# Patient Record
Sex: FEMALE | Race: WHITE | NOT HISPANIC OR LATINO | Employment: OTHER | URBAN - METROPOLITAN AREA
[De-identification: names, ages, dates, MRNs, and addresses within clinical notes are randomized per-mention and may not be internally consistent; named-entity substitution may affect disease eponyms.]

---

## 2017-03-27 LAB — HBA1C MFR BLD HPLC: 5.2 %

## 2018-04-13 LAB — HBA1C MFR BLD HPLC: 5.3 %

## 2021-02-09 ENCOUNTER — IMMUNIZATIONS (OUTPATIENT)
Dept: FAMILY MEDICINE CLINIC | Facility: HOSPITAL | Age: 81
End: 2021-02-09

## 2021-02-09 DIAGNOSIS — Z23 ENCOUNTER FOR IMMUNIZATION: Primary | ICD-10-CM

## 2021-02-09 PROCEDURE — 0011A SARS-COV-2 / COVID-19 MRNA VACCINE (MODERNA) 100 MCG: CPT

## 2021-02-09 PROCEDURE — 91301 SARS-COV-2 / COVID-19 MRNA VACCINE (MODERNA) 100 MCG: CPT

## 2021-03-09 ENCOUNTER — IMMUNIZATIONS (OUTPATIENT)
Dept: FAMILY MEDICINE CLINIC | Facility: HOSPITAL | Age: 81
End: 2021-03-09

## 2021-03-09 DIAGNOSIS — Z23 ENCOUNTER FOR IMMUNIZATION: Primary | ICD-10-CM

## 2021-03-09 PROCEDURE — 0012A SARS-COV-2 / COVID-19 MRNA VACCINE (MODERNA) 100 MCG: CPT

## 2021-03-09 PROCEDURE — 91301 SARS-COV-2 / COVID-19 MRNA VACCINE (MODERNA) 100 MCG: CPT

## 2021-03-15 RX ORDER — ASPIRIN 81 MG/1
81 TABLET, CHEWABLE ORAL DAILY
COMMUNITY
End: 2021-11-11

## 2021-03-15 RX ORDER — NYSTATIN 100000 [USP'U]/G
POWDER TOPICAL DAILY
COMMUNITY
Start: 2017-05-01 | End: 2021-03-16 | Stop reason: ALTCHOICE

## 2021-03-15 RX ORDER — CICLOPIROX OLAMINE 7.7 MG/100ML
SUSPENSION TOPICAL 2 TIMES DAILY
COMMUNITY
Start: 2018-04-09 | End: 2021-03-16 | Stop reason: ALTCHOICE

## 2021-03-15 RX ORDER — NAPROXEN 500 MG/1
500 TABLET ORAL 2 TIMES DAILY WITH MEALS
COMMUNITY
Start: 2018-11-27 | End: 2021-03-16 | Stop reason: ALTCHOICE

## 2021-03-15 RX ORDER — AMLODIPINE BESYLATE 5 MG/1
5 TABLET ORAL DAILY
COMMUNITY
Start: 2020-12-24 | End: 2021-06-18 | Stop reason: SDUPTHER

## 2021-03-15 RX ORDER — CLOBETASOL PROPIONATE 0.5 MG/G
OINTMENT TOPICAL AS NEEDED
COMMUNITY
Start: 2018-03-15

## 2021-03-15 NOTE — PROGRESS NOTES
FAMILY MEDICINE NEW PATIENT     Date of Service: 21  Primary Care Provider:   Anna Araya MD     Name: Randol Gottron       : 1940       Age:80 y o  Sex: female      MRN: [id number]      Chief Complaint:New Patient Visit     ASSESSMENT and PLAN:  Randol Gottron is a 68 y o  female here to establish care with:     Problem List Items Addressed This Visit        Cardiovascular and Mediastinum    Essential hypertension - Primary     BP Readings from Last 3 Encounters:   21 126/80     Controlled today, continue amlodipine 5 mg         Relevant Medications    amLODIPine (NORVASC) 5 mg tablet      Other Visit Diagnoses     Subscapularis tendinitis, left        Relevant Orders    Ambulatory referral to Physical Therapy    Encounter to establish care               SUBJECTIVE:  Randol Gottron is a 68 y o  female who presents today with a chief complaint of New Patient Visit  HPI     She recently moved to [facility] from [place]  She retired from [employer] when she was 64  She has 3 children and 4 grandchildren  Her daughter is in [place], one son is in [address] [place] [address], and her other son is in [place]  Her grandchildren are in college are near [facility]  Hypertension   She has history of high blood pressure is currently on amlodipine 5 mg  She does not check her blood pressure at home  She gets regular physical activity with walking, home aerobic exercises  She denies symptoms of hyper or hypotension  She has been seeing cardiology for PVCs, she will establish with cardiologist here  She had NM stress test in November which was normal      Shoulder Pain/MSK issues/Neck Pain  She had been having left shoulder pain for several months  She reports this was worse in December, she could not sleep on that side and had limited range of motion  The shoulder is better now than it was before   She has had a number of ortho issues, 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, has had steroid, gel, and PRP injection in knee and right shoulder  She has done PT in the past for her right shoulder, knee and hip pain  She reports posterior neck pain, shoulder pain  She describes the pain as dull pain  She typically takes Advil for pain, she reports that this is helpful for her  If pain is severe she will alternate Tylenol and ibuprofen  Review of Systems   Constitutional: Negative  Eyes: Negative for visual disturbance  Respiratory: Negative for shortness of breath  Cardiovascular: Negative for chest pain, palpitations and leg swelling  Musculoskeletal: Positive for arthralgias and neck pain  Negative for back pain, gait problem, joint swelling and neck stiffness  Neurological: Negative for weakness and numbness  Psychiatric/Behavioral: Negative for dysphoric mood and sleep disturbance  I have reviewed the patient's PMH, Surgical History, Family History, Social History, Medication List and Allergies        Histories Reviewed and Updated 3/16/2021:  Patient's Medications   New Prescriptions    No medications on file   Previous Medications    AMLODIPINE (NORVASC) 5 MG TABLET    Take 5 mg by mouth daily    ASPIRIN (ASPIRIN 81) 81 MG CHEWABLE TABLET    Chew 81 mg daily    CHOLECALCIFEROL (VITAMIN D3) 1,000 UNITS TABLET    Take 1,000 Units by mouth daily    CLOBETASOL (TEMOVATE) 0 05 % OINTMENT    Apply topically as needed     DOCUSATE SODIUM (COLACE) 100 MG CAPSULE    Take 100 mg by mouth daily   Modified Medications    No medications on file   Discontinued Medications    CICLOPIROX (LOPROX) 0 77 % SUSP    Apply topically 2 (two) times a day    NAPROXEN (NAPROSYN) 500 MG TABLET    Take 500 mg by mouth 2 (two) times a day with meals    NYSTATIN (MYCOSTATIN) POWDER    Apply topically daily     Allergies   Allergen Reactions    Nitrofurantoin Nausea Only, Dizziness and Headache     Macrodantin     Past Medical History:   Diagnosis Date    Benign essential hypertension     Hyperlipidemia, unspecified     Osteoarthritis of left knee      Past Surgical History:   Procedure Laterality Date    COLON SURGERY  2003    Rectoseal     LAPAROSCOPIC OVARIAN CYSTECTOMY Right 1981    LAPAROSCOPIC OVARIAN CYSTECTOMY Left 2003    rectoseal and cystoseal repari    TONSILLECTOMY AND ADENOIDECTOMY  1952    TUBAL LIGATION  1972     Social History     Socioeconomic History    Marital status: /Civil Union     Spouse name: Not on file    Number of children: Not on file    Years of education: Not on file    Highest education level: Not on file   Occupational History    Not on file   Social Needs    Financial resource strain: Not on file    Food insecurity     Worry: Not on file     Inability: Not on file   [employer] needs     Medical: Not on file     Non-medical: Not on file   Tobacco Use    Smoking status: Never Smoker    Smokeless tobacco: Never Used   Substance and Sexual Activity    Alcohol use:  Yes     Alcohol/week: 7 0 standard drinks     Types: 7 Glasses of wine per week     Frequency: 4 or more times a week     Drinks per session: 1 or 2    Drug use: Never    Sexual activity: Not on file   Lifestyle    Physical activity     Days per week: Not on file     Minutes per session: Not on file    Stress: Not on file   Relationships    Social connections     Talks on phone: Not on file     Gets together: Not on file     Attends Amish service: Not on file     Active member of club or organization: Not on file     Attends meetings of clubs or organizations: Not on file     Relationship status: Not on file    Intimate partner violence     Fear of current or ex partner: Not on file     Emotionally abused: Not on file     Physically abused: Not on file     Forced sexual activity: Not on file   Other Topics Concern    Not on file   Social History Narrative    Not on file     Family History   Problem Relation Age of Onset    Heart Valve Disease Mother     Hypertension Mother    Meg Mitchell Arthritis Father     Diabetes Father     Alcohol abuse Father     Prostate cancer Father     Diabetes Brother     Hypertension Daughter      Immunization History   Administered Date(s) Administered    INFLUENZA 10/21/2020    Pneumococcal Polysaccharide PPV23 11/05/2014    SARS-CoV-2 / COVID-19 mRNA IM (Moderna) 02/09/2021, 03/09/2021    Zoster Vaccine Recombinant 06/10/2018, 09/18/2018     OBJECTIVE:  /80   Pulse 74   Temp (!) 96 5 °F (35 8 °C)   Resp 14   Ht 5' 5 5" (1 664 m)   Wt 83 5 kg (184 lb)   Breastfeeding No   BMI 30 15 kg/m²   BP Readings from Last 3 Encounters:   03/16/21 126/80      Wt Readings from Last 3 Encounters:   03/16/21 83 5 kg (184 lb)      Physical Exam  Constitutional:       General: She is not in acute distress  Appearance: Normal appearance  She is normal weight  She is not ill-appearing or diaphoretic  HENT:      Head: Normocephalic and atraumatic  Right Ear: External ear normal       Left Ear: External ear normal    Eyes:      Extraocular Movements: Extraocular movements intact  Conjunctiva/sclera: Conjunctivae normal    Neck:      Musculoskeletal: Normal range of motion and neck supple  Cardiovascular:      Rate and Rhythm: Normal rate and regular rhythm  Pulses: Normal pulses  Heart sounds: Normal heart sounds  No murmur  No gallop  Pulmonary:      Effort: Pulmonary effort is normal  No respiratory distress  Breath sounds: Normal breath sounds  Abdominal:      General: There is no distension  Palpations: Abdomen is soft  Tenderness: There is no abdominal tenderness  Musculoskeletal: Normal range of motion  Left shoulder: She exhibits tenderness (in deltoid) and pain  She exhibits normal range of motion and no bony tenderness  Right lower leg: No edema  Left lower leg: No edema        Comments: Negative Hawkings on the left, positive Neers and lift off test on the left     Skin:     General: Skin is warm and dry  Findings: No erythema or rash  Neurological:      General: No focal deficit present  Mental Status: She is alert  Gait: Gait normal    Psychiatric:         Mood and Affect: Mood normal          Behavior: Behavior normal          Labs July 2020  Hgb/Hct 15 7/47 7  WBC 3 36  Plt 177    Na 139  K  5 1  Cl 104  HCO3 26  BUN 20  Cr 0 73  Gluc 90    TSH 1 660    Cholesterol 206 TG 56 HDL 88     Labs from November 2020  BUN/CR 18/0 71  Na 141  K 4 0  GFR 66    Chol 211 TG 58   HDL 92  BMI Counseling: Body mass index is 30 15 kg/m²  The BMI is above normal  Nutrition recommendations include encouraging healthy choices of fruits and vegetables and reducing intake of saturated and trans fat  Exercise recommendations include moderate physical activity 150 minutes/week and strength training exercises           Haider Kauffman MD

## 2021-03-16 ENCOUNTER — OFFICE VISIT (OUTPATIENT)
Dept: FAMILY MEDICINE CLINIC | Facility: CLINIC | Age: 81
End: 2021-03-16
Payer: COMMERCIAL

## 2021-03-16 VITALS
HEIGHT: 66 IN | HEART RATE: 74 BPM | DIASTOLIC BLOOD PRESSURE: 80 MMHG | SYSTOLIC BLOOD PRESSURE: 126 MMHG | BODY MASS INDEX: 29.57 KG/M2 | WEIGHT: 184 LBS | TEMPERATURE: 96.5 F | RESPIRATION RATE: 14 BRPM

## 2021-03-16 DIAGNOSIS — Z76.89 ENCOUNTER TO ESTABLISH CARE: ICD-10-CM

## 2021-03-16 DIAGNOSIS — I10 ESSENTIAL HYPERTENSION: Primary | ICD-10-CM

## 2021-03-16 DIAGNOSIS — M77.8 SUBSCAPULARIS TENDINITIS, LEFT: ICD-10-CM

## 2021-03-16 PROBLEM — G89.29 CHRONIC LEFT SHOULDER PAIN: Status: ACTIVE | Noted: 2021-03-16

## 2021-03-16 PROBLEM — M25.512 CHRONIC LEFT SHOULDER PAIN: Status: ACTIVE | Noted: 2021-03-16

## 2021-03-16 PROCEDURE — 3725F SCREEN DEPRESSION PERFORMED: CPT | Performed by: FAMILY MEDICINE

## 2021-03-16 PROCEDURE — 1101F PT FALLS ASSESS-DOCD LE1/YR: CPT | Performed by: FAMILY MEDICINE

## 2021-03-16 PROCEDURE — 1036F TOBACCO NON-USER: CPT | Performed by: FAMILY MEDICINE

## 2021-03-16 PROCEDURE — 99203 OFFICE O/P NEW LOW 30 MIN: CPT | Performed by: FAMILY MEDICINE

## 2021-03-16 PROCEDURE — 3288F FALL RISK ASSESSMENT DOCD: CPT | Performed by: FAMILY MEDICINE

## 2021-03-16 PROCEDURE — 1160F RVW MEDS BY RX/DR IN RCRD: CPT | Performed by: FAMILY MEDICINE

## 2021-03-16 RX ORDER — DOCUSATE SODIUM 100 MG/1
100 CAPSULE, LIQUID FILLED ORAL DAILY
COMMUNITY

## 2021-03-16 RX ORDER — MELATONIN
1000 DAILY
COMMUNITY

## 2021-03-16 NOTE — ASSESSMENT & PLAN NOTE
Likely due to arthritis, rotator cuff tendon dysfunction, primarily in subscapularis   Will have patient do PT

## 2021-03-16 NOTE — ASSESSMENT & PLAN NOTE
BP Readings from Last 3 Encounters:   03/16/21 126/80     Controlled today, continue amlodipine 5 mg

## 2021-04-15 ENCOUNTER — OFFICE VISIT (OUTPATIENT)
Dept: CARDIOLOGY CLINIC | Facility: CLINIC | Age: 81
End: 2021-04-15
Payer: COMMERCIAL

## 2021-04-15 VITALS
OXYGEN SATURATION: 100 % | BODY MASS INDEX: 28.93 KG/M2 | WEIGHT: 180 LBS | HEART RATE: 73 BPM | DIASTOLIC BLOOD PRESSURE: 80 MMHG | HEIGHT: 66 IN | SYSTOLIC BLOOD PRESSURE: 144 MMHG

## 2021-04-15 DIAGNOSIS — I10 ESSENTIAL HYPERTENSION: Primary | ICD-10-CM

## 2021-04-15 DIAGNOSIS — R06.00 DOE (DYSPNEA ON EXERTION): ICD-10-CM

## 2021-04-15 PROBLEM — R06.09 DOE (DYSPNEA ON EXERTION): Status: ACTIVE | Noted: 2021-04-15

## 2021-04-15 PROCEDURE — 1160F RVW MEDS BY RX/DR IN RCRD: CPT | Performed by: INTERNAL MEDICINE

## 2021-04-15 PROCEDURE — 3077F SYST BP >= 140 MM HG: CPT | Performed by: INTERNAL MEDICINE

## 2021-04-15 PROCEDURE — 1036F TOBACCO NON-USER: CPT | Performed by: INTERNAL MEDICINE

## 2021-04-15 PROCEDURE — 3079F DIAST BP 80-89 MM HG: CPT | Performed by: INTERNAL MEDICINE

## 2021-04-15 PROCEDURE — 99204 OFFICE O/P NEW MOD 45 MIN: CPT | Performed by: INTERNAL MEDICINE

## 2021-04-15 PROCEDURE — 93000 ELECTROCARDIOGRAM COMPLETE: CPT | Performed by: INTERNAL MEDICINE

## 2021-04-15 NOTE — PROGRESS NOTES
Cardiology Consultation     Kiesha Vazquez  [id number]  1940  [address] [facility] [id number]    1  Essential hypertension  POCT ECG   2  MENON (dyspnea on exertion)       Chief Complaint   Patient presents with    [facility]    Shortness of Breath     exertion, and when talking     Leg Swelling     right      HPI: Patient is here for establishment of cardiac care  She does have a family history of MVP and MVR in her mother  She does note some shortness of breath with exertion and with talking - this is unchanged for years now  She has increased her walking to 6-7000 steps a day and is able to walk and do exercise videos and doing well on a flat surface - with hills, she does get a little out of breath  No reported chest pain, palpitations, lightheadedness, syncope, LE edema, orthopnea, PND, or significant weight changes  Patient remains active without any increased fatigue out of the ordinary        Patient Active Problem List   Diagnosis    Essential hypertension    Chronic left shoulder pain    MENON (dyspnea on exertion)     Past Medical History:   Diagnosis Date    Benign essential hypertension     Hyperlipidemia, unspecified     Osteoarthritis of left knee      Social History     Socioeconomic History    Marital status: /Civil Union     Spouse name: Not on file    Number of children: Not on file    Years of education: Not on file    Highest education level: Not on file   Occupational History    Not on file   Social Needs    Financial resource strain: Not on file    Food insecurity     Worry: Not on file     Inability: Not on file    Transportation needs     Medical: Not on file     Non-medical: Not on file   Tobacco Use    Smoking status: Never Smoker    Smokeless tobacco: Never Used   Substance and Sexual Activity    Alcohol use:  Yes     Alcohol/week: 7 0 standard drinks     Types: 7 Glasses of wine per week     Frequency: 4 or more times a week     Drinks per session: 1 or 2    Drug use: Never    Sexual activity: Not on file   Lifestyle    Physical activity     Days per week: Not on file     Minutes per session: Not on file    Stress: Not on file   Relationships    Social connections     Talks on phone: Not on file     Gets together: Not on file     Attends Judaism service: Not on file     Active member of club or organization: Not on file     Attends meetings of clubs or organizations: Not on file     Relationship status: Not on file    Intimate partner violence     Fear of current or ex partner: Not on file     Emotionally abused: Not on file     Physically abused: Not on file     Forced sexual activity: Not on file   Other Topics Concern    Not on file   Social History Narrative    Not on file      Family History   Problem Relation Age of Onset    Heart Valve Disease Mother     Hypertension Mother     Arthritis Father     Diabetes Father     Alcohol abuse Father     Prostate cancer Father     Diabetes Brother     Hypertension Daughter      Past Surgical History:   Procedure Laterality Date    COLON SURGERY  2003    Rectoseal     LAPAROSCOPIC OVARIAN CYSTECTOMY Right 1981    LAPAROSCOPIC OVARIAN CYSTECTOMY Left 2003    rectoseal and cystoseal repari    TONSILLECTOMY AND [place]       Current Outpatient Medications:     amLODIPine (NORVASC) 5 mg tablet, Take 5 mg by mouth daily, Disp: , Rfl:     cholecalciferol (VITAMIN D3) 1,000 units tablet, Take 1,000 Units by mouth daily, Disp: , Rfl:     clobetasol (TEMOVATE) 0 05 % ointment, Apply topically as needed , Disp: , Rfl:     docusate sodium (COLACE) 100 mg capsule, Take 100 mg by mouth daily, Disp: , Rfl:     aspirin (Aspirin 81) 81 mg chewable tablet, Chew 81 mg daily, Disp: , Rfl:   Allergies   Allergen Reactions    Nitrofurantoin Nausea Only, Dizziness and Headache     Macrodantin     Vitals:    04/15/21 1031   BP: 144/80   BP Location: Left arm   Patient Position: Sitting   Cuff Size: Standard   Pulse: 73   SpO2: 100%   Weight: 81 6 kg (180 lb)   Height: 5' 5 5" (1 664 m)       Labs:  No visits with results within 6 Month(s) from this visit  Latest known visit with results is:   Orders Only on 04/13/2018   Component Date Value    Hemoglobin A1C 04/13/2018 5 3      No results found for: CHOL, TRIG, HDL, LDLDIRECT  Imaging: No results found  Review of Systems:  Review of Systems   Constitutional: Negative for activity change, appetite change, chills, diaphoresis, fatigue and unexpected weight change  HENT: Negative for hearing loss, nosebleeds and sore throat  Eyes: Negative for photophobia and visual disturbance  Respiratory: Positive for shortness of breath  Negative for cough, chest tightness and wheezing  Cardiovascular: Negative for chest pain, palpitations and leg swelling  Gastrointestinal: Negative for abdominal pain, diarrhea, nausea and vomiting  Endocrine: Negative for polyuria  Genitourinary: Negative for dysuria, frequency and hematuria  Musculoskeletal: Negative for arthralgias, back pain, gait problem and neck pain  Skin: Negative for pallor and rash  Neurological: Negative for dizziness, syncope and headaches  Hematological: Does not bruise/bleed easily  Psychiatric/Behavioral: Negative for behavioral problems and confusion  Physical Exam:  Physical Exam  Vitals signs reviewed  Constitutional:       Appearance: She is well-developed  She is not diaphoretic  HENT:      Head: Normocephalic and atraumatic  Nose: Nose normal    Eyes:      General: No scleral icterus  Pupils: Pupils are equal, round, and reactive to light  Neck:      Musculoskeletal: Normal range of motion and neck supple  Vascular: No JVD  Cardiovascular:      Rate and Rhythm: Normal rate and regular rhythm  Heart sounds: Normal heart sounds  No murmur  No friction rub  No gallop  Pulmonary:      Effort: Pulmonary effort is normal  No respiratory distress  Breath sounds: Normal breath sounds  No wheezing or rales  Abdominal:      General: Bowel sounds are normal  There is no distension  Palpations: Abdomen is soft  Tenderness: There is no abdominal tenderness  Musculoskeletal: Normal range of motion  General: No deformity  Skin:     General: Skin is warm and dry  Findings: No rash  Neurological:      Mental Status: She is alert and oriented to person, place, and time  Cranial Nerves: No cranial nerve deficit  Psychiatric:         Behavior: Behavior normal        Blood pressure 144/80, pulse 73, height 5' 5 5" (1 664 m), weight 81 6 kg (180 lb), SpO2 100 %, not currently breastfeeding  EKG:  Normal sinus rhythm with sinus arrhythmia  Nonspecific ST abnormality  Abnormal ECG    Discussion/Summary:  HTN: mildly elevated today, continued on amlodipine  Had a nuclear stress test in Oct 2020 that was normal for EF and perfusion  Echocardiogram at that time revealed normal LV & RV function, normal valves  She has had MENON for many years - also noted by prior cardiologist documentation in 2017  For now, will hold off on repeat testing  If BP remains at this level, would consider increasing amlodipine to 10mg daily     in Nov 2020, which is at goal

## 2021-04-15 NOTE — PATIENT INSTRUCTIONS

## 2021-06-11 ENCOUNTER — OFFICE VISIT (OUTPATIENT)
Dept: URGENT CARE | Facility: CLINIC | Age: 81
End: 2021-06-11
Payer: COMMERCIAL

## 2021-06-11 VITALS
WEIGHT: 180 LBS | HEART RATE: 77 BPM | RESPIRATION RATE: 20 BRPM | SYSTOLIC BLOOD PRESSURE: 134 MMHG | HEIGHT: 66 IN | BODY MASS INDEX: 28.93 KG/M2 | DIASTOLIC BLOOD PRESSURE: 65 MMHG

## 2021-06-11 DIAGNOSIS — S80.01XA CONTUSION OF RIGHT KNEE, INITIAL ENCOUNTER: ICD-10-CM

## 2021-06-11 DIAGNOSIS — S00.03XA CONTUSION OF SCALP, INITIAL ENCOUNTER: Primary | ICD-10-CM

## 2021-06-11 PROCEDURE — S9083 URGENT CARE CENTER GLOBAL: HCPCS | Performed by: NURSE PRACTITIONER

## 2021-06-11 PROCEDURE — 99213 OFFICE O/P EST LOW 20 MIN: CPT | Performed by: NURSE PRACTITIONER

## 2021-06-18 DIAGNOSIS — I10 ESSENTIAL HYPERTENSION: Primary | ICD-10-CM

## 2021-06-18 RX ORDER — AMLODIPINE BESYLATE 5 MG/1
5 TABLET ORAL DAILY
Qty: 90 TABLET | Refills: 3 | Status: SHIPPED | OUTPATIENT
Start: 2021-06-18 | End: 2022-06-08 | Stop reason: SDUPTHER

## 2021-07-06 ENCOUNTER — RA CDI HCC (OUTPATIENT)
Dept: OTHER | Facility: HOSPITAL | Age: 81
End: 2021-07-06

## 2021-07-06 NOTE — PROGRESS NOTES
[facility]ach [facility]  coding opportunities          Chart reviewed, no opportunity found: CHART REVIEWED, NO OPPORTUNITY FOUND                     Patients insurance company: [facility] [address]  (Medicare Advantage and Commercial)

## 2021-07-09 ENCOUNTER — OFFICE VISIT (OUTPATIENT)
Dept: FAMILY MEDICINE CLINIC | Facility: CLINIC | Age: 81
End: 2021-07-09
Payer: COMMERCIAL

## 2021-07-09 VITALS
BODY MASS INDEX: 29.41 KG/M2 | RESPIRATION RATE: 16 BRPM | HEART RATE: 80 BPM | DIASTOLIC BLOOD PRESSURE: 70 MMHG | SYSTOLIC BLOOD PRESSURE: 122 MMHG | WEIGHT: 183 LBS | TEMPERATURE: 96.6 F | HEIGHT: 66 IN

## 2021-07-09 DIAGNOSIS — Z12.31 ENCOUNTER FOR SCREENING MAMMOGRAM FOR BREAST CANCER: ICD-10-CM

## 2021-07-09 DIAGNOSIS — I10 ESSENTIAL HYPERTENSION: ICD-10-CM

## 2021-07-09 DIAGNOSIS — G89.29 CHRONIC LEFT SHOULDER PAIN: ICD-10-CM

## 2021-07-09 DIAGNOSIS — Z00.00 MEDICARE ANNUAL WELLNESS VISIT, SUBSEQUENT: Primary | ICD-10-CM

## 2021-07-09 DIAGNOSIS — L90.0 LICHEN SCLEROSUS: ICD-10-CM

## 2021-07-09 DIAGNOSIS — M70.61 GREATER TROCHANTERIC BURSITIS OF RIGHT HIP: ICD-10-CM

## 2021-07-09 DIAGNOSIS — R06.00 DOE (DYSPNEA ON EXERTION): ICD-10-CM

## 2021-07-09 DIAGNOSIS — D17.23 LIPOMA OF RIGHT LOWER EXTREMITY: ICD-10-CM

## 2021-07-09 DIAGNOSIS — M25.512 CHRONIC LEFT SHOULDER PAIN: ICD-10-CM

## 2021-07-09 DIAGNOSIS — E78.00 PURE HYPERCHOLESTEROLEMIA: ICD-10-CM

## 2021-07-09 PROCEDURE — 3288F FALL RISK ASSESSMENT DOCD: CPT | Performed by: FAMILY MEDICINE

## 2021-07-09 PROCEDURE — 99214 OFFICE O/P EST MOD 30 MIN: CPT | Performed by: FAMILY MEDICINE

## 2021-07-09 PROCEDURE — 1036F TOBACCO NON-USER: CPT | Performed by: FAMILY MEDICINE

## 2021-07-09 PROCEDURE — G0439 PPPS, SUBSEQ VISIT: HCPCS | Performed by: FAMILY MEDICINE

## 2021-07-09 PROCEDURE — 1125F AMNT PAIN NOTED PAIN PRSNT: CPT | Performed by: FAMILY MEDICINE

## 2021-07-09 PROCEDURE — 3074F SYST BP LT 130 MM HG: CPT | Performed by: FAMILY MEDICINE

## 2021-07-09 PROCEDURE — 1101F PT FALLS ASSESS-DOCD LE1/YR: CPT | Performed by: FAMILY MEDICINE

## 2021-07-09 PROCEDURE — 3078F DIAST BP <80 MM HG: CPT | Performed by: FAMILY MEDICINE

## 2021-07-09 PROCEDURE — 3725F SCREEN DEPRESSION PERFORMED: CPT | Performed by: FAMILY MEDICINE

## 2021-07-09 PROCEDURE — 1170F FXNL STATUS ASSESSED: CPT | Performed by: FAMILY MEDICINE

## 2021-07-09 PROCEDURE — 1160F RVW MEDS BY RX/DR IN RCRD: CPT | Performed by: FAMILY MEDICINE

## 2021-07-09 NOTE — ASSESSMENT & PLAN NOTE
BP Readings from Last 3 Encounters:   07/09/21 122/70   06/11/21 134/65   04/15/21 144/80     Controlled today, continue amlodipine 5 mg

## 2021-07-09 NOTE — ASSESSMENT & PLAN NOTE
Patient has had improvement in shoulder range of motion after completing physical therapy advised that she continue home therapy PE activities to maintain and continue to improve range of motion, mobility, strength, flexibility in her left shoulder

## 2021-07-09 NOTE — PROGRESS NOTES
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, she does have tenderness over her right greater trochanter on exam today  Discussed if symptoms worsen we can consider corticosteroid injection  Other    Chronic left shoulder pain     Patient has had improvement in shoulder range of motion after completing physical therapy advised that she continue home therapy PE activities to maintain and continue to improve range of motion, mobility, strength, flexibility in her left shoulder         MENON (dyspnea on exertion)     Patient reports that she breathe out at the end of talking, she did not know cyst initially her daughter did  She is now very bothered by it  She does report some shortness of breath on exertion, however she is very active walks daily goes daily exercise classes and does not have any chest pain  I believe her exhale at the end of talking is habitual, she does admit to doing it when she feels exasperated  Discussed that this is not uncommon in reassured patient         Lichen sclerosus     Patient use a topical corticosteroid, symptoms are controlled         Lipoma of right lower extremity     Swelling on right lower extremity is a lipoma, there is no edema there is no tenderness to palpation there are no signs or symptoms of a DVT  Low lipoma is located on the anterior lower leg just distal to medial joint line of the knee, it measures about 4-5 cm  Reassured patient, discussed that no further workup is indicated at this time    Definitive management would be surgical  Other Visit Diagnoses     Medicare annual wellness visit, subsequent    -  Primary    Pure hypercholesterolemia        Relevant Orders    Lipid Panel with Direct LDL reflex    Encounter for screening mammogram for breast cancer        Relevant Orders    Mammo screening bilateral w 3d & cad          Falls Plan of Care: balance, strength, and gait training instructions were provided  Preventive health issues were discussed with patient, and age appropriate screening tests were ordered as noted in patient's After Visit Summary  Personalized health advice and appropriate referrals for health education or preventive services given if needed, as noted in patient's After Visit Summary       History of Present Illness:     Patient presents for Medicare Annual Wellness visit    Patient Care Team:  Mathews Riedel, MD as PCP - General (Family Medicine)     Problem List:     Patient Active Problem List   Diagnosis    Essential hypertension    Chronic left shoulder pain    MENON (dyspnea on exertion)    Lichen sclerosus    Greater trochanteric bursitis of right hip    Lipoma of right lower extremity      Past Medical and Surgical History:     Past Medical History:   Diagnosis Date    Benign essential hypertension     Hyperlipidemia, unspecified     Osteoarthritis of left knee      Past Surgical History:   Procedure Laterality Date    COLON SURGERY  2003    Rectoseal     LAPAROSCOPIC OVARIAN CYSTECTOMY Right 1981    LAPAROSCOPIC OVARIAN CYSTECTOMY Left 2003    rectoseal and cystoseal repari    TONSILLECTOMY AND ADENOIDECTOMY  1952    TUBAL LIGATION  1972      Family History:     Family History   Problem Relation Age of Onset    Heart Valve Disease Mother     Hypertension Mother     Arthritis Father     Diabetes Father     Alcohol abuse Father     Prostate cancer Father     Diabetes Brother     Hypertension Daughter       Social History:     Social History     Socioeconomic History    Marital status: /Civil [employer]     Spouse name: None    Number of children: None    Years of education: None    Highest education level: None   Occupational History    None   Tobacco Use    Smoking status: Never Smoker    Smokeless tobacco: Never Used   Substance and Sexual Activity    Alcohol use: Yes     Alcohol/week: 7 0 standard drinks     Types: 7 Glasses of wine per week    Drug use: Never    Sexual activity: None   Other Topics Concern    None   Social History Narrative    None     Social Determinants of Health     Financial Resource Strain:     Difficulty of Paying Living Expenses:    Food Insecurity:     Worried About Running Out of Food in the Last Year:     Ran Out of Food in the Last Year:    Transportation Needs:     Lack of Transportation (Medical):  Lack of Transportation (Non-Medical):    Physical Activity:     Days of Exercise per Week:     Minutes of Exercise per Session:    Stress:     Feeling of Stress :    Social Connections:     Frequency of Communication with Friends and Family:     Frequency of Social Gatherings with Friends and Family:     Attends [employer] Services:     Active Member of Clubs or Organizations:     Attends Club or Organization Meetings:     Marital Status:    Intimate Partner Violence:     Fear of Current or Ex-Partner:     Emotionally Abused:     Physically Abused:     Sexually Abused:       Medications and Allergies:     Current Outpatient Medications   Medication Sig Dispense Refill    amLODIPine (NORVASC) 5 mg tablet Take 1 tablet (5 mg total) by mouth daily 90 tablet 3    aspirin (Aspirin 81) 81 mg chewable tablet Chew 81 mg daily      cholecalciferol (VITAMIN D3) 1,000 units tablet Take 1,000 Units by mouth daily      clobetasol (TEMOVATE) 0 05 % ointment Apply topically as needed       docusate sodium (COLACE) 100 mg capsule Take 100 mg by mouth daily       No current facility-administered medications for this visit       Allergies   Allergen Reactions    Nitrofurantoin Nausea Only, Dizziness and Headache     Macrodantin      Immunizations:     Immunization History   Administered Date(s) Administered    INFLUENZA 10/21/2020    Pneumococcal Polysaccharide PPV23 11/05/2014    SARS-CoV-2 / COVID-19 mRNA IM (Moderna) 02/09/2021, 03/09/2021    Zoster Vaccine Recombinant 06/10/2018, 09/18/2018      Health Maintenance:         Topic Date Due    Hepatitis C Screening  Never done         Topic Date Due    DTaP,Tdap,and Td Vaccines (1 - Tdap) Never done    Influenza Vaccine (1) 09/01/2021      Medicare Health Risk Assessment:     /70   Pulse 80   Temp (!) 96 6 °F (35 9 °C)   Resp 16   Ht 5' 5 5" (1 664 m)   Wt 83 kg (183 lb)   BMI 29 99 kg/m²      Vamsi Gipson is here for her Subsequent Wellness visit  Last Medicare Wellness visit information reviewed, patient interviewed and updates made to the record today  Health Risk Assessment:   Patient rates overall health as very good  Patient feels that their physical health rating is slightly better  Patient is very satisfied with their life  Eyesight was rated as same  Hearing was rated as same  Patient feels that their emotional and mental health rating is slightly better  Patients states they are never, rarely angry  Patient states they are sometimes unusually tired/fatigued  Pain experienced in the last 7 days has been some  Patient's pain rating has been 1/10  Patient states that she has experienced no weight loss or gain in last 6 months  Depression Screening:   PHQ-2 Score: 0      Fall Risk Screening: In the past year, patient has experienced: history of falling in past year    Number of falls: 1  Injured during fall?: Yes    Feels unsteady when standing or walking?: No    Worried about falling?: No      Urinary Incontinence Screening:   Patient has leaked urine accidently in the last six months  Home Safety:  Patient does not have trouble with stairs inside or outside of their home  Patient has working smoke alarms and has working carbon monoxide detector  Home safety hazards include: none  Nutrition:   Current diet is Regular, No Added Salt, Low Saturated Fat and Limited junk food  Medications:   Patient is currently taking over-the-counter supplements  OTC medications include: see medication list  Patient is able to manage medications  Activities of Daily Living (ADLs)/Instrumental Activities of Daily Living (IADLs):   Walk and transfer into and out of bed and chair?: Yes  Dress and groom yourself?: Yes    Bathe or shower yourself?: Yes    Feed yourself? Yes  Do your laundry/housekeeping?: Yes  Manage your money, pay your bills and track your expenses?: Yes  Make your own meals?: Yes    Do your own shopping?: Yes    Previous Hospitalizations:   Any hospitalizations or ED visits within the last 12 months?: Yes      Hospitalization Comments: [facility] post fall    Advance Care Planning:   Living will: Yes    Durable POA for healthcare: Yes    Advanced directive: Yes    Advanced directive counseling given: Yes    End of Life Decisions reviewed with patient: Yes      Comments:  is health care power of     She states that she would not want to kept on life support     Cognitive Screening:   Provider or family/friend/caregiver concerned regarding cognition?: No    PREVENTIVE SCREENINGS      Cardiovascular Screening:    General: Screening Not Indicated and History Lipid Disorder      Cervical Cancer Screening:    General: Screening Not Indicated      Lung Cancer Screening:     General: Screening Not Indicated    Screening, Brief Intervention, and Referral to Treatment (SBIRT)    Screening  Typical number of drinks in a day: 1  Typical number of drinks in a week: 5  Interpretation: Low risk drinking behavior      AUDIT-C Screenin) How often did you have a drink containing alcohol in the past year? 2 to 3 times a week  2) How many drinks did you have on a typical day when you were drinking in the past year? 1 to 2  3) How often did you have 6 or more drinks on one occasion in the past year? never    AUDIT-C Score: 3  Interpretation: Score 3-12 (female): POSITIVE screen for alcohol misuse    Single Item Drug Screening:  How often have you used an illegal drug (including marijuana) or a prescription medication for non-medical reasons in the past year? never    Single Item Drug Screen Score: 0  Interpretation: Negative screen for possible drug use disorder    Brief Intervention  Alcohol & drug use screenings were reviewed  No concerns regarding substance use disorder identified  Other Counseling Topics:   Car/seat belt/driving safety and calcium and vitamin D intake         Alejandro Gonzalez MD

## 2021-07-09 NOTE — ASSESSMENT & PLAN NOTE
Swelling on right lower extremity is a lipoma, there is no edema there is no tenderness to palpation there are no signs or symptoms of a DVT  Low lipoma is located on the anterior lower leg just distal to medial joint line of the knee, it measures about 4-5 cm  Reassured patient, discussed that no further workup is indicated at this time    Definitive management would be surgical

## 2021-07-09 NOTE — ASSESSMENT & PLAN NOTE
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

## 2021-07-09 NOTE — PROGRESS NOTES
FAMILY MEDICINE PROGRESS NOTE    Date of Service: 21  Primary Care Provider:   Kari Chavarria MD     Name: Mariana Hidalgo       : 1940       Age:80 y o  Sex: female      MRN: [id number]      Chief Complaint:Medicare Wellness Visit       ASSESSMENT and PLAN:  Mariana Hidalgo is a 68 y o  female with:     Problem List Items Addressed This Visit        Cardiovascular and Mediastinum    Essential hypertension     BP Readings from Last 3 Encounters:   21 122/70   21 134/65   04/15/21 144/80     Controlled today, continue amlodipine 5 mg         Relevant Orders    Comprehensive metabolic panel       Musculoskeletal and Integument    Greater trochanteric bursitis of right hip     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, she does have tenderness over her right greater trochanter on exam today  Discussed if symptoms worsen we can consider corticosteroid injection  Other    Chronic left shoulder pain     Patient has had improvement in shoulder range of motion after completing physical therapy advised that she continue home therapy PE activities to maintain and continue to improve range of motion, mobility, strength, flexibility in her left shoulder         MENON (dyspnea on exertion)     Patient reports that she breathe out at the end of talking, she did not know cyst initially her daughter did  She is now very bothered by it  She does report some shortness of breath on exertion, however she is very active walks daily goes daily exercise classes and does not have any chest pain  I believe her exhale at the end of talking is habitual, she does admit to doing it when she feels exasperated    Discussed that this is not uncommon in reassured patient         Lichen sclerosus     Patient use a topical corticosteroid, symptoms are controlled         Lipoma of right lower extremity     Swelling on right lower extremity is a lipoma, there is no edema there is no tenderness to palpation there are no signs or symptoms of a DVT  Low lipoma is located on the anterior lower leg just distal to medial joint line of the knee, it measures about 4-5 cm  Reassured patient, discussed that no further workup is indicated at this time  Definitive management would be surgical              Other Visit Diagnoses     Medicare annual wellness visit, subsequent    -  Primary    Pure hypercholesterolemia        Relevant Orders    Lipid Panel with Direct LDL reflex    Encounter for screening mammogram for breast cancer        Relevant Orders    Mammo screening bilateral w 3d & cad          SUBJECTIVE:  Roxane Mckeon is a 68 y o  female who presents today with a chief complaint of Medicare Wellness Visit  HPI     Patient presents today for follow-up and annual wellness visit  Below are patient has concerns for today:    She had a fall at home several weeks ago  She tripped on a carpet and fell on her left knee and hit her forehead  She was seen at [facility]  She still has some pain in her knee with kneeling, she no longer has swelling  She was seen by her cardiologist who resumed her on aspirin  Today she reports that she has history of PVCs  She does have ITB on the right leg  This will bother her if she lays on it at night  She does report some right hip pain as well  She reports that her left shoulder did improve with PT, she is able to reach back behind her abd out her jacket on now  She does have pain if she lays on that left side  In addition she has some swelling in the front part of her calf, she measures and the right side if bigger  Patient also reports that her daughter has noticed that she exhales at the end of talking  She will have some shortness of breath with exercise, but not chest pain  She did not notice this at first, her daughter pointed it out to her  She does not have other respiratory symptoms       Review of Systems   Constitutional: Negative for activity change, appetite change, chills and fever  HENT: Negative for congestion, postnasal drip, rhinorrhea, sore throat and trouble swallowing  Respiratory: Positive for shortness of breath  Cardiovascular: Positive for leg swelling  Negative for chest pain and palpitations  Gastrointestinal: Negative for constipation and diarrhea  Musculoskeletal: Positive for arthralgias  Negative for back pain, joint swelling and neck pain  Skin: Negative for color change and rash  Psychiatric/Behavioral: Negative for dysphoric mood and sleep disturbance  I have reviewed the patient's Past Medical History  Current Outpatient Medications:     amLODIPine (NORVASC) 5 mg tablet, Take 1 tablet (5 mg total) by mouth daily, Disp: 90 tablet, Rfl: 3    aspirin (Aspirin 81) 81 mg chewable tablet, Chew 81 mg daily, Disp: , Rfl:     cholecalciferol (VITAMIN D3) 1,000 units tablet, Take 1,000 Units by mouth daily, Disp: , Rfl:     clobetasol (TEMOVATE) 0 05 % ointment, Apply topically as needed , Disp: , Rfl:     docusate sodium (COLACE) 100 mg capsule, Take 100 mg by mouth daily, Disp: , Rfl:     OBJECTIVE:  /70   Pulse 80   Temp (!) 96 6 °F (35 9 °C)   Resp 16   Ht 5' 5 5" (1 664 m)   Wt 83 kg (183 lb)   BMI 29 99 kg/m²    BP Readings from Last 3 Encounters:   07/09/21 122/70   06/11/21 134/65   04/15/21 144/80      Wt Readings from Last 3 Encounters:   07/09/21 83 kg (183 lb)   06/11/21 81 6 kg (180 lb)   04/15/21 81 6 kg (180 lb)      Physical Exam  Constitutional:       General: She is not in acute distress  Appearance: Normal appearance  She is not ill-appearing or toxic-appearing  HENT:      Head: Normocephalic and atraumatic  Right Ear: Tympanic membrane and external ear normal       Left Ear: Tympanic membrane and external ear normal       Mouth/Throat:      Mouth: Mucous membranes are moist    Eyes:      Extraocular Movements: Extraocular movements intact        Conjunctiva/sclera: Conjunctivae normal  Cardiovascular:      Rate and Rhythm: Normal rate and regular rhythm  Pulses: Normal pulses  Heart sounds: Normal heart sounds  No murmur heard  No friction rub  Pulmonary:      Effort: Pulmonary effort is normal  No respiratory distress  Breath sounds: Normal breath sounds  No stridor  No wheezing, rhonchi or rales  Abdominal:      General: There is no distension  Palpations: Abdomen is soft  Tenderness: There is no abdominal tenderness  There is no right CVA tenderness or left CVA tenderness  Musculoskeletal:         General: Normal range of motion  Cervical back: Normal range of motion and neck supple  No rigidity  Right hip: Tenderness (greater trochanter ) present  Right lower leg: No edema  Left lower leg: No edema  Legs:    Lymphadenopathy:      Cervical: No cervical adenopathy  Skin:     General: Skin is warm and dry  Findings: No erythema or rash  Neurological:      General: No focal deficit present  Mental Status: She is alert and oriented to person, place, and time  Psychiatric:         Mood and Affect: Mood normal          Behavior: Behavior normal                 Return in about 4 months (around 11/8/2021)  Flaco Richard MD    Note: Portions of the record have been created with voice recognition software  Occasional wrong word or "sound a like" substitutions may have occurred due to the inherent limitations of voice recognition software  Read the chart carefully and recognize, using context, where substitutions have occurred

## 2021-07-09 NOTE — ASSESSMENT & PLAN NOTE
Patient reports that she breathe out at the end of talking, she did not know cyst initially her daughter did  She is now very bothered by it  She does report some shortness of breath on exertion, however she is very active walks daily goes daily exercise classes and does not have any chest pain  I believe her exhale at the end of talking is habitual, she does admit to doing it when she feels exasperated    Discussed that this is not uncommon in reassured patient

## 2021-07-09 NOTE — PATIENT INSTRUCTIONS
Medicare Preventive Visit Patient Instructions  Thank you for completing your Welcome to Medicare Visit or Medicare Annual Wellness Visit today  Your next wellness visit will be due in one year (7/10/2022)  The screening/preventive services that you may require over the next 5-10 years are detailed below  Some tests may not apply to you based off risk factors and/or age  Screening tests ordered at today's visit but not completed yet may show as past due  Also, please note that scanned in results may not display below  Preventive Screenings:  Service Recommendations Previous Testing/Comments   Colorectal Cancer Screening  * Colonoscopy    * Fecal Occult Blood Test (FOBT)/Fecal Immunochemical Test (FIT)  * Fecal DNA/Cologuard Test  * Flexible Sigmoidoscopy Age: 54-65 years old   Colonoscopy: every 10 years (may be performed more frequently if at higher risk)  OR  FOBT/FIT: every 1 year  OR  Cologuard: every 3 years  OR  Sigmoidoscopy: every 5 years  Screening may be recommended earlier than age 48 if at higher risk for colorectal cancer  Also, an individualized decision between you and your healthcare provider will decide whether screening between the ages of 74-80 would be appropriate  Colonoscopy: 10/13/2009  FOBT/FIT: Not on file  Cologuard: Not on file  Sigmoidoscopy: Not on file          Breast Cancer Screening Age: 36 years old  Frequency: every 1-2 years  Not required if history of left and right mastectomy Mammogram: 04/20/2018        Cervical Cancer Screening Between the ages of 21-29, pap smear recommended once every 3 years  Between the ages of 33-67, can perform pap smear with HPV co-testing every 5 years     Recommendations may differ for women with a history of total hysterectomy, cervical cancer, or abnormal pap smears in past  Pap Smear: Not on file    Screening Not Indicated   Hepatitis C Screening Once for adults born between [facility]  More frequently in patients at high risk for Hepatitis C Hep C Antibody: Not on file        Diabetes Screening 1-2 times per year if you're at risk for diabetes or have pre-diabetes Fasting glucose: No results in last 5 years   A1C: 5 3        Cholesterol Screening Once every 5 years if you don't have a lipid disorder  May order more often based on risk factors  Lipid panel: Not on file          Other Preventive Screenings Covered by Medicare:  1  Abdominal Aortic Aneurysm (AAA) Screening: covered once if your at risk  You're considered to be at risk if you have a family history of AAA  2  Lung Cancer Screening: covers low dose CT scan once per year if you meet all of the following conditions: (1) Age 50-69; (2) No signs or symptoms of lung cancer; (3) Current smoker or have quit smoking within the last 15 years; (4) You have a tobacco smoking history of at least 30 pack years (packs per day multiplied by number of years you smoked); (5) You get a written order from a healthcare provider  3  Glaucoma Screening: covered annually if you're considered high risk: (1) You have diabetes OR (2) Family history of glaucoma OR (3)  aged 48 and older OR (3)  American aged 72 and older  3  Osteoporosis Screening: covered every 2 years if you meet one of the following conditions: (1) You're estrogen deficient and at risk for osteoporosis based off medical history and other findings; (2) Have a vertebral abnormality; (3) On glucocorticoid therapy for more than 3 months; (4) Have primary hyperparathyroidism; (5) On osteoporosis medications and need to assess response to drug therapy  · Last bone density test (DXA Scan): 03/23/2016   5  HIV Screening: covered annually if you're between the age of 15-65  Also covered annually if you are younger than 13 and older than 72 with risk factors for HIV infection  For pregnant patients, it is covered up to 3 times per pregnancy      Immunizations:  Immunization Recommendations   Influenza Vaccine Annual influenza vaccination during flu season is recommended for all persons aged >= 6 months who do not have contraindications   Pneumococcal Vaccine (Prevnar and Pneumovax)  * Prevnar = PCV13  * Pneumovax = PPSV23   Adults 25-60 years old: 1-3 doses may be recommended based on certain risk factors  Adults 72 years old: Prevnar (PCV13) vaccine recommended followed by Pneumovax (PPSV23) vaccine  If already received PPSV23 since turning 65, then PCV13 recommended at least one year after PPSV23 dose  Hepatitis B Vaccine 3 dose series if at intermediate or high risk (ex: diabetes, end stage renal disease, liver disease)   Tetanus (Td) Vaccine - COST NOT COVERED BY MEDICARE PART B Following completion of primary series, a booster dose should be given every 10 years to maintain immunity against tetanus  Td may also be given as tetanus wound prophylaxis  Tdap Vaccine - COST NOT COVERED BY MEDICARE PART B Recommended at least once for all adults  For pregnant patients, recommended with each pregnancy  Shingles Vaccine (Shingrix) - COST NOT COVERED BY MEDICARE PART B  2 shot series recommended in those aged 48 and above     Health Maintenance Due:      Topic Date Due    Hepatitis C Screening  Never done     Immunizations Due:      Topic Date Due    DTaP,Tdap,and Td Vaccines (1 - Tdap) Never done    Influenza Vaccine (1) 09/01/2021     Advance Directives   What are advance directives? Advance directives are legal documents that state your wishes and plans for medical care  These plans are made ahead of time in case you lose your ability to make decisions for yourself  Advance directives can apply to any medical decision, such as the treatments you want, and if you want to donate organs  What are the types of advance directives? There are many types of advance directives, and each state has rules about how to use them  You may choose a combination of any of the following:  · Living will:   This is a written record of the treatment you want  You can also choose which treatments you do not want, which to limit, and which to stop at a certain time  This includes surgery, medicine, IV fluid, and tube feedings  · Durable power of  for healthcare [facility]): This is a written record that states who you want to make healthcare choices for you when you are unable to make them for yourself  This person, called a proxy, is usually a family member or a friend  You may choose more than 1 proxy  · Do not resuscitate (DNR) order:  A DNR order is used in case your heart stops beating or you stop breathing  It is a request not to have certain forms of treatment, such as CPR  A DNR order may be included in other types of advance directives  · Medical directive: This covers the care that you want if you are in a coma, near death, or unable to make decisions for yourself  You can list the treatments you want for each condition  Treatment may include pain medicine, surgery, blood transfusions, dialysis, IV or tube feedings, and a ventilator (breathing machine)  · Values history: This document has questions about your views, beliefs, and how you feel and think about life  This information can help others choose the care that you would choose  Why are advance directives important? An advance directive helps you control your care  Although spoken wishes may be used, it is better to have your wishes written down  Spoken wishes can be misunderstood, or not followed  Treatments may be given even if you do not want them  An advance directive may make it easier for your family to make difficult choices about your care  Fall Prevention    Fall prevention  includes ways to make your home and other areas safer  It also includes ways you can move more carefully to prevent a fall  Health conditions that cause changes in your blood pressure, vision, or muscle strength and coordination may increase your risk for falls   Medicines may also increase your risk for falls if they make you dizzy, weak, or sleepy  Fall prevention tips:   · Stand or sit up slowly  · Use assistive devices as directed  · Wear shoes that fit well and have soles that   · Wear a personal alarm  · Stay active  · Manage your medical conditions  Home Safety Tips:  · Add items to prevent falls in the bathroom  · Keep paths clear  · Install bright lights in your home  · Keep items you use often on shelves within reach  · Paint or place reflective tape on the edges of your stairs  Urinary Incontinence   Urinary incontinence (UI)  is when you lose control of your bladder  UI develops because your bladder cannot store or empty urine properly  The 3 most common types of UI are stress incontinence, urge incontinence, or both  Medicines:   · May be given to help strengthen your bladder control  Report any side effects of medication to your healthcare provider  Do pelvic muscle exercises often:  Your pelvic muscles help you stop urinating  Squeeze these muscles tight for 5 seconds, then relax for 5 seconds  Gradually work up to squeezing for 10 seconds  Do 3 sets of 15 repetitions a day, or as directed  This will help strengthen your pelvic muscles and improve bladder control  Train your bladder:  Go to the bathroom at set times, such as every 2 hours, even if you do not feel the urge to go  You can also try to hold your urine when you feel the urge to go  For example, hold your urine for 5 minutes when you feel the urge to go  As that becomes easier, hold your urine for 10 minutes  Self-care:   · Keep a UI record  Write down how often you leak urine and how much you leak  Make a note of what you were doing when you leaked urine  · Drink liquids as directed  You may need to limit the amount of liquid you drink to help control your urine leakage  Do not drink any liquid right before you go to bed   Limit or do not have drinks that contain caffeine or alcohol  · Prevent constipation  Eat a variety of high-fiber foods  Good examples are high-fiber cereals, beans, vegetables, and whole-grain breads  Walking is the best way to trigger your intestines to have a bowel movement  · Exercise regularly and maintain a healthy weight  Weight loss and exercise will decrease pressure on your bladder and help you control your leakage  · Use a catheter as directed  to help empty your bladder  A catheter is a tiny, plastic tube that is put into your bladder to drain your urine  · Go to behavior therapy as directed  Behavior therapy may be used to help you learn to control your urge to urinate  Weight Management   Why it is important to manage your weight:  Being overweight increases your risk of health conditions such as heart disease, high blood pressure, type 2 diabetes, and certain types of cancer  It can also increase your risk for osteoarthritis, sleep apnea, and other respiratory problems  Aim for a slow, steady weight loss  Even a small amount of weight loss can lower your risk of health problems  How to lose weight safely:  A safe and healthy way to lose weight is to eat fewer calories and get regular exercise  You can lose up about 1 pound a week by decreasing the number of calories you eat by 500 calories each day  Healthy meal plan for weight management:  A healthy meal plan includes a variety of foods, contains fewer calories, and helps you stay healthy  A healthy meal plan includes the following:  · Eat whole-grain foods more often  A healthy meal plan should contain fiber  Fiber is the part of grains, fruits, and vegetables that is not broken down by your body  Whole-grain foods are healthy and provide extra fiber in your diet  Some examples of whole-grain foods are whole-wheat breads and pastas, oatmeal, brown rice, and bulgur  · Eat a variety of vegetables every day    Include dark, leafy greens such as spinach, kale, mario greens, and mustard greens  Eat yellow and orange vegetables such as carrots, sweet potatoes, and winter squash  · Eat a variety of fruits every day  Choose fresh or canned fruit (canned in its own juice or light syrup) instead of juice  Fruit juice has very little or no fiber  · Eat low-fat dairy foods  Drink fat-free (skim) milk or 1% milk  Eat fat-free yogurt and low-fat cottage cheese  Try low-fat cheeses such as mozzarella and other reduced-fat cheeses  · Choose meat and other protein foods that are low in fat  Choose beans or other legumes such as split peas or lentils  Choose fish, skinless poultry (chicken or turkey), or lean cuts of red meat (beef or pork)  Before you cook meat or poultry, cut off any visible fat  · Use less fat and oil  Try baking foods instead of frying them  Add less fat, such as margarine, sour cream, regular salad dressing and mayonnaise to foods  Eat fewer high-fat foods  Some examples of high-fat foods include french fries, doughnuts, ice cream, and cakes  · Eat fewer sweets  Limit foods and drinks that are high in sugar  This includes candy, cookies, regular soda, and sweetened drinks  Exercise:  Exercise at least 30 minutes per day on most days of the week  Some examples of exercise include walking, biking, dancing, and swimming  You can also fit in more physical activity by taking the stairs instead of the elevator or parking farther away from stores  Ask your healthcare provider about the best exercise plan for you  © Copyright [address] 2018 Information is for End User's use only and may not be sold, redistributed or otherwise used for commercial purposes   All illustrations and images included in CareNotes® are the copyrighted property of [employer]  or [address] [place] [address] [facility]

## 2021-09-01 ENCOUNTER — HOSPITAL ENCOUNTER (OUTPATIENT)
Dept: MAMMOGRAPHY | Facility: HOSPITAL | Age: 81
Discharge: HOME | End: 2021-09-01
Payer: COMMERCIAL

## 2021-09-01 VITALS — WEIGHT: 183 LBS | BODY MASS INDEX: 29.41 KG/M2 | HEIGHT: 66 IN

## 2021-09-01 DIAGNOSIS — Z12.31 ENCOUNTER FOR SCREENING MAMMOGRAM FOR BREAST CANCER: ICD-10-CM

## 2021-09-01 PROCEDURE — 77067 SCR MAMMO BI INCL CAD: CPT

## 2021-09-01 PROCEDURE — 77063 BREAST TOMOSYNTHESIS BI: CPT

## 2021-09-25 ENCOUNTER — RA CDI HCC (OUTPATIENT)
Dept: OTHER | Facility: HOSPITAL | Age: 81
End: 2021-09-25

## 2021-09-25 NOTE — PROGRESS NOTES
Zach RUST 77  coding opportunities       Chart reviewed, no opportunity found: CHART REVIEWED, NO OPPORTUNITY FOUND      no podiatry note or imaging for PVD                  Patients insurance company: [address]  (Medicare Advantage and Commercial)

## 2021-09-28 NOTE — PROGRESS NOTES
FAMILY MEDICINE PROGRESS NOTE    Date of Service: 21  Primary Care Provider:   Marianna Meckel, MD       Name: Mervat Arora       : 1940       Age:80 y o  Sex: female      MRN: [id number]      Chief Complaint:Hip Pain (right)       ASSESSMENT and PLAN:  Mervat Arora is a 68 y o  female with:     Problem List Items Addressed This Visit        Musculoskeletal and Integument    Greater trochanteric bursitis of right hip - Primary       Other    Chronic left shoulder pain        Bursa injection given today, continue supportive care  Handout of shoulder exercises given to patient today  Large joint arthrocentesis: R greater trochanteric bursa  Universal Protocol:  Consent: Verbal consent obtained  Written consent not obtained  Risks and benefits: risks, benefits and alternatives were discussed  Consent given by: patient  Time out: Immediately prior to procedure a "time out" was called to verify the correct patient, procedure, equipment, support staff and site/side marked as required  Patient understanding: patient states understanding of the procedure being performed  Patient consent: the patient's understanding of the procedure matches consent given  Procedure consent: procedure consent matches procedure scheduled  Relevant documents: relevant documents not present or verified  Test results: test results not available  Site marked: the operative site was marked  Radiology Images displayed and confirmed   If images not available, report reviewed: imaging studies not available  Required items: required blood products, implants, devices, and special equipment available  Patient identity confirmed: verbally with patient    Supporting Documentation  Indications: pain   Procedure Details  Location: hip - R greater trochanteric bursa  Preparation: Patient was prepped and draped in the usual sterile fashion  Needle size: 22 G  Ultrasound guidance: no  Approach: lateral    Patient tolerance: patient tolerated the procedure well with no immediate complications          SUBJECTIVE:  Raphael Sneed is a 68 y o  female who presents today with a chief complaint of Hip Pain (right)  HPI     She has history of ITB Syndrome, greater trochanter bursitis in right hip  She reports that her hip was problematic last week, she was having a hard time sleeping on her right side  She also had trouble getting up from a seated position  She reports that she did some home exercises and stretches  She has been taken ibuprofen  Shoulder pain did improve with PT, though she still has some restricted range of motion  Review of Systems   Musculoskeletal: Positive for arthralgias  I have reviewed the patient's Past Medical History  Current Outpatient Medications:     amLODIPine (NORVASC) 5 mg tablet, Take 1 tablet (5 mg total) by mouth daily, Disp: 90 tablet, Rfl: 3    aspirin (Aspirin 81) 81 mg chewable tablet, Chew 81 mg daily, Disp: , Rfl:     cholecalciferol (VITAMIN D3) 1,000 units tablet, Take 1,000 Units by mouth daily, Disp: , Rfl:     clobetasol (TEMOVATE) 0 05 % ointment, Apply topically as needed , Disp: , Rfl:     docusate sodium (COLACE) 100 mg capsule, Take 100 mg by mouth daily, Disp: , Rfl:     OBJECTIVE:  /72   Pulse 80   Temp (!) 96 1 °F (35 6 °C)   Resp 16   Ht 5' 5 5" (1 664 m)   Wt 84 8 kg (187 lb)   BMI 30 65 kg/m²    BP Readings from Last 3 Encounters:   09/29/21 124/72   07/09/21 122/70   06/11/21 134/65      Wt Readings from Last 3 Encounters:   09/29/21 84 8 kg (187 lb)   09/01/21 83 kg (183 lb)   07/09/21 83 kg (183 lb)      Physical Exam  Constitutional:       General: She is not in acute distress  Appearance: Normal appearance  She is not toxic-appearing  HENT:      Head: Normocephalic and atraumatic  Eyes:      Extraocular Movements: Extraocular movements intact        Conjunctiva/sclera: Conjunctivae normal    Pulmonary:      Effort: Pulmonary effort is normal  No respiratory distress  Musculoskeletal:      Right hip: Tenderness (over greater trochanter) present  No bony tenderness  Normal range of motion  Normal strength  Left hip: Normal  No tenderness  Normal range of motion  Normal strength  Neurological:      General: No focal deficit present  Mental Status: She is alert  Mental status is at baseline  Gait: Gait normal                 Return if symptoms worsen or fail to improve  Dee Dee Bucio MD    Note: Portions of the record have been created with voice recognition software  Occasional wrong word or "sound a like" substitutions may have occurred due to the inherent limitations of voice recognition software  Read the chart carefully and recognize, using context, where substitutions have occurred

## 2021-09-29 ENCOUNTER — OFFICE VISIT (OUTPATIENT)
Dept: FAMILY MEDICINE CLINIC | Facility: CLINIC | Age: 81
End: 2021-09-29
Payer: COMMERCIAL

## 2021-09-29 VITALS
HEART RATE: 80 BPM | BODY MASS INDEX: 30.05 KG/M2 | SYSTOLIC BLOOD PRESSURE: 124 MMHG | WEIGHT: 187 LBS | DIASTOLIC BLOOD PRESSURE: 72 MMHG | HEIGHT: 66 IN | RESPIRATION RATE: 16 BRPM | TEMPERATURE: 96.1 F

## 2021-09-29 DIAGNOSIS — M70.61 GREATER TROCHANTERIC BURSITIS OF RIGHT HIP: Primary | ICD-10-CM

## 2021-09-29 DIAGNOSIS — I73.9 PAD (PERIPHERAL ARTERY DISEASE) (HCC): ICD-10-CM

## 2021-09-29 DIAGNOSIS — M25.512 CHRONIC LEFT SHOULDER PAIN: ICD-10-CM

## 2021-09-29 DIAGNOSIS — G89.29 CHRONIC LEFT SHOULDER PAIN: ICD-10-CM

## 2021-09-29 PROCEDURE — 1160F RVW MEDS BY RX/DR IN RCRD: CPT | Performed by: FAMILY MEDICINE

## 2021-09-29 PROCEDURE — 3074F SYST BP LT 130 MM HG: CPT | Performed by: FAMILY MEDICINE

## 2021-09-29 PROCEDURE — 3078F DIAST BP <80 MM HG: CPT | Performed by: FAMILY MEDICINE

## 2021-09-29 PROCEDURE — 1036F TOBACCO NON-USER: CPT | Performed by: FAMILY MEDICINE

## 2021-09-29 PROCEDURE — 20610 DRAIN/INJ JOINT/BURSA W/O US: CPT | Performed by: FAMILY MEDICINE

## 2021-09-29 PROCEDURE — 99213 OFFICE O/P EST LOW 20 MIN: CPT | Performed by: FAMILY MEDICINE

## 2021-09-29 RX ORDER — TRIAMCINOLONE ACETONIDE 40 MG/ML
40 INJECTION, SUSPENSION INTRA-ARTICULAR; INTRAMUSCULAR ONCE
Status: COMPLETED | OUTPATIENT
Start: 2021-09-29 | End: 2021-09-29

## 2021-09-29 RX ADMIN — TRIAMCINOLONE ACETONIDE 40 MG: 40 INJECTION, SUSPENSION INTRA-ARTICULAR; INTRAMUSCULAR at 11:24

## 2021-10-08 ENCOUNTER — IMMUNIZATIONS (OUTPATIENT)
Dept: GERIATRICS | Facility: OTHER | Age: 81
End: 2021-10-08
Payer: COMMERCIAL

## 2021-10-08 DIAGNOSIS — Z23 ENCOUNTER FOR IMMUNIZATION: Primary | ICD-10-CM

## 2021-10-08 PROCEDURE — G0008 ADMIN INFLUENZA VIRUS VAC: HCPCS | Performed by: NURSE PRACTITIONER

## 2021-10-08 PROCEDURE — 90662 IIV NO PRSV INCREASED AG IM: CPT | Performed by: NURSE PRACTITIONER

## 2021-11-11 ENCOUNTER — OFFICE VISIT (OUTPATIENT)
Dept: FAMILY MEDICINE CLINIC | Facility: CLINIC | Age: 81
End: 2021-11-11
Payer: COMMERCIAL

## 2021-11-11 VITALS
HEART RATE: 104 BPM | RESPIRATION RATE: 16 BRPM | DIASTOLIC BLOOD PRESSURE: 70 MMHG | BODY MASS INDEX: 29.89 KG/M2 | WEIGHT: 186 LBS | SYSTOLIC BLOOD PRESSURE: 116 MMHG | HEIGHT: 66 IN | TEMPERATURE: 96.4 F

## 2021-11-11 DIAGNOSIS — I73.9 PAD (PERIPHERAL ARTERY DISEASE) (HCC): ICD-10-CM

## 2021-11-11 DIAGNOSIS — I10 ESSENTIAL HYPERTENSION: Primary | ICD-10-CM

## 2021-11-11 DIAGNOSIS — E78.00 PURE HYPERCHOLESTEROLEMIA: ICD-10-CM

## 2021-11-11 DIAGNOSIS — R20.0 NUMBNESS OF TOES: ICD-10-CM

## 2021-11-11 DIAGNOSIS — D17.23 LIPOMA OF RIGHT LOWER EXTREMITY: ICD-10-CM

## 2021-11-11 PROCEDURE — 3074F SYST BP LT 130 MM HG: CPT | Performed by: FAMILY MEDICINE

## 2021-11-11 PROCEDURE — 3078F DIAST BP <80 MM HG: CPT | Performed by: FAMILY MEDICINE

## 2021-11-11 PROCEDURE — 99214 OFFICE O/P EST MOD 30 MIN: CPT | Performed by: FAMILY MEDICINE

## 2021-11-11 PROCEDURE — 1036F TOBACCO NON-USER: CPT | Performed by: FAMILY MEDICINE

## 2021-11-11 PROCEDURE — 1160F RVW MEDS BY RX/DR IN RCRD: CPT | Performed by: FAMILY MEDICINE

## 2021-12-20 ENCOUNTER — OFFICE VISIT (OUTPATIENT)
Dept: URGENT CARE | Facility: CLINIC | Age: 81
End: 2021-12-20
Payer: COMMERCIAL

## 2021-12-20 VITALS
RESPIRATION RATE: 18 BRPM | DIASTOLIC BLOOD PRESSURE: 74 MMHG | TEMPERATURE: 98.7 F | HEART RATE: 69 BPM | BODY MASS INDEX: 31.16 KG/M2 | WEIGHT: 187 LBS | HEIGHT: 65 IN | SYSTOLIC BLOOD PRESSURE: 161 MMHG | OXYGEN SATURATION: 99 %

## 2021-12-20 DIAGNOSIS — H60.11 CELLULITIS OF AURICLE OF RIGHT EAR: Primary | ICD-10-CM

## 2021-12-20 PROCEDURE — 99213 OFFICE O/P EST LOW 20 MIN: CPT | Performed by: PHYSICIAN ASSISTANT

## 2021-12-20 PROCEDURE — S9083 URGENT CARE CENTER GLOBAL: HCPCS | Performed by: PHYSICIAN ASSISTANT

## 2021-12-20 RX ORDER — CEPHALEXIN 500 MG/1
500 CAPSULE ORAL EVERY 6 HOURS SCHEDULED
Qty: 28 CAPSULE | Refills: 0 | Status: SHIPPED | OUTPATIENT
Start: 2021-12-20 | End: 2021-12-27

## 2021-12-23 ENCOUNTER — TELEMEDICINE (OUTPATIENT)
Dept: FAMILY MEDICINE CLINIC | Facility: CLINIC | Age: 81
End: 2021-12-23
Payer: COMMERCIAL

## 2021-12-23 DIAGNOSIS — H60.11 CELLULITIS OF RIGHT EXTERNAL EAR: Primary | ICD-10-CM

## 2021-12-23 DIAGNOSIS — Z20.822 EXPOSURE TO COVID-19 VIRUS: ICD-10-CM

## 2021-12-23 PROCEDURE — 1036F TOBACCO NON-USER: CPT | Performed by: FAMILY MEDICINE

## 2021-12-23 PROCEDURE — 99213 OFFICE O/P EST LOW 20 MIN: CPT | Performed by: FAMILY MEDICINE

## 2021-12-23 PROCEDURE — 1160F RVW MEDS BY RX/DR IN RCRD: CPT | Performed by: FAMILY MEDICINE

## 2022-01-19 ENCOUNTER — OFFICE VISIT (OUTPATIENT)
Dept: FAMILY MEDICINE CLINIC | Facility: CLINIC | Age: 82
End: 2022-01-19
Payer: COMMERCIAL

## 2022-01-19 VITALS
SYSTOLIC BLOOD PRESSURE: 118 MMHG | OXYGEN SATURATION: 97 % | HEIGHT: 65 IN | BODY MASS INDEX: 31.32 KG/M2 | RESPIRATION RATE: 18 BRPM | DIASTOLIC BLOOD PRESSURE: 80 MMHG | WEIGHT: 188 LBS | HEART RATE: 71 BPM | TEMPERATURE: 96.7 F

## 2022-01-19 DIAGNOSIS — H60.11 CELLULITIS OF RIGHT EXTERNAL EAR: Primary | ICD-10-CM

## 2022-01-19 DIAGNOSIS — L98.9 SCALP LESION: ICD-10-CM

## 2022-01-19 PROCEDURE — 99213 OFFICE O/P EST LOW 20 MIN: CPT | Performed by: FAMILY MEDICINE

## 2022-01-19 PROCEDURE — 1036F TOBACCO NON-USER: CPT | Performed by: FAMILY MEDICINE

## 2022-01-19 PROCEDURE — 3074F SYST BP LT 130 MM HG: CPT | Performed by: FAMILY MEDICINE

## 2022-01-19 PROCEDURE — 1160F RVW MEDS BY RX/DR IN RCRD: CPT | Performed by: FAMILY MEDICINE

## 2022-01-19 PROCEDURE — 3079F DIAST BP 80-89 MM HG: CPT | Performed by: FAMILY MEDICINE

## 2022-01-19 PROCEDURE — 3725F SCREEN DEPRESSION PERFORMED: CPT | Performed by: FAMILY MEDICINE

## 2022-01-19 RX ORDER — SULFAMETHOXAZOLE AND TRIMETHOPRIM 800; 160 MG/1; MG/1
1 TABLET ORAL EVERY 12 HOURS SCHEDULED
Qty: 14 TABLET | Refills: 0 | Status: SHIPPED | OUTPATIENT
Start: 2022-01-19 | End: 2022-01-26

## 2022-01-19 NOTE — PROGRESS NOTES
FAMILY MEDICINE PROGRESS NOTE    Date of Service: 22  Primary Care Provider:   Edwin Morris MD       Name: Moni Lang       : 1940       Age:81 y o  Sex: female      MRN: [id number]      Chief Complaint:Recurrent Skin Infections (Of right ear)       ASSESSMENT and PLAN:  Moni Lang is a 80 y o  female with:     Problem List Items Addressed This Visit     None      Visit Diagnoses     Cellulitis of right external ear    -  Primary    Relevant Medications    sulfamethoxazole-trimethoprim (BACTRIM DS) 800-160 mg per tablet    Scalp lesion            Will treat cellulitis with Bactrim for MRSA coverage  If does not fully resolve within two weeks, consider additional course of antibiotics with flouroquinolone, but she does not have risk factors for pseudomonas, and this does not appear to be perichondritis  Recommended warm compresses for lesion on scalp    SUBJECTIVE:  Moni Lang is a 80 y o  female who presents today with a chief complaint of Recurrent Skin Infections (Of right ear)  HPI     She reports that three days ago she noticed ear pain  She had recently been treated for right ear cellulitis in December with keflex and topical mupirocin  She reports then her symptoms improved, but never fully resolved  She recently bumped her head on car trunk  She feels a spot on her scalp  Review of Systems   Skin: Positive for color change (ear redness)  I have reviewed the patient's Past Medical History      Current Outpatient Medications:     amLODIPine (NORVASC) 5 mg tablet, Take 1 tablet (5 mg total) by mouth daily, Disp: 90 tablet, Rfl: 3    cholecalciferol (VITAMIN D3) 1,000 units tablet, Take 1,000 Units by mouth daily, Disp: , Rfl:     clobetasol (TEMOVATE) 0 05 % ointment, Apply topically as needed , Disp: , Rfl:     docusate sodium (COLACE) 100 mg capsule, Take 100 mg by mouth daily, Disp: , Rfl:     mupirocin (BACTROBAN) 2 % ointment, Apply topically 3 (three) times a day for 5 days, Disp: 22 g, Rfl: 0    sulfamethoxazole-trimethoprim (BACTRIM DS) 800-160 mg per tablet, Take 1 tablet by mouth every 12 (twelve) hours for 7 days, Disp: 14 tablet, Rfl: 0    OBJECTIVE:  /80 (BP Location: Left arm, Patient Position: Sitting, Cuff Size: Standard)   Pulse 71   Temp (!) 96 7 °F (35 9 °C)   Resp 18   Ht 5' 5" (1 651 m)   Wt 85 3 kg (188 lb)   SpO2 97%   Breastfeeding No   BMI 31 28 kg/m²    BP Readings from Last 3 Encounters:   01/19/22 118/80   12/20/21 161/74   11/11/21 116/70      Wt Readings from Last 3 Encounters:   01/19/22 85 3 kg (188 lb)   12/20/21 84 8 kg (187 lb)   11/11/21 84 4 kg (186 lb)      Physical Exam  Constitutional:       General: She is not in acute distress  Appearance: Normal appearance  She is not ill-appearing or toxic-appearing  HENT:      Head: Normocephalic and atraumatic  Right Ear: Tympanic membrane and ear canal normal  Tenderness present  No laceration, drainage or swelling  No middle ear effusion  There is no impacted cerumen  Left Ear: Tympanic membrane and ear canal normal   No middle ear effusion  There is no impacted cerumen  Ears:     Pulmonary:      Effort: Pulmonary effort is normal    Musculoskeletal:      Cervical back: Normal range of motion  Neurological:      General: No focal deficit present  Mental Status: She is alert and oriented to person, place, and time  Return if symptoms worsen or fail to improve  Winifred Quintero MD    Note: Portions of the record have been created with voice recognition software  Occasional wrong word or "sound a like" substitutions may have occurred due to the inherent limitations of voice recognition software  Read the chart carefully and recognize, using context, where substitutions have occurred

## 2022-04-19 ENCOUNTER — OFFICE VISIT (OUTPATIENT)
Dept: CARDIOLOGY CLINIC | Facility: CLINIC | Age: 82
End: 2022-04-19
Payer: COMMERCIAL

## 2022-04-19 VITALS
HEIGHT: 65 IN | OXYGEN SATURATION: 99 % | DIASTOLIC BLOOD PRESSURE: 72 MMHG | SYSTOLIC BLOOD PRESSURE: 122 MMHG | WEIGHT: 185.3 LBS | HEART RATE: 76 BPM | BODY MASS INDEX: 30.87 KG/M2

## 2022-04-19 DIAGNOSIS — E78.00 PURE HYPERCHOLESTEROLEMIA: ICD-10-CM

## 2022-04-19 DIAGNOSIS — I10 ESSENTIAL HYPERTENSION: Primary | ICD-10-CM

## 2022-04-19 DIAGNOSIS — R06.00 DOE (DYSPNEA ON EXERTION): ICD-10-CM

## 2022-04-19 PROCEDURE — 3078F DIAST BP <80 MM HG: CPT | Performed by: INTERNAL MEDICINE

## 2022-04-19 PROCEDURE — 93000 ELECTROCARDIOGRAM COMPLETE: CPT | Performed by: INTERNAL MEDICINE

## 2022-04-19 PROCEDURE — 1160F RVW MEDS BY RX/DR IN RCRD: CPT | Performed by: INTERNAL MEDICINE

## 2022-04-19 PROCEDURE — 99214 OFFICE O/P EST MOD 30 MIN: CPT | Performed by: INTERNAL MEDICINE

## 2022-04-19 PROCEDURE — 3074F SYST BP LT 130 MM HG: CPT | Performed by: INTERNAL MEDICINE

## 2022-04-19 PROCEDURE — 1036F TOBACCO NON-USER: CPT | Performed by: INTERNAL MEDICINE

## 2022-04-19 NOTE — PATIENT INSTRUCTIONS
Chronic Hypertension   AMBULATORY CARE:   Hypertension  is high blood pressure  Your blood pressure is the force of your blood moving against the walls of your arteries  Hypertension causes your blood pressure to get so high that your heart has to work much harder than normal  This can damage your heart  Even if you have hypertension for years, lifestyle changes, medicines, or both can help bring your blood pressure to normal   Call your local emergency number (911 in the [address] [facility]) or have someone call if:   · You have chest pain  · You have any of the following signs of a heart attack:      ? Squeezing, pressure, or pain in your chest    ? You may  also have any of the following:     § Discomfort or pain in your back, neck, jaw, stomach, or arm    § Shortness of breath    § Nausea or vomiting    § Lightheadedness or a sudden cold sweat    · You become confused or have difficulty speaking  · You suddenly feel lightheaded or have trouble breathing  Seek care immediately if:   · You have a severe headache or vision loss  · You have weakness in an arm or leg  Call your doctor or cardiologist if:   · You feel faint, dizzy, confused, or drowsy  · You have been taking your blood pressure medicine but your pressure is higher than your provider says it should be  · You have questions or concerns about your condition or care  Treatment for chronic hypertension  may include medicine to lower your blood pressure and cholesterol levels  A low cholesterol level helps prevent heart disease and makes it easier to control your blood pressure  Heart disease can make your blood pressure harder to control  You may also need to make lifestyle changes  What you need to know about the stages of hypertension:       · Normal blood pressure is 119/79 or lower   Your healthcare provider may only check your blood pressure each year if it stays at a normal level  · Elevated blood pressure is 120/79 to 129/79    This is sometimes called prehypertension  Your healthcare provider may suggest lifestyle changes to help lower your blood pressure to a normal level  He or she may then check it again in 3 to 6 months  · Stage 1 hypertension is 130/80  to 139/89   Your provider may recommend lifestyle changes, medication, and checks every 3 to 6 months until your blood pressure is controlled  · Stage 2 hypertension is 140/90 or higher   Your provider will recommend lifestyle changes and have you take 2 kinds of hypertension medicines  You will also need to have your blood pressure checked monthly until it is controlled  Manage chronic hypertension:   · Check your blood pressure at home  Avoid smoking, caffeine, and exercise at least 30 minutes before checking your blood pressure  Sit and rest for 5 minutes before you take your blood pressure  Extend your arm and support it on a flat surface  Your arm should be at the same level as your heart  Follow the directions that came with your blood pressure monitor  Check your blood pressure 2 times, 1 minute apart, before you take your medicine in the morning  Also check your blood pressure before your evening meal  Keep a record of your readings and bring it to your follow-up visits  Ask your healthcare provider what your blood pressure should be  · Manage any other health conditions you have  Health conditions such as diabetes can increase your risk for hypertension  Follow your healthcare provider's instructions and take all your medicines as directed  Talk to your healthcare provider about any new health conditions you have recently developed  · Ask about all medicines  Certain medicines can increase your blood pressure  Examples include oral birth control pills, decongestants, herbal supplements, and NSAIDs, such as ibuprofen  Your healthcare provider can tell you which medicines are safe for you to take   This includes prescription and over-the-counter medicines  Lifestyle changes you can make to lower your blood pressure: Your provider may want you to make more lifestyle changes if you are having trouble controlling your blood pressure  This may feel difficult over time, especially if you think you are making good changes but your pressure is still high  It might help to focus on one new change at a time  For example, try to add 1 more day of exercise, or exercise for an extra 10 minutes on 2 days  Small changes can make a big difference  Your healthcare provider can also refer you to specialists such as a dietitian who can help you make small changes  Your family members may be included in helping you learn to create lifestyle changes, such as the following:     · Limit sodium (salt) as directed  Too much sodium can affect your fluid balance  Check labels to find low-sodium or no-salt-added foods  Some low-sodium foods use potassium salts for flavor  Too much potassium can also cause health problems  Your healthcare provider will tell you how much sodium and potassium are safe for you to have in a day  He or she may recommend that you limit sodium to 2,300 mg a day  · Follow the meal plan recommended by your healthcare provider  A dietitian or your provider can give you more information on low-sodium plans or the DASH (Dietary Approaches to Stop Hypertension) eating plan  The DASH plan is low in sodium, processed sugar, unhealthy fats, and total fat  It is high in potassium, calcium, and fiber  These can be found in vegetables, fruit, and whole-grain foods  · Be physically active throughout the day  Physical activity, such as exercise, can help control your blood pressure and your weight  Be physically active for at least 30 minutes per day, on most days of the week  Include aerobic activity, such as walking or riding a bicycle  Also include strength training at least 2 times each week   Your healthcare providers can help you create a physical activity plan  · Decrease stress  This may help lower your blood pressure  Learn ways to relax, such as deep breathing or listening to music  · Limit alcohol as directed  Alcohol can increase your blood pressure  A drink of alcohol is 12 ounces of beer, 5 ounces of wine, or 1½ ounces of liquor  · Do not smoke  Nicotine and other chemicals in cigarettes and cigars can increase your blood pressure and also cause lung damage  Ask your healthcare provider for information if you currently smoke and need help to quit  E-cigarettes or smokeless tobacco still contain nicotine  Talk to your healthcare provider before you use these products  Follow up with your doctor or cardiologist as directed: You will need to return to have your blood pressure checked and to have other lab tests done  Write down your questions so you remember to ask them during your visits  © Copyright [employer] 2022 Information is for End User's use only and may not be sold, redistributed or otherwise used for commercial purposes  All illustrations and images included in CareNotes® are the copyrighted property of [employer]  or [facility] Benita Leslie   The above information is an  only  It is not intended as medical advice for individual conditions or treatments  Talk to your doctor, nurse or pharmacist before following any medical regimen to see if it is safe and effective for you

## 2022-04-19 NOTE — PROGRESS NOTES
Cardiology Consultation     Nyla Salazar  [id number]  1940  [facility]  [address]  [id number]    1  Essential hypertension  POCT ECG   2  MENON (dyspnea on exertion)     3  Pure hypercholesterolemia       Chief Complaint   Patient presents with    Follow-up     yearly     Shortness of Breath     exertion, takes a deep breath after talking     Leg Swelling     right      HPI: Patient is here for continued cardiac care  She does have a family history of MVP and MVR in her mother  She does note some shortness of breath with exertion and with talking - this is unchanged for years now  She has increased her walking to 6-7000 steps a day and is able to walk and do exercise videos and doing well on a flat surface - with hills, she does get a little out of breath  Symptoms have increased since last visit with more SOB with talking  No reported chest pain, palpitations, lightheadedness, syncope, LE edema, orthopnea, PND, or significant weight changes  Patient remains active without any increased fatigue out of the ordinary          Patient Active Problem List   Diagnosis    Essential hypertension    Chronic left shoulder pain    MENON (dyspnea on exertion)    Lichen sclerosus    Greater trochanteric bursitis of right hip    Lipoma of right lower extremity    PAD (peripheral artery disease) (HCC)    Pure hypercholesterolemia    Numbness of toes     Past Medical History:   Diagnosis Date    Benign essential hypertension     Hyperlipidemia, unspecified     Osteoarthritis of left knee      Social History     Socioeconomic History    Marital status: /Civil Union     Spouse name: Not on file    Number of children: Not on file    Years of education: Not on file    Highest education level: Not on file   Occupational History    Not on file   Tobacco Use    Smoking status: Never Smoker    Smokeless tobacco: Never Used   Substance and Sexual Activity    Alcohol use:  Yes     Alcohol/week: 1 0 standard drink     Types: 1 Glasses of wine per week     Comment: 1 glass nightly    Drug use: Never    Sexual activity: Not on file   Other Topics Concern    Not on file   Social History Narrative    Not on file     Social Determinants of Health     Financial Resource Strain: Not on file   Food Insecurity: Not on file   Transportation Needs: Not on file   Physical Activity: Not on file   Stress: Not on file   Social Connections: Not on file   Intimate Partner Violence: Not on file   Housing Stability: Not on file      Family History   Problem Relation Age of Onset    Heart Valve Disease Mother     Hypertension Mother     Arthritis Father     Diabetes Father     Alcohol abuse Father     Prostate cancer Father [de-identified]    Diabetes Brother     No Known Problems Son     Hypertension Daughter     No Known Problems Son     No Known Problems Paternal Uncle     No Known Problems Paternal Uncle     No Known Problems Paternal Uncle     No Known Problems Paternal Uncle     Pancreatic cancer Maternal Aunt 72     Past Surgical History:   Procedure Laterality Date    COLON SURGERY  2003    Rectoseal     LAPAROSCOPIC OVARIAN CYSTECTOMY Right 1981    LAPAROSCOPIC OVARIAN CYSTECTOMY Left 2003    rectoseal and cystoseal repari    TONSILLECTOMY AND ADENOIDECTOMY  1952    TUBAL LIGATION  1972       Current Outpatient Medications:     amLODIPine (NORVASC) 5 mg tablet, Take 1 tablet (5 mg total) by mouth daily, Disp: 90 tablet, Rfl: 3    cholecalciferol (VITAMIN D3) 1,000 units tablet, Take 1,000 Units by mouth daily, Disp: , Rfl:     clobetasol (TEMOVATE) 0 05 % ointment, Apply topically as needed , Disp: , Rfl:     docusate sodium (COLACE) 100 mg capsule, Take 100 mg by mouth daily, Disp: , Rfl:     mupirocin (BACTROBAN) 2 % ointment, Apply topically 3 (three) times a day for 5 days (Patient not taking: Reported on 4/19/2022 ), Disp: 22 g, Rfl: 0  Allergies   Allergen Reactions    Nitrofurantoin Nausea Only, Dizziness and Headache     Macrodantin     Vitals:    04/19/22 1257   BP: 122/72   BP Location: Left arm   Patient Position: Sitting   Cuff Size: Large   Pulse: 76   SpO2: 99%   Weight: 84 1 kg (185 lb 4 8 oz)   Height: 5' 5" (1 651 m)       Labs:  No visits with results within 6 Month(s) from this visit  Latest known visit with results is:   Orders Only on 04/13/2018   Component Date Value    Hemoglobin A1C 04/13/2018 5 3      No results found for: CHOL, TRIG, HDL, LDLDIRECT  Imaging: No results found  Review of Systems:  Review of Systems   Constitutional: Negative for activity change, appetite change, chills, diaphoresis, fatigue and unexpected weight change  HENT: Negative for hearing loss, nosebleeds and sore throat  Eyes: Negative for photophobia and visual disturbance  Respiratory: Positive for shortness of breath  Negative for cough, chest tightness and wheezing  Cardiovascular: Negative for chest pain, palpitations and leg swelling  Gastrointestinal: Negative for abdominal pain, diarrhea, nausea and vomiting  Endocrine: Negative for polyuria  Genitourinary: Negative for dysuria, frequency and hematuria  Musculoskeletal: Negative for arthralgias, back pain, gait problem and neck pain  Skin: Negative for pallor and rash  Neurological: Negative for dizziness, syncope and headaches  Hematological: Does not bruise/bleed easily  Psychiatric/Behavioral: Negative for behavioral problems and confusion  Physical Exam:  Physical Exam  Vitals reviewed  Constitutional:       Appearance: She is well-developed  She is not diaphoretic  HENT:      Head: Normocephalic and atraumatic  Nose: Nose normal    Eyes:      General: No scleral icterus  Pupils: Pupils are equal, round, and reactive to light  Neck:      Vascular: No JVD     Cardiovascular:      Rate and Rhythm: Normal rate and regular rhythm  Heart sounds: Normal heart sounds  No murmur heard  No friction rub  No gallop  Pulmonary:      Effort: Pulmonary effort is normal  No respiratory distress  Breath sounds: Normal breath sounds  No wheezing or rales  Abdominal:      General: Bowel sounds are normal  There is no distension  Palpations: Abdomen is soft  Tenderness: There is no abdominal tenderness  Musculoskeletal:         General: No deformity  Normal range of motion  Cervical back: Normal range of motion and neck supple  Skin:     General: Skin is warm and dry  Findings: No rash  Neurological:      Mental Status: She is alert and oriented to person, place, and time  Cranial Nerves: No cranial nerve deficit  Psychiatric:         Behavior: Behavior normal        Blood pressure 122/72, pulse 76, height 5' 5" (1 651 m), weight 84 1 kg (185 lb 4 8 oz), SpO2 99 %, not currently breastfeeding  EKG:  Normal sinus rhythm   Nonspecific T abnormality  Abnormal ECG    Discussion/Summary:  HTN: much improved today, continued on amlodipine  Had a nuclear stress test in Oct 2020 that was normal for EF and perfusion  Echocardiogram at that time revealed normal LV & RV function, normal valves  She has had MENON for many years - also noted by prior cardiologist documentation in 2017  For now, will hold off on repeat testing, dyspnea has increased since last time, so will refer for repeat echo and stress test    in Nov 2020, which is at goal - repeat levels in Oct 2021 revealed LDL of 113

## 2022-05-25 ENCOUNTER — HOSPITAL ENCOUNTER (OUTPATIENT)
Dept: NON INVASIVE DIAGNOSTICS | Facility: CLINIC | Age: 82
Discharge: HOME | End: 2022-05-25
Payer: COMMERCIAL

## 2022-05-25 VITALS — HEIGHT: 65 IN | WEIGHT: 185 LBS | BODY MASS INDEX: 30.82 KG/M2

## 2022-05-25 VITALS
SYSTOLIC BLOOD PRESSURE: 122 MMHG | WEIGHT: 185 LBS | HEIGHT: 65 IN | BODY MASS INDEX: 30.82 KG/M2 | DIASTOLIC BLOOD PRESSURE: 72 MMHG | HEART RATE: 82 BPM

## 2022-05-25 DIAGNOSIS — I10 ESSENTIAL HYPERTENSION: ICD-10-CM

## 2022-05-25 DIAGNOSIS — R06.00 DOE (DYSPNEA ON EXERTION): ICD-10-CM

## 2022-05-25 DIAGNOSIS — E78.00 PURE HYPERCHOLESTEROLEMIA: ICD-10-CM

## 2022-05-25 DIAGNOSIS — I48.0 PAROXYSMAL ATRIAL FIBRILLATION (HCC): Primary | ICD-10-CM

## 2022-05-25 LAB
AORTIC ROOT: 2.5 CM
APICAL FOUR CHAMBER EJECTION FRACTION: 52 %
ASCENDING AORTA: 3.1 CM
BASELINE ST DEPRESSION: 0 MM
CHEST PAIN STATEMENT: NORMAL
E WAVE DECELERATION TIME: 175 MS
E/A RATIO: 3.36
FRACTIONAL SHORTENING: 39 (ref 28–44)
INTERVENTRICULAR SEPTUM IN DIASTOLE (PARASTERNAL SHORT AXIS VIEW): 0.7 CM
INTERVENTRICULAR SEPTUM: 0.7 CM (ref 0.6–1.1)
LAAS-AP2: 18.8 CM2
LAAS-AP4: 20.1 CM2
LEFT ATRIUM SIZE: 3.9 CM
LEFT ATRIUM VOLUME INDEX (MOD BIPLANE): 20.4
LEFT INTERNAL DIMENSION IN SYSTOLE: 3.1 CM (ref 2.1–4)
LEFT VENTRICULAR INTERNAL DIMENSION IN DIASTOLE: 5.1 CM (ref 3.5–6)
LEFT VENTRICULAR POSTERIOR WALL IN END DIASTOLE: 0.8 CM
LEFT VENTRICULAR STROKE VOLUME: 89 ML
LVSV (TEICH): 89 ML
MAX DIASTOLIC BP: 84 MMHG
MAX HEART RATE: 148 BPM
MAX PREDICTED HEART RATE: 141 BPM
MAX. SYSTOLIC BP: 142 MMHG
MV E'TISSUE VEL-SEP: 11 CM/S
MV PEAK A VEL: 0.22 M/S
MV PEAK E VEL: 74 CM/S
MV STENOSIS PRESSURE HALF TIME: 51 MS
MV VALVE AREA P 1/2 METHOD: 4.3
NUC STRESS EJECTION FRACTION: 62 %
PA SYSTOLIC PRESSURE: 27 MMHG
PROTOCOL NAME: NORMAL
RATE PRESSURE PRODUCT: NORMAL
REASON FOR TERMINATION: NORMAL
RIGHT ATRIUM AREA SYSTOLE A4C: 18.6 CM2
RIGHT VENTRICLE ID DIMENSION: 3.5 CM
SL CV LEFT ATRIUM LENGTH A2C: 5.5 CM
SL CV LV EF: 60
SL CV PED ECHO LEFT VENTRICLE DIASTOLIC VOLUME (MOD BIPLANE) 2D: 127 ML
SL CV PED ECHO LEFT VENTRICLE SYSTOLIC VOLUME (MOD BIPLANE) 2D: 38 ML
SL CV REST NUCLEAR ISOTOPE DOSE: 9 MCI
SL CV STRESS NUCLEAR ISOTOPE DOSE: 33 MCI
SL CV STRESS RECOVERY BP: NORMAL MMHG
SL CV STRESS RECOVERY HR: 106 BPM
SL CV STRESS RECOVERY O2 SAT: 98 %
STRESS ANGINA INDEX: 0
STRESS BASELINE BP: NORMAL MMHG
STRESS BASELINE HR: 108 BPM
STRESS O2 SAT REST: 98 %
STRESS PEAK HR: 148 BPM
STRESS POST O2 SAT PEAK: 98 %
STRESS POST PEAK BP: 128 MMHG
STRESS ST DEPRESSION: 0 MM
STRESS/REST PERFUSION RATIO: 1.4
TARGET HR FORMULA: NORMAL
TEST INDICATION: NORMAL
TIME IN EXERCISE PHASE: NORMAL
TR MAX PG: 23 MMHG
TR PEAK VELOCITY: 2.4 M/S
TRICUSPID VALVE PEAK REGURGITATION VELOCITY: 2.38 M/S

## 2022-05-25 PROCEDURE — 78452 HT MUSCLE IMAGE SPECT MULT: CPT

## 2022-05-25 PROCEDURE — 93306 TTE W/DOPPLER COMPLETE: CPT

## 2022-05-25 PROCEDURE — A9502 TC99M TETROFOSMIN: HCPCS

## 2022-05-25 PROCEDURE — 93016 CV STRESS TEST SUPVJ ONLY: CPT | Performed by: INTERNAL MEDICINE

## 2022-05-25 PROCEDURE — 93306 TTE W/DOPPLER COMPLETE: CPT | Performed by: INTERNAL MEDICINE

## 2022-05-25 PROCEDURE — 78452 HT MUSCLE IMAGE SPECT MULT: CPT | Performed by: INTERNAL MEDICINE

## 2022-05-25 PROCEDURE — 93017 CV STRESS TEST TRACING ONLY: CPT

## 2022-05-25 PROCEDURE — 93018 CV STRESS TEST I&R ONLY: CPT | Performed by: INTERNAL MEDICINE

## 2022-05-25 RX ORDER — METOPROLOL SUCCINATE 25 MG/1
25 TABLET, EXTENDED RELEASE ORAL DAILY
Qty: 30 TABLET | Refills: 5 | Status: SHIPPED | OUTPATIENT
Start: 2022-05-25 | End: 2022-08-17 | Stop reason: SDUPTHER

## 2022-05-25 RX ADMIN — REGADENOSON 0.4 MG: 0.08 INJECTION, SOLUTION INTRAVENOUS at 13:14

## 2022-05-26 ENCOUNTER — TELEPHONE (OUTPATIENT)
Dept: CARDIOLOGY CLINIC | Facility: CLINIC | Age: 82
End: 2022-05-26

## 2022-05-26 NOTE — TELEPHONE ENCOUNTER
Currently, she is on appropriate management for the new diagnosis of afib, so there isn't anything dangerous  I am more than happy to see her to discuss the diagnosis though

## 2022-05-26 NOTE — TELEPHONE ENCOUNTER
P/C is concerned about her abnormal stress test  and echo results  Should pt make an appt to come in with these results?   Please advise    P/C [phone]

## 2022-05-26 NOTE — TELEPHONE ENCOUNTER
Called and advised pt of results, wants an appointment to discuss  Advised can call as soon as the schedule comes out

## 2022-06-07 NOTE — H&P (VIEW-ONLY)
Cardiology  Follow Up   Office Visit Note  Megan Villareal   80 y o    female   MRN: [id number]  [address]  [address] [facility] [address] [facility] [address]  [phone]    PCP: Alberta Morris MD  Cardiologist: Dr Bandar Wright            Summary of recommendations  Cardiac catheterization is recommended, given new onset atrial fibrillation and a stress test that shows transient ischemic dilatation, in the setting of MENON  Due for routine lab work by her PCP in the near future  Add TSH  She was provided a handout on atrial fibrillation  No exertional activity  Follow up will be scheduled with Dr Bandar Wright        Assessment/plan  Abnormal stress test, transient ischemic dilatation was noted  She has new onset atrial fibrillation  Cardiac catheterization recommended as I discussed today with her cardiologist Dr Bandar Wright  Will start aspirin 81 mg daily  No exertional activity  Atrial fibrillation, paroxysmal, new onset  Found during a stress test   -EKG today normal sinus rhythm 69 beats per minute  Nonspecific ST T-wave changes  Started on Toprol 25 mg daily, anticoagulation with Xarelto 20 mg daily  MENON  Mitral regurgitation, mild-to-moderate by echo 5/25/22  Hypertension, essential   /80 on amlodipine 5 mg daily, metoprolol succinate 25 mg daily  Lipids:  11/20/20 LDL 92, non   Cardiac testing  · TTE 5/25/22  EF 60%  No RWMA  RV cavity size mildly dilated  Mild biatrial enlargement  Mild-to-moderate MR  Moderate TR  Mild pulmonic valve regurgitation  · NM myocardial SPECT 5/25/22  No perfusion defects  EF 62%  The ECG and SPECT imaging portions of the stress study are concordant with no evidence of stress induced myocardial ischemia  The ECG shows atrial fibrillation  There is evidence of transient ischemic dilation (TID)  TID was appreciated quantitatively but not visually            HPI  Dakotah Gandara is a 81 yo female with essential hypertension and chronic MENON  She had a nuclear stress test in October 2020 that was normal   She had an echocardiogram that demonstrated normal RV and LV function, normal valvular function  She has been dyspneic on exertion for years  This has been documented by her cardiologist back in 2017  She was last seen in the office 4/19/22 by Dr Heraclio Capone  Testing was repeated 5/25/22 given persistent, and worsening MENON  She has been trying to increase her exercise tolerance  She has been walking 6-7000 steps a day  She does well on a flat surface, has increased symptoms with hills  She denied palpitations    6/8/22  Follow-up, after testing, performed given worsening MENON  She was found to be in AFib, during the stress test   She was started on metoprolol succinate 25 mg daily, anticoagulation with Xarelto 20 mg daily, by a covering cardiologist   The stress test showed no evidence of perfusion defects  There was, however evidence of transient ischemic dilatation  EKG:  Normal sinus rhythm 69 beats per minute  /80  HPI:  The patient relates to me that she has been asymptomatic  Her cardiologist simply wanted to establish a new baseline given that she was established a relationship with a new cardiologist   That was the impetus for her recent echo and stress test   She exercises regularly, either walking outside or walking on a treadmill, for 30 minutes, whether dependent  She also participates in Silver sneakers, and does gentle yoga, and some light weight lifting  With these activities she denies chest pain pressure heaviness back jaw or arm pain, palpitations or shortness of breath  She relates that her daughter believe she is slightly dyspneic with conversation at peak exercise, and has pursed lip breathing at times  Today, she wanted to review her test results, and have a further explanation about atrial fibrillation  We reviewed her stress test, and echocardiogram findings      She is aware of the transient ischemic dilatation/abnormal stress findings  I recommend no exertional activity  Will proceed with cardiac catheterization  The patient is in agreement  She is aware of the mitral regurgitation  This certainly could be an etiology of her dyspnea  She is tolerating Toprol and anticoagulation without problems   She tells me she will be getting her annual blood work, for her annual physical per her PCP, at the beginning of July  I added a TSH to ensure that would be covered given her new onset AFib  I recommend she avoid NSAIDs given her anticoagulation  I have spent 40 minutes with Patient  today in which greater than 50% of this time was spent in counseling/coordination of care regarding Patient and family education, Importance of tx compliance, Risk factor reductions and Impressions  Assessment  Diagnoses and all orders for this visit:    Abnormal stress test    MENON (dyspnea on exertion)    Paroxysmal atrial fibrillation (HCC)  -     POCT ECG  -     TSH, 3rd generation with Free T4 reflex; Future    Essential hypertension  -     amLODIPine (NORVASC) 5 mg tablet; Take 1 tablet (5 mg total) by mouth daily    Pure hypercholesterolemia    Nonrheumatic mitral valve regurgitation          Past Medical History:   Diagnosis Date    Benign essential hypertension     Hyperlipidemia, unspecified     Osteoarthritis of left knee        Review of Systems   Constitutional: Negative for chills  Cardiovascular: Positive for dyspnea on exertion  Negative for chest pain, claudication, cyanosis, irregular heartbeat, leg swelling, near-syncope, orthopnea, palpitations, paroxysmal nocturnal dyspnea and syncope  Respiratory: Negative for cough and shortness of breath  Gastrointestinal: Negative for heartburn and nausea  Neurological: Negative for dizziness, focal weakness, headaches, light-headedness and weakness  All other systems reviewed and are negative        Allergies   Allergen Reactions    Nitrofurantoin Nausea Only, Dizziness and Headache     Macrodantin       Current Outpatient Medications:     amLODIPine (NORVASC) 5 mg tablet, Take 1 tablet (5 mg total) by mouth daily, Disp: 90 tablet, Rfl: 3    cholecalciferol (VITAMIN D3) 1,000 units tablet, Take 1,000 Units by mouth daily, Disp: , Rfl:     clobetasol (TEMOVATE) 0 05 % ointment, Apply topically as needed , Disp: , Rfl:     docusate sodium (COLACE) 100 mg capsule, Take 100 mg by mouth daily, Disp: , Rfl:     metoprolol succinate (TOPROL-XL) 25 mg 24 hr tablet, Take 1 tablet (25 mg total) by mouth daily, Disp: 30 tablet, Rfl: 5    rivaroxaban (Xarelto) 20 mg tablet, Take 1 tablet (20 mg total) by mouth daily with breakfast, Disp: 30 tablet, Rfl: 5        Social History     Socioeconomic History    Marital status: /Civil Union     Spouse name: Not on file    Number of children: Not on file    Years of education: Not on file    Highest education level: Not on file   Occupational History    Not on file   Tobacco Use    Smoking status: Never Smoker    Smokeless tobacco: Never Used   Substance and Sexual Activity    Alcohol use:  Yes     Alcohol/week: 1 0 standard drink     Types: 1 Glasses of wine per week     Comment: 1 glass nightly    Drug use: Never    Sexual activity: Not on file   Other Topics Concern    Not on file   Social History Narrative    Not on file     Social Determinants of Health     Financial Resource Strain: Not on file   Food Insecurity: Not on file   Transportation Needs: Not on file   Physical Activity: Not on file   Stress: Not on file   Social Connections: Not on file   Intimate Partner Violence: Not on file   Housing Stability: Not on file       Family History   Problem Relation Age of Onset    Hyperlipidemia Mother     Heart Valve Disease Mother     Hypertension Mother     Arthritis Father     Diabetes Father     Alcohol abuse Father     Prostate cancer Father [de-identified]    Diabetes Brother     Atrial fibrillation Brother     Pancreatic cancer Maternal Aunt 72    No Known Problems Paternal Uncle     No Known Problems Paternal Uncle     No Known Problems Paternal Uncle     No Known Problems Paternal Uncle     Hypertension Daughter     Bradycardia Son     No Known Problems Son        Physical Exam  Vitals and nursing note reviewed  Constitutional:       General: She is not in acute distress  Appearance: She is not diaphoretic  HENT:      Head: Normocephalic and atraumatic  Eyes:      Conjunctiva/sclera: Conjunctivae normal    Cardiovascular:      Rate and Rhythm: Normal rate and regular rhythm  Pulses: Intact distal pulses  Heart sounds: Normal heart sounds  Pulmonary:      Effort: Pulmonary effort is normal       Breath sounds: Normal breath sounds  Abdominal:      General: Bowel sounds are normal       Palpations: Abdomen is soft  Musculoskeletal:         General: Normal range of motion  Cervical back: Normal range of motion and neck supple  Skin:     General: Skin is warm and dry  Neurological:      Mental Status: She is alert and oriented to person, place, and time  Vitals: Blood pressure 122/80, pulse 71, resp  rate 18, height 5' 5" (1 651 m), weight 84 kg (185 lb 4 oz), SpO2 98 %, not currently breastfeeding  Wt Readings from Last 3 Encounters:   06/08/22 84 kg (185 lb 4 oz)   05/25/22 83 9 kg (185 lb)   05/25/22 83 9 kg (185 lb)         Labs & Results:  No results found for: WBC, HGB, HCT, MCV, PLT  No results found for: BNP  No components found for: CHEM  No results found for: CKTOTAL, TROPONINI, TROPONINT, CKMBINDEX  No results found for this or any previous visit  No results found for this or any previous visit  This note was completed in part utilizing [employer] direct voice recognition software     Grammatical errors, random word insertion, spelling mistakes, and incomplete sentences may be an occasional consequence of the system secondary to software limitations, ambient noise and hardware issues  At the time of dictation, efforts were made to edit, clarify and /or correct errors  Please read the chart carefully and recognize, using context, where substitutions have occurred    If you have any questions or concerns about the context, text or information contained within the body of this dictation, please contact myself, the provider, for further clarification

## 2022-06-07 NOTE — PROGRESS NOTES
Cardiology  Follow Up   Office Visit Note  Leighton Gar   80 y o    female   MRN: [id number]  [address]  [address] [facility] [address] [facility] [place] [address]  [phone]    PCP: Haider Kauffman MD  Cardiologist: Dr Mary Washington            Summary of recommendations  Cardiac catheterization is recommended, given new onset atrial fibrillation and a stress test that shows transient ischemic dilatation, in the setting of MENON  Due for routine lab work by her PCP in the near future  Add TSH  She was provided a handout on atrial fibrillation  No exertional activity  Follow up will be scheduled with Dr Mary Washington        Assessment/plan  Abnormal stress test, transient ischemic dilatation was noted  She has new onset atrial fibrillation  Cardiac catheterization recommended as I discussed today with her cardiologist Dr Mary Washington  Will start aspirin 81 mg daily  No exertional activity  Atrial fibrillation, paroxysmal, new onset  Found during a stress test   -EKG today normal sinus rhythm 69 beats per minute  Nonspecific ST T-wave changes  Started on Toprol 25 mg daily, anticoagulation with Xarelto 20 mg daily  MENON  Mitral regurgitation, mild-to-moderate by echo 5/25/22  Hypertension, essential   /80 on amlodipine 5 mg daily, metoprolol succinate 25 mg daily  Lipids:  11/20/20 LDL 92, non   Cardiac testing  · TTE 5/25/22  EF 60%  No RWMA  RV cavity size mildly dilated  Mild biatrial enlargement  Mild-to-moderate MR  Moderate TR  Mild pulmonic valve regurgitation  · NM myocardial SPECT 5/25/22  No perfusion defects  EF 62%  The ECG and SPECT imaging portions of the stress study are concordant with no evidence of stress induced myocardial ischemia  The ECG shows atrial fibrillation  There is evidence of transient ischemic dilation (TID)  TID was appreciated quantitatively but not visually            HPI  Ino Barahona is a 81 yo female with essential hypertension and chronic MENON  She had a nuclear stress test in October 2020 that was normal   She had an echocardiogram that demonstrated normal RV and LV function, normal valvular function  She has been dyspneic on exertion for years  This has been documented by her cardiologist back in 2017  She was last seen in the office 4/19/22 by Dr Shameka Alfonso  Testing was repeated 5/25/22 given persistent, and worsening MENON  She has been trying to increase her exercise tolerance  She has been walking 6-7000 steps a day  She does well on a flat surface, has increased symptoms with hills  She denied palpitations    6/8/22  Follow-up, after testing, performed given worsening MENON  She was found to be in AFib, during the stress test   She was started on metoprolol succinate 25 mg daily, anticoagulation with Xarelto 20 mg daily, by a covering cardiologist   The stress test showed no evidence of perfusion defects  There was, however evidence of transient ischemic dilatation  EKG:  Normal sinus rhythm 69 beats per minute  /80  HPI:  The patient relates to me that she has been asymptomatic  Her cardiologist simply wanted to establish a new baseline given that she was established a relationship with a new cardiologist   That was the impetus for her recent echo and stress test   She exercises regularly, either walking outside or walking on a treadmill, for 30 minutes, whether dependent  She also participates in Silver [employer], and does gentle yoga, and some light weight lifting  With these activities she denies chest pain pressure heaviness back jaw or arm pain, palpitations or shortness of breath  She relates that her daughter believe she is slightly dyspneic with conversation at peak exercise, and has pursed lip breathing at times  Today, she wanted to review her test results, and have a further explanation about atrial fibrillation  We reviewed her stress test, and echocardiogram findings      She is aware of the transient ischemic dilatation/abnormal stress findings  I recommend no exertional activity  Will proceed with cardiac catheterization  The patient is in agreement  She is aware of the mitral regurgitation  This certainly could be an etiology of her dyspnea  She is tolerating Toprol and anticoagulation without problems   She tells me she will be getting her annual blood work, for her annual physical per her PCP, at the beginning of July  I added a TSH to ensure that would be covered given her new onset AFib  I recommend she avoid NSAIDs given her anticoagulation  I have spent 40 minutes with Patient  today in which greater than 50% of this time was spent in counseling/coordination of care regarding Patient and family education, Importance of tx compliance, Risk factor reductions and Impressions  Assessment  Diagnoses and all orders for this visit:    Abnormal stress test    MENON (dyspnea on exertion)    Paroxysmal atrial fibrillation (HCC)  -     POCT ECG  -     TSH, 3rd generation with Free T4 reflex; Future    Essential hypertension  -     amLODIPine (NORVASC) 5 mg tablet; Take 1 tablet (5 mg total) by mouth daily    Pure hypercholesterolemia    Nonrheumatic mitral valve regurgitation          Past Medical History:   Diagnosis Date    Benign essential hypertension     Hyperlipidemia, unspecified     Osteoarthritis of left knee        Review of Systems   Constitutional: Negative for chills  Cardiovascular: Positive for dyspnea on exertion  Negative for chest pain, claudication, cyanosis, irregular heartbeat, leg swelling, near-syncope, orthopnea, palpitations, paroxysmal nocturnal dyspnea and syncope  Respiratory: Negative for cough and shortness of breath  Gastrointestinal: Negative for heartburn and nausea  Neurological: Negative for dizziness, focal weakness, headaches, light-headedness and weakness  All other systems reviewed and are negative        Allergies   Allergen Reactions    Nitrofurantoin Nausea Only, Dizziness and Headache     Macrodantin       Current Outpatient Medications:     amLODIPine (NORVASC) 5 mg tablet, Take 1 tablet (5 mg total) by mouth daily, Disp: 90 tablet, Rfl: 3    cholecalciferol (VITAMIN D3) 1,000 units tablet, Take 1,000 Units by mouth daily, Disp: , Rfl:     clobetasol (TEMOVATE) 0 05 % ointment, Apply topically as needed , Disp: , Rfl:     docusate sodium (COLACE) 100 mg capsule, Take 100 mg by mouth daily, Disp: , Rfl:     metoprolol succinate (TOPROL-XL) 25 mg 24 hr tablet, Take 1 tablet (25 mg total) by mouth daily, Disp: 30 tablet, Rfl: 5    rivaroxaban (Xarelto) 20 mg tablet, Take 1 tablet (20 mg total) by mouth daily with breakfast, Disp: 30 tablet, Rfl: 5        Social History     Socioeconomic History    Marital status: /Civil Union     Spouse name: Not on file    Number of children: Not on file    Years of education: Not on file    Highest education level: Not on file   Occupational History    Not on file   Tobacco Use    Smoking status: Never Smoker    Smokeless tobacco: Never Used   Substance and Sexual Activity    Alcohol use:  Yes     Alcohol/week: 1 0 standard drink     Types: 1 Glasses of wine per week     Comment: 1 glass nightly    Drug use: Never    Sexual activity: Not on file   Other Topics Concern    Not on file   Social History Narrative    Not on file     Social Determinants of Health     Financial Resource Strain: Not on file   Food Insecurity: Not on file   Transportation Needs: Not on file   Physical Activity: Not on file   Stress: Not on file   Social Connections: Not on file   Intimate Partner Violence: Not on file   Housing Stability: Not on file       Family History   Problem Relation Age of Onset    Hyperlipidemia Mother     Heart Valve Disease Mother     Hypertension Mother     Arthritis Father     Diabetes Father     Alcohol abuse Father     Prostate cancer Father [de-identified]    Diabetes Brother     Atrial fibrillation Brother     Pancreatic cancer Maternal Aunt 72    No Known Problems Paternal Uncle     No Known Problems Paternal Uncle     No Known Problems Paternal Uncle     No Known Problems Paternal Uncle     Hypertension Daughter     Bradycardia Son     No Known Problems Son        Physical Exam  Vitals and nursing note reviewed  Constitutional:       General: She is not in acute distress  Appearance: She is not diaphoretic  HENT:      Head: Normocephalic and atraumatic  Eyes:      Conjunctiva/sclera: Conjunctivae normal    Cardiovascular:      Rate and Rhythm: Normal rate and regular rhythm  Pulses: Intact distal pulses  Heart sounds: Normal heart sounds  Pulmonary:      Effort: Pulmonary effort is normal       Breath sounds: Normal breath sounds  Abdominal:      General: Bowel sounds are normal       Palpations: Abdomen is soft  Musculoskeletal:         General: Normal range of motion  Cervical back: Normal range of motion and neck supple  Skin:     General: Skin is warm and dry  Neurological:      Mental Status: She is alert and oriented to person, place, and time  Vitals: Blood pressure 122/80, pulse 71, resp  rate 18, height 5' 5" (1 651 m), weight 84 kg (185 lb 4 oz), SpO2 98 %, not currently breastfeeding  Wt Readings from Last 3 Encounters:   06/08/22 84 kg (185 lb 4 oz)   05/25/22 83 9 kg (185 lb)   05/25/22 83 9 kg (185 lb)         Labs & Results:  No results found for: WBC, HGB, HCT, MCV, PLT  No results found for: BNP  No components found for: CHEM  No results found for: CKTOTAL, TROPONINI, TROPONINT, CKMBINDEX  No results found for this or any previous visit  No results found for this or any previous visit  This note was completed in part utilizing [employer] direct voice recognition software     Grammatical errors, random word insertion, spelling mistakes, and incomplete sentences may be an occasional consequence of the system secondary to software limitations, ambient noise and hardware issues  At the time of dictation, efforts were made to edit, clarify and /or correct errors  Please read the chart carefully and recognize, using context, where substitutions have occurred    If you have any questions or concerns about the context, text or information contained within the body of this dictation, please contact myself, the provider, for further clarification

## 2022-06-08 ENCOUNTER — OFFICE VISIT (OUTPATIENT)
Dept: CARDIOLOGY CLINIC | Facility: CLINIC | Age: 82
End: 2022-06-08
Payer: COMMERCIAL

## 2022-06-08 ENCOUNTER — PREP FOR PROCEDURE (OUTPATIENT)
Dept: CARDIOLOGY CLINIC | Facility: CLINIC | Age: 82
End: 2022-06-08

## 2022-06-08 VITALS
OXYGEN SATURATION: 98 % | DIASTOLIC BLOOD PRESSURE: 80 MMHG | HEART RATE: 71 BPM | WEIGHT: 185.25 LBS | HEIGHT: 65 IN | SYSTOLIC BLOOD PRESSURE: 122 MMHG | BODY MASS INDEX: 30.87 KG/M2 | RESPIRATION RATE: 18 BRPM

## 2022-06-08 DIAGNOSIS — R06.00 DOE (DYSPNEA ON EXERTION): ICD-10-CM

## 2022-06-08 DIAGNOSIS — R94.39 ABNORMAL STRESS TEST: Primary | ICD-10-CM

## 2022-06-08 DIAGNOSIS — I48.0 PAROXYSMAL ATRIAL FIBRILLATION (HCC): ICD-10-CM

## 2022-06-08 DIAGNOSIS — I34.0 NONRHEUMATIC MITRAL VALVE REGURGITATION: ICD-10-CM

## 2022-06-08 DIAGNOSIS — E78.00 PURE HYPERCHOLESTEROLEMIA: ICD-10-CM

## 2022-06-08 DIAGNOSIS — I10 ESSENTIAL HYPERTENSION: ICD-10-CM

## 2022-06-08 DIAGNOSIS — I48.91 ATRIAL FIBRILLATION, NEW ONSET (HCC): ICD-10-CM

## 2022-06-08 PROCEDURE — 3079F DIAST BP 80-89 MM HG: CPT | Performed by: NURSE PRACTITIONER

## 2022-06-08 PROCEDURE — 99215 OFFICE O/P EST HI 40 MIN: CPT | Performed by: NURSE PRACTITIONER

## 2022-06-08 PROCEDURE — 1160F RVW MEDS BY RX/DR IN RCRD: CPT | Performed by: NURSE PRACTITIONER

## 2022-06-08 PROCEDURE — 93000 ELECTROCARDIOGRAM COMPLETE: CPT | Performed by: NURSE PRACTITIONER

## 2022-06-08 PROCEDURE — 1036F TOBACCO NON-USER: CPT | Performed by: NURSE PRACTITIONER

## 2022-06-08 PROCEDURE — 3074F SYST BP LT 130 MM HG: CPT | Performed by: NURSE PRACTITIONER

## 2022-06-08 RX ORDER — AMLODIPINE BESYLATE 5 MG/1
5 TABLET ORAL DAILY
Qty: 90 TABLET | Refills: 3 | Status: SHIPPED | OUTPATIENT
Start: 2022-06-08

## 2022-06-08 RX ORDER — ASPIRIN 81 MG/1
81 TABLET ORAL DAILY
Start: 2022-06-08 | End: 2022-08-03 | Stop reason: ALTCHOICE

## 2022-06-08 NOTE — LETTER
June 8, 2022     Matthew Shen MD  [address] [place] [address]    Patient: Logan Ball   YOB: 1940   Date of Visit: 6/8/2022       Dear Dr Catalina Aleman: Thank you for referring Logan Ball to me for evaluation  Below are my notes for this consultation  If you have questions, please do not hesitate to call me  I look forward to following your patient along with you  Sincerely,        ANN-MARIE Haynes        CC: MD Juan Alberto Arthur, [facility]  6/8/2022 10:13 AM  Sign when Signing Visit  Cardiology  Follow Up   Office Visit Note  Logan Ball   80 y o    female   MRN: [id number]  [address]  [address]  SUNDAY 110[address] [facility] [address] Patel [address]  [phone]    PCP: Matthew Shen MD  Cardiologist: Dr Beto Allan            Summary of recommendations  Cardiac catheterization is recommended, given new onset atrial fibrillation and a stress test that shows transient ischemic dilatation, in the setting of MENON  Due for routine lab work by her PCP in the near future  Add TSH  She was provided a handout on atrial fibrillation  No exertional activity  Follow up will be scheduled with Dr Beto Allan        Assessment/plan  Abnormal stress test, transient ischemic dilatation was noted  She has new onset atrial fibrillation  Cardiac catheterization recommended as I discussed today with her cardiologist Dr Beto Allan  Will start aspirin 81 mg daily  No exertional activity  Atrial fibrillation, paroxysmal, new onset  Found during a stress test   -EKG today normal sinus rhythm 69 beats per minute  Nonspecific ST T-wave changes  Started on Toprol 25 mg daily, anticoagulation with Xarelto 20 mg daily  MENON  Mitral regurgitation, mild-to-moderate by echo 5/25/22  Hypertension, essential   /80 on amlodipine 5 mg daily, metoprolol succinate 25 mg daily  Lipids:  11/20/20 LDL 92, non   Cardiac testing  · TTE 5/25/22  EF 60%  No RWMA  RV cavity size mildly dilated  Mild biatrial enlargement  Mild-to-moderate MR  Moderate TR  Mild pulmonic valve regurgitation  · NM myocardial SPECT 5/25/22  No perfusion defects  EF 62%  The ECG and SPECT imaging portions of the stress study are concordant with no evidence of stress induced myocardial ischemia  The ECG shows atrial fibrillation  There is evidence of transient ischemic dilation (TID)  TID was appreciated quantitatively but not visually  HPI  Tomeka Martinez is a 79 yo female with essential hypertension and chronic MENON  She had a nuclear stress test in October 2020 that was normal   She had an echocardiogram that demonstrated normal RV and LV function, normal valvular function  She has been dyspneic on exertion for years  This has been documented by her cardiologist back in 2017  She was last seen in the office 4/19/22 by Dr Marie Shape  Testing was repeated 5/25/22 given persistent, and worsening MENON  She has been trying to increase her exercise tolerance  She has been walking 6-7000 steps a day  She does well on a flat surface, has increased symptoms with hills  She denied palpitations    6/8/22  Follow-up, after testing, performed given worsening MENON  She was found to be in AFib, during the stress test   She was started on metoprolol succinate 25 mg daily, anticoagulation with Xarelto 20 mg daily, by a covering cardiologist   The stress test showed no evidence of perfusion defects  There was, however evidence of transient ischemic dilatation  EKG:  Normal sinus rhythm 69 beats per minute  /80  HPI:  The patient relates to me that she has been asymptomatic  Her cardiologist simply wanted to establish a new baseline given that she was established a relationship with a new cardiologist   That was the impetus for her recent echo and stress test   She exercises regularly, either walking outside or walking on a treadmill, for 30 minutes, whether dependent    She also participates in Silver sneakers, and does gentle yoga, and some light weight lifting  With these activities she denies chest pain pressure heaviness back jaw or arm pain, palpitations or shortness of breath  She relates that her daughter believe she is slightly dyspneic with conversation at peak exercise, and has pursed lip breathing at times  Today, she wanted to review her test results, and have a further explanation about atrial fibrillation  We reviewed her stress test, and echocardiogram findings  She is aware of the transient ischemic dilatation/abnormal stress findings  I recommend no exertional activity  Will proceed with cardiac catheterization  The patient is in agreement  She is aware of the mitral regurgitation  This certainly could be an etiology of her dyspnea  She is tolerating Toprol and anticoagulation without problems   She tells me she will be getting her annual blood work, for her annual physical per her PCP, at the beginning of July  I added a TSH to ensure that would be covered given her new onset AFib  I recommend she avoid NSAIDs given her anticoagulation  I have spent 40 minutes with Patient  today in which greater than 50% of this time was spent in counseling/coordination of care regarding Patient and family education, Importance of tx compliance, Risk factor reductions and Impressions  Assessment  Diagnoses and all orders for this visit:    Abnormal stress test    MENON (dyspnea on exertion)    Paroxysmal atrial fibrillation (HCC)  -     POCT ECG  -     TSH, 3rd generation with Free T4 reflex; Future    Essential hypertension  -     amLODIPine (NORVASC) 5 mg tablet;  Take 1 tablet (5 mg total) by mouth daily    Pure hypercholesterolemia    Nonrheumatic mitral valve regurgitation          Past Medical History:   Diagnosis Date    Benign essential hypertension     Hyperlipidemia, unspecified     Osteoarthritis of left knee        Review of Systems   Constitutional: Negative for chills  Cardiovascular: Positive for dyspnea on exertion  Negative for chest pain, claudication, cyanosis, irregular heartbeat, leg swelling, near-syncope, orthopnea, palpitations, paroxysmal nocturnal dyspnea and syncope  Respiratory: Negative for cough and shortness of breath  Gastrointestinal: Negative for heartburn and nausea  Neurological: Negative for dizziness, focal weakness, headaches, light-headedness and weakness  All other systems reviewed and are negative  Allergies   Allergen Reactions    Nitrofurantoin Nausea Only, Dizziness and Headache     Macrodantin       Current Outpatient Medications:     amLODIPine (NORVASC) 5 mg tablet, Take 1 tablet (5 mg total) by mouth daily, Disp: 90 tablet, Rfl: 3    cholecalciferol (VITAMIN D3) 1,000 units tablet, Take 1,000 Units by mouth daily, Disp: , Rfl:     clobetasol (TEMOVATE) 0 05 % ointment, Apply topically as needed , Disp: , Rfl:     docusate sodium (COLACE) 100 mg capsule, Take 100 mg by mouth daily, Disp: , Rfl:     metoprolol succinate (TOPROL-XL) 25 mg 24 hr tablet, Take 1 tablet (25 mg total) by mouth daily, Disp: 30 tablet, Rfl: 5    rivaroxaban (Xarelto) 20 mg tablet, Take 1 tablet (20 mg total) by mouth daily with breakfast, Disp: 30 tablet, Rfl: 5        Social History     Socioeconomic History    Marital status: /Civil Union     Spouse name: Not on file    Number of children: Not on file    Years of education: Not on file    Highest education level: Not on file   Occupational History    Not on file   Tobacco Use    Smoking status: Never Smoker    Smokeless tobacco: Never Used   Substance and Sexual Activity    Alcohol use:  Yes     Alcohol/week: 1 0 standard drink     Types: 1 Glasses of wine per week     Comment: 1 glass nightly    Drug use: Never    Sexual activity: Not on file   Other Topics Concern    Not on file   Social History Narrative    Not on file     Social Determinants of Health Financial Resource Strain: Not on file   Food Insecurity: Not on file   Transportation Needs: Not on file   Physical Activity: Not on file   Stress: Not on file   Social Connections: Not on file   Intimate Partner Violence: Not on file   Housing Stability: Not on file       Family History   Problem Relation Age of Onset    Hyperlipidemia Mother     Heart Valve Disease Mother     Hypertension Mother     Arthritis Father     Diabetes Father     Alcohol abuse Father     Prostate cancer Father [de-identified]    Diabetes Brother     Atrial fibrillation Brother     Pancreatic cancer Maternal Aunt 72    No Known Problems Paternal Uncle     No Known Problems Paternal Uncle     No Known Problems Paternal Uncle     No Known Problems Paternal Uncle     Hypertension Daughter     Bradycardia Son     No Known Problems Son        Physical Exam  Vitals and nursing note reviewed  Constitutional:       General: She is not in acute distress  Appearance: She is not diaphoretic  HENT:      Head: Normocephalic and atraumatic  Eyes:      Conjunctiva/sclera: Conjunctivae normal    Cardiovascular:      Rate and Rhythm: Normal rate and regular rhythm  Pulses: Intact distal pulses  Heart sounds: Normal heart sounds  Pulmonary:      Effort: Pulmonary effort is normal       Breath sounds: Normal breath sounds  Abdominal:      General: Bowel sounds are normal       Palpations: Abdomen is soft  Musculoskeletal:         General: Normal range of motion  Cervical back: Normal range of motion and neck supple  Skin:     General: Skin is warm and dry  Neurological:      Mental Status: She is alert and oriented to person, place, and time  Vitals: Blood pressure 122/80, pulse 71, resp  rate 18, height 5' 5" (1 651 m), weight 84 kg (185 lb 4 oz), SpO2 98 %, not currently breastfeeding     Wt Readings from Last 3 Encounters:   06/08/22 84 kg (185 lb 4 oz)   05/25/22 83 9 kg (185 lb)   05/25/22 83 9 kg (185 lb)         Labs & Results:  No results found for: WBC, HGB, HCT, MCV, PLT  No results found for: BNP  No components found for: CHEM  No results found for: Reginia Code, TROPONINT, CKMBINDEX  No results found for this or any previous visit  No results found for this or any previous visit  This note was completed in part utilizing m-modal fluency direct voice recognition software  Grammatical errors, random word insertion, spelling mistakes, and incomplete sentences may be an occasional consequence of the system secondary to software limitations, ambient noise and hardware issues  At the time of dictation, efforts were made to edit, clarify and /or correct errors  Please read the chart carefully and recognize, using context, where substitutions have occurred    If you have any questions or concerns about the context, text or information contained within the body of this dictation, please contact myself, the provider, for further clarification

## 2022-06-16 ENCOUNTER — TELEPHONE (OUTPATIENT)
Dept: CARDIOLOGY CLINIC | Facility: CLINIC | Age: 82
End: 2022-06-16

## 2022-06-16 ENCOUNTER — PREP FOR PROCEDURE (OUTPATIENT)
Dept: CARDIOLOGY CLINIC | Facility: CLINIC | Age: 82
End: 2022-06-16

## 2022-06-16 DIAGNOSIS — I48.0 PAROXYSMAL ATRIAL FIBRILLATION (HCC): Primary | ICD-10-CM

## 2022-06-16 NOTE — TELEPHONE ENCOUNTER
Pt is schedule for a LHC on 7/6/2022 at [facility] with Dr Sommer    PT is aware of the instructions (mychart)    No allergies for the cath    Lab ordered:  · CMP     Med hold:  Xarelto - 2 days prior to procedure     Can I have an auth?

## 2022-06-27 ENCOUNTER — TELEPHONE (OUTPATIENT)
Dept: CARDIOLOGY CLINIC | Facility: CLINIC | Age: 82
End: 2022-06-27

## 2022-06-27 NOTE — TELEPHONE ENCOUNTER
P/c'd , she has been on Toprol-xl 25 mg qd for about one mth  Starting Saturday her Bp was low ( 89/59) and she was lightheaded  She then only took half the dose and Bp had improved to 119/74  Asymptomatic  Last night she took the 25 mg again and today Bp 99/66  Hr 67-73 and 50 at rest   Lightheaded again      Please advise

## 2022-07-06 ENCOUNTER — HOSPITAL ENCOUNTER (OUTPATIENT)
Facility: HOSPITAL | Age: 82
Setting detail: OUTPATIENT SURGERY
Discharge: HOME | End: 2022-07-06
Attending: INTERNAL MEDICINE | Admitting: INTERNAL MEDICINE
Payer: COMMERCIAL

## 2022-07-06 VITALS
HEIGHT: 65 IN | BODY MASS INDEX: 29.99 KG/M2 | WEIGHT: 180 LBS | HEART RATE: 70 BPM | DIASTOLIC BLOOD PRESSURE: 61 MMHG | TEMPERATURE: 97.7 F | SYSTOLIC BLOOD PRESSURE: 101 MMHG | OXYGEN SATURATION: 94 % | RESPIRATION RATE: 18 BRPM

## 2022-07-06 DIAGNOSIS — R06.09 DOE (DYSPNEA ON EXERTION): ICD-10-CM

## 2022-07-06 DIAGNOSIS — I48.91 ATRIAL FIBRILLATION, NEW ONSET (HCC): ICD-10-CM

## 2022-07-06 DIAGNOSIS — R94.39 ABNORMAL STRESS TEST: ICD-10-CM

## 2022-07-06 LAB
ERYTHROCYTE [DISTWIDTH] IN BLOOD BY AUTOMATED COUNT: 13.5 % (ref 11.6–15.1)
HCT VFR BLD AUTO: 46.8 % (ref 34.8–46.1)
HGB BLD-MCNC: 15.3 G/DL (ref 11.5–15.4)
MCH RBC QN AUTO: 29.8 PG (ref 26.8–34.3)
MCHC RBC AUTO-ENTMCNC: 32.7 G/DL (ref 31.4–37.4)
MCV RBC AUTO: 91 FL (ref 82–98)
PLATELET # BLD AUTO: 178 THOUSANDS/UL (ref 149–390)
PMV BLD AUTO: 10.1 FL (ref 8.9–12.7)
RBC # BLD AUTO: 5.14 MILLION/UL (ref 3.81–5.12)
WBC # BLD AUTO: 3.81 THOUSAND/UL (ref 4.31–10.16)

## 2022-07-06 PROCEDURE — C1769 GUIDE WIRE: HCPCS | Performed by: INTERNAL MEDICINE

## 2022-07-06 PROCEDURE — 85027 COMPLETE CBC AUTOMATED: CPT

## 2022-07-06 PROCEDURE — 93454 CORONARY ARTERY ANGIO S&I: CPT | Performed by: INTERNAL MEDICINE

## 2022-07-06 PROCEDURE — 99152 MOD SED SAME PHYS/QHP 5/>YRS: CPT | Performed by: INTERNAL MEDICINE

## 2022-07-06 PROCEDURE — C1894 INTRO/SHEATH, NON-LASER: HCPCS | Performed by: INTERNAL MEDICINE

## 2022-07-06 RX ORDER — ACETAMINOPHEN 325 MG/1
650 TABLET ORAL EVERY 4 HOURS PRN
Status: DISCONTINUED | OUTPATIENT
Start: 2022-07-06 | End: 2022-07-06 | Stop reason: HOSPADM

## 2022-07-06 RX ORDER — HEPARIN SODIUM 1000 [USP'U]/ML
INJECTION, SOLUTION INTRAVENOUS; SUBCUTANEOUS AS NEEDED
Status: DISCONTINUED | OUTPATIENT
Start: 2022-07-06 | End: 2022-07-06 | Stop reason: HOSPADM

## 2022-07-06 RX ORDER — VERAPAMIL HCL 2.5 MG/ML
AMPUL (ML) INTRAVENOUS AS NEEDED
Status: DISCONTINUED | OUTPATIENT
Start: 2022-07-06 | End: 2022-07-06 | Stop reason: HOSPADM

## 2022-07-06 RX ORDER — FENTANYL CITRATE 50 UG/ML
INJECTION, SOLUTION INTRAMUSCULAR; INTRAVENOUS AS NEEDED
Status: DISCONTINUED | OUTPATIENT
Start: 2022-07-06 | End: 2022-07-06 | Stop reason: HOSPADM

## 2022-07-06 RX ORDER — SODIUM CHLORIDE 9 MG/ML
125 INJECTION, SOLUTION INTRAVENOUS CONTINUOUS
Status: DISCONTINUED | OUTPATIENT
Start: 2022-07-06 | End: 2022-07-06 | Stop reason: HOSPADM

## 2022-07-06 RX ORDER — ASPIRIN 81 MG/1
324 TABLET, CHEWABLE ORAL ONCE
Status: COMPLETED | OUTPATIENT
Start: 2022-07-06 | End: 2022-07-06

## 2022-07-06 RX ORDER — SODIUM CHLORIDE 9 MG/ML
125 INJECTION, SOLUTION INTRAVENOUS CONTINUOUS
Status: DISCONTINUED | OUTPATIENT
Start: 2022-07-06 | End: 2022-07-06

## 2022-07-06 RX ORDER — MIDAZOLAM HYDROCHLORIDE 2 MG/2ML
INJECTION, SOLUTION INTRAMUSCULAR; INTRAVENOUS AS NEEDED
Status: DISCONTINUED | OUTPATIENT
Start: 2022-07-06 | End: 2022-07-06 | Stop reason: HOSPADM

## 2022-07-06 RX ORDER — NITROGLYCERIN 20 MG/100ML
INJECTION INTRAVENOUS AS NEEDED
Status: DISCONTINUED | OUTPATIENT
Start: 2022-07-06 | End: 2022-07-06 | Stop reason: HOSPADM

## 2022-07-06 RX ORDER — LIDOCAINE HYDROCHLORIDE 10 MG/ML
INJECTION, SOLUTION EPIDURAL; INFILTRATION; INTRACAUDAL; PERINEURAL AS NEEDED
Status: DISCONTINUED | OUTPATIENT
Start: 2022-07-06 | End: 2022-07-06 | Stop reason: HOSPADM

## 2022-07-06 RX ORDER — ONDANSETRON 2 MG/ML
4 INJECTION INTRAMUSCULAR; INTRAVENOUS EVERY 6 HOURS PRN
Status: DISCONTINUED | OUTPATIENT
Start: 2022-07-06 | End: 2022-07-06 | Stop reason: HOSPADM

## 2022-07-06 RX ADMIN — ASPIRIN 81 MG 324 MG: 81 TABLET ORAL at 07:19

## 2022-07-06 RX ADMIN — SODIUM CHLORIDE 125 ML/HR: 0.9 INJECTION, SOLUTION INTRAVENOUS at 07:19

## 2022-07-06 NOTE — DISCHARGE INSTRUCTIONS
1  Please see the post cardiac catheterization dishcarge instructions  No heavy lifting, greater than 10 lbs  or strenuous  activity for 48 hrs  2 Remove band aid tomorrow  Shower and wash area- wrist gently with soap and water- beginning tomorrow  Rinse and pat dry  Apply new water seal band aid  Repeat this process for 5 days  No powders, creams lotions or antibiotic ointments  for 5 days  No tub baths, hot tubs or swimming for 5 days  3  Please call our office ([phone]) if you have any fever, redness, swelling, discharge from your wrist access site      4 No driving for 2 days

## 2022-07-06 NOTE — INTERVAL H&P NOTE
H&P reviewed  After examining the patient I find no changes in the patients condition since the H&P had been written      For transient ischemic dilation with otherwise no perfusion defect on nuclear stress test     Vitals:    07/06/22 0722   BP: 145/75   Pulse: 69   Resp: 17   Temp: 98 3 °F (36 8 °C)   SpO2: 95%     Rae Novak MD / 07/06/22 / 8:10 AM

## 2022-07-08 ENCOUNTER — RA CDI HCC (OUTPATIENT)
Dept: OTHER | Facility: HOSPITAL | Age: 82
End: 2022-07-08

## 2022-07-08 NOTE — PROGRESS NOTES
[facility]  coding opportunities       Chart reviewed, no opportunity found:   [address]        Patients Insurance     Medicare Insurance: [employer] Advantage

## 2022-07-11 PROBLEM — I48.0 PAROXYSMAL ATRIAL FIBRILLATION (HCC): Status: ACTIVE | Noted: 2022-07-11

## 2022-07-11 PROBLEM — I25.10 CORONARY ARTERY DISEASE INVOLVING NATIVE CORONARY ARTERY OF NATIVE HEART WITHOUT ANGINA PECTORIS: Status: ACTIVE | Noted: 2022-07-11

## 2022-07-11 NOTE — PROGRESS NOTES
FAMILY MEDICINE PROGRESS NOTE    Date of Service: 22  Primary Care Provider:   Amado Altamirano MD       Name: Kirsten Berman       : 1940       Age:81 y o  Sex: female      MRN: [id number]      Chief Complaint:Medicare Wellness Visit       ASSESSMENT and PLAN:  Kirsten Berman is a 80 y o  female with:     Problem List Items Addressed This Visit        Cardiovascular and Mediastinum    Essential hypertension - Primary     BP Readings from Last 3 Encounters:   22 142/94   22 101/61   22 122/80     Elevated today, continue current management with amlodipine 5 mg daily, metoprolol succinate 25 mg daily  Recommended ongoing home monitoring, though if persistently elevated may need to increase amlodipine   Will do home study for sleep apnea as well given increase in hypertension, new diagnosis of atrial fibrillation            Relevant Orders    Home Study    Paroxysmal atrial fibrillation [facility])     Recent diagnosis during cardiac work-up for exertional dyspnea  She is now on metoprolol succinate 25 mg daily for rate control and Xarelto for anticoagulation            Relevant Orders    Home Study    Coronary artery disease involving native coronary artery of native heart without angina pectoris     Mild atherosclerosis on recent cath  Continue medical management with aspirin, though continue addition of statin  Reviewed importance of health/active lifestyle               Other    Pure hypercholesterolemia    Class 1 obesity due to excess calories without serious comorbidity with body mass index (BMI) of 30 0 to 30 9 in adult    Relevant Orders    Home Study    Acute nonintractable headache     Not migrainous, does not sound like paroxysmal hemicrania  Possible related to elevated blood pressure  No red flag symptoms  Continue Tylenol, recommended keeping headache diary to better classify                Other Visit Diagnoses     Medicare annual wellness visit, subsequent Encounter for screening for other disorder        At risk for sleep apnea        Relevant Orders    Home Study          SUBJECTIVE:  Mojgan Love is a 80 y o  female who presents today with a chief complaint of Medicare Wellness Visit  HPI     Patient presents for AWV and follow-up  Since her last visit she saw her cardiologist for yearly follow-up  Given ongoing dyspnea on exertion that had worsened since her last visit she had further testing with ECHO and NM stress test  Normal LV function noted on ECHO, both right ad left atrium were mild dilated, there was mild to moderate mitral regurgitation, and patient was noted to be in atrial fibrillation  NM stress test did not show any evidence of ischemia, there was however atrial fibrillation  She had cardiac catheterization that showed 10% stenosis in proximal RCA, otherwise only minimal disease  She was started on metoprolol succinate 12 5 mg daily, and anticoagulation with Xarelto 20 mg daily  She was also started on baby aspirin  She reports that she has more elevated blood pressure at home  She is on amlodipine 5 mg daily and metoprolol succinate 25 mg daily  She denies chest pain, no change in her shortness of breath  She has been having unilateral headaches, starting about 2 to 3 weeks ago, that comes and goes will last anywhere from a few seconds to a a few minutes  She will take Tylenol which does help the pain  The pain is dull, does not radiate  No associated symptoms  She has not been exercising for the last month due to recommendations from cardiology  She was previously exercising 5 days a week with silver sneakers, treadmill, ect  Review of Systems   HENT: Negative for hearing loss  Eyes: Negative for visual disturbance  Respiratory: Positive for shortness of breath  Cardiovascular: Negative for chest pain and palpitations  Neurological: Positive for headaches  Negative for dizziness and light-headedness       I have reviewed the patient's Past Medical History  Current Outpatient Medications:     amLODIPine (NORVASC) 5 mg tablet, Take 1 tablet (5 mg total) by mouth daily, Disp: 90 tablet, Rfl: 3    aspirin (ECOTRIN LOW STRENGTH) 81 mg EC tablet, Take 1 tablet (81 mg total) by mouth daily, Disp: , Rfl:     cholecalciferol (VITAMIN D3) 1,000 units tablet, Take 1,000 Units by mouth daily, Disp: , Rfl:     clobetasol (TEMOVATE) 0 05 % ointment, Apply topically as needed , Disp: , Rfl:     docusate sodium (COLACE) 100 mg capsule, Take 100 mg by mouth daily, Disp: , Rfl:     metoprolol succinate (TOPROL-XL) 25 mg 24 hr tablet, Take 1 tablet (25 mg total) by mouth daily, Disp: 30 tablet, Rfl: 5    rivaroxaban (Xarelto) 20 mg tablet, Take 1 tablet (20 mg total) by mouth daily with breakfast, Disp: 30 tablet, Rfl: 5    OBJECTIVE:  /94   Pulse 74   Temp (!) 95 6 °F (35 3 °C)   Resp 17   Ht 5' 5" (1 651 m)   Wt 83 5 kg (184 lb)   SpO2 97%   BMI 30 62 kg/m²    BP Readings from Last 3 Encounters:   07/12/22 142/94   07/06/22 101/61   06/08/22 122/80      Wt Readings from Last 3 Encounters:   07/12/22 83 5 kg (184 lb)   07/06/22 81 6 kg (180 lb)   06/08/22 84 kg (185 lb 4 oz)      Physical Exam  Constitutional:       General: She is not in acute distress  Appearance: Normal appearance  She is obese  She is not ill-appearing or toxic-appearing  HENT:      Head: Normocephalic and atraumatic  Right Ear: Tympanic membrane, ear canal and external ear normal       Left Ear: Tympanic membrane, ear canal and external ear normal       Nose: Nose normal       Mouth/Throat:      Mouth: Mucous membranes are moist    Eyes:      Extraocular Movements: Extraocular movements intact  Conjunctiva/sclera: Conjunctivae normal       Pupils: Pupils are equal, round, and reactive to light  Cardiovascular:      Rate and Rhythm: Normal rate and regular rhythm  Pulses: Normal pulses  Heart sounds: Normal heart sounds   No murmur heard  No friction rub  No gallop  Pulmonary:      Effort: Pulmonary effort is normal  No respiratory distress  Breath sounds: Normal breath sounds  No stridor  No wheezing, rhonchi or rales  Abdominal:      General: There is no distension  Palpations: Abdomen is soft  Tenderness: There is no abdominal tenderness  Musculoskeletal:         General: Normal range of motion  Cervical back: Normal range of motion and neck supple  No rigidity  Right lower leg: No edema  Left lower leg: No edema  Lymphadenopathy:      Cervical: No cervical adenopathy  Skin:     General: Skin is warm and dry  Findings: No erythema or rash  Neurological:      General: No focal deficit present  Mental Status: She is alert and oriented to person, place, and time  Mental status is at baseline  Cranial Nerves: No cranial nerve deficit  Sensory: No sensory deficit  Motor: No weakness  Gait: Gait normal    Psychiatric:         Mood and Affect: Mood normal          Behavior: Behavior normal            BMI Counseling: Body mass index is 30 62 kg/m²  The BMI is above normal  Nutrition recommendations include encouraging healthy choices of fruits and vegetables, consuming healthier snacks and reducing intake of saturated and trans fat  Exercise recommendations include moderate physical activity 150 minutes/week and strength training exercises  Rationale for BMI follow-up plan is due to patient being overweight or obese  Depression Screening and Follow-up Plan: Patient was screened for depression during today's encounter  They screened negative with a PHQ-2 score of 0  Return in about 6 months (around 1/12/2023), or if symptoms worsen or fail to improve, for follow-up   Charmayne Perches, MD    Note: Portions of the record have been created with voice recognition software    Occasional wrong word or "sound a like" substitutions may have occurred due to the inherent limitations of voice recognition software  Read the chart carefully and recognize, using context, where substitutions have occurred

## 2022-07-12 ENCOUNTER — OFFICE VISIT (OUTPATIENT)
Dept: FAMILY MEDICINE CLINIC | Facility: CLINIC | Age: 82
End: 2022-07-12
Payer: COMMERCIAL

## 2022-07-12 VITALS
RESPIRATION RATE: 17 BRPM | OXYGEN SATURATION: 97 % | HEART RATE: 74 BPM | TEMPERATURE: 95.6 F | BODY MASS INDEX: 30.66 KG/M2 | HEIGHT: 65 IN | WEIGHT: 184 LBS | SYSTOLIC BLOOD PRESSURE: 142 MMHG | DIASTOLIC BLOOD PRESSURE: 94 MMHG

## 2022-07-12 DIAGNOSIS — I48.0 PAROXYSMAL ATRIAL FIBRILLATION (HCC): ICD-10-CM

## 2022-07-12 DIAGNOSIS — Z00.00 MEDICARE ANNUAL WELLNESS VISIT, SUBSEQUENT: ICD-10-CM

## 2022-07-12 DIAGNOSIS — I25.10 CORONARY ARTERY DISEASE INVOLVING NATIVE CORONARY ARTERY OF NATIVE HEART WITHOUT ANGINA PECTORIS: ICD-10-CM

## 2022-07-12 DIAGNOSIS — Z13.89 ENCOUNTER FOR SCREENING FOR OTHER DISORDER: ICD-10-CM

## 2022-07-12 DIAGNOSIS — I10 ESSENTIAL HYPERTENSION: Primary | ICD-10-CM

## 2022-07-12 DIAGNOSIS — Z91.89 AT RISK FOR SLEEP APNEA: ICD-10-CM

## 2022-07-12 DIAGNOSIS — R51.9 ACUTE NONINTRACTABLE HEADACHE, UNSPECIFIED HEADACHE TYPE: ICD-10-CM

## 2022-07-12 DIAGNOSIS — E66.09 CLASS 1 OBESITY DUE TO EXCESS CALORIES WITHOUT SERIOUS COMORBIDITY WITH BODY MASS INDEX (BMI) OF 30.0 TO 30.9 IN ADULT: ICD-10-CM

## 2022-07-12 DIAGNOSIS — E78.00 PURE HYPERCHOLESTEROLEMIA: ICD-10-CM

## 2022-07-12 PROBLEM — E66.811 CLASS 1 OBESITY DUE TO EXCESS CALORIES WITHOUT SERIOUS COMORBIDITY WITH BODY MASS INDEX (BMI) OF 30.0 TO 30.9 IN ADULT: Status: ACTIVE | Noted: 2022-07-12

## 2022-07-12 PROCEDURE — 3288F FALL RISK ASSESSMENT DOCD: CPT | Performed by: FAMILY MEDICINE

## 2022-07-12 PROCEDURE — 99214 OFFICE O/P EST MOD 30 MIN: CPT | Performed by: FAMILY MEDICINE

## 2022-07-12 PROCEDURE — 3080F DIAST BP >= 90 MM HG: CPT | Performed by: FAMILY MEDICINE

## 2022-07-12 PROCEDURE — G0444 DEPRESSION SCREEN ANNUAL: HCPCS | Performed by: FAMILY MEDICINE

## 2022-07-12 PROCEDURE — 1160F RVW MEDS BY RX/DR IN RCRD: CPT | Performed by: FAMILY MEDICINE

## 2022-07-12 PROCEDURE — 3725F SCREEN DEPRESSION PERFORMED: CPT | Performed by: FAMILY MEDICINE

## 2022-07-12 PROCEDURE — G0439 PPPS, SUBSEQ VISIT: HCPCS | Performed by: FAMILY MEDICINE

## 2022-07-12 PROCEDURE — 1003F LEVEL OF ACTIVITY ASSESS: CPT | Performed by: FAMILY MEDICINE

## 2022-07-12 PROCEDURE — 3077F SYST BP >= 140 MM HG: CPT | Performed by: FAMILY MEDICINE

## 2022-07-12 PROCEDURE — 1125F AMNT PAIN NOTED PAIN PRSNT: CPT | Performed by: FAMILY MEDICINE

## 2022-07-12 PROCEDURE — 1170F FXNL STATUS ASSESSED: CPT | Performed by: FAMILY MEDICINE

## 2022-07-12 PROCEDURE — 1090F PRES/ABSN URINE INCON ASSESS: CPT | Performed by: FAMILY MEDICINE

## 2022-07-12 PROCEDURE — 1101F PT FALLS ASSESS-DOCD LE1/YR: CPT | Performed by: FAMILY MEDICINE

## 2022-07-12 NOTE — ASSESSMENT & PLAN NOTE
Not migrainous, does not sound like paroxysmal hemicrania  Possible related to elevated blood pressure  No red flag symptoms  Continue Tylenol, recommended keeping headache diary to better classify

## 2022-07-12 NOTE — ASSESSMENT & PLAN NOTE
Recent diagnosis during cardiac work-up for exertional dyspnea   She is now on metoprolol succinate 25 mg daily for rate control and Xarelto for anticoagulation

## 2022-07-12 NOTE — ASSESSMENT & PLAN NOTE
Mild atherosclerosis on recent cath  Continue medical management with aspirin, though continue addition of statin  Reviewed importance of health/active lifestyle

## 2022-07-12 NOTE — PATIENT INSTRUCTIONS
Medicare Preventive Visit Patient Instructions  Thank you for completing your Welcome to Medicare Visit or Medicare Annual Wellness Visit today  Your next wellness visit will be due in one year (7/13/2023)  The screening/preventive services that you may require over the next 5-10 years are detailed below  Some tests may not apply to you based off risk factors and/or age  Screening tests ordered at today's visit but not completed yet may show as past due  Also, please note that scanned in results may not display below  Preventive Screenings:  Service Recommendations Previous Testing/Comments   Colorectal Cancer Screening  * Colonoscopy    * Fecal Occult Blood Test (FOBT)/Fecal Immunochemical Test (FIT)  * Fecal DNA/Cologuard Test  * Flexible Sigmoidoscopy Age: 54-65 years old   Colonoscopy: every 10 years (may be performed more frequently if at higher risk)  OR  FOBT/FIT: every 1 year  OR  Cologuard: every 3 years  OR  Sigmoidoscopy: every 5 years  Screening may be recommended earlier than age 48 if at higher risk for colorectal cancer  Also, an individualized decision between you and your healthcare provider will decide whether screening between the ages of 74-80 would be appropriate  Colonoscopy: 10/13/2009  FOBT/FIT: Not on file  Cologuard: Not on file  Sigmoidoscopy: Not on file          Breast Cancer Screening Age: 36 years old  Frequency: every 1-2 years  Not required if history of left and right mastectomy Mammogram: 09/01/2021    Screening Current   Cervical Cancer Screening Between the ages of 21-29, pap smear recommended once every 3 years  Between the ages of 33-67, can perform pap smear with HPV co-testing every 5 years     Recommendations may differ for women with a history of total hysterectomy, cervical cancer, or abnormal pap smears in past  Pap Smear: Not on file    Screening Not Indicated   Hepatitis C Screening Once for adults born between 1945 and 1965  More frequently in patients at high risk for Hepatitis C Hep C Antibody: Not on file        Diabetes Screening 1-2 times per year if you're at risk for diabetes or have pre-diabetes Fasting glucose: No results in last 5 years   A1C: 5 3        Cholesterol Screening Once every 5 years if you don't have a lipid disorder  May order more often based on risk factors  Lipid panel: Not on file    Screening Not Indicated  History Lipid Disorder     Other Preventive Screenings Covered by Medicare:  1  Abdominal Aortic Aneurysm (AAA) Screening: covered once if your at risk  You're considered to be at risk if you have a family history of AAA  2  Lung Cancer Screening: covers low dose CT scan once per year if you meet all of the following conditions: (1) Age 50-69; (2) No signs or symptoms of lung cancer; (3) Current smoker or have quit smoking within the last 15 years; (4) You have a tobacco smoking history of at least 30 pack years (packs per day multiplied by number of years you smoked); (5) You get a written order from a healthcare provider  3  Glaucoma Screening: covered annually if you're considered high risk: (1) You have diabetes OR (2) Family history of glaucoma OR (3)  aged 48 and older OR (3)  American aged 72 and older  3  Osteoporosis Screening: covered every 2 years if you meet one of the following conditions: (1) You're estrogen deficient and at risk for osteoporosis based off medical history and other findings; (2) Have a vertebral abnormality; (3) On glucocorticoid therapy for more than 3 months; (4) Have primary hyperparathyroidism; (5) On osteoporosis medications and need to assess response to drug therapy  · Last bone density test (DXA Scan): 03/23/2016   5  HIV Screening: covered annually if you're between the age of 15-65  Also covered annually if you are younger than 13 and older than 72 with risk factors for HIV infection   For pregnant patients, it is covered up to 3 times per pregnancy  Immunizations:  Immunization Recommendations   Influenza Vaccine Annual influenza vaccination during flu season is recommended for all persons aged >= 6 months who do not have contraindications   Pneumococcal Vaccine (Prevnar and Pneumovax)  * Prevnar = PCV13  * Pneumovax = PPSV23   Adults 25-60 years old: 1-3 doses may be recommended based on certain risk factors  Adults 72 years old: Prevnar (PCV13) vaccine recommended followed by Pneumovax (PPSV23) vaccine  If already received PPSV23 since turning 65, then PCV13 recommended at least one year after PPSV23 dose  Hepatitis B Vaccine 3 dose series if at intermediate or high risk (ex: diabetes, end stage renal disease, liver disease)   Tetanus (Td) Vaccine - COST NOT COVERED BY MEDICARE PART B Following completion of primary series, a booster dose should be given every 10 years to maintain immunity against tetanus  Td may also be given as tetanus wound prophylaxis  Tdap Vaccine - COST NOT COVERED BY MEDICARE PART B Recommended at least once for all adults  For pregnant patients, recommended with each pregnancy  Shingles Vaccine (Shingrix) - COST NOT COVERED BY MEDICARE PART B  2 shot series recommended in those aged 48 and above     Health Maintenance Due:      Topic Date Due    Hepatitis C Screening  Never done     Immunizations Due:      Topic Date Due    Pneumococcal Vaccine: 65+ Years (2 - PCV) 11/05/2015    COVID-19 Vaccine (4 - Booster for Moderna series) 03/01/2022    Influenza Vaccine (1) 09/01/2022     Advance Directives   What are advance directives? Advance directives are legal documents that state your wishes and plans for medical care  These plans are made ahead of time in case you lose your ability to make decisions for yourself  Advance directives can apply to any medical decision, such as the treatments you want, and if you want to donate organs  What are the types of advance directives?   There are many types of advance directives, and each state has rules about how to use them  You may choose a combination of any of the following:  · Living will: This is a written record of the treatment you want  You can also choose which treatments you do not want, which to limit, and which to stop at a certain time  This includes surgery, medicine, IV fluid, and tube feedings  · Durable power of  for healthcare [facility]): This is a written record that states who you want to make healthcare choices for you when you are unable to make them for yourself  This person, called a proxy, is usually a family member or a friend  You may choose more than 1 proxy  · Do not resuscitate (DNR) order:  A DNR order is used in case your heart stops beating or you stop breathing  It is a request not to have certain forms of treatment, such as CPR  A DNR order may be included in other types of advance directives  · Medical directive: This covers the care that you want if you are in a coma, near death, or unable to make decisions for yourself  You can list the treatments you want for each condition  Treatment may include pain medicine, surgery, blood transfusions, dialysis, IV or tube feedings, and a ventilator (breathing machine)  · Values history: This document has questions about your views, beliefs, and how you feel and think about life  This information can help others choose the care that you would choose  Why are advance directives important? An advance directive helps you control your care  Although spoken wishes may be used, it is better to have your wishes written down  Spoken wishes can be misunderstood, or not followed  Treatments may be given even if you do not want them  An advance directive may make it easier for your family to make difficult choices about your care     Weight Management   Why it is important to manage your weight:  Being overweight increases your risk of health conditions such as heart disease, high blood pressure, type 2 diabetes, and certain types of cancer  It can also increase your risk for osteoarthritis, sleep apnea, and other respiratory problems  Aim for a slow, steady weight loss  Even a small amount of weight loss can lower your risk of health problems  How to lose weight safely:  A safe and healthy way to lose weight is to eat fewer calories and get regular exercise  You can lose up about 1 pound a week by decreasing the number of calories you eat by 500 calories each day  Healthy meal plan for weight management:  A healthy meal plan includes a variety of foods, contains fewer calories, and helps you stay healthy  A healthy meal plan includes the following:  · Eat whole-grain foods more often  A healthy meal plan should contain fiber  Fiber is the part of grains, fruits, and vegetables that is not broken down by your body  Whole-grain foods are healthy and provide extra fiber in your diet  Some examples of whole-grain foods are whole-wheat breads and pastas, oatmeal, brown rice, and bulgur  · Eat a variety of vegetables every day  Include dark, leafy greens such as spinach, kale, mario greens, and mustard greens  Eat yellow and orange vegetables such as carrots, sweet potatoes, and winter squash  · Eat a variety of fruits every day  Choose fresh or canned fruit (canned in its own juice or light syrup) instead of juice  Fruit juice has very little or no fiber  · Eat low-fat dairy foods  Drink fat-free (skim) milk or 1% milk  Eat fat-free yogurt and low-fat cottage cheese  Try low-fat cheeses such as mozzarella and other reduced-fat cheeses  · Choose meat and other protein foods that are low in fat  Choose beans or other legumes such as split peas or lentils  Choose fish, skinless poultry (chicken or turkey), or lean cuts of red meat (beef or pork)  Before you cook meat or poultry, cut off any visible fat  · Use less fat and oil  Try baking foods instead of frying them   Add less fat, such as margarine, sour cream, regular salad dressing and mayonnaise to foods  Eat fewer high-fat foods  Some examples of high-fat foods include french fries, doughnuts, ice cream, and cakes  · Eat fewer sweets  Limit foods and drinks that are high in sugar  This includes candy, cookies, regular soda, and sweetened drinks  Exercise:  Exercise at least 30 minutes per day on most days of the week  Some examples of exercise include walking, biking, dancing, and swimming  You can also fit in more physical activity by taking the stairs instead of the elevator or parking farther away from stores  Ask your healthcare provider about the best exercise plan for you  © Copyright [employer] 2018 Information is for End User's use only and may not be sold, redistributed or otherwise used for commercial purposes   All illustrations and images included in CareNotes® are the copyrighted property of A D A M , Inc  or [address] [place] [address]

## 2022-07-12 NOTE — PROGRESS NOTES
Assessment and Plan:     Problem List Items Addressed This Visit        Cardiovascular and Mediastinum    Essential hypertension - Primary     BP Readings from Last 3 Encounters:   07/12/22 142/94   07/06/22 101/61   06/08/22 122/80     Elevated today, continue current management with amlodipine 5 mg daily, metoprolol succinate 25 mg daily  Recommended ongoing home monitoring, though if persistently elevated may need to increase amlodipine   Will do home study for sleep apnea as well given increase in hypertension, new diagnosis of atrial fibrillation            Relevant Orders    Home Study    Paroxysmal atrial fibrillation [facility])     Recent diagnosis during cardiac work-up for exertional dyspnea  She is now on metoprolol succinate 25 mg daily for rate control and Xarelto for anticoagulation            Relevant Orders    Home Study    Coronary artery disease involving native coronary artery of native heart without angina pectoris     Mild atherosclerosis on recent cath  Continue medical management with aspirin, though continue addition of statin  Reviewed importance of health/active lifestyle               Other    Pure hypercholesterolemia    Class 1 obesity due to excess calories without serious comorbidity with body mass index (BMI) of 30 0 to 30 9 in adult    Relevant Orders    Home Study    Acute nonintractable headache     Not migrainous, does not sound like paroxysmal hemicrania  Possible related to elevated blood pressure  No red flag symptoms  Continue Tylenol, recommended keeping headache diary to better classify  Other Visit Diagnoses     Medicare annual wellness visit, subsequent        Encounter for screening for other disorder        At risk for sleep apnea        Relevant Orders    Home Study           Preventive health issues were discussed with patient, and age appropriate screening tests were ordered as noted in patient's After Visit Summary    Personalized health advice and appropriate referrals for health education or preventive services given if needed, as noted in patient's After Visit Summary  History of Present Illness:     Patient presents for a Medicare Wellness Visit    HPI   Patient Care Team:  Omayra Enrique MD as PCP - General (Family Medicine)     Review of Systems:     Review of Systems     Problem List:     Patient Active Problem List   Diagnosis    Essential hypertension    Chronic left shoulder pain    MENON (dyspnea on exertion)    Lichen sclerosus    Lipoma of right lower extremity    [facility] [address] [facility]    Pure hypercholesterolemia    Abnormal stress test    Paroxysmal atrial fibrillation ([facility] [address] [facility]    Coronary artery disease involving native coronary artery of native heart without angina pectoris    Class 1 obesity due to excess calories without serious comorbidity with body mass index (BMI) of 30 0 to 30 9 in adult    Acute nonintractable headache      Past Medical and Surgical History:     Past Medical History:   Diagnosis Date    Benign essential hypertension     Hyperlipidemia, unspecified     Osteoarthritis of left knee      Past Surgical History:   Procedure Laterality Date    CARDIAC CATHETERIZATION Left 7/6/2022    Procedure: Cardiac Left Heart Cath;  Surgeon: Reynaldo Schuster MD;  [facility];   Service: Cardiology    COLON SURGERY  2003    Rectoseal     LAPAROSCOPIC OVARIAN CYSTECTOMY Right 1981    LAPAROSCOPIC OVARIAN CYSTECTOMY Left 2003    rectoseal and cystoseal repari    TONSILLECTOMY AND ADENOIDECTOMY  1952    TUBAL LIGATION  1972      Family History:     Family History   Problem Relation Age of Onset    Hyperlipidemia Mother     Heart Valve Disease Mother     Hypertension Mother     Arthritis Father     Diabetes Father     Alcohol abuse Father     Prostate cancer Father [de-identified]    Diabetes Brother     Atrial fibrillation Brother     Pancreatic cancer Maternal Aunt 72    No Known Problems Paternal Uncle     No Known Problems Paternal Uncle     No Known Problems Paternal Uncle     No Known Problems Paternal Uncle     Hypertension Daughter     Bradycardia Son     No Known Problems Son       Social History:     Social History     Socioeconomic History    Marital status: /Civil Union     Spouse name: None    Number of children: None    Years of education: None    Highest education level: None   Occupational History    None   Tobacco Use    Smoking status: Never Smoker    Smokeless tobacco: Never Used   Substance and Sexual Activity    Alcohol use: Yes     Alcohol/week: 1 0 standard drink     Types: 1 Glasses of wine per week     Comment: 1 glass nightly    Drug use: Never    Sexual activity: None   Other Topics Concern    None   Social History Narrative    None     Social Determinants of Health     Financial Resource Strain: Not on file   Food Insecurity: Not on file   Transportation Needs: Not on file   Physical Activity: Not on file   Stress: Not on file   Social Connections: Not on file   Intimate Partner Violence: Not on file   Housing Stability: Not on file      Medications and Allergies:     Current Outpatient Medications   Medication Sig Dispense Refill    amLODIPine (NORVASC) 5 mg tablet Take 1 tablet (5 mg total) by mouth daily 90 tablet 3    aspirin (ECOTRIN LOW STRENGTH) 81 mg EC tablet Take 1 tablet (81 mg total) by mouth daily      cholecalciferol (VITAMIN D3) 1,000 units tablet Take 1,000 Units by mouth daily      clobetasol (TEMOVATE) 0 05 % ointment Apply topically as needed       docusate sodium (COLACE) 100 mg capsule Take 100 mg by mouth daily      metoprolol succinate (TOPROL-XL) 25 mg 24 hr tablet Take 1 tablet (25 mg total) by mouth daily 30 tablet 5    rivaroxaban (Xarelto) 20 mg tablet Take 1 tablet (20 mg total) by mouth daily with breakfast 30 tablet 5     No current facility-administered medications for this visit       Allergies   Allergen Reactions    Nitrofurantoin Nausea Only, Dizziness and Headache     Macrodantin      Immunizations:     Immunization History   Administered Date(s) Administered    COVID-19 MODERNA VACC 0 5 ML IM 02/09/2021, 03/09/2021, 11/01/2021    INFLUENZA 10/21/2020    Influenza, high dose seasonal 0 7 mL 10/08/2021    Pneumococcal Polysaccharide PPV23 11/05/2014    Zoster Vaccine Recombinant 06/10/2018, 09/18/2018      Health Maintenance:         Topic Date Due    Hepatitis C Screening  Never done         Topic Date Due    Pneumococcal Vaccine: 65+ Years (2 - PCV) 11/05/2015    COVID-19 Vaccine (4 - Booster for Moderna series) 03/01/2022    Influenza Vaccine (1) 09/01/2022      Medicare Screening Tests and Risk Assessments:     Michelle Vale is here for her Subsequent Wellness visit  Last Medicare Wellness visit information reviewed, patient interviewed and updates made to the record today  Health Risk Assessment:   Patient rates overall health as very good  Patient feels that their physical health rating is slightly worse  Patient is very satisfied with their life  Eyesight was rated as same  Hearing was rated as same  Patient feels that their emotional and mental health rating is same  Patients states they are never, rarely angry  Patient states they are sometimes unusually tired/fatigued  Pain experienced in the last 7 days has been some  Patient's pain rating has been 3/10  Patient states that she has experienced no weight loss or gain in last 6 months  Depression Screening:   PHQ-2 Score: 0      Fall Risk Screening: In the past year, patient has experienced: no history of falling in past year      Urinary Incontinence Screening:   Patient has not leaked urine accidently in the last six months  Home Safety:  Patient does not have trouble with stairs inside or outside of their home  Patient has working smoke alarms and has working carbon monoxide detector  Home safety hazards include: none       Nutrition: Current diet is Regular  Medications:   Patient is currently taking over-the-counter supplements  OTC medications include: see medication list  Patient is able to manage medications  Activities of Daily Living (ADLs)/Instrumental Activities of Daily Living (IADLs):   Walk and transfer into and out of bed and chair?: Yes  Dress and groom yourself?: Yes    Bathe or shower yourself?: Yes    Feed yourself? Yes  Do your laundry/housekeeping?: Yes  Manage your money, pay your bills and track your expenses?: Yes  Make your own meals?: Yes    Do your own shopping?: Yes    Previous Hospitalizations:   Any hospitalizations or ED visits within the last 12 months?: No      Advance Care Planning:   Living will: Yes    Durable POA for healthcare: Yes    Advanced directive: Yes    Advanced directive counseling given: Yes      Cognitive Screening:   Provider or family/friend/caregiver concerned regarding cognition?: No    PREVENTIVE SCREENINGS      Cardiovascular Screening:    General: Screening Not Indicated and History Lipid Disorder      Breast Cancer Screening:     General: Screening Current      Cervical Cancer Screening:    General: Screening Not Indicated      Osteoporosis Screening:    General: Screening Not Indicated and Screening Current      Lung Cancer Screening:     General: Screening Not Indicated    Screening, Brief Intervention, and Referral to Treatment (SBIRT)    Screening  Typical number of drinks in a day: 1  Typical number of drinks in a week: 4  Interpretation: Low risk drinking behavior  AUDIT-C Screenin) How often did you have a drink containing alcohol in the past year? 2 to 3 times a week  2) How many drinks did you have on a typical day when you were drinking in the past year?  1 to 2  3) How often did you have 6 or more drinks on one occasion in the past year? never    AUDIT-C Score: 3  Interpretation: Score 3-12 (female): POSITIVE screen for alcohol misuse    AUDIT Screenin) How often during the last year have you found that you were not able to stop drinking once you had started? 0 - never  5) How often during the last year have you failed to do what was normally expected from you because of drinking? 0 - never  6) How often during the last year have you needed a first drink in the morning to get yourself going after a heavy drinking session? 0 - never  7) How often during the last year have you had a feeling of guilt or remorse after drinking? 0 - never  8) How often during the last year have you been unable to remember what happened the night before because you had been drinking? 0 - never  9) Have you or someone else been injured as a result of your drinking? 0 - no  10) Has a relative or friend or a doctor or another health worker been concerned about your drinking or suggested you cut down? 0 - no    AUDIT Score: 3  Interpretation: Low risk alcohol consumption    Single Item Drug Screening:  How often have you used an illegal drug (including marijuana) or a prescription medication for non-medical reasons in the past year? never    Single Item Drug Screen Score: 0  Interpretation: Negative screen for possible drug use disorder    Brief Intervention  Alcohol & drug use screenings were reviewed  No concerns regarding substance use disorder identified  Healthy alcohol use/limits discussed  Alcohol cessation counseling given  Other Counseling Topics:   Regular weightbearing exercise and calcium and vitamin D intake       No exam data present     Physical Exam:     /94   Pulse 74   Temp (!) 95 6 °F (35 3 °C)   Resp 17   Ht 5' 5" (1 651 m)   Wt 83 5 kg (184 lb)   SpO2 97%   BMI 30 62 kg/m²     Physical Exam     Betty Albarado MD

## 2022-07-12 NOTE — ASSESSMENT & PLAN NOTE
BP Readings from Last 3 Encounters:   07/12/22 142/94   07/06/22 101/61   06/08/22 122/80     Elevated today, continue current management with amlodipine 5 mg daily, metoprolol succinate 25 mg daily   Recommended ongoing home monitoring, though if persistently elevated may need to increase amlodipine   Will do home study for sleep apnea as well given increase in hypertension, new diagnosis of atrial fibrillation

## 2022-07-14 ENCOUNTER — TELEPHONE (OUTPATIENT)
Dept: CARDIOLOGY CLINIC | Facility: CLINIC | Age: 82
End: 2022-07-14

## 2022-07-14 NOTE — TELEPHONE ENCOUNTER
She had cardiac catheterization 7/6  Some CAD, chronic occlusion no intervention    Yes, she can return to her normal activities  She should follow-up with Dr Gilberto Yeager    If she has persistent symptoms of dyspnea, I can see her prior to that, otherwise three-month follow-up with Dr Gilberto Yeager is satisfactory

## 2022-07-14 NOTE — TELEPHONE ENCOUNTER
P/C had a cath last week, needs to know when can return back to normal activities  Also does she need a f/up at this time?     Last ov 6/8/2022   Next request is nov 2022    Please advise

## 2022-08-01 NOTE — PROGRESS NOTES
Cardiology  Follow Up   Office Visit Note  Poonam Roth   80 y o    female   MRN: [id number]  [address]  [address] [facility] [address] [facility] [place] [address]  [phone]    PCP: Corey Calzada MD  Cardiologist: Dr Chacho Woodward            Summary of recommendations  -Stop aspirin, given her minimal CAD in the setting of chronic anticoagulation with with Xarelto, to minimize the bleeding risk in this 28-year-old   -1 week Zio patch given persistent MENON- assess % of AF burden  Follow up will be scheduled with Dr Chacho Woodward next month, already sched        Assessment/plan  MENON  Due to frequent PAF vs contriubtion of mitral valve disease  - 1 week zio patch- assess AF burden  Atrial fibrillation, paroxysmal, new onset  Found during a stress test  Started on Toprol 25 mg daily, anticoagulation with Xarelto 20 mg daily  -EKG normal sinus rhythm 63 beats per minute  -assess burden of AFib  If high, consider AARD versus ablation  Abnormal stress test 5/25/22: Evidence of t i d   In the   setting of new AFib, the abnormal stress test, a left heart catheterization was pursued as recommended by her cardiologist   This demonstrated minimal ASCVD  Mitral regurgitation, mild-to-moderate by echo 5/25/22  Hypertension, essential   /82 on amlodipine 5 mg daily, metoprolol succinate 25 mg daily  Lipids:  11/20/20 LDL 92, non   She would benefit from statin therapy, given her LHC  Not addressed today  Cardiac testing  · TTE 5/25/22  EF 60%  No RWMA  RV cavity size mildly dilated  Mild biatrial enlargement  Mild-to-moderate MR  Moderate TR  Mild pulmonic valve regurgitation  · NM myocardial SPECT 5/25/22  No perfusion defects  EF 62%  The ECG and SPECT imaging portions of the stress study are concordant with no evidence of stress induced myocardial ischemia  The ECG shows atrial fibrillation  There is evidence of transient ischemic dilation (TID)   TID was appreciated quantitatively but not visually  · Cardiac catheterization 7/6/22:  · Prox RCA lesion is 10% stenosed  Minimal atherosclerotic disease  HPI  Garlon Essex is a 79 yo female with essential hypertension and chronic MENON  She had a nuclear stress test in October 2020 that was normal   She had an echocardiogram that demonstrated normal RV and LV function, normal valvular function  She has been dyspneic on exertion for years  This has been documented by her cardiologist back in 2017  She was last seen in the office 4/19/22 by Dr Arthur Leak  Testing was repeated 5/25/22 given persistent, and worsening MENON  She has been trying to increase her exercise tolerance  She has been walking 6-7000 steps a day  She does well on a flat surface, has increased symptoms with hills  She denied palpitations    6/8/22  Follow-up, after testing, performed given worsening MENON  She was found to be in AFib, during the stress test   She was started on metoprolol succinate 25 mg daily, anticoagulation with Xarelto 20 mg daily, by a covering cardiologist   The stress test showed no evidence of perfusion defects  There was, however evidence of transient ischemic dilatation  EKG:  Normal sinus rhythm 69 beats per minute  /80  HPI:  The patient relates to me that she has been asymptomatic  Her cardiologist simply wanted to establish a new baseline given that she was established a relationship with a new cardiologist   That was the impetus for her recent echo and stress test   She exercises regularly, either walking outside or walking on a treadmill, for 30 minutes, whether dependent  She also participates in Silver sneakers, and does gentle yoga, and some light weight lifting  With these activities she denies chest pain pressure heaviness back jaw or arm pain, palpitations or shortness of breath    She relates that her daughter believe she is slightly dyspneic with conversation at peak exercise, and has pursed lip breathing at times  Today, she wanted to review her test results, and have a further explanation about atrial fibrillation  We reviewed her stress test, and echocardiogram findings  She is aware of the transient ischemic dilatation/abnormal stress findings  I recommend no exertional activity  Will proceed with cardiac catheterization  The patient is in agreement  She is aware of the mitral regurgitation  This certainly could be an etiology of her dyspnea  She is tolerating Toprol and anticoagulation without problems   She tells me she will be getting her annual blood work, for her annual physical per her PCP, at the beginning of July  I added a TSH to ensure that would be covered given her new onset AFib  I recommend she avoid NSAIDs given her anticoagulation  Interval history  7/6/22  LHC: Minimal CAD    7/12/22  OV PCP  BP elevated  Home Sleep study recommended    8/3/22  Close follow-up  MENON  Underwent stress test   Found have new onset AFib  No started on metoprolol, and Xarelto given the AF  Stress test showed t i d  Started on baby aspirin  Left heart catheterization pursued, this demonstrated minimal CAD  ROS: + MENON  No CP  Given the minimal degree of CAD, will stop aspirin as she is on Xarelto, to minimize bleeding risk in this 80year-old  EKG today normal sinus rhythm 63 beats per minute   /82  Together we reviewed her coronary angiogram diagram   She is aware of the findings  Today, I recommend a 1 week ZIO patch to assess the burden of atrial fibrillation, which may be contributing to her MENON  If she has a low burden, we may consider re-evaluating the degree of MR she has  If she has a high burden, consider antiarrhythmic therapy versus ablation  Given her minimal CAD, she may benefit from statin therapy    This however was not addressed today        I have spent 40 minutes with Patient  today in which greater than 50% of this time was spent in counseling/coordination of care regarding Patient and family education, Importance of tx compliance, Risk factor reductions and Impressions  Assessment  Diagnoses and all orders for this visit:    Coronary artery disease involving native coronary artery of native heart without angina pectoris    Essential hypertension    PAD (peripheral artery disease) (HCC)    Paroxysmal atrial fibrillation (HCC)    Class 1 obesity due to excess calories without serious comorbidity with body mass index (BMI) of 30 0 to 30 9 in adult    Pure hypercholesterolemia          Past Medical History:   Diagnosis Date    Benign essential hypertension     Hyperlipidemia, unspecified     Osteoarthritis of left knee        Review of Systems   Constitutional: Negative for chills  Cardiovascular: Positive for dyspnea on exertion  Negative for chest pain, claudication, cyanosis, irregular heartbeat, leg swelling, near-syncope, orthopnea, palpitations, paroxysmal nocturnal dyspnea and syncope  Respiratory: Negative for cough and shortness of breath  Gastrointestinal: Negative for heartburn and nausea  Neurological: Negative for dizziness, focal weakness, headaches, light-headedness and weakness  All other systems reviewed and are negative  Allergies   Allergen Reactions    Nitrofurantoin Nausea Only, Dizziness and Headache     Macrodantin           Current Outpatient Medications:     amLODIPine (NORVASC) 5 mg tablet, Take 1 tablet (5 mg total) by mouth daily, Disp: 90 tablet, Rfl: 3    aspirin (ECOTRIN LOW STRENGTH) 81 mg EC tablet, Take 1 tablet (81 mg total) by mouth daily, Disp: , Rfl:     cholecalciferol (VITAMIN D3) 1,000 units tablet, Take 1,000 Units by mouth daily, Disp: , Rfl:     clobetasol (TEMOVATE) 0 05 % ointment, Apply topically as needed , Disp: , Rfl:     docusate sodium (COLACE) 100 mg capsule, Take 100 mg by mouth daily, Disp: , Rfl:     metoprolol succinate (TOPROL-XL) 25 mg 24 hr tablet, Take 1 tablet (25 mg total) by mouth daily, Disp: 30 tablet, Rfl: 5    rivaroxaban (Xarelto) 20 mg tablet, Take 1 tablet (20 mg total) by mouth daily with breakfast, Disp: 30 tablet, Rfl: 5        Social History     Socioeconomic History    Marital status: /Civil Union     Spouse name: Not on file    Number of children: Not on file    Years of education: Not on file    Highest education level: Not on file   Occupational History    Not on file   Tobacco Use    Smoking status: Never Smoker    Smokeless tobacco: Never Used   Substance and Sexual Activity    Alcohol use: Yes     Alcohol/week: 1 0 standard drink     Types: 1 Glasses of wine per week     Comment: 1 glass nightly    Drug use: Never    Sexual activity: Not on file   Other Topics Concern    Not on file   Social History Narrative    Not on file     Social Determinants of Health     Financial Resource Strain: Not on file   Food Insecurity: Not on file   Transportation Needs: Not on file   Physical Activity: Not on file   Stress: Not on file   Social Connections: Not on file   Intimate Partner Violence: Not on file   Housing Stability: Not on file       Family History   Problem Relation Age of Onset    Hyperlipidemia Mother     Heart Valve Disease Mother     Hypertension Mother     Arthritis Father     Diabetes Father     Alcohol abuse Father     Prostate cancer Father [de-identified]    Diabetes Brother     Atrial fibrillation Brother     Pancreatic cancer Maternal Aunt 72    No Known Problems Paternal Uncle     No Known Problems Paternal Uncle     No Known Problems Paternal Uncle     No Known Problems Paternal Uncle     Hypertension Daughter     Bradycardia Son     No Known Problems Son        Physical Exam  Vitals and nursing note reviewed  Constitutional:       General: She is not in acute distress  Appearance: She is not diaphoretic  HENT:      Head: Normocephalic and atraumatic     Eyes:      Conjunctiva/sclera: Conjunctivae normal    Cardiovascular:      Rate and Rhythm: Normal rate and regular rhythm  Pulses: Intact distal pulses  Heart sounds: Normal heart sounds  Pulmonary:      Effort: Pulmonary effort is normal       Breath sounds: Normal breath sounds  Abdominal:      General: Bowel sounds are normal       Palpations: Abdomen is soft  Musculoskeletal:         General: Normal range of motion  Cervical back: Normal range of motion and neck supple  Skin:     General: Skin is warm and dry  Neurological:      Mental Status: She is alert and oriented to person, place, and time  Vitals: not currently breastfeeding  Wt Readings from Last 3 Encounters:   07/12/22 83 5 kg (184 lb)   07/06/22 81 6 kg (180 lb)   06/08/22 84 kg (185 lb 4 oz)         Labs & Results:  Lab Results   Component Value Date    WBC 3 81 (L) 07/06/2022    HGB 15 3 07/06/2022    HCT 46 8 (H) 07/06/2022    MCV 91 07/06/2022     07/06/2022     No results found for: BNP  No components found for: CHEM  No results found for: CKTOTAL, TROPONINI, TROPONINT, CKMBINDEX  No results found for this or any previous visit  No results found for this or any previous visit  This note was completed in part utilizing [employer] direct voice recognition software  Grammatical errors, random word insertion, spelling mistakes, and incomplete sentences may be an occasional consequence of the system secondary to software limitations, ambient noise and hardware issues  At the time of dictation, efforts were made to edit, clarify and /or correct errors  Please read the chart carefully and recognize, using context, where substitutions have occurred    If you have any questions or concerns about the context, text or information contained within the body of this dictation, please contact myself, the provider, for further clarification

## 2022-08-03 ENCOUNTER — OFFICE VISIT (OUTPATIENT)
Dept: CARDIOLOGY CLINIC | Facility: CLINIC | Age: 82
End: 2022-08-03
Payer: COMMERCIAL

## 2022-08-03 VITALS
BODY MASS INDEX: 30.86 KG/M2 | HEART RATE: 64 BPM | OXYGEN SATURATION: 99 % | WEIGHT: 185.2 LBS | HEIGHT: 65 IN | SYSTOLIC BLOOD PRESSURE: 130 MMHG | DIASTOLIC BLOOD PRESSURE: 82 MMHG

## 2022-08-03 DIAGNOSIS — I10 ESSENTIAL HYPERTENSION: ICD-10-CM

## 2022-08-03 DIAGNOSIS — R06.09 DOE (DYSPNEA ON EXERTION): ICD-10-CM

## 2022-08-03 DIAGNOSIS — I73.9 PAD (PERIPHERAL ARTERY DISEASE) (HCC): ICD-10-CM

## 2022-08-03 DIAGNOSIS — E78.00 PURE HYPERCHOLESTEROLEMIA: ICD-10-CM

## 2022-08-03 DIAGNOSIS — I48.0 PAROXYSMAL ATRIAL FIBRILLATION (HCC): ICD-10-CM

## 2022-08-03 DIAGNOSIS — I25.10 CORONARY ARTERY DISEASE INVOLVING NATIVE CORONARY ARTERY OF NATIVE HEART WITHOUT ANGINA PECTORIS: Primary | ICD-10-CM

## 2022-08-03 DIAGNOSIS — E66.09 CLASS 1 OBESITY DUE TO EXCESS CALORIES WITHOUT SERIOUS COMORBIDITY WITH BODY MASS INDEX (BMI) OF 30.0 TO 30.9 IN ADULT: ICD-10-CM

## 2022-08-03 PROCEDURE — 99215 OFFICE O/P EST HI 40 MIN: CPT | Performed by: NURSE PRACTITIONER

## 2022-08-03 PROCEDURE — 93000 ELECTROCARDIOGRAM COMPLETE: CPT | Performed by: NURSE PRACTITIONER

## 2022-08-03 NOTE — LETTER
August 3, 2022     Dipika Davis MD  [address] [place] [address]    Patient: Avila Lawrence   YOB: 1940   Date of Visit: 8/3/2022       Dear Dr Андрей Joyner: Thank you for referring Avila Lawrence to me for evaluation  Below are my notes for this consultation  If you have questions, please do not hesitate to call me  I look forward to following your patient along with you  Sincerely,        ANN-MARIE Torre        CC: MD Sierra Moreno, [facility]  8/3/2022 11:32 AM  Incomplete  Cardiology  Follow Up   Office Visit Note  Avila Lawrence   80 y o    female   MRN: [id number]  [address]  [address]  SUNDAY [address] [place] [address]  [phone]  [phone]    PCP: Dipika Davis MD  Cardiologist: Dr Tami Justin            Summary of recommendations  -Stop aspirin, given her minimal CAD in the setting of chronic anticoagulation with with Xarelto, to minimize the bleeding risk in this 68-year-old   -1 week Zio patch given persistent MENON- assess % of AF burden  Follow up will be scheduled with Dr Tami Justin next month, already sched        Assessment/plan  MENON  Due to frequent PAF vs contriubtion of mitral valve disease  - 1 week zio patch- assess AF burden  Atrial fibrillation, paroxysmal, new onset  Found during a stress test  Started on Toprol 25 mg daily, anticoagulation with Xarelto 20 mg daily  -EKG normal sinus rhythm 63 beats per minute  -assess burden of AFib  If high, consider AARD versus ablation  Abnormal stress test 5/25/22: Evidence of t i d   In the   setting of new AFib, the abnormal stress test, a left heart catheterization was pursued as recommended by her cardiologist   This demonstrated minimal ASCVD  Mitral regurgitation, mild-to-moderate by echo 5/25/22  Hypertension, essential   /82 on amlodipine 5 mg daily, metoprolol succinate 25 mg daily  Lipids:  11/20/20 LDL 92, non    She would benefit from statin therapy, given her [facility]  Not addressed today  Cardiac testing  · TTE 5/25/22  EF 60%  No RWMA  RV cavity size mildly dilated  Mild biatrial enlargement  Mild-to-moderate MR  Moderate TR  Mild pulmonic valve regurgitation  · NM myocardial SPECT 5/25/22  No perfusion defects  EF 62%  The ECG and SPECT imaging portions of the stress study are concordant with no evidence of stress induced myocardial ischemia  The ECG shows atrial fibrillation  There is evidence of transient ischemic dilation (TID)  TID was appreciated quantitatively but not visually  · Cardiac catheterization 7/6/22:  · Prox RCA lesion is 10% stenosed  Minimal atherosclerotic disease  CHEMA De La Cruz is a 81 yo female with essential hypertension and chronic MENON  She had a nuclear stress test in October 2020 that was normal   She had an echocardiogram that demonstrated normal RV and LV function, normal valvular function  She has been dyspneic on exertion for years  This has been documented by her cardiologist back in 2017  She was last seen in the office 4/19/22 by Dr Jesus Watts  Testing was repeated 5/25/22 given persistent, and worsening MENON  She has been trying to increase her exercise tolerance  She has been walking 6-7000 steps a day  She does well on a flat surface, has increased symptoms with hills  She denied palpitations    6/8/22  Follow-up, after testing, performed given worsening MENON  She was found to be in AFib, during the stress test   She was started on metoprolol succinate 25 mg daily, anticoagulation with Xarelto 20 mg daily, by a covering cardiologist   The stress test showed no evidence of perfusion defects  There was, however evidence of transient ischemic dilatation  EKG:  Normal sinus rhythm 69 beats per minute  /80  HPI:  The patient relates to me that she has been asymptomatic    Her cardiologist simply wanted to establish a new baseline given that she was established a relationship with a new cardiologist   That was the impetus for her recent echo and stress test   She exercises regularly, either walking outside or walking on a treadmill, for 30 minutes, whether dependent  She also participates in Silver sneakers, and does gentle yoga, and some light weight lifting  With these activities she denies chest pain pressure heaviness back jaw or arm pain, palpitations or shortness of breath  She relates that her daughter believe she is slightly dyspneic with conversation at peak exercise, and has pursed lip breathing at times  Today, she wanted to review her test results, and have a further explanation about atrial fibrillation  We reviewed her stress test, and echocardiogram findings  She is aware of the transient ischemic dilatation/abnormal stress findings  I recommend no exertional activity  Will proceed with cardiac catheterization  The patient is in agreement  She is aware of the mitral regurgitation  This certainly could be an etiology of her dyspnea  She is tolerating Toprol and anticoagulation without problems   She tells me she will be getting her annual blood work, for her annual physical per her PCP, at the beginning of July  I added a TSH to ensure that would be covered given her new onset AFib  I recommend she avoid NSAIDs given her anticoagulation  Interval history  7/6/22  LHC: Minimal CAD    7/12/22  OV PCP  BP elevated  Home Sleep study recommended    8/3/22  Close follow-up  MENON  Underwent stress test   Found have new onset AFib  No started on metoprolol, and Xarelto given the AF  Stress test showed t i d  Started on baby aspirin  Left heart catheterization pursued, this demonstrated minimal CAD  ROS: + MENON   No CP  Given the minimal degree of CAD, will stop aspirin as she is on Xarelto, to minimize bleeding risk in this 80year-old  EKG today normal sinus rhythm 63 beats per minute   /82  Together we reviewed her coronary angiogram diagram   She is aware of the findings  Today, I recommend a 1 week ZIO patch to assess the burden of atrial fibrillation, which may be contributing to her MENON  If she has a low burden, we may consider re-evaluating the degree of MR she has  If she has a high burden, consider antiarrhythmic therapy versus ablation  Given her minimal CAD, she may benefit from statin therapy  This however was not addressed today        I have spent 40 minutes with Patient  today in which greater than 50% of this time was spent in counseling/coordination of care regarding Patient and family education, Importance of tx compliance, Risk factor reductions and Impressions  Assessment  Diagnoses and all orders for this visit:    Coronary artery disease involving native coronary artery of native heart without angina pectoris    Essential hypertension    PAD (peripheral artery disease) (HCC)    Paroxysmal atrial fibrillation (HCC)    Class 1 obesity due to excess calories without serious comorbidity with body mass index (BMI) of 30 0 to 30 9 in adult    Pure hypercholesterolemia          Past Medical History:   Diagnosis Date    Benign essential hypertension     Hyperlipidemia, unspecified     Osteoarthritis of left knee        Review of Systems   Constitutional: Negative for chills  Cardiovascular: Positive for dyspnea on exertion  Negative for chest pain, claudication, cyanosis, irregular heartbeat, leg swelling, near-syncope, orthopnea, palpitations, paroxysmal nocturnal dyspnea and syncope  Respiratory: Negative for cough and shortness of breath  Gastrointestinal: Negative for heartburn and nausea  Neurological: Negative for dizziness, focal weakness, headaches, light-headedness and weakness  All other systems reviewed and are negative  Allergies   Allergen Reactions    Nitrofurantoin Nausea Only, Dizziness and Headache     Macrodantin           Current Outpatient Medications:     amLODIPine (NORVASC) 5 mg tablet, Take 1 tablet (5 mg total) by mouth daily, Disp: 90 tablet, Rfl: 3    aspirin (ECOTRIN LOW STRENGTH) 81 mg EC tablet, Take 1 tablet (81 mg total) by mouth daily, Disp: , Rfl:     cholecalciferol (VITAMIN D3) 1,000 units tablet, Take 1,000 Units by mouth daily, Disp: , Rfl:     clobetasol (TEMOVATE) 0 05 % ointment, Apply topically as needed , Disp: , Rfl:     docusate sodium (COLACE) 100 mg capsule, Take 100 mg by mouth daily, Disp: , Rfl:     metoprolol succinate (TOPROL-XL) 25 mg 24 hr tablet, Take 1 tablet (25 mg total) by mouth daily, Disp: 30 tablet, Rfl: 5    rivaroxaban (Xarelto) 20 mg tablet, Take 1 tablet (20 mg total) by mouth daily with breakfast, Disp: 30 tablet, Rfl: 5        Social History     Socioeconomic History    Marital status: /Civil Union     Spouse name: Not on file    Number of children: Not on file    Years of education: Not on file    Highest education level: Not on file   Occupational History    Not on file   Tobacco Use    Smoking status: Never Smoker    Smokeless tobacco: Never Used   Substance and Sexual Activity    Alcohol use:  Yes     Alcohol/week: 1 0 standard drink     Types: 1 Glasses of wine per week     Comment: 1 glass nightly    Drug use: Never    Sexual activity: Not on file   Other Topics Concern    Not on file   Social History Narrative    Not on file     Social Determinants of Health     Financial Resource Strain: Not on file   Food Insecurity: Not on file   Transportation Needs: Not on file   Physical Activity: Not on file   Stress: Not on file   Social Connections: Not on file   Intimate Partner Violence: Not on file   Housing Stability: Not on file       Family History   Problem Relation Age of Onset    Hyperlipidemia Mother     Heart Valve Disease Mother     Hypertension Mother     Arthritis Father     Diabetes Father     Alcohol abuse Father     Prostate cancer Father [de-identified]    Diabetes Brother     Atrial fibrillation Brother     Pancreatic cancer Maternal Aunt 72    No Known Problems Paternal Uncle     No Known Problems Paternal Uncle     No Known Problems Paternal Uncle     No Known Problems Paternal Uncle     Hypertension Daughter     Bradycardia Son     No Known Problems Son        Physical Exam  Vitals and nursing note reviewed  Constitutional:       General: She is not in acute distress  Appearance: She is not diaphoretic  HENT:      Head: Normocephalic and atraumatic  Eyes:      Conjunctiva/sclera: Conjunctivae normal    Cardiovascular:      Rate and Rhythm: Normal rate and regular rhythm  Pulses: Intact distal pulses  Heart sounds: Normal heart sounds  Pulmonary:      Effort: Pulmonary effort is normal       Breath sounds: Normal breath sounds  Abdominal:      General: Bowel sounds are normal       Palpations: Abdomen is soft  Musculoskeletal:         General: Normal range of motion  Cervical back: Normal range of motion and neck supple  Skin:     General: Skin is warm and dry  Neurological:      Mental Status: She is alert and oriented to person, place, and time  Vitals: not currently breastfeeding  Wt Readings from Last 3 Encounters:   07/12/22 83 5 kg (184 lb)   07/06/22 81 6 kg (180 lb)   06/08/22 84 kg (185 lb 4 oz)         Labs & Results:  Lab Results   Component Value Date    WBC 3 81 (L) 07/06/2022    HGB 15 3 07/06/2022    HCT 46 8 (H) 07/06/2022    MCV 91 07/06/2022     07/06/2022     No results found for: BNP  No components found for: CHEM  No results found for: CKTOTAL, TROPONINI, TROPONINT, CKMBINDEX  No results found for this or any previous visit  No results found for this or any previous visit  This note was completed in part utilizing [employer] direct voice recognition software     Grammatical errors, random word insertion, spelling mistakes, and incomplete sentences may be an occasional consequence of the system secondary to software limitations, ambient noise and hardware issues  At the time of dictation, efforts were made to edit, clarify and /or correct errors  Please read the chart carefully and recognize, using context, where substitutions have occurred  If you have any questions or concerns about the context, text or information contained within the body of this dictation, please contact myself, the provider, for further clarification        ANN-MARIE Carmen  8/3/2022 11:27 AM  Incomplete  Cardiology  Follow Up   Office Visit Note  Leandra Horton   80 y o    female   MRN: [id number]  [address]  [address] [place] [address] [phone]    PCP: Tony Dubois MD  Cardiologist: Dr Kay Cody            Summary of recommendations  Stop aspirin, given her minimal CAD in the setting of chronic anticoagulation with with Xarelto, to minimize the bleeding risk in this 82-year-old   Start pravastatin  Follow up will be scheduled with Dr Kay Cody 3 months        Assessment/plan  MENON  Due to frequent PAF vs contriubtin of mitral valve disease  Atrial fibrillation, paroxysmal, new onset  Found during a stress test  Started on Toprol 25 mg daily, anticoagulation with Xarelto 20 mg daily  -EKG normal sinus rhythm 63 beats per minute  Abnormal stress test 5/25/22: Evidence of t i d   In the   setting of new AFib, the abnormal stress test, a left heart catheterization was pursued as recommended by her cardiologist   This demonstrated minimal ASCVD  Mitral regurgitation, mild-to-moderate by echo 5/25/22  Hypertension, essential   /82 on amlodipine 5 mg daily, metoprolol succinate 25 mg daily  Lipids:  11/20/20 LDL 92, non   Cardiac testing  · TTE 5/25/22  EF 60%  No RWMA  RV cavity size mildly dilated  Mild biatrial enlargement  Mild-to-moderate MR  Moderate TR  Mild pulmonic valve regurgitation  · NM myocardial SPECT 5/25/22  No perfusion defects  EF 62%   The ECG and SPECT imaging portions of the stress study are concordant with no evidence of stress induced myocardial ischemia  The ECG shows atrial fibrillation  There is evidence of transient ischemic dilation (TID)  TID was appreciated quantitatively but not visually  · Cardiac catheterization 7/6/22:  · Prox RCA lesion is 10% stenosed  Minimal atherosclerotic disease  HPI  Sanya García is a 79 yo female with essential hypertension and chronic MENON  She had a nuclear stress test in October 2020 that was normal   She had an echocardiogram that demonstrated normal RV and LV function, normal valvular function  She has been dyspneic on exertion for years  This has been documented by her cardiologist back in 2017  She was last seen in the office 4/19/22 by Dr Coronel Brine  Testing was repeated 5/25/22 given persistent, and worsening MENON  She has been trying to increase her exercise tolerance  She has been walking 6-7000 steps a day  She does well on a flat surface, has increased symptoms with hills  She denied palpitations    6/8/22  Follow-up, after testing, performed given worsening MENON  She was found to be in AFib, during the stress test   She was started on metoprolol succinate 25 mg daily, anticoagulation with Xarelto 20 mg daily, by a covering cardiologist   The stress test showed no evidence of perfusion defects  There was, however evidence of transient ischemic dilatation  EKG:  Normal sinus rhythm 69 beats per minute  /80  HPI:  The patient relates to me that she has been asymptomatic  Her cardiologist simply wanted to establish a new baseline given that she was established a relationship with a new cardiologist   That was the impetus for her recent echo and stress test   She exercises regularly, either walking outside or walking on a treadmill, for 30 minutes, whether dependent  She also participates in Silver sneakers, and does gentle yoga, and some light weight lifting    With these activities she denies chest pain pressure heaviness back jaw or arm pain, palpitations or shortness of breath  She relates that her daughter believe she is slightly dyspneic with conversation at peak exercise, and has pursed lip breathing at times  Today, she wanted to review her test results, and have a further explanation about atrial fibrillation  We reviewed her stress test, and echocardiogram findings  She is aware of the transient ischemic dilatation/abnormal stress findings  I recommend no exertional activity  Will proceed with cardiac catheterization  The patient is in agreement  She is aware of the mitral regurgitation  This certainly could be an etiology of her dyspnea  She is tolerating Toprol and anticoagulation without problems   She tells me she will be getting her annual blood work, for her annual physical per her PCP, at the beginning of July  I added a TSH to ensure that would be covered given her new onset AFib  I recommend she avoid NSAIDs given her anticoagulation  Interval history  7/6/22  LHC: Minimal CAD    7/12/22  OV PCP  BP elevated  Home Sleep study recommended    8/3/22  Close follow-up  MENON  Underwent stress test   Found have new onset AFib  No started on metoprolol, and Xarelto given the AF  Stress test showed t i d  Started on baby aspirin  Left heart catheterization pursued, this demonstrated minimal CAD  ROS  Recommend starting a statin  Given the minimal degree of CAD, will stop aspirin as she is on Xarelto, to minimize bleeding risk in this 80year-old  EKG today normal sinus rhythm 63 beats per minute   /82                    I have spent 40 minutes with Patient  today in which greater than 50% of this time was spent in counseling/coordination of care regarding Patient and family education, Importance of tx compliance, Risk factor reductions and Impressions    Assessment  Diagnoses and all orders for this visit:    Coronary artery disease involving native coronary artery of native heart without angina pectoris    Essential hypertension    PAD (peripheral artery disease) (HCC)    Paroxysmal atrial fibrillation (HCC)    Class 1 obesity due to excess calories without serious comorbidity with body mass index (BMI) of 30 0 to 30 9 in adult    Pure hypercholesterolemia          Past Medical History:   Diagnosis Date    Benign essential hypertension     Hyperlipidemia, unspecified     Osteoarthritis of left knee        Review of Systems   Constitutional: Negative for chills  Cardiovascular: Positive for dyspnea on exertion  Negative for chest pain, claudication, cyanosis, irregular heartbeat, leg swelling, near-syncope, orthopnea, palpitations, paroxysmal nocturnal dyspnea and syncope  Respiratory: Negative for cough and shortness of breath  Gastrointestinal: Negative for heartburn and nausea  Neurological: Negative for dizziness, focal weakness, headaches, light-headedness and weakness  All other systems reviewed and are negative  Allergies   Allergen Reactions    Nitrofurantoin Nausea Only, Dizziness and Headache     Macrodantin           Current Outpatient Medications:     amLODIPine (NORVASC) 5 mg tablet, Take 1 tablet (5 mg total) by mouth daily, Disp: 90 tablet, Rfl: 3    aspirin (ECOTRIN LOW STRENGTH) 81 mg EC tablet, Take 1 tablet (81 mg total) by mouth daily, Disp: , Rfl:     cholecalciferol (VITAMIN D3) 1,000 units tablet, Take 1,000 Units by mouth daily, Disp: , Rfl:     clobetasol (TEMOVATE) 0 05 % ointment, Apply topically as needed , Disp: , Rfl:     docusate sodium (COLACE) 100 mg capsule, Take 100 mg by mouth daily, Disp: , Rfl:     metoprolol succinate (TOPROL-XL) 25 mg 24 hr tablet, Take 1 tablet (25 mg total) by mouth daily, Disp: 30 tablet, Rfl: 5    rivaroxaban (Xarelto) 20 mg tablet, Take 1 tablet (20 mg total) by mouth daily with breakfast, Disp: 30 tablet, Rfl: 5        Social History     Socioeconomic History    Marital status: /Civil Union     Spouse name: Not on file    Number of children: Not on file    Years of education: Not on file    Highest education level: Not on file   Occupational History    Not on file   Tobacco Use    Smoking status: Never Smoker    Smokeless tobacco: Never Used   Substance and Sexual Activity    Alcohol use: Yes     Alcohol/week: 1 0 standard drink     Types: 1 Glasses of wine per week     Comment: 1 glass nightly    Drug use: Never    Sexual activity: Not on file   Other Topics Concern    Not on file   Social History Narrative    Not on file     Social Determinants of Health     Financial Resource Strain: Not on file   Food Insecurity: Not on file   Transportation Needs: Not on file   Physical Activity: Not on file   Stress: Not on file   Social Connections: Not on file   Intimate Partner Violence: Not on file   Housing Stability: Not on file       Family History   Problem Relation Age of Onset    Hyperlipidemia Mother     Heart Valve Disease Mother     Hypertension Mother     Arthritis Father     Diabetes Father     Alcohol abuse Father     Prostate cancer Father [de-identified]    Diabetes Brother     Atrial fibrillation Brother     Pancreatic cancer Maternal Aunt 72    No Known Problems Paternal Uncle     No Known Problems Paternal Uncle     No Known Problems Paternal Uncle     No Known Problems Paternal Uncle     Hypertension Daughter     Bradycardia Son     No Known Problems Son        Physical Exam  Vitals and nursing note reviewed  Constitutional:       General: She is not in acute distress  Appearance: She is not diaphoretic  HENT:      Head: Normocephalic and atraumatic  Eyes:      Conjunctiva/sclera: Conjunctivae normal    Cardiovascular:      Rate and Rhythm: Normal rate and regular rhythm  Pulses: Intact distal pulses  Heart sounds: Normal heart sounds  Pulmonary:      Effort: Pulmonary effort is normal       Breath sounds: Normal breath sounds     Abdominal: General: Bowel sounds are normal       Palpations: Abdomen is soft  Musculoskeletal:         General: Normal range of motion  Cervical back: Normal range of motion and neck supple  Skin:     General: Skin is warm and dry  Neurological:      Mental Status: She is alert and oriented to person, place, and time  Vitals: not currently breastfeeding  Wt Readings from Last 3 Encounters:   07/12/22 83 5 kg (184 lb)   07/06/22 81 6 kg (180 lb)   06/08/22 84 kg (185 lb 4 oz)         Labs & Results:  Lab Results   Component Value Date    WBC 3 81 (L) 07/06/2022    HGB 15 3 07/06/2022    HCT 46 8 (H) 07/06/2022    MCV 91 07/06/2022     07/06/2022     No results found for: BNP  No components found for: CHEM  No results found for: CKTOTAL, TROPONINI, TROPONINT, CKMBINDEX  No results found for this or any previous visit  No results found for this or any previous visit  This note was completed in part utilizing [employer] direct voice recognition software  Grammatical errors, random word insertion, spelling mistakes, and incomplete sentences may be an occasional consequence of the system secondary to software limitations, ambient noise and hardware issues  At the time of dictation, efforts were made to edit, clarify and /or correct errors  Please read the chart carefully and recognize, using context, where substitutions have occurred    If you have any questions or concerns about the context, text or information contained within the body of this dictation, please contact myself, the provider, for further clarification

## 2022-08-17 ENCOUNTER — CLINICAL SUPPORT (OUTPATIENT)
Dept: CARDIOLOGY CLINIC | Facility: CLINIC | Age: 82
End: 2022-08-17
Payer: COMMERCIAL

## 2022-08-17 DIAGNOSIS — R06.09 DOE (DYSPNEA ON EXERTION): ICD-10-CM

## 2022-08-17 DIAGNOSIS — I48.0 PAROXYSMAL ATRIAL FIBRILLATION (HCC): ICD-10-CM

## 2022-08-17 PROCEDURE — 93244 EXT ECG>48HR<7D REV&INTERPJ: CPT | Performed by: INTERNAL MEDICINE

## 2022-08-17 RX ORDER — METOPROLOL SUCCINATE 25 MG/1
37.5 TABLET, EXTENDED RELEASE ORAL DAILY
Qty: 30 TABLET | Refills: 5
Start: 2022-08-17 | End: 2022-09-12 | Stop reason: SDUPTHER

## 2022-08-28 ENCOUNTER — OFFICE VISIT (OUTPATIENT)
Dept: URGENT CARE | Facility: CLINIC | Age: 82
End: 2022-08-28
Payer: COMMERCIAL

## 2022-08-28 VITALS
BODY MASS INDEX: 31.16 KG/M2 | HEIGHT: 65 IN | RESPIRATION RATE: 16 BRPM | WEIGHT: 187 LBS | TEMPERATURE: 97.7 F | DIASTOLIC BLOOD PRESSURE: 69 MMHG | OXYGEN SATURATION: 98 % | HEART RATE: 58 BPM | SYSTOLIC BLOOD PRESSURE: 139 MMHG

## 2022-08-28 DIAGNOSIS — M94.8X9 PERICHONDRITIS: Primary | ICD-10-CM

## 2022-08-28 PROCEDURE — S9083 URGENT CARE CENTER GLOBAL: HCPCS | Performed by: NURSE PRACTITIONER

## 2022-08-28 PROCEDURE — 99213 OFFICE O/P EST LOW 20 MIN: CPT | Performed by: NURSE PRACTITIONER

## 2022-08-28 RX ORDER — LEVOFLOXACIN 500 MG/1
500 TABLET, FILM COATED ORAL EVERY 24 HOURS
Qty: 5 TABLET | Refills: 0 | Status: SHIPPED | OUTPATIENT
Start: 2022-08-28 | End: 2022-09-02

## 2022-08-28 NOTE — PROGRESS NOTES
[facility]        NAME: Kirsten Berman is a 80 y o  female  : 1940    MRN: [id number]  DATE: 2022  TIME: 10:17 AM    Assessment and Plan   Perichondritis [M94 8X9]  1  Perichondritis  levofloxacin (LEVAQUIN) 500 mg tablet         Patient Instructions     --Warm compresses to ear 2-3 times a day  --Take antibiotic as prescribed  Take with food  --OTC hydrocortisone as needed for itching  --Seek re-evaluation by PCP if no improvement over the next 3-5 days  Go to ER for any worsening (redness, swelling, pain, fever)  Chief Complaint     Chief Complaint   Patient presents with    Cellulitis     Pt  C/o right ear feeling painful, red, swollen, and itchy x 2 days  Pt  States that she did a toning class Thursday and feels pain from her right buttock down into her leg  Pt  Using ice and taking tylenol  History of Present Illness       Here with complaints of right ear infection  Outside of ear is red, swollen, painful, itchy  Started 2 days ago  No known injuries, skin breaks, contact precipitants  No recent swimming  No otorrhea  No fever/chills  Applying topical antibiotic  Had something similar 8 months ago  Resolved after two rounds of antibiotic (cephalexin-->Bactrim)  Denies history of MRSA  Review of Systems   Review of Systems   Constitutional: Negative for fever  HENT: Positive for ear pain  Negative for ear discharge            Current Medications       Current Outpatient Medications:     cholecalciferol (VITAMIN D3) 1,000 units tablet, Take 1,000 Units by mouth daily, Disp: , Rfl:     levofloxacin (LEVAQUIN) 500 mg tablet, Take 1 tablet (500 mg total) by mouth every 24 hours for 5 days, Disp: 5 tablet, Rfl: 0    metoprolol succinate (TOPROL-XL) 25 mg 24 hr tablet, Take 1 5 tablets (37 5 mg total) by mouth daily, Disp: 30 tablet, Rfl: 5    rivaroxaban (Xarelto) 20 mg tablet, Take 1 tablet (20 mg total) by mouth daily with breakfast, Disp: 30 tablet, Rfl: 5    clobetasol (TEMOVATE) 0 05 % ointment, Apply topically as needed , Disp: , Rfl:     docusate sodium (COLACE) 100 mg capsule, Take 100 mg by mouth daily, Disp: , Rfl:     Current Allergies     Allergies as of 08/28/2022 - Reviewed 08/28/2022   Allergen Reaction Noted    Nitrofurantoin Nausea Only, Dizziness, and Headache 02/28/2018            The following portions of the patient's history were reviewed and updated as appropriate: allergies, current medications, past family history, past medical history, past social history, past surgical history and problem list      Past Medical History:   Diagnosis Date    Benign essential hypertension     Hyperlipidemia, unspecified     Osteoarthritis of left knee        Past Surgical History:   Procedure Laterality Date    CARDIAC CATHETERIZATION Left 7/6/2022    Procedure: Cardiac Left Heart Cath;  Surgeon: Tyler Coulter MD;  Location:  CARDIAC CATH LAB; Service: Cardiology    COLON SURGERY  2003    Rectoseal     LAPAROSCOPIC OVARIAN CYSTECTOMY Right 1981    LAPAROSCOPIC OVARIAN CYSTECTOMY Left 2003    rectoseal and cystoseal repari    TONSILLECTOMY AND ADENOIDECTOMY  1952    TUBAL LIGATION  1972       Family History   Problem Relation Age of Onset    Hyperlipidemia Mother     Heart Valve Disease Mother     Hypertension Mother     Arthritis Father     Diabetes Father     Alcohol abuse Father     Prostate cancer Father [de-identified]    Diabetes Brother     Atrial fibrillation Brother     Pancreatic cancer Maternal Aunt 72    No Known Problems Paternal Uncle     No Known Problems Paternal Uncle     No Known Problems Paternal Uncle     No Known Problems Paternal Uncle     Hypertension Daughter     Bradycardia Son     No Known Problems Son          Medications have been verified          Objective   /69   Pulse 58   Temp 97 7 °F (36 5 °C)   Resp 16   Ht 5' 5" (1 651 m)   Wt 84 8 kg (187 lb)   SpO2 98%   BMI 31 12 kg/m²   No LMP recorded  Patient is postmenopausal        Physical Exam     Physical Exam  Constitutional:       General: She is not in acute distress  HENT:      Right Ear: Tympanic membrane and ear canal normal  There is no impacted cerumen  Ears:      Comments: Superior and middle aspect of pinna right ear, erythematous, swollen, warm, tender  Extends partially to tragus, but not to canal or elsewhere  No mastoid tenderness  No obvious skin breaks  No drainage noted  Pulmonary:      Effort: Pulmonary effort is normal    Neurological:      Mental Status: She is alert     Psychiatric:         Mood and Affect: Mood normal

## 2022-08-28 NOTE — PATIENT INSTRUCTIONS
--Warm compresses to ear 2-3 times a day  --Take antibiotic as prescribed  Take with food  --OTC hydrocortisone as needed for itching  --Seek re-evaluation by PCP if no improvement over the next 3-5 days  Go to ER for any worsening (redness, swelling, pain, fever)

## 2022-08-29 ENCOUNTER — OFFICE VISIT (OUTPATIENT)
Dept: FAMILY MEDICINE CLINIC | Facility: CLINIC | Age: 82
End: 2022-08-29
Payer: COMMERCIAL

## 2022-08-29 ENCOUNTER — TELEPHONE (OUTPATIENT)
Dept: CARDIOLOGY CLINIC | Facility: CLINIC | Age: 82
End: 2022-08-29

## 2022-08-29 VITALS
RESPIRATION RATE: 17 BRPM | BODY MASS INDEX: 31.4 KG/M2 | HEIGHT: 65 IN | WEIGHT: 188.5 LBS | OXYGEN SATURATION: 97 % | TEMPERATURE: 96.1 F | SYSTOLIC BLOOD PRESSURE: 132 MMHG | HEART RATE: 82 BPM | DIASTOLIC BLOOD PRESSURE: 84 MMHG

## 2022-08-29 DIAGNOSIS — M25.551 RIGHT HIP PAIN: Primary | ICD-10-CM

## 2022-08-29 DIAGNOSIS — S76.011A MUSCLE STRAIN OF RIGHT GLUTEAL REGION, INITIAL ENCOUNTER: ICD-10-CM

## 2022-08-29 DIAGNOSIS — M70.61 GREATER TROCHANTERIC BURSITIS OF RIGHT HIP: ICD-10-CM

## 2022-08-29 PROCEDURE — 3075F SYST BP GE 130 - 139MM HG: CPT | Performed by: FAMILY MEDICINE

## 2022-08-29 PROCEDURE — 20610 DRAIN/INJ JOINT/BURSA W/O US: CPT | Performed by: FAMILY MEDICINE

## 2022-08-29 PROCEDURE — 1160F RVW MEDS BY RX/DR IN RCRD: CPT | Performed by: FAMILY MEDICINE

## 2022-08-29 PROCEDURE — 3079F DIAST BP 80-89 MM HG: CPT | Performed by: FAMILY MEDICINE

## 2022-08-29 PROCEDURE — 99213 OFFICE O/P EST LOW 20 MIN: CPT | Performed by: FAMILY MEDICINE

## 2022-08-29 RX ORDER — TRIAMCINOLONE ACETONIDE 40 MG/ML
40 INJECTION, SUSPENSION INTRA-ARTICULAR; INTRAMUSCULAR ONCE
Status: COMPLETED | OUTPATIENT
Start: 2022-08-29 | End: 2022-08-29

## 2022-08-29 RX ADMIN — TRIAMCINOLONE ACETONIDE 40 MG: 40 INJECTION, SUSPENSION INTRA-ARTICULAR; INTRAMUSCULAR at 10:37

## 2022-08-29 NOTE — PROGRESS NOTES
FAMILY MEDICINE PROGRESS NOTE    Date of Service: 22  Primary Care Provider:   Andrea Lang MD       Name: Irina Barnes       : 1940       Age:81 y o  Sex: female      MRN: [id number]      Chief Complaint:Hip Pain (Right hip pain, difficulty walking x2 days, patient has chronic right hip bursitis  Pain is in glute area )       ASSESSMENT and PLAN:  Irina Barnes is a 80 y o  female with:     Problem List Items Addressed This Visit    None     Visit Diagnoses     Right hip pain    -  Primary    Greater trochanteric bursitis of right hip        Relevant Medications    triamcinolone acetonide (KENALOG-40) 40 mg/mL injection 40 mg (Start on 2022 10:45 AM)    Other Relevant Orders    Large joint arthrocentesis: R greater trochanteric bursa    Muscle strain of right gluteal region, initial encounter            Patient with gluteus muscle strain as well as flare of greater trochanter bursitis  Injection given today, recommended supportive care with ice, stretches, Tylenol, Voltaren gel  Large joint arthrocentesis: R greater trochanteric bursa  Universal Protocol:  Consent: Verbal consent obtained  Written consent not obtained  Risks and benefits: risks, benefits and alternatives were discussed  Consent given by: patient  Time out: Immediately prior to procedure a "time out" was called to verify the correct patient, procedure, equipment, support staff and site/side marked as required  Patient understanding: patient states understanding of the procedure being performed  Patient consent: the patient's understanding of the procedure matches consent given  Procedure consent: procedure consent matches procedure scheduled  Relevant documents: relevant documents not present or verified  Test results: test results not available  Site marked: the operative site was marked  Radiology Images displayed and confirmed   If images not available, report reviewed: imaging studies not available  Required items: required blood products, implants, devices, and special equipment available  Patient identity confirmed: verbally with patient    Supporting Documentation  Indications: pain   Procedure Details  Location: hip - R greater trochanteric bursa  Preparation: Patient was prepped and draped in the usual sterile fashion  Needle size: 22 G  Ultrasound guidance: no  Approach: lateral    Patient tolerance: patient tolerated the procedure well with no immediate complications            SUBJECTIVE:  Nadia Godoy is a 80 y o  female who presents today with a chief complaint of Hip Pain (Right hip pain, difficulty walking x2 days, patient has chronic right hip bursitis  Pain is in glute area )  HPI     She reports that she has had right hip pain since Friday  She did a harder exercise class on Thursday  She still has some pain with walking  She reports this morning he pain was so severe she could not raise her right leg off the bed  She reprots it did feel better once she got up and moved around she felt better but still has some pain  She has been icing and taking Tylenol  She had right greater trochanteric bursa injection in September of last year  She has similar tenderness  Review of Systems   Musculoskeletal: Positive for arthralgias  I have reviewed the patient's Past Medical History      Current Outpatient Medications:     cholecalciferol (VITAMIN D3) 1,000 units tablet, Take 1,000 Units by mouth daily, Disp: , Rfl:     clobetasol (TEMOVATE) 0 05 % ointment, Apply topically as needed , Disp: , Rfl:     docusate sodium (COLACE) 100 mg capsule, Take 100 mg by mouth daily, Disp: , Rfl:     levofloxacin (LEVAQUIN) 500 mg tablet, Take 1 tablet (500 mg total) by mouth every 24 hours for 5 days, Disp: 5 tablet, Rfl: 0    metoprolol succinate (TOPROL-XL) 25 mg 24 hr tablet, Take 1 5 tablets (37 5 mg total) by mouth daily, Disp: 30 tablet, Rfl: 5    rivaroxaban (Xarelto) 20 mg tablet, Take 1 tablet (20 mg total) by mouth daily with breakfast, Disp: 30 tablet, Rfl: 5    Current Facility-Administered Medications:     triamcinolone acetonide (KENALOG-40) 40 mg/mL injection 40 mg, 40 mg, Intra-articular, Once, Betty Albarado MD    OBJECTIVE:  /84   Pulse 82   Temp (!) 96 1 °F (35 6 °C)   Resp 17   Ht 5' 5" (1 651 m)   Wt 85 5 kg (188 lb 8 oz)   SpO2 97%   BMI 31 37 kg/m²    BP Readings from Last 3 Encounters:   08/29/22 132/84   08/28/22 139/69   08/03/22 130/82      Wt Readings from Last 3 Encounters:   08/29/22 85 5 kg (188 lb 8 oz)   08/28/22 84 8 kg (187 lb)   08/03/22 84 kg (185 lb 3 2 oz)      Physical Exam  Constitutional:       General: She is not in acute distress  Appearance: Normal appearance  She is normal weight  She is not ill-appearing or toxic-appearing  HENT:      Head: Normocephalic and atraumatic  Right Ear: External ear normal       Left Ear: External ear normal       Nose: Nose normal       Mouth/Throat:      Mouth: Mucous membranes are moist    Eyes:      Extraocular Movements: Extraocular movements intact  Conjunctiva/sclera: Conjunctivae normal    Pulmonary:      Effort: Pulmonary effort is normal  No respiratory distress  Musculoskeletal:         General: Normal range of motion  Cervical back: Normal range of motion and neck supple  Right hip: Tenderness (over greater trochanter) present  No bony tenderness or crepitus  Normal range of motion (negative Log Roll and CONRAD test)  Skin:     Findings: No erythema or rash  Neurological:      General: No focal deficit present  Mental Status: She is alert and oriented to person, place, and time  Psychiatric:         Mood and Affect: Mood normal          Behavior: Behavior normal                 Return if symptoms worsen or fail to improve  Betty Albarado MD    Note: Portions of the record have been created with voice recognition software    Occasional wrong word or "sound a like" substitutions may have occurred due to the inherent limitations of voice recognition software  Read the chart carefully and recognize, using context, where substitutions have occurred

## 2022-08-29 NOTE — TELEPHONE ENCOUNTER
Patient has high bp readings in the morning up to 155/103 for the past week  She was instructed to stop amlodipine and increase metoprolol based on her zio result which showed SVT  By the afternoon bp does go down to 106/74  She is taking metoprolol in the pm  She monitors her heart rate on her fitbit and averages in the 60's  This morning bp was 151/95 then after sitting for 10 minutes it went down to 132/85  She was seen by urgent care and her pcp the past 2 days for other issues and bp was within normal range at both visits  She would like to know if she should continue the current regimen  Please advise

## 2022-08-30 ENCOUNTER — TELEPHONE (OUTPATIENT)
Dept: FAMILY MEDICINE CLINIC | Facility: CLINIC | Age: 82
End: 2022-08-30

## 2022-08-30 NOTE — TELEPHONE ENCOUNTER
Spoke with patient, kenalog was the injection given at visit, not sent to pharmacy  After speaking with patient she stated she was suppose to get "a cream"  Chart reviewed, she was instructed to get OTC voltaren gel  I informed patient we have samples here she can    will  today

## 2022-08-30 NOTE — TELEPHONE ENCOUNTER
Patient was in yesterday and  Was given Kenalog but [facility] do not have it yet          Can it be called in to pharmacy please

## 2022-09-12 DIAGNOSIS — I48.0 PAROXYSMAL ATRIAL FIBRILLATION (HCC): ICD-10-CM

## 2022-09-12 RX ORDER — METOPROLOL SUCCINATE 25 MG/1
37.5 TABLET, EXTENDED RELEASE ORAL DAILY
Qty: 45 TABLET | Refills: 5 | Status: SHIPPED | OUTPATIENT
Start: 2022-09-12

## 2022-09-20 ENCOUNTER — OFFICE VISIT (OUTPATIENT)
Dept: CARDIOLOGY CLINIC | Facility: CLINIC | Age: 82
End: 2022-09-20
Payer: COMMERCIAL

## 2022-09-20 VITALS
SYSTOLIC BLOOD PRESSURE: 128 MMHG | HEIGHT: 65 IN | DIASTOLIC BLOOD PRESSURE: 72 MMHG | HEART RATE: 70 BPM | OXYGEN SATURATION: 95 % | WEIGHT: 186.2 LBS | BODY MASS INDEX: 31.02 KG/M2

## 2022-09-20 DIAGNOSIS — E78.00 PURE HYPERCHOLESTEROLEMIA: ICD-10-CM

## 2022-09-20 DIAGNOSIS — I48.0 PAROXYSMAL ATRIAL FIBRILLATION (HCC): Primary | ICD-10-CM

## 2022-09-20 DIAGNOSIS — I10 ESSENTIAL HYPERTENSION: ICD-10-CM

## 2022-09-20 DIAGNOSIS — I25.10 CORONARY ARTERY DISEASE INVOLVING NATIVE CORONARY ARTERY OF NATIVE HEART WITHOUT ANGINA PECTORIS: ICD-10-CM

## 2022-09-20 PROCEDURE — 3074F SYST BP LT 130 MM HG: CPT | Performed by: INTERNAL MEDICINE

## 2022-09-20 PROCEDURE — 1160F RVW MEDS BY RX/DR IN RCRD: CPT | Performed by: INTERNAL MEDICINE

## 2022-09-20 PROCEDURE — 99214 OFFICE O/P EST MOD 30 MIN: CPT | Performed by: INTERNAL MEDICINE

## 2022-09-20 PROCEDURE — 3078F DIAST BP <80 MM HG: CPT | Performed by: INTERNAL MEDICINE

## 2022-09-20 NOTE — PROGRESS NOTES
Cardiology Consultation     David Martinez  [id number]  1940  [address] [facility]   [address]  [id number]    1  Paroxysmal atrial fibrillation (HCC)     2  Essential hypertension     3  Coronary artery disease involving native coronary artery of native heart without angina pectoris     4  Pure hypercholesterolemia       Chief Complaint   Patient presents with    Follow-up    Shortness of Breath     On exertion     Numbness      R Sciatica     Fatigue     HPI: Patient has continued shortness of breath with exertion, unchanged for years  No reported chest pain, palpitations, lightheadedness, syncope, LE edema, orthopnea, PND, or significant weight changes  Patient remains active without any increased fatigue out of the ordinary        Patient Active Problem List   Diagnosis    Essential hypertension    Chronic left shoulder pain    MENON (dyspnea on exertion)    Lichen sclerosus    Lipoma of right lower extremity    [facility]    Pure hypercholesterolemia    Abnormal stress test    Paroxysmal atrial fibrillation ([address] )    Coronary artery disease involving native coronary artery of native heart without angina pectoris    Class 1 obesity due to excess calories without serious comorbidity with body mass index (BMI) of 30 0 to 30 9 in adult    Acute nonintractable headache     Past Medical History:   Diagnosis Date    Benign essential hypertension     Hyperlipidemia, unspecified     Osteoarthritis of left knee     Perichondritis of ear, right      Social History     Socioeconomic History    Marital status: /Civil Union     Spouse name: Not on file    Number of children: Not on file    Years of education: Not on file    Highest education level: Not on file   Occupational History    Not on file   Tobacco Use    Smoking status: Never Smoker    Smokeless tobacco: Never Used   Vaping Use    Vaping Use: Never used   Substance and Sexual Activity    Alcohol use: Yes     Alcohol/week: 1 0 standard drink     Types: 1 Glasses of wine per week     Comment: 1 glass nightly    Drug use: Never    Sexual activity: Not on file   Other Topics Concern    Not on file   Social History Narrative    Not on file     Social Determinants of Health     Financial Resource Strain: Not on file   Food Insecurity: Not on file   Transportation Needs: Not on file   Physical Activity: Not on file   Stress: Not on file   Social Connections: Not on file   Intimate Partner Violence: Not on file   Housing Stability: Not on file      Family History   Problem Relation Age of Onset    Hyperlipidemia Mother     Heart Valve Disease Mother     Hypertension Mother     Arthritis Father     Diabetes Father     Alcohol abuse Father     Prostate cancer Father [de-identified]    Diabetes Brother     Atrial fibrillation Brother     Pancreatic cancer Maternal Aunt 72    No Known Problems Paternal Uncle     No Known Problems Paternal Uncle     No Known Problems Paternal Uncle     No Known Problems Paternal Uncle     Hypertension Daughter     Bradycardia Son     No Known Problems Son      Past Surgical History:   Procedure Laterality Date    CARDIAC CATHETERIZATION Left 7/6/2022    Procedure: Cardiac Left Heart Cath;  Surgeon: Jamel Estrada MD;  [facility];   Service: Cardiology    COLON SURGERY  2003    Rectoseal     LAPAROSCOPIC OVARIAN CYSTECTOMY Right 1981    LAPAROSCOPIC OVARIAN CYSTECTOMY Left 2003    rectoseal and cystoseal repari    TONSILLECTOMY AND ADENOIDECTOMY  1952    TUBAL LIGATION  1972       Current Outpatient Medications:     cholecalciferol (VITAMIN D3) 1,000 units tablet, Take 1,000 Units by mouth daily, Disp: , Rfl:     clobetasol (TEMOVATE) 0 05 % ointment, Apply topically as needed , Disp: , Rfl:     docusate sodium (COLACE) 100 mg capsule, Take 100 mg by mouth daily, Disp: , Rfl:     metoprolol succinate (TOPROL-XL) 25 mg 24 hr tablet, Take 1 5 tablets (37 5 mg total) by mouth daily, Disp: 45 tablet, Rfl: 5    rivaroxaban (Xarelto) 20 mg tablet, Take 1 tablet (20 mg total) by mouth daily with breakfast, Disp: 30 tablet, Rfl: 5  Allergies   Allergen Reactions    Nitrofurantoin Nausea Only, Dizziness and Headache     Macrodantin     Vitals:    09/20/22 1047   BP: 128/72   BP Location: Left arm   Patient Position: Sitting   Cuff Size: Large   Pulse: 70   SpO2: 95%   Weight: 84 5 kg (186 lb 3 2 oz)   Height: 5' 5" (1 651 m)       Labs:  Admission on 07/06/2022, Discharged on 07/06/2022   Component Date Value    WBC 07/06/2022 3 81 (A)    RBC 07/06/2022 5 14 (A)    Hemoglobin 07/06/2022 15 3     Hematocrit 07/06/2022 46 8 (A)    MCV 07/06/2022 91     MCH 07/06/2022 29 8     MCHC 07/06/2022 32 7     RDW 07/06/2022 13 5     Platelets 34/22/4756 178     MPV 07/06/2022 10 1    Hospital Outpatient Visit on 05/25/2022   Component Date Value    Protocol Name 05/25/2022 DANNIELLE SIT     Time In Exercise Phase 05/25/2022 00:03:00     MAX   SYSTOLIC BP 47/32/9441 708     Max Diastolic Bp 88/92/6565 84     Max Heart Rate 05/25/2022 148     Max Predicted Heart Rate 05/25/2022 141     Reason for Termination 05/25/2022 Protocol Complete     Test Indication 05/25/2022 MENON     Target Hr Formular 05/25/2022 (220 - Age)*100%     Chest Pain Statement 05/25/2022 none    Hospital Outpatient Visit on 05/25/2022   Component Date Value    Baseline HR 05/25/2022 108     Baseline BP 05/25/2022 142/84     O2 sat rest 05/25/2022 98     Stress peak HR 05/25/2022 148     Post peak BP 05/25/2022 128     Rate Pressure Product 05/25/2022 18,944 0     O2 sat peak 05/25/2022 98     Recovery HR 05/25/2022 106     Recovery BP 05/25/2022 142/70     O2 sat recovery 05/25/2022 98     Angina Index 05/25/2022 0     Rest Nuclear Isotope Dose 05/25/2022 9 00     Stress Nuclear Isotope D* 05/25/2022 33 00     ST Depression (mm) 05/25/2022 0     Stress/rest perfusion ra* 05/25/2022 1 40     EF (%) 05/25/2022 62     Base ST Depresion (mm) 05/25/2022 0    Hospital Outpatient Visit on 05/25/2022   Component Date Value    A4C EF 05/25/2022 52     LVIDd 05/25/2022 5 10     LVIDS 05/25/2022 3 10     IVSd 05/25/2022 0 70     LVPWd 05/25/2022 0 80     FS 05/25/2022 39     MV E' Tissue Velocity Se* 05/25/2022 11     LA Volume Index (BP) 05/25/2022 20 4     E/A ratio 05/25/2022 3 36     E wave deceleration time 05/25/2022 175     MV Peak E Jeff 05/25/2022 74     MV Peak A Jeff 05/25/2022 0 22     RVID d 05/25/2022 3 5     LA size 05/25/2022 3 9     LA length (A2C) 05/25/2022 5 50     RAA A4C 05/25/2022 18 6     MV stenosis pressure 1/2* 05/25/2022 51     MV valve area p 1/2 meth* 05/25/2022 4 30     TR Peak Jeff 05/25/2022 2 4     Triscuspid Valve Regurgi* 05/25/2022 23 0     Ao root 05/25/2022 2 50     Asc Ao 05/25/2022 3 1     Tricuspid valve peak reg* 05/25/2022 2 38     Left ventricular stroke * 05/25/2022 89 00     IVS 05/25/2022 0 7     LEFT VENTRICLE SYSTOLIC * 46/32/1838 38     LV DIASTOLIC VOLUME (MOD* 65/98/8488 127     Left Atrium Area-systoli* 05/25/2022 20 1     Left Atrium Area-systoli* 05/25/2022 18 8     LVSV, 2D 05/25/2022 89     LV EF 05/25/2022 60     PASP 05/25/2022 27 0      No results found for: CHOL, TRIG, HDL, LDLDIRECT  Imaging: No results found  Review of Systems:  Review of Systems   Constitutional: Negative for activity change, appetite change, chills, diaphoresis, fatigue and unexpected weight change  HENT: Negative for hearing loss, nosebleeds and sore throat  Eyes: Negative for photophobia and visual disturbance  Respiratory: Positive for shortness of breath  Negative for cough, chest tightness and wheezing  Cardiovascular: Negative for chest pain, palpitations and leg swelling     Gastrointestinal: Negative for abdominal pain, diarrhea, nausea and vomiting  Endocrine: Negative for polyuria  Genitourinary: Negative for dysuria, frequency and hematuria  Musculoskeletal: Negative for arthralgias, back pain, gait problem and neck pain  Skin: Negative for pallor and rash  Neurological: Negative for dizziness, syncope and headaches  Hematological: Does not bruise/bleed easily  Psychiatric/Behavioral: Negative for behavioral problems and confusion  Physical Exam:  Physical Exam  Vitals reviewed  Constitutional:       Appearance: She is well-developed  She is not diaphoretic  HENT:      Head: Normocephalic and atraumatic  Nose: Nose normal    Eyes:      General: No scleral icterus  Pupils: Pupils are equal, round, and reactive to light  Neck:      Vascular: No JVD  Cardiovascular:      Rate and Rhythm: Normal rate and regular rhythm  Heart sounds: Normal heart sounds  No murmur heard  No friction rub  No gallop  Pulmonary:      Effort: Pulmonary effort is normal  No respiratory distress  Breath sounds: Normal breath sounds  No wheezing or rales  Abdominal:      General: Bowel sounds are normal  There is no distension  Palpations: Abdomen is soft  Tenderness: There is no abdominal tenderness  Musculoskeletal:         General: No deformity  Normal range of motion  Cervical back: Normal range of motion and neck supple  Skin:     General: Skin is warm and dry  Findings: No rash  Neurological:      Mental Status: She is alert and oriented to person, place, and time  Cranial Nerves: No cranial nerve deficit  Psychiatric:         Behavior: Behavior normal        Blood pressure 128/72, pulse 70, height 5' 5" (1 651 m), weight 84 5 kg (186 lb 3 2 oz), SpO2 95 %, not currently breastfeeding  EKG:  Normal sinus rhythm   Nonspecific T abnormality  Abnormal ECG    Discussion/Summary:  HTN: much improved today, continued on metoprolol    Had a nuclear stress test in Oct 2020 that was normal for EF and perfusion  Echocardiogram at that time (and on repeat in May 2022) revealed normal LV & RV function, normal valves other than mild to moderate MR, moderate TR  She has had MENON for many years - also noted by prior cardiologist documentation in 2017  PAF/mild CAD: noted on stress testing and LHC revealed 10% pRCA lesion  Continued on Xarelto for [facility] and Toprol XL for rate control - has only changed to it recently, so even with fatigue, will continue on this for now - if she feels she'd like to change, then she'll call us  Zio monitor revealed no further afib on that study, so unlikely to be cause of MENON       HLD:  in Nov 2020, which is at goal - repeat levels in Oct 2021 revealed LDL of 113  LDL 95 in June 2022

## 2022-10-07 ENCOUNTER — TELEPHONE (OUTPATIENT)
Dept: CARDIOLOGY CLINIC | Facility: CLINIC | Age: 82
End: 2022-10-07

## 2022-10-07 NOTE — TELEPHONE ENCOUNTER
P/c'd, she got fit bit and it told her she had A-fib yesterday between 9-11  It happened 11 times and lasted each 5 mins  She was asymptomatic  [facility] [address]  during those times  Her bp is getting higher since taking her off Amlodipine and increasing the Toprol-xl  135-150/        Taking: Toprol-xl 37 5 mg qd    Please advise

## 2022-10-13 NOTE — PROGRESS NOTES
[address] [facility] [employer]        NAME: Juan Stockton is a 68 y o  female  : 1940    MRN: [id number]  DATE: 2021  TIME: 5:56 PM    Assessment and Plan   Contusion of scalp, initial encounter [S00 03XA]  1  Contusion of scalp, initial encounter     2  Contusion of right knee, initial encounter           Patient Instructions       Follow up with PCP in 3-5 days  Proceed to  ER if symptoms worsen  Chief Complaint     Chief Complaint   Patient presents with   Kymberly Christianson Fall     one week ago  fell on knee also hit head on R side, iced  Now having increased ecchymosis around R eye and also R lower leg  Was taking ASA but stopped the day of the fall         History of Present Illness       Patient is a 68year old female presenting for evaluation post fall 8 or 9 days ago  She tripped and fell in her home  She struck her head on the arm of a wooden chair and her right knee on the ground  She denies LOC  She denies headache, dizziness, visual changes, or gait disturbances  She presents today for evaluation of the ecchymotic areas on her face and right knee  Ecchymosis is resolving but is "moving" down her face  She applied ice day of the fall  No ice or OTC treatments since  Review of Systems   Review of Systems   Constitutional: Negative for activity change, chills and fever  Eyes: Negative for photophobia and visual disturbance  Respiratory: Negative for cough and shortness of breath  Musculoskeletal: Negative for arthralgias, gait problem and joint swelling  Skin: Positive for color change  Negative for wound  Neurological: Negative for dizziness, syncope, weakness, numbness and headaches           Current Medications       Current Outpatient Medications:     amLODIPine (NORVASC) 5 mg tablet, Take 5 mg by mouth daily, Disp: , Rfl:     cholecalciferol (VITAMIN D3) 1,000 units tablet, Take 1,000 Units by mouth daily, Disp: , Rfl:     docusate sodium (COLACE) 100 mg capsule, Take 100 mg by mouth Subjective:      Patient ID: Lyndsay Alicia is a 41 y.o. female.    Chief Complaint: Annual Exam (Wellness)    Lyndsay is a 41-year-old female that is overall healthy with no acute issues.  Here today for her wellness visit.  In the past patient has had a cardiac work-up including an echocardiogram etc. which were all returned negative. However she had been having on and off tachycardia with heart rate sometimes going in the 200+ range. Was seen by Dr. Shaikh after a 2-week Holter monitor and is s/p ablation of her SVT.  Doing well with her metoprolol and staying stable since.  Labs for wellness are reviewed and all essentially normal.      A separate E/M visit was performed for anxiety/depression   Patient usually coping well with this on her own and has had few episodes in the past however currently all went with her mom being in the hospital, full-time job and responsibilities at home.  We discussed at length on coping mechanisms and at this time patient would like to try nonpharmacological treatment and was advised at length on importance of exercise, yoga practice and medication etc..        Wellness: 10/13/22  Pap: 03/2021    Electrophysiology MD: Dr. Vitale    The patient's Health Maintenance was reviewed and the following appears to be due at this time:   There are no preventive care reminders to display for this patient.     Past Medical History:  Past Medical History:   Diagnosis Date    Arrhythmia     Pain     Vitamin D deficiency      Past Surgical History:   Procedure Laterality Date    CARDIAC ELECTROPHYSIOLOGY MAPPING AND ABLATION      EYE SURGERY  2006    I dont remember exactly     Review of patient's allergies indicates:   Allergen Reactions    Codeine Nausea Only     Social History     Socioeconomic History    Marital status:    Tobacco Use    Smoking status: Never    Smokeless tobacco: Never   Substance and Sexual Activity    Alcohol use: Not Currently     Family History   Problem  "Relation Age of Onset    Heart failure Mother     Breast cancer Mother     Leukemia Father     Hypertension Brother        Review of Systems    A comprehensive review of systems was performed and is negative except for that stated above  Objective:   /74 (BP Location: Left arm, Patient Position: Sitting, BP Method: Small (Manual))   Pulse 74   Temp 97.9 °F (36.6 °C) (Temporal)   Ht 5' 8" (1.727 m)   Wt 67.2 kg (148 lb 3.2 oz)   LMP 10/10/2022 (Approximate)   SpO2 99%   BMI 22.53 kg/m²     Physical Exam  Constitutional:       Appearance: Normal appearance.   HENT:      Head: Normocephalic and atraumatic.      Nose: Nose normal.      Mouth/Throat:      Mouth: Mucous membranes are moist.      Pharynx: Oropharynx is clear.   Eyes:      Extraocular Movements: Extraocular movements intact.      Pupils: Pupils are equal, round, and reactive to light.   Cardiovascular:      Rate and Rhythm: Normal rate and regular rhythm.      Pulses: Normal pulses.   Pulmonary:      Effort: Pulmonary effort is normal.      Breath sounds: Normal breath sounds.   Abdominal:      General: Bowel sounds are normal.      Palpations: Abdomen is soft.   Musculoskeletal:         General: Normal range of motion.      Cervical back: Normal range of motion and neck supple.   Skin:     General: Skin is warm.   Neurological:      General: No focal deficit present.      Mental Status: She is alert and oriented to person, place, and time. Mental status is at baseline.   Psychiatric:         Mood and Affect: Mood normal.     Assessment/ Plan:   1. Need for vaccination  -     Influenza - Quadrivalent (PF)    2. Wellness examination  Assessment & Plan:  -labs are reviewed all essentially normal  -patient is advised on importance of watching her carbohydrate intake and saturated fat intake, making the right nutritional choices and exercising on a regular basis  -up-to-date with the screening      3. SVT (supraventricular tachycardia)  Assessment " daily, Disp: , Rfl:     aspirin (Aspirin 81) 81 mg chewable tablet, Chew 81 mg daily, Disp: , Rfl:     clobetasol (TEMOVATE) 0 05 % ointment, Apply topically as needed , Disp: , Rfl:     Current Allergies     Allergies as of 06/11/2021 - Reviewed 06/11/2021   Allergen Reaction Noted    Nitrofurantoin Nausea Only, Dizziness, and Headache 02/28/2018            The following portions of the patient's history were reviewed and updated as appropriate: allergies, current medications, past family history, past medical history, past social history, past surgical history and problem list      Past Medical History:   Diagnosis Date    Benign essential hypertension     Hyperlipidemia, unspecified     Osteoarthritis of left knee        Past Surgical History:   Procedure Laterality Date    COLON SURGERY  2003    Rectoseal     LAPAROSCOPIC OVARIAN CYSTECTOMY Right 1981    LAPAROSCOPIC OVARIAN CYSTECTOMY Left 2003    rectoseal and cystoseal repari    TONSILLECTOMY AND ADENOIDECTOMY  1952    TUBAL LIGATION  1972       Family History   Problem Relation Age of Onset    Heart Valve Disease Mother     Hypertension Mother     Arthritis Father     Diabetes Father     Alcohol abuse Father     Prostate cancer Father     Diabetes Brother     Hypertension Daughter          Medications have been verified  Objective   /65 (Patient Position: Sitting)   Pulse 77   Resp 20   Ht 5' 5 5" (1 664 m)   Wt 81 6 kg (180 lb)   BMI 29 50 kg/m²        Physical Exam     Physical Exam  Vitals signs reviewed  Constitutional:       General: She is awake  She is not in acute distress  Appearance: Normal appearance  She is normal weight  HENT:      Head: No abrasion or laceration  Neck:      Musculoskeletal: Full passive range of motion without pain  Cardiovascular:      Rate and Rhythm: Normal rate     Pulmonary:      Effort: Pulmonary effort is normal    Musculoskeletal:        Legs:    Skin:     General: Skin & Plan:  -status post ablation and now on metoprolol and remaining stable   -continue treatment with metoprolol      4. Anxiety and depression  Assessment & Plan:   Continue on metoprolol which should help with the palpitations   Refraining from adding any pharmacological treatment however patient is advised to call if the measures discussed below do not help in symptom improvement   -will consider low dose of Celexa at that time    Practice deep breathing or abdominal breathing exercises when anxiety occurs.  Exercise daily. Get sunlight daily.  Avoid caffeine, alcohol and stimulants.  Practice positive phrases and repeat throughout the day, along with yoga and relaxation techniques.  Set healthy boundaries, avoid people and conversations that increase stress.  Educated patient on the risks of serotonin based medications such as serotonin modulators and SSRIs/SNRIs including common side effects of nausea, GI upset, headache dizziness as well as the rare risk for worsening symptoms of depression including development of suicidal thoughts or ideations, and serotonin syndrome.             is warm and moist    Neurological:      General: No focal deficit present  Mental Status: She is alert, oriented to person, place, and time and easily aroused  Psychiatric:         Behavior: Behavior is cooperative

## 2022-10-19 ENCOUNTER — TELEPHONE (OUTPATIENT)
Dept: CARDIOLOGY CLINIC | Facility: CLINIC | Age: 82
End: 2022-10-19

## 2022-10-19 NOTE — TELEPHONE ENCOUNTER
Advised  Verbally understood  Did advise if it keeps happening to call and if HR is consistently 140 or above to go to the ER

## 2022-10-19 NOTE — TELEPHONE ENCOUNTER
P/c'd , her watch says she is in A-fib since midnight  Hr range is   Had a ha that woke her up and she feels a little off today      Taking: toprol-xl 25 mg at bedtime and 12 5 mg in the am      Please advise

## 2022-11-21 DIAGNOSIS — I48.0 PAROXYSMAL ATRIAL FIBRILLATION (HCC): ICD-10-CM

## 2022-11-22 RX ORDER — RIVAROXABAN 20 MG/1
TABLET, FILM COATED ORAL
Qty: 30 TABLET | Refills: 5 | Status: SHIPPED | OUTPATIENT
Start: 2022-11-22

## 2023-01-12 ENCOUNTER — OFFICE VISIT (OUTPATIENT)
Dept: FAMILY MEDICINE CLINIC | Facility: CLINIC | Age: 83
End: 2023-01-12

## 2023-01-12 VITALS
DIASTOLIC BLOOD PRESSURE: 78 MMHG | TEMPERATURE: 97.5 F | OXYGEN SATURATION: 96 % | SYSTOLIC BLOOD PRESSURE: 120 MMHG | HEART RATE: 66 BPM | HEIGHT: 65 IN | WEIGHT: 190.5 LBS | BODY MASS INDEX: 31.74 KG/M2

## 2023-01-12 DIAGNOSIS — I25.10 CORONARY ARTERY DISEASE INVOLVING NATIVE CORONARY ARTERY OF NATIVE HEART WITHOUT ANGINA PECTORIS: ICD-10-CM

## 2023-01-12 DIAGNOSIS — M71.22 BAKER CYST, LEFT: ICD-10-CM

## 2023-01-12 DIAGNOSIS — I48.0 PAROXYSMAL ATRIAL FIBRILLATION (HCC): Primary | ICD-10-CM

## 2023-01-12 DIAGNOSIS — I10 ESSENTIAL HYPERTENSION: ICD-10-CM

## 2023-01-12 NOTE — PROGRESS NOTES
Name: Crescencio Blankenship      : 1940      MRN: [id number]  Encounter Provider: Raimundo Whitman MD  Encounter Date: 2023   Encounter department: [facility]     1  Paroxysmal atrial fibrillation (HCC)  Assessment & Plan:  Currently stable  Continue current medications  Continue following with cardiology  Remains on metoprolol and Xarelto      2  Essential hypertension  Assessment & Plan:  Currently stable  Continue current medications  No longer on amlodipine  Continue metoprolol      3  Coronary artery disease involving native coronary artery of native heart without angina pectoris  Assessment & Plan:  Currently stable  Continue current medications  4  Baker cyst, left  Assessment & Plan:  Baker's cyst left knee  Drained in the past   Would like referral to sports medicine for further drainage  Referral made    Orders:  -     Ambulatory referral to Sports Medicine; Future      Orders above  Blood work ordered by cardiology  Complete prior to next visit  Follow-up in 6 months for annual wellness visit       Subjective      Few afib episodes- 4 (2 oct, 1 nov, 1 dec)  Happens primarily at nights  Happens at night  Only recognized by watch  She is unaware of these episodes when they happen  Overall she is doing well  Remains on Xarelto and metoprolol  Follows with cardiology    Had flu shot and COVID booster    Review of Systems    Current Outpatient Medications on File Prior to Visit   Medication Sig   • cholecalciferol (VITAMIN D3) 1,000 units tablet Take 1,000 Units by mouth daily   • clobetasol (TEMOVATE) 0 05 % ointment Apply topically as needed    • docusate sodium (COLACE) 100 mg capsule Take 100 mg by mouth daily   • metoprolol succinate (TOPROL-XL) 25 mg 24 hr tablet Take 1 5 tablets (37 5 mg total) by mouth daily   • Xarelto 20 MG tablet TAKE 1 TABLET BY MOUTH EVERY DAY WITH BREAKFAST       Objective     /78 (BP Location: Left arm, Patient Position: Sitting, Cuff Size: Large)   Pulse 66   Temp 97 5 °F (36 4 °C)   Ht 5' 5" (1 651 m)   Wt 86 4 kg (190 lb 8 oz)   SpO2 96%   BMI 31 70 kg/m²     Physical Exam  Vitals and nursing note reviewed  Constitutional:       Appearance: She is well-developed  HENT:      Head: Normocephalic and atraumatic  Cardiovascular:      Rate and Rhythm: Normal rate and regular rhythm  Pulmonary:      Effort: Pulmonary effort is normal       Breath sounds: Normal breath sounds  Neurological:      General: No focal deficit present  Mental Status: She is alert     Psychiatric:         Mood and Affect: Mood normal          Behavior: Behavior normal        Jaci Collins MD

## 2023-01-12 NOTE — ASSESSMENT & PLAN NOTE
Currently stable  Continue current medications  Continue following with cardiology    Remains on metoprolol and Xarelto

## 2023-01-12 NOTE — ASSESSMENT & PLAN NOTE
Baker's cyst left knee  Drained in the past   Would like referral to sports medicine for further drainage    Referral made

## 2023-02-01 ENCOUNTER — APPOINTMENT (OUTPATIENT)
Dept: RADIOLOGY | Facility: MEDICAL CENTER | Age: 83
End: 2023-02-01

## 2023-02-01 ENCOUNTER — OFFICE VISIT (OUTPATIENT)
Dept: OBGYN CLINIC | Facility: MEDICAL CENTER | Age: 83
End: 2023-02-01

## 2023-02-01 VITALS
WEIGHT: 190 LBS | HEART RATE: 75 BPM | DIASTOLIC BLOOD PRESSURE: 84 MMHG | BODY MASS INDEX: 31.65 KG/M2 | SYSTOLIC BLOOD PRESSURE: 136 MMHG | HEIGHT: 65 IN

## 2023-02-01 DIAGNOSIS — M71.22 BAKER CYST, LEFT: ICD-10-CM

## 2023-02-01 DIAGNOSIS — M25.561 CHRONIC PAIN OF BOTH KNEES: ICD-10-CM

## 2023-02-01 DIAGNOSIS — M25.562 CHRONIC PAIN OF BOTH KNEES: ICD-10-CM

## 2023-02-01 DIAGNOSIS — M75.101 ROTATOR CUFF SYNDROME OF RIGHT SHOULDER: ICD-10-CM

## 2023-02-01 DIAGNOSIS — G89.29 CHRONIC PAIN OF BOTH KNEES: ICD-10-CM

## 2023-02-01 DIAGNOSIS — M17.0 BILATERAL PRIMARY OSTEOARTHRITIS OF KNEE: Primary | ICD-10-CM

## 2023-02-01 RX ORDER — TRIAMCINOLONE ACETONIDE 40 MG/ML
80 INJECTION, SUSPENSION INTRA-ARTICULAR; INTRAMUSCULAR
Status: COMPLETED | OUTPATIENT
Start: 2023-02-01 | End: 2023-02-01

## 2023-02-01 RX ORDER — ROPIVACAINE HYDROCHLORIDE 5 MG/ML
10 INJECTION, SOLUTION EPIDURAL; INFILTRATION; PERINEURAL
Status: COMPLETED | OUTPATIENT
Start: 2023-02-01 | End: 2023-02-01

## 2023-02-01 RX ADMIN — TRIAMCINOLONE ACETONIDE 80 MG: 40 INJECTION, SUSPENSION INTRA-ARTICULAR; INTRAMUSCULAR at 13:21

## 2023-02-01 RX ADMIN — ROPIVACAINE HYDROCHLORIDE 10 ML: 5 INJECTION, SOLUTION EPIDURAL; INFILTRATION; PERINEURAL at 13:21

## 2023-02-01 NOTE — PROGRESS NOTES
1  Bilateral primary osteoarthritis of knee  Ambulatory referral to Physical Therapy      2  Chronic pain of both knees  XR knee 3 vw right non injury    XR knee 3 vw left non injury      3  Baker cyst, left  Ambulatory referral to Sports Medicine    XR knee 3 vw left non injury      4  Rotator cuff syndrome of right shoulder  Ambulatory referral to Physical Therapy        Orders Placed This Encounter   Procedures   • Large joint arthrocentesis   • XR knee 3 vw right non injury   • XR knee 3 vw left non injury   • Ambulatory referral to Physical Therapy        Impression:  Chronic bilateral knee pain likely secondary to osteoarthritis (predominantly patellofemoral osteoarthritis) as below  Patient was previously treated in [place] and her treatment included hyaluronic acid injections, platelet rich plasma injections and Baker's cyst aspiration  We discussed different treatment options and decided to proceed with bilateral knee steroid injections  We will also have her start formal physical therapy  Patient also presents with chronic right shoulder pain  This is likely secondary to rotator cuff syndrome  She is mostly limited due to range of motion limitation from internal rotation  She can start physical therapy for this as well  I will see her back in 5 weeks if needed  Can consider viscosupplementation in the future  Imaging Studies (I personally reviewed images in PACS and report):  Bilateral knee x-rays most recent to this encounter reviewed  These images show moderate tricompartmental osteoarthritic changes which mostly affects the patellofemoral joints  There are no acute osseous abnormalities  Return in about 6 weeks (around 3/15/2023), or if symptoms worsen or fail to improve  Patient is in agreement with the above plan      HPI:  Filippo Castellanos is a [de-identified] y o  female  who presents for evaluation of   Chief Complaint   Patient presents with   • Left Knee - Pain     Says she has a baker cyst, had it drain in the past, came from [place]  • Right Shoulder - Pain     Limited ROM in her Right arm   • Right Knee - Pain     Bothers her when walking  Pain from the knee down to her calf area  Onset/Mechanism: Chronic pain for over 15 years  Location: In the back of the knees  Radiation: Into the leg  Provocative: Walking down stairs  Severity: Painful  Associated Symptoms: Difficulty with flexibility  Treatment so far: Baker's cyst aspiration, platelet rich plasma injections and HA injections  Following history reviewed and updated:  Past Medical History:   Diagnosis Date   • Allergic 1980    [address] tin   • Arthritis 2008   • Benign essential hypertension    • Hyperlipidemia, unspecified    • Hypertension 2016   • Osteoarthritis of left knee    • Perichondritis of ear, right      Past Surgical History:   Procedure Laterality Date   • CARDIAC CATHETERIZATION Left 7/6/2022    Procedure: Cardiac Left Heart Cath;  Surgeon: Anthony García MD;  Location: [facility];   Service: Cardiology   • COLON SURGERY  2003    Rectoseal    • LAPAROSCOPIC OVARIAN CYSTECTOMY Right 1981   • LAPAROSCOPIC OVARIAN CYSTECTOMY Left 2003    rectoseal and cystoseal repari   • TONSILLECTOMY AND ADENOIDECTOMY  1952   • TUBAL LIGATION  1972     Social History   Social History     Substance and Sexual Activity   Alcohol Use Yes   • Alcohol/week: 5 0 standard drinks   • Types: 5 Glasses of wine per week    Comment: 1 glass nightly     Social History     Substance and Sexual Activity   Drug Use Never     Social History     Tobacco Use   Smoking Status Never   Smokeless Tobacco Never     Family History   Problem Relation Age of Onset   • Hyperlipidemia Mother    • Heart Valve Disease Mother    • Hypertension Mother    • Arthritis Father    • Diabetes Father    • Alcohol abuse Father    • Prostate cancer Father    • Diabetes Brother    • Atrial fibrillation Brother    • Alcohol abuse Brother    • Arthritis Brother    • Pancreatic cancer Maternal Aunt 65   • No Known Problems Paternal Uncle    • No Known Problems Paternal Uncle    • No Known Problems Paternal Uncle    • No Known Problems Paternal Uncle    • Hypertension Daughter    • Bradycardia Son    • No Known Problems Son      Allergies   Allergen Reactions   • Nitrofurantoin Nausea Only, Dizziness and Headache     Macrodantin        Constitutional:  /84   Pulse 75   Ht 5' 5" (1 651 m)   Wt 86 2 kg (190 lb)   BMI 31 62 kg/m²    General: NAD  Eyes: Anicteric sclerae  Neck: Supple  Lungs: Unlabored breathing  Cardiovascular: No lower extremity edema  Skin: Intact without erythema  Neurologic: Sensation intact to light touch  Psychiatric: Mood and affect are appropriate  Right Knee Exam     Tenderness   The patient is experiencing tenderness in the medial joint line  Range of Motion   Extension: normal     Tests   Varus: negative Valgus: negative    Other   Erythema: absent  Scars: absent  Sensation: normal  Pulse: present  Swelling: none  Effusion: no effusion present      Left Knee Exam     Tenderness   The patient is experiencing tenderness in the medial joint line  Range of Motion   Extension: normal     Tests   Varus: negative Valgus: negative    Other   Erythema: absent  Scars: absent  Sensation: normal  Pulse: present  Swelling: none  Effusion: no effusion present      Right Shoulder Exam     Tenderness   The patient is experiencing tenderness in the acromion  Range of Motion   Internal rotation 0 degrees: abnormal     Tests   Peña test: positive  Impingement: positive             Large joint arthrocentesis: bilateral knee  Universal Protocol:  Consent: Verbal consent obtained  Written consent not obtained    Risks and benefits: risks, benefits and alternatives were discussed  Consent given by: patient  Timeout called at: 2/1/2023 1:20 PM   Patient understanding: patient states understanding of the procedure being performed  Patient consent: the patient's understanding of the procedure matches consent given  Site marked: the operative site was marked  Radiology Images displayed and confirmed  If images not available, report reviewed: imaging studies available  Patient identity confirmed: verbally with patient    Supporting Documentation  Indications: pain   Procedure Details  Location: knee - bilateral knee  Needle size: 22 G  Ultrasound guidance: no  Approach: Inferolateral to patella  Medications (Right): 80 mg triamcinolone acetonide 40 mg/mL; 10 mL ropivacaine 0 5 %Medications (Left): 80 mg triamcinolone acetonide 40 mg/mL   Patient tolerance: patient tolerated the procedure well with no immediate complications  Dressing:  Sterile dressing applied    There was little to no resistance encountered during the injection  Risks of this procedure include:    - Risk of bleeding since a needle is involved  - Risk of infection (1/10,000 chance as per recent studies)  Signs/symptoms were discussed and they would prompt an urgent evaluation at an emergency department   - Risk of pigmentation or skin dimpling in the skin (2-3% chance as per recent studies) from the steroid  - Risk of increased pain from steroid flare (1% chance as per recent studies) that typically lasts 24-48 hours  - Risk of increased blood sugars from the steroid medication that can last for a few weeks  If the patient is a diabetic or pre-diabetic, they were encouraged to closely monitor their blood sugars and discuss with PCP if elevated more than usual or if having symptoms  The benefits outweigh the risks and so the procedure was completed

## 2023-02-07 ENCOUNTER — EVALUATION (OUTPATIENT)
Dept: PHYSICAL THERAPY | Facility: CLINIC | Age: 83
End: 2023-02-07

## 2023-02-07 DIAGNOSIS — M75.101 ROTATOR CUFF SYNDROME OF RIGHT SHOULDER: ICD-10-CM

## 2023-02-07 DIAGNOSIS — M17.0 BILATERAL PRIMARY OSTEOARTHRITIS OF KNEE: ICD-10-CM

## 2023-02-07 NOTE — PROGRESS NOTES
PT Evaluation     Today's date: 2023  Patient name: Arya Gracia  : 1940  MRN: [id number]  Referring provider: Sayda Waggoner DO  Dx:   Encounter Diagnosis     ICD-10-CM    1  Bilateral primary osteoarthritis of knee  M17 0 Ambulatory referral to Physical Therapy      2  Rotator cuff syndrome of right shoulder  M75 101 Ambulatory referral to Physical Therapy                     Assessment  Assessment details: Arya Gracia is a [de-identified] y o  female who presents with chronic L knee and R shoulder pain  Patient's current impairments include decreased knee flexion ROM, decreased quad and hip strength, decreased shoulder A/PROM into flex, abd, and IR as well as decreased RTC strength  Functionally, patient is limited in her ability to ascend/descend stairs, complete ADLs such as getting dressed without discomfort, or return to her desired exercise routine  Patient would benefit from skilled physical therapy to address the impairments, improve their level of function, and to improve their overall quality of life  Impairments: abnormal or restricted ROM, activity intolerance, impaired physical strength, lacks appropriate home exercise program and pain with function    Goals  STG to be achieved in 4 weeks  1) Pt to be independent with HEP   2) Shoulder abduction AROM to improve > 110 degrees   3) Knee flexion AROM to improve by 5 degrees     LTG to be achieved in 8 weeks   1) PT to report no difficulty with ascending/descending stairs   2) FOTO > expected   3) Shoulder abduction AROM > 125 deg  4) Behind the back IR AROM to be > L5    Plan  Frequency: 2x week        Subjective Evaluation    History of Present Illness  Mechanism of injury: History:     Knees: Patient presents with B knee pain secondary to OA, L > R  She was previously treated in [place] with hyaluronic acid injections, platelet rich plasma injections and Baker's cyst aspiration  She recently had steroid injections which has helped tremendously   She still has some difficulty going up/down the stairs but no longer has discomfort with sitting  Sometimes feels like the L knee can sometimes give way and feel unstable  She previously did PT in [address] [place] [address] for the knees which didn't help that much  Shoulder: Pt also presents with chronic shoulder pain  She had PRP treatment in the past which helped for a while  Forward flexion is okay but she has pain and limited range with behind the back IR and abduction  She occasionally has some discomfort sleeping with R shoulder but very rarely  The pain is only if she does certain positions (putting her coat on, a certain Yann Chi move)  Denies numbness and tingling in UE and LE  Aggravating: see above   Alleviating: knees- Tylenol, icing, elevating  Shoulder- ice and Tylenol  24 hours: kind of stiff/unsteady in the morning   Functional limitations: returning to desired exercise routine   Social support: lives with  at [employer]  Hobbies/occupation: enjoys walking 1 mile and exercising  Has some difficulty with inclines  Uses recumbent elliptical  Does 20 mins on treadmill  Imaging: n/a       Pain  Pain scale at highest: shoulder is 2/10 when behind the back, knees 5/10 before cortisone but now 1/10      Patient Goals  Patient goal: strengthen and get flexibility back         Objective     Active Range of Motion   Left Shoulder   Flexion: 135 degrees   Abduction: 125 degrees   External rotation BTH: C3   Internal rotation BTB: T11     Right Shoulder   Flexion: 133 degrees   Adduction: 95 degrees with pain  External rotation BTH: C3   Internal rotation BTB: sacrum   Left Knee   Flexion: 109 degrees   Extension: 0 degrees     Right Knee   Flexion: 120 degrees   Extension: 0 degrees     Passive Range of Motion     Right Shoulder   Flexion: 130 degrees   Abduction: 98 degrees   External rotation 90°: 80 degrees   Internal rotation 90°: 45 degrees     Additional Passive Range of Motion Details  Stretch at end-ranges but no pain     Strength/Myotome Testing     Left Shoulder     Planes of Motion   Flexion: 4+   Abduction: 4   External rotation at 0°: 4+   Internal rotation at 0°: 5     Right Shoulder     Planes of Motion   Flexion: 4   Abduction: 3+   External rotation at 0°: 4+   Internal rotation at 0°: 5     Left Hip   Planes of Motion   Flexion: 4+  External rotation: 4+  Internal rotation: 4+    Right Hip   Planes of Motion   Flexion: 4+  External rotation: 4+  Internal rotation: 4+    Left Knee   Flexion: 5  Extension: 5    Right Knee   Flexion: 5  Extension: 5    Additional Strength Details  R heel raise 8 L 7   SLR: L 10 with mod fatigue R 10 with min fatigue     General Comments:      Knee Comments  SLS: R 8 seconds L 15s econds               Precautions: none    Medbridge: [id number]     Manuals 2/7            Knee flexion PROM  5'                                                    Shoulder             AAROM dowel flexion (supine) HEP            AAROM dowel abduction (standing) HEP            P[place]ley AAROM             TB Rows             TB Pulldowns             Behind the back IR stretch with strap             Bent over row              Pec stretch door                           Knee             Recumbent bike              Leg press              Standing hip abd              Standing hip ext             Supine SLR  HEP            Bridge HEP                                      Ther Activity             Sit to stand              Step ups                                                     Modalities

## 2023-02-10 ENCOUNTER — OFFICE VISIT (OUTPATIENT)
Dept: PHYSICAL THERAPY | Facility: CLINIC | Age: 83
End: 2023-02-10

## 2023-02-10 DIAGNOSIS — M75.101 ROTATOR CUFF SYNDROME OF RIGHT SHOULDER: Primary | ICD-10-CM

## 2023-02-10 DIAGNOSIS — M17.0 BILATERAL PRIMARY OSTEOARTHRITIS OF KNEE: ICD-10-CM

## 2023-02-10 NOTE — PROGRESS NOTES
Daily Note     Today's date: 2/10/2023  Patient name: Roxana Kelsey  : 1940  MRN: [id number]  Referring provider: Sudha Ramos DO  Dx:   Encounter Diagnosis     ICD-10-CM    1  Rotator cuff syndrome of right shoulder  M75 101       2  Bilateral primary osteoarthritis of knee  M17 0                      Subjective: Patient reports that when she does the AAROM abductions she can hear some cracking in her shoulder but it's not painful  Objective: See treatment diary below      Assessment: Patient did well with treatment today  Focused both on the shoulder and the knee which patient responded well with  Modified abduction dowel exercise to seated table slides which felt better to the patient  Pt had the most challenge with iso ER step outs  Continue to progress as tolerated  Plan: Continue per plan of care        Precautions: none    Medbridge: [id number]   1:1 with PT [id number]  Manuals  2/10           Knee flexion PROM  5'  5'            Shoulder flexion PROM  5'                                     Shoulder             AAROM dowel flexion (supine) HEP            AAROM dowel abduction (standing) HEP            P[place]ley AAROM             TB Rows  RTB 20x  GTB NV          TB Pulldowns  RTB 20x           Behind the back IR stretch with strap             Bent over row              Pec stretch door              TB Iso ER stepouts  2x10 RTB                        Knee             NuStep  5' L6           Leg press              Standing hip abd   2x10 B           Standing hip ext  2x10 B           Supine SLR  HEP 2x10 ea            Bridge HEP 20x                                     Ther Activity             Sit to stand              Step ups                                                     Modalities

## 2023-02-14 ENCOUNTER — OFFICE VISIT (OUTPATIENT)
Dept: PHYSICAL THERAPY | Facility: CLINIC | Age: 83
End: 2023-02-14

## 2023-02-14 DIAGNOSIS — M75.101 ROTATOR CUFF SYNDROME OF RIGHT SHOULDER: ICD-10-CM

## 2023-02-14 DIAGNOSIS — M17.0 BILATERAL PRIMARY OSTEOARTHRITIS OF KNEE: Primary | ICD-10-CM

## 2023-02-14 NOTE — PROGRESS NOTES
Daily Note     Today's date: 2023  Patient name: Mae Galarza  : 1940  MRN: [id number]  Referring provider: Nena Valencia DO  Dx:   Encounter Diagnosis     ICD-10-CM    1  Bilateral primary osteoarthritis of knee  M17 0       2  Rotator cuff syndrome of right shoulder  M75  101                      Subjective: Patient reports no soreness after last session  She was able to do some TB exercises at the gym over the weekend        Objective: See treatment diary below      Assessment: Pt with improved shoulder flexion, abduction, and IR today  Introduced behind the back IR stretch with strap which was uncomfortable but tolerable for pt  Added in balance exercises as well as pt mentioned having hesitation with her LLE when walking down an incline on an uneven surface near where she lives  Plan: Continue per plan of care        Precautions: none    Medbridge: [id number]   1:1 with PT 1015-11  Manuals 2/7 2/10 2/14          Knee flexion PROM  5'  5'  5'          Shoulder PROM as tolerated  5' 5'                                     Shoulder             AAROM dowel flexion (supine) HEP            AAROM dowel abduction (standing) HEP            Pulley AAROM             TB Rows  RTB 20x  GTB 3x10          TB Pulldowns  RTB 20x GTB 3x10          Behind the back IR stretch with strap   5x10"          Bent over row              Pec stretch door              TB Iso ER stepouts  2x10 RTB                        Knee             NuStep  5' L6 5'          Leg press    30# 3x10          Standing hip abd   2x10 B 1 5# 2x10 B          Standing hip ext  2x10 B 1 5# 2x10 B           Supine SLR  HEP 2x10 ea  2x10 ea           Bridge HEP 20x           SLS   3x15s B           Tandem stance    2x20s B          Ther Activity             Sit to stand              Step ups                                                     Modalities

## 2023-02-16 ENCOUNTER — OFFICE VISIT (OUTPATIENT)
Dept: PHYSICAL THERAPY | Facility: CLINIC | Age: 83
End: 2023-02-16

## 2023-02-16 DIAGNOSIS — M17.0 BILATERAL PRIMARY OSTEOARTHRITIS OF KNEE: Primary | ICD-10-CM

## 2023-02-16 NOTE — PROGRESS NOTES
Daily Note     Today's date: 2023  Patient name: Zoraida Garcia  : 1940  MRN: [id number]  Referring provider: Fozia Bauman DO  Dx:   Encounter Diagnosis     ICD-10-CM    1  Bilateral primary osteoarthritis of knee  M17 0                      Subjective: Pt reports that she didn't have time to complete behind the back stretch as she was busy yesterday but is feeling good       Objective: See treatment diary below      Assessment: Pt continues to demonstrate improved shoulder PROM as well as maintaining knee ROM gains  Knee ROM measured 113 today prior to manual stretching  Continued to challenge pt with RTC strengthening and incorporated LE machines for quad and hamstring strengthening  Plan: Continue per plan of care        Precautions: none    Medbridge: [id number]   1:1 with -10  Manuals 2/7 2/10 2/14 2/16         Knee flexion PROM  5'  5'  5' 5'          Shoulder PROM as tolerated  5' 5'  5'                                    Shoulder             AAROM dowel flexion (supine) HEP            AAROM dowel abduction (standing) HEP            Pulley AAROM             TB Rows  RTB 20x  GTB 3x10 GTB 3x10          TB Pulldowns  RTB 20x GTB 3x10 GTB 3x10          Behind the back IR stretch with strap   5x10"          Bent over row              Pec stretch door              TB Iso ER stepouts  2x10 RTB           TB ER      YTB 3x10                      Knee             NuStep  5' L6 5'          Leg press    30# 3x10 35# 3x10         Standing hip abd   2x10 B 1 5# 2x10 B          Standing hip ext  2x10 B 1 5# 2x10 B           Supine SLR  HEP 2x10 ea  2x10 ea           Bridge HEP 20x           SLS   3x15s B           Tandem stance    2x20s B          Knee flexion machine     22# 3x10         Knee extension machine     22# 3x10                      Ther Activity             Sit to stand              Step ups                                                     Modalities

## 2023-02-21 ENCOUNTER — OFFICE VISIT (OUTPATIENT)
Dept: PHYSICAL THERAPY | Facility: CLINIC | Age: 83
End: 2023-02-21

## 2023-02-21 DIAGNOSIS — M75.101 ROTATOR CUFF SYNDROME OF RIGHT SHOULDER: ICD-10-CM

## 2023-02-21 DIAGNOSIS — M17.0 BILATERAL PRIMARY OSTEOARTHRITIS OF KNEE: Primary | ICD-10-CM

## 2023-02-21 NOTE — PROGRESS NOTES
Daily Note     Today's date: 2023  Patient name: Avila Lawrence  : 1940  MRN: [id number]  Referring provider: Mariah Gama DO  Dx:   Encounter Diagnosis     ICD-10-CM    1  Bilateral primary osteoarthritis of knee  M17 0       2  Rotator cuff syndrome of right shoulder  M75  101                      Subjective: Patient reports that she went walking at the [facility] and felt a little unsteady on the downhill  She feels that the unsteadiness more at the shin vs the knee  Objective: See treatment diary below      Assessment: Patient is demonstrating great improvements in shoulder A/PROM but still has some restrictions with behind the back reaching  Focused mostly on LE strength today, particularly of quad muscles to help with descending control with declines  Patient did great with wedge ramps but felt fatigued with lateral step downs  Plan: Continue per plan of care        Precautions: none    Medbridge: [id number]   1:1 with PT 1015-11  Manuals 2/7 2/10 2/14 2/16 2/21        Knee flexion PROM  5'  5'  5' 5'  5'        Shoulder PROM as tolerated  5' 5'  5'  5'                                  Shoulder             AAROM dowel flexion (supine) HEP            AAROM dowel abduction (standing) HEP            Pulley AAROM             TB Rows  RTB 20x  GTB 3x10 GTB 3x10          TB Pulldowns  RTB 20x GTB 3x10 GTB 3x10          Behind the back IR stretch with strap   5x10"  10x5"        Bent over row              Pec stretch door              TB Iso ER stepouts  2x10 RTB           TB ER      YTB 3x10                      Knee             NuStep  5' L6 5'  5'        Leg press    30# 3x10 35# 3x10 35# 10x  40# 2x10 SL NV        Standing hip abd   2x10 B 1 5# 2x10 B  1 5# 2x10 B        Standing hip ext  2x10 B 1 5# 2x10 B   1 5# 2x10 B        Supine SLR  HEP 2x10 ea  2x10 ea   Standing 1 5# 2x10 B        Bridge HEP 20x           SLS   3x15s B           Tandem stance    2x20s B          Knee flexion machine     22# 3x10 22# 2x15 SL NV       Knee extension machine     22# 3x10 22# 3x10        Wedge ramps       2'                                 Ther Activity             Sit to stand              Lateral step downs      4" 3x10 LLE                                               Modalities

## 2023-02-23 ENCOUNTER — APPOINTMENT (OUTPATIENT)
Dept: PHYSICAL THERAPY | Facility: CLINIC | Age: 83
End: 2023-02-23

## 2023-02-27 DIAGNOSIS — I48.0 PAROXYSMAL ATRIAL FIBRILLATION (HCC): ICD-10-CM

## 2023-02-27 RX ORDER — METOPROLOL SUCCINATE 25 MG/1
37.5 TABLET, EXTENDED RELEASE ORAL DAILY
Qty: 45 TABLET | Refills: 5 | Status: SHIPPED | OUTPATIENT
Start: 2023-02-27

## 2023-02-28 ENCOUNTER — APPOINTMENT (OUTPATIENT)
Dept: PHYSICAL THERAPY | Facility: CLINIC | Age: 83
End: 2023-02-28

## 2023-03-02 ENCOUNTER — OFFICE VISIT (OUTPATIENT)
Dept: PHYSICAL THERAPY | Facility: CLINIC | Age: 83
End: 2023-03-02

## 2023-03-02 DIAGNOSIS — M75.101 ROTATOR CUFF SYNDROME OF RIGHT SHOULDER: ICD-10-CM

## 2023-03-02 DIAGNOSIS — M17.0 BILATERAL PRIMARY OSTEOARTHRITIS OF KNEE: Primary | ICD-10-CM

## 2023-03-02 NOTE — PROGRESS NOTES
Daily Note     Today's date: 3/2/2023  Patient name: Andre Trevizo  : 1940  MRN: [id number]  Referring provider: Eric Mcdaniel DO  Dx:   Encounter Diagnosis     ICD-10-CM    1  Bilateral primary osteoarthritis of knee  M17 0       2  Rotator cuff syndrome of right shoulder  M75  101                      Subjective: Patient reports overall improvements  The slowest improvement is the behind the back motion with the shoulder  She has improved ease with ascending stairs  Would like to transition to weights instead of resistance bands       Objective: See treatment diary below      Assessment: Modified HEP to transition to more weight-based exercises instead of resistance bands per patient request  Behind the back IR has improved to L1  Patient did well with exercises today and only had minor discomfort with squats at the bar  Overall, pt is making good improvements both at the shoulder and knee and should continue with PT  Plan: Continue per plan of care        Precautions: none    Medbridge: [id number]   1:1 with PT 1015-11  Manuals 2/7 2/10 2/14 2/16 2/21 3/2       Knee flexion PROM  5'  5'  5' 5'  5'         Shoulder PROM as tolerated  5' 5'  5'  5'                                  Shoulder             AAROM dowel flexion (supine) HEP            AAROM dowel abduction (standing) HEP            Pulley AAROM             TB Rows  RTB 20x  GTB 3x10 GTB 3x10          TB Pulldowns  RTB 20x GTB 3x10 GTB 3x10          Behind the back IR stretch with strap   5x10"  10x5"        Bent over row       5# 2x10        Pec stretch door              TB Iso ER stepouts  2x10 RTB           TB ER      YTB 3x10         Straight arm extension bent over       3# 2x10       Bent over horiz abd       2# 2x10        Standing shoulder flexion             Standing shoulder abduction                          Knee             NuStep  5' L6 5'  5'        Leg press    30# 3x10 35# 3x10 35# 10x  40# 2x10 SL NV        Standing hip abd 2x10 B 1 5# 2x10 B  1 5# 2x10 B 2# 2x10 B       Standing hip ext  2x10 B 1 5# 2x10 B   1 5# 2x10 B 2# 2x10 B       Supine SLR  HEP 2x10 ea  2x10 ea   Standing 1 5# 2x10 B Standing 2# 2x10 B        Bridge HEP 20x           SLS   3x15s B           Tandem stance    2x20s B          Knee flexion machine     22# 3x10 22# 2x15 22# 2x15       Knee extension machine     22# 3x10 22# 3x10 22# 2x15       Wedge ramps       2'       Mini squats       At bar 2x10                     Ther Activity             Sit to stand              Lateral step downs      4" 3x10 LLE 4" 2x10 LLE                                               Modalities

## 2023-03-07 ENCOUNTER — OFFICE VISIT (OUTPATIENT)
Dept: PHYSICAL THERAPY | Facility: CLINIC | Age: 83
End: 2023-03-07

## 2023-03-07 DIAGNOSIS — M75.101 ROTATOR CUFF SYNDROME OF RIGHT SHOULDER: ICD-10-CM

## 2023-03-07 DIAGNOSIS — M17.0 BILATERAL PRIMARY OSTEOARTHRITIS OF KNEE: Primary | ICD-10-CM

## 2023-03-07 NOTE — PROGRESS NOTES
Daily Note     Today's date: 3/7/2023  Patient name: Martell Mann  : 1940  MRN: [id number]  Referring provider: Ashley Acosta DO  Dx:   Encounter Diagnosis     ICD-10-CM    1  Bilateral primary osteoarthritis of knee  M17 0       2  Rotator cuff syndrome of right shoulder  M75  101                      Subjective: Patient reports continued improvements       Objective: See treatment diary below      Assessment: Patient with improved behind the back motion today after manual stretching by ~1 inch  Continues to benefit from manual PROM of R shoulder into IR  Had the most difficulty with new exercise of sit to stands from the chair without use of the hands by the end of the second round  Did extremely well with both SL and tandem balance today with little to no use of the bar for stability  Plan: Continue per plan of care        Precautions: none    Medbridge: [id number]   1:1 with PT 8-035  Manuals 2/7 2/10 2/14 2/16 2/21 3/2 3/7      Knee flexion PROM  5'  5'  5' 5'  5'         Shoulder PROM as tolerated  5' 5'  5'  5'  5' into IR                                 Shoulder             AAROM dowel flexion (supine) HEP            AAROM dowel abduction (standing) HEP            Pulley AAROM             TB Rows  RTB 20x  GTB 3x10 GTB 3x10          TB Pulldowns  RTB 20x GTB 3x10 GTB 3x10          Behind the back IR stretch with strap   5x10"  10x5"        Bent over row       5# 2x10        Pec stretch door              TB Iso ER stepouts  2x10 RTB           TB ER      YTB 3x10         Straight arm extension bent over       3# 2x10       Bent over horiz abd       2# 2x10        Standing shoulder flexion             Standing shoulder abduction                          Knee             NuStep  5' L6 5'  5'  5'       Leg press    30# 3x10 35# 3x10 35# 10x  40# 2x10 SL NV  40# 2x10 B  30# 2x10 LLE       Standing hip abd   2x10 B 1 5# 2x10 B  1 5# 2x10 B 2# 2x10 B 2# 2x10 B      Standing hip ext  2x10 B 1 5# 2x10 B 1 5# 2x10 B 2# 2x10 B 2# 2x10 B       Supine SLR  HEP 2x10 ea  2x10 ea   Standing 1 5# 2x10 B Standing 2# 2x10 B  Standing 2x10 B       Bridge HEP 20x           SLS   3x15s B     4x15" B      Tandem stance    2x20s B    4x20s      Knee flexion machine     22# 3x10 22# 2x15 22# 2x15 22# 2x15      Knee extension machine     22# 3x10 22# 3x10 22# 2x15 22# 2x15      Wedge ramps       2'       Mini squats       At bar 2x10                     Ther Activity             Sit to stand        From chair 2x10       Lateral step downs      4" 3x10 LLE 4" 2x10 LLE  4" 3x10ea LLE                                              Modalities

## 2023-03-09 ENCOUNTER — OFFICE VISIT (OUTPATIENT)
Dept: PHYSICAL THERAPY | Facility: CLINIC | Age: 83
End: 2023-03-09

## 2023-03-09 DIAGNOSIS — M17.0 BILATERAL PRIMARY OSTEOARTHRITIS OF KNEE: Primary | ICD-10-CM

## 2023-03-09 DIAGNOSIS — M75.101 ROTATOR CUFF SYNDROME OF RIGHT SHOULDER: ICD-10-CM

## 2023-03-09 NOTE — PROGRESS NOTES
Daily Note     Today's date: 3/9/2023  Patient name: Roxana Kelsey  : 1940  MRN: [id number]  Referring provider: Sudha Ramos DO  Dx:   Encounter Diagnosis     ICD-10-CM    1  Bilateral primary osteoarthritis of knee  M17 0       2  Rotator cuff syndrome of right shoulder  M75  101                      Subjective: Pt reports that Tuesday after lunch, she had a lot of right knee pain for a short instance but then felt fine for the rest of the day  Objective: See treatment diary below      Assessment: Patient tolerated treatment well  Continues to have the most difficulty with lateral step downs on RLE vs LLE but no significant discomfort  Was particularly challenged with tandem walking on the foam but only needed minimal UE use on the bar  Plan for discharge next visit due to progress made thus far  Plan: Continue per plan of care        Precautions: none    Medbridge: [id number]   1:1 with -1019  Manuals 2/7 2/10 2/14 2/16 2/21 3/2 3/7 3/9     Knee flexion PROM  5'  5'  5' 5'  5'         Shoulder PROM as tolerated  5' 5'  5'  5'  5' into IR                                 Shoulder             AAROM dowel flexion (supine) HEP            AAROM dowel abduction (standing) HEP            Pulley AAROM             TB Rows  RTB 20x  GTB 3x10 GTB 3x10          TB Pulldowns  RTB 20x GTB 3x10 GTB 3x10          Behind the back IR stretch with strap   5x10"  10x5"        Bent over row       5# 2x10        Pec stretch door              TB Iso ER stepouts  2x10 RTB           TB ER      YTB 3x10         Straight arm extension bent over       3# 2x10       Bent over horiz abd       2# 2x10        Standing shoulder flexion             Standing shoulder abduction                          Knee             NuStep  5' L6 5'  5'  5'  5'      Leg press    30# 3x10 35# 3x10 35# 10x  40# 2x10 SL NV  40# 2x10 B  30# 2x10 LLE  40# 2x10 B   30# 2x10 LLE      Standing hip abd   2x10 B 1 5# 2x10 B  1 5# 2x10 B 2# 2x10 B 2# 2x10 B 2 5# 2x10 B     Standing hip ext  2x10 B 1 5# 2x10 B   1 5# 2x10 B 2# 2x10 B 2# 2x10 B  2 5# 2x10 B     Supine SLR  HEP 2x10 ea  2x10 ea   Standing 1 5# 2x10 B Standing 2# 2x10 B  Standing 2x10 B  Standing 2 5# B     Bridge HEP 20x           SLS   3x15s B     4x15" B 3x fatigue      Tandem stance    2x20s B    4x20s      Knee flexion machine     22# 3x10 22# 2x15 22# 2x15 22# 2x15 22# 2x15     Knee extension machine     22# 3x10 22# 3x10 22# 2x15 22# 2x15 22# 2x15     Wedge ramps       2'       Mini squats       At bar 2x10        Foam tandem walk         1:30     Foam side step         1:30                                             Ther Activity             Sit to stand        From chair 2x10  From chair 2x10      Lateral step downs      4" 3x10 LLE 4" 2x10 LLE  4" 3x10ea LLE  4" 3x10ea LLE                                             Modalities

## 2023-03-14 ENCOUNTER — OFFICE VISIT (OUTPATIENT)
Dept: PHYSICAL THERAPY | Facility: CLINIC | Age: 83
End: 2023-03-14

## 2023-03-14 DIAGNOSIS — M17.0 BILATERAL PRIMARY OSTEOARTHRITIS OF KNEE: ICD-10-CM

## 2023-03-14 DIAGNOSIS — M75.101 ROTATOR CUFF SYNDROME OF RIGHT SHOULDER: Primary | ICD-10-CM

## 2023-03-14 NOTE — PROGRESS NOTES
PT Discharge    Today's date: 3/14/2023  Patient name: Anson Sharma  : 1940  MRN: [id number]  Referring provider: Patricia Milan DO  Dx:   Encounter Diagnosis     ICD-10-CM    1  Rotator cuff syndrome of right shoulder  M75 101       2  Bilateral primary osteoarthritis of knee  M17 0                      Subjective: Patient reports that she tweaked her right knee a little today on her way in on the curb  Patient states that her left knee feels about 80% better compared to day one  Uneven surfaces can still be hesitant  Patient states that her shoulder has no pain anymore  Patient is just limited in her ability to reach behind her back, but even that has improved  Goals  STG to be achieved in 4 weeks  1) Pt to be independent with HEP -MET   2) Shoulder abduction AROM to improve > 110 degrees -MET   3) Knee flexion AROM to improve by 5 degrees -MET     LTG to be achieved in 8 weeks   1) PT to report no difficulty with ascending/descending stairs - PARTIALLY MET   2) FOTO > expected -MET   3) Shoulder abduction AROM > 125 deg -MET   4) Behind the back IR AROM to be > L5 -MET      Objective:        Active Range of Motion     Right Shoulder   Flexion: 140 degrees   Abduction:110 degrees  External rotation BTH: T2   Internal rotation BTB: T10    Left Knee   Flexion: 116 degrees   Extension: 0 degrees     Right Knee   Flexion: 125 degrees   Extension: 0 degrees     Passive Range of Motion     Right Shoulder   Flexion: 130 degrees   Abduction: 98 degrees   External rotation 90°: 80 degrees   Internal rotation 90°: 45 degrees     Additional Passive Range of Motion Details  Stretch at end-ranges but no pain     Strength/Myotome Testing     Left Shoulder     Planes of Motion   Flexion: 4+   Abduction: 4   External rotation at 0°: 4+   Internal rotation at 0°: 5     Right Shoulder     Planes of Motion   Flexion: 4+   Abduction: 4+   External rotation at 0°: 5   Internal rotation at 0°: 5     Left Hip   Planes of Motion   Flexion: 4+  External rotation: 5  Internal rotation: 5    Right Hip   Planes of Motion   Flexion: 4+  External rotation: 4+  Internal rotation: 4+    Left Knee   Flexion: 5  Extension: 5    Right Knee   Flexion: 5  Extension: 5    Additional Strength Details  R heel raise 14 L 20    SLR: L 10 with mod fatigue R 10 with min fatigue     General Comments:      Knee Comments  SLS: R 12 seconds seconds L 15 seconds        Assessment: 3/14 (Discharge): Patient shows great improvement with right shoulder and left knee and is ready for discharge  She tweaked her right knee today this morning but was given education on what to do at home  She is aware that she can return to therapy if needed for the rightk nee  Initial eval: Manuel Esteves is a [de-identified] y o  female who presents with chronic L knee and R shoulder pain  Patient's current impairments include decreased knee flexion ROM, decreased quad and hip strength, decreased shoulder A/PROM into flex, abd, and IR as well as decreased RTC strength  Functionally, patient is limited in her ability to ascend/descend stairs, complete ADLs such as getting dressed without discomfort, or return to her desired exercise routine  Patient would benefit from skilled physical therapy to address the impairments, improve their level of function, and to improve their overall quality of life        Plan: Discharge     Precautions: none    Medbridge: [id number]   1:1 with PT   Pt assessed today   Manuals 2/7 2/10 2/14 2/16 2/21 3/2 3/7 3/9 3/14    Knee flexion PROM  5'  5'  5' 5'  5'         Shoulder PROM as tolerated  5' 5'  5'  5'  5' into IR                                 Shoulder             AAROM dowel flexion (supine) HEP            AAROM dowel abduction (standing) HEP            Pulley AAROM             TB Rows  RTB 20x  GTB 3x10 GTB 3x10          TB Pulldowns  RTB 20x GTB 3x10 GTB 3x10          Behind the back IR stretch with strap   5x10"  10x5"        Bent over row 5# 2x10        Pec stretch door              TB Iso ER stepouts  2x10 RTB           TB ER      YTB 3x10         Straight arm extension bent over       3# 2x10       Bent over horiz abd       2# 2x10        Standing shoulder flexion             Standing shoulder abduction                          Knee             NuStep  5' L6 5'  5'  5'  5'      Leg press    30# 3x10 35# 3x10 35# 10x  40# 2x10 SL NV  40# 2x10 B  30# 2x10 LLE  40# 2x10 B   30# 2x10 LLE      Standing hip abd   2x10 B 1 5# 2x10 B  1 5# 2x10 B 2# 2x10 B 2# 2x10 B 2 5# 2x10 B     Standing hip ext  2x10 B 1 5# 2x10 B   1 5# 2x10 B 2# 2x10 B 2# 2x10 B  2 5# 2x10 B     Supine SLR  HEP 2x10 ea  2x10 ea   Standing 1 5# 2x10 B Standing 2# 2x10 B  Standing 2x10 B  Standing 2 5# B     Bridge HEP 20x           SLS   3x15s B     4x15" B 3x fatigue      Tandem stance    2x20s B    4x20s      Knee flexion machine     22# 3x10 22# 2x15 22# 2x15 22# 2x15 22# 2x15     Knee extension machine     22# 3x10 22# 3x10 22# 2x15 22# 2x15 22# 2x15     Wedge ramps       2'       Mini squats       At bar 2x10        Foam tandem walk         1:30     Foam side step         1:30                                             Ther Activity             Sit to stand        From chair 2x10  From chair 2x10      Lateral step downs      4" 3x10 LLE 4" 2x10 LLE  4" 3x10ea LLE  4" 3x10ea LLE                                             Modalities

## 2023-03-15 ENCOUNTER — OFFICE VISIT (OUTPATIENT)
Dept: OBGYN CLINIC | Facility: MEDICAL CENTER | Age: 83
End: 2023-03-15

## 2023-03-15 VITALS
SYSTOLIC BLOOD PRESSURE: 122 MMHG | HEIGHT: 65 IN | HEART RATE: 101 BPM | WEIGHT: 189 LBS | DIASTOLIC BLOOD PRESSURE: 82 MMHG | BODY MASS INDEX: 31.49 KG/M2

## 2023-03-15 DIAGNOSIS — M17.0 BILATERAL PRIMARY OSTEOARTHRITIS OF KNEE: Primary | ICD-10-CM

## 2023-03-15 DIAGNOSIS — G89.29 CHRONIC LEFT SHOULDER PAIN: ICD-10-CM

## 2023-03-15 DIAGNOSIS — M25.512 CHRONIC LEFT SHOULDER PAIN: ICD-10-CM

## 2023-03-15 NOTE — PROGRESS NOTES
1  Bilateral primary osteoarthritis of knee        2  Chronic left shoulder pain          No orders of the defined types were placed in this encounter  Impression:  Patient is here in follow up of chronic bilateral knee pain likely secondary to osteoarthritis (predominantly patellofemoral osteoarthritis) as below  Patient was previously treated in [facility] and her treatment included hyaluronic acid injections, platelet rich plasma injections and Baker's cyst aspiration  She previously had bilateral knee steroid injections along with PT  Her knees are doing well      Patient also presents with chronic right shoulder pain  This is likely secondary to rotator cuff syndrome  She has done well with physical therapy  Continue HEP     I will see her back if needed  If symptoms continued, would consider viscosupplementation for her knees      Imaging Studies (I personally reviewed images in PACS and report):  Bilateral knee x-rays most recent to this encounter reviewed  These images show moderate tricompartmental osteoarthritic changes which mostly affects the patellofemoral joints  There are no acute osseous abnormalities  Return if symptoms worsen or fail to improve  Patient is in agreement with the above plan  HPI:  Zoey Larson is a [de-identified] y o  female  who presents in follow up  Here for   Chief Complaint   Patient presents with   • Right Shoulder - Follow-up   • Right Knee - Follow-up     Pain on side of her knee when walking down the steps  Since last visit: See above  PT was very helpful  Shoulder has great ROM  Knee is stronger  Right knee is bothering her today  It is mostly on the inside  Stairs are most troublesome      Following history reviewed and updated:  Past Medical History:   Diagnosis Date   • Allergic 1980    [address] tin   • Arthritis 2008   • Benign essential hypertension    • Hyperlipidemia, unspecified    • Hypertension 2016   • Osteoarthritis of left knee    • Perichondritis of ear, right      Past Surgical History:   Procedure Laterality Date   • CARDIAC CATHETERIZATION Left 7/6/2022    Procedure: Cardiac Left Heart Cath;  Surgeon: Stephanie Hutchinson MD;  [facility]; Service: Cardiology   • COLON SURGERY  2003    Rectoseal    • LAPAROSCOPIC OVARIAN CYSTECTOMY Right 1981   • LAPAROSCOPIC OVARIAN CYSTECTOMY Left 2003    rectoseal and cystoseal repari   • TONSILLECTOMY AND ADENOIDECTOMY  1952   • TUBAL LIGATION  1972     Social History   Social History     Substance and Sexual Activity   Alcohol Use Yes   • Alcohol/week: 5 0 standard drinks   • Types: 5 Glasses of wine per week    Comment: 1 glass nightly     Social History     Substance and Sexual Activity   Drug Use Never     Social History     Tobacco Use   Smoking Status Never   Smokeless Tobacco Never     Family History   Problem Relation Age of Onset   • Hyperlipidemia Mother    • Heart Valve Disease Mother    • Hypertension Mother    • Arthritis Father    • Diabetes Father    • Alcohol abuse Father    • Prostate cancer Father    • Diabetes Brother    • Atrial fibrillation Brother    • Alcohol abuse Brother    • Arthritis Brother    • Pancreatic cancer Maternal Aunt 65   • No Known Problems Paternal Uncle    • No Known Problems Paternal Uncle    • No Known Problems Paternal Uncle    • No Known Problems Paternal Uncle    • Hypertension Daughter    • Bradycardia Son    • No Known Problems Son      Allergies   Allergen Reactions   • Nitrofurantoin Nausea Only, Dizziness and Headache     Macrodantin        Constitutional:  /82   Pulse 101   Ht 5' 5" (1 651 m)   Wt 85 7 kg (189 lb)   BMI 31 45 kg/m²    General: NAD  Eyes: Clear sclerae  ENT: No inflammation, lesion, or mass of lips  No tracheal deviation  Musculoskeletal: As mentioned below  Integumentary: No visible rashes or skin lesions  Pulmonary/Chest: Effort normal  No respiratory distress  Neuro: CN's grossly intact, CUTLER    Psych: Normal affect and judgement  Vascular: WWP  Right Knee Exam     Tenderness   The patient is experiencing tenderness in the medial joint line  Range of Motion   Extension: normal     Tests   Eugenia:  Medial - positive   Varus: negative Valgus: negative    Other   Erythema: absent  Scars: absent  Sensation: normal  Pulse: present  Swelling: none  Effusion: no effusion present      Left Knee Exam     Tenderness   The patient is experiencing tenderness in the medial joint line      Range of Motion   Extension: normal     Tests   Varus: negative Valgus: negative    Other   Erythema: absent  Scars: absent  Sensation: normal  Pulse: present  Swelling: none  Effusion: no effusion present             Procedures

## 2023-03-17 ENCOUNTER — TELEPHONE (OUTPATIENT)
Dept: CARDIOLOGY CLINIC | Facility: CLINIC | Age: 83
End: 2023-03-17

## 2023-03-17 DIAGNOSIS — I48.0 PAROXYSMAL ATRIAL FIBRILLATION (HCC): ICD-10-CM

## 2023-03-17 NOTE — TELEPHONE ENCOUNTER
Just to clarify you want her to take metoprolol 25 mg am, and 37 5 mg pm    She  taking 37 5 mg qd      Please advise

## 2023-03-17 NOTE — TELEPHONE ENCOUNTER
P/C having episodes of AFIB and doesn't seem to be coming out of it, Episodes seem to be holding a lot longer  Especially this one started last night at 830 pm and per watch is still in afib  HR's are erratic, and irregular  Ranging from   Currently see this from her watch, and if sitting calm or laying down is feeling it  Did advise if constantly 140 and above report to ED pt understood    Pt had been taking her metoprolol one table at night, and 1/2 tab in am      About 10 days ago started taking 1 5 tablet at night and it seems constant with when all this started       Metoprolol succinate 1 5 mg qd  Xarelto 20 mg qd    Nona is asking if medication need to be adjusted  /90's today, sob on exertion  Next ov 3/29/2023     Please advise

## 2023-03-17 NOTE — TELEPHONE ENCOUNTER
Requested medication(s) are due for refill today: yes  Patient has already received a courtesy refill: no  Other reason request has been forwarded to provider: yes

## 2023-03-20 ENCOUNTER — TELEPHONE (OUTPATIENT)
Dept: CARDIOLOGY CLINIC | Facility: CLINIC | Age: 83
End: 2023-03-20

## 2023-03-20 RX ORDER — METOPROLOL SUCCINATE 25 MG/1
62.5 TABLET, EXTENDED RELEASE ORAL DAILY
Qty: 75 TABLET | Refills: 2 | Status: SHIPPED | OUTPATIENT
Start: 2023-03-20 | End: 2023-03-29

## 2023-03-20 NOTE — TELEPHONE ENCOUNTER
She usually gets her lipids checked by her PCP with her annual visits    It can be checked at that time in the summer as usual

## 2023-03-29 ENCOUNTER — OFFICE VISIT (OUTPATIENT)
Dept: CARDIOLOGY CLINIC | Facility: CLINIC | Age: 83
End: 2023-03-29

## 2023-03-29 VITALS
HEIGHT: 65 IN | WEIGHT: 189 LBS | DIASTOLIC BLOOD PRESSURE: 76 MMHG | SYSTOLIC BLOOD PRESSURE: 114 MMHG | BODY MASS INDEX: 31.49 KG/M2 | HEART RATE: 62 BPM

## 2023-03-29 DIAGNOSIS — E78.00 PURE HYPERCHOLESTEROLEMIA: ICD-10-CM

## 2023-03-29 DIAGNOSIS — I48.0 PAROXYSMAL ATRIAL FIBRILLATION (HCC): Primary | ICD-10-CM

## 2023-03-29 DIAGNOSIS — I25.10 CORONARY ARTERY DISEASE INVOLVING NATIVE CORONARY ARTERY OF NATIVE HEART WITHOUT ANGINA PECTORIS: ICD-10-CM

## 2023-03-29 DIAGNOSIS — I73.9 PAD (PERIPHERAL ARTERY DISEASE) (HCC): ICD-10-CM

## 2023-03-29 RX ORDER — METOPROLOL SUCCINATE 25 MG/1
25 TABLET, EXTENDED RELEASE ORAL 2 TIMES DAILY
Qty: 180 TABLET | Refills: 3
Start: 2023-03-29

## 2023-03-29 NOTE — PROGRESS NOTES
Cardiology Consultation     Serenity Martínez  [id number]  1940  [address] [facility] [place] [address]  [id number]    1  Paroxysmal atrial fibrillation (HCC)  POCT ECG    metoprolol succinate (TOPROL-XL) 25 mg 24 hr tablet    TSH, 3rd generation with Free T4 reflex    HEMOGLOBIN A1C W/ EAG ESTIMATION      2  Coronary artery disease involving native coronary artery of native heart without angina pectoris  CBC and differential    Comprehensive metabolic panel    HEMOGLOBIN A1C W/ EAG ESTIMATION    Vitamin D Panel      3  Pure hypercholesterolemia  Lipid panel      4  PAD (peripheral artery disease) [facility])          Chief Complaint   Patient presents with   • Atrial Fibrillation     F/u visit   Admits to SOB     HPI: Patient has continued shortness of breath with exertion, unchanged from prior visit and as reported for years  No reported chest pain, palpitations, lightheadedness, syncope, LE edema, orthopnea, PND, or significant weight changes  Patient remains active without any increased fatigue out of the ordinary        Patient Active Problem List   Diagnosis   • Essential hypertension   • Chronic left shoulder pain   • MENON (dyspnea on exertion)   • Lichen sclerosus   • Lipoma of right lower extremity   • PAD (peripheral artery disease) (HCC)   • Pure hypercholesterolemia   • Abnormal stress test   • Paroxysmal atrial fibrillation (HCC)   • Coronary artery disease involving native coronary artery of native heart without angina pectoris   • Class 1 obesity due to excess calories without serious comorbidity with body mass index (BMI) of 30 0 to 30 9 in adult   • Acute nonintractable headache   • Baker cyst, left   • Chronic pain of left knee   • Bilateral primary osteoarthritis of knee     Past Medical History:   Diagnosis Date   • Allergic 1980    [address] tin   • Arthritis 2008   • Benign essential hypertension • Hyperlipidemia, unspecified    • Hypertension 2016   • Osteoarthritis of left knee    • Perichondritis of ear, right      Social History     Socioeconomic History   • Marital status: /Civil Union     Spouse name: Not on file   • Number of children: Not on file   • Years of education: Not on file   • Highest education level: Not on file   Occupational History   • Not on file   Tobacco Use   • Smoking status: Never   • Smokeless tobacco: Never   Vaping Use   • Vaping Use: Never used   Substance and Sexual Activity   • Alcohol use: Yes     Alcohol/week: 5 0 standard drinks     Types: 5 Glasses of wine per week     Comment: 1 glass nightly   • Drug use: Never   • Sexual activity: Not Currently     Partners: Male   Other Topics Concern   • Not on file   Social History Narrative   • Not on file     Social Determinants of Health     Financial Resource Strain: Not on file   Food Insecurity: Not on file   Transportation Needs: Not on file   Physical Activity: Not on file   Stress: Not on file   Social Connections: Not on file   Intimate Partner Violence: Not on file   Housing Stability: Not on file      Family History   Problem Relation Age of Onset   • Hyperlipidemia Mother    • Heart Valve Disease Mother    • Hypertension Mother    • Arthritis Father    • Diabetes Father    • Alcohol abuse Father    • Prostate cancer Father    • Diabetes Brother    • Atrial fibrillation Brother    • Alcohol abuse Brother    • Arthritis Brother    • Pancreatic cancer Maternal Aunt 72   • No Known Problems Paternal Uncle    • No Known Problems Paternal Uncle    • No Known Problems Paternal Uncle    • No Known Problems Paternal Uncle    • Hypertension Daughter    • Bradycardia Son    • No Known Problems Son      Past Surgical History:   Procedure Laterality Date   • CARDIAC CATHETERIZATION Left 7/6/2022    Procedure: Cardiac Left Heart Cath;  Surgeon: Jj Myers MD;  [facility];   Service: Cardiology   • "COLON SURGERY  2003    Rectoseal    • LAPAROSCOPIC OVARIAN CYSTECTOMY Right 1981   • LAPAROSCOPIC OVARIAN CYSTECTOMY Left 2003    rectoseal and cystoseal repari   • TONSILLECTOMY AND ADENOIDECTOMY  1952   • TUBAL LIGATION  1972       Current Outpatient Medications:   •  cholecalciferol (VITAMIN D3) 1,000 units tablet, Take 1,000 Units by mouth daily, Disp: , Rfl:   •  clobetasol (TEMOVATE) 0 05 % ointment, Apply topically as needed , Disp: , Rfl:   •  docusate sodium (COLACE) 100 mg capsule, Take 100 mg by mouth daily, Disp: , Rfl:   •  metoprolol succinate (TOPROL-XL) 25 mg 24 hr tablet, Take 1 tablet (25 mg total) by mouth 2 (two) times a day, Disp: 180 tablet, Rfl: 3  •  Xarelto 20 MG tablet, TAKE 1 TABLET BY MOUTH EVERY DAY WITH BREAKFAST, Disp: 30 tablet, Rfl: 5  Allergies   Allergen Reactions   • Nitrofurantoin Nausea Only, Dizziness and Headache     Macrodantin     Vitals:    03/29/23 0850   BP: 114/76   BP Location: Left arm   Patient Position: Sitting   Pulse: 62   Weight: 85 7 kg (189 lb)   Height: 5' 5\" (1 651 m)       Labs:  No visits with results within 6 Month(s) from this visit  Latest known visit with results is:   Admission on 07/06/2022, Discharged on 07/06/2022   Component Date Value   • WBC 07/06/2022 3 81 (L)    • RBC 07/06/2022 5 14 (H)    • Hemoglobin 07/06/2022 15 3    • Hematocrit 07/06/2022 46 8 (H)    • MCV 07/06/2022 91    • MCH 07/06/2022 29 8    • MCHC 07/06/2022 32 7    • RDW 07/06/2022 13 5    • Platelets 88/77/5389 178    • MPV 07/06/2022 10 1      No results found for: CHOL, TRIG, HDL, LDLDIRECT  Imaging: No results found  Review of Systems:  Review of Systems   Constitutional: Negative for activity change, appetite change, chills, diaphoresis, fatigue and unexpected weight change  HENT: Negative for hearing loss, nosebleeds and sore throat  Eyes: Negative for photophobia and visual disturbance  Respiratory: Positive for shortness of breath   Negative for cough, chest " "tightness and wheezing  Cardiovascular: Negative for chest pain, palpitations and leg swelling  Gastrointestinal: Negative for abdominal pain, diarrhea, nausea and vomiting  Endocrine: Negative for polyuria  Genitourinary: Negative for dysuria, frequency and hematuria  Musculoskeletal: Negative for arthralgias, back pain, gait problem and neck pain  Skin: Negative for pallor and rash  Neurological: Negative for dizziness, syncope and headaches  Hematological: Does not bruise/bleed easily  Psychiatric/Behavioral: Negative for behavioral problems and confusion  Physical Exam:  Physical Exam  Vitals reviewed  Constitutional:       Appearance: She is well-developed  She is not diaphoretic  HENT:      Head: Normocephalic and atraumatic  Nose: Nose normal    Eyes:      General: No scleral icterus  Pupils: Pupils are equal, round, and reactive to light  Neck:      Vascular: No JVD  Cardiovascular:      Rate and Rhythm: Normal rate and regular rhythm  Heart sounds: Normal heart sounds  No murmur heard  No friction rub  No gallop  Pulmonary:      Effort: Pulmonary effort is normal  No respiratory distress  Breath sounds: Normal breath sounds  No wheezing or rales  Abdominal:      General: Bowel sounds are normal  There is no distension  Palpations: Abdomen is soft  Tenderness: There is no abdominal tenderness  Musculoskeletal:         General: No deformity  Normal range of motion  Cervical back: Normal range of motion and neck supple  Skin:     General: Skin is warm and dry  Findings: No rash  Neurological:      Mental Status: She is alert and oriented to person, place, and time  Cranial Nerves: No cranial nerve deficit  Psychiatric:         Behavior: Behavior normal        Blood pressure 114/76, pulse 62, height 5' 5\" (1 651 m), weight 85 7 kg (189 lb), not currently breastfeeding      EKG:  Normal sinus rhythm   Normal " ECG    Discussion/Summary:  HTN: remains well controlled today, continued on metoprolol  Had a nuclear stress test in Oct 2020 that was normal for EF and perfusion  Echocardiogram at that time (and on repeat in May 2022) revealed normal LV & RV function, normal valves other than mild to moderate MR, moderate TR  She has had MENON for many years - also noted by prior cardiologist documentation in 2017  PAF/mild CAD: noted on stress testing and LHC revealed 10% pRCA lesion  [facility] and Toprol XL for rate control, dose of which has recently been increased for improved rate control to 25mg BID  Still has some occasional dyspnea, however overall rate control seems to be appropriate at this time for afib - but her heart rate does go up to 100-120 with minimal exertion  She does exercise at gym on machines and her heart rate only goes to high 90's without dyspnea  Recommended that she check pulse-ox to see if she has hypoxia that could be causing her to be tachycardic with activity  HLD:  in Nov 2020, which is at goal - repeat levels in Oct 2021 revealed LDL of 113  LDL 95 in June 2022  Repeat at this time

## 2023-05-17 DIAGNOSIS — I48.0 PAROXYSMAL ATRIAL FIBRILLATION (HCC): ICD-10-CM

## 2023-05-17 RX ORDER — RIVAROXABAN 20 MG/1
TABLET, FILM COATED ORAL
Qty: 30 TABLET | Refills: 5 | Status: SHIPPED | OUTPATIENT
Start: 2023-05-17

## 2023-06-01 ENCOUNTER — OFFICE VISIT (OUTPATIENT)
Dept: OBGYN CLINIC | Facility: MEDICAL CENTER | Age: 83
End: 2023-06-01

## 2023-06-01 VITALS
WEIGHT: 192 LBS | HEIGHT: 65 IN | SYSTOLIC BLOOD PRESSURE: 126 MMHG | HEART RATE: 79 BPM | DIASTOLIC BLOOD PRESSURE: 73 MMHG | BODY MASS INDEX: 31.99 KG/M2

## 2023-06-01 DIAGNOSIS — M17.0 BILATERAL PRIMARY OSTEOARTHRITIS OF KNEE: Primary | ICD-10-CM

## 2023-06-01 RX ORDER — TRIAMCINOLONE ACETONIDE 40 MG/ML
80 INJECTION, SUSPENSION INTRA-ARTICULAR; INTRAMUSCULAR
Status: COMPLETED | OUTPATIENT
Start: 2023-06-01 | End: 2023-06-01

## 2023-06-01 RX ORDER — ROPIVACAINE HYDROCHLORIDE 5 MG/ML
10 INJECTION, SOLUTION EPIDURAL; INFILTRATION; PERINEURAL
Status: COMPLETED | OUTPATIENT
Start: 2023-06-01 | End: 2023-06-01

## 2023-06-01 RX ADMIN — ROPIVACAINE HYDROCHLORIDE 10 ML: 5 INJECTION, SOLUTION EPIDURAL; INFILTRATION; PERINEURAL at 09:30

## 2023-06-01 RX ADMIN — TRIAMCINOLONE ACETONIDE 80 MG: 40 INJECTION, SUSPENSION INTRA-ARTICULAR; INTRAMUSCULAR at 09:30

## 2023-06-01 NOTE — PROGRESS NOTES
1  Bilateral primary osteoarthritis of knee          Orders Placed This Encounter   Procedures   • Large joint arthrocentesis      Impression:  Patient is here in follow up of chronic bilateral knee pain likely secondary to osteoarthritis (predominantly patellofemoral osteoarthritis) as below   Patient was previously treated in [place] and her treatment included hyaluronic acid injections, platelet rich plasma injections and Baker's cyst aspiration     She previously had bilateral knee steroid injections along with PT  Her left knee is doing well  Her right knee is causing a lot of pain  She is having trouble getting her right leg straight  We decided to proceed with a right knee steroid injection  The patient will call to let us know how she is doing and if still symptomatic, we will obtain 1 shot viscosupplementation      Patient also presents with chronic right shoulder pain   This is likely secondary to rotator cuff syndrome  She has done well with physical therapy  Continue HEP     Imaging Studies (I personally reviewed images in PACS and report):  Bilateral knee x-rays most recent to this encounter reviewed   These images show moderate tricompartmental osteoarthritic changes which mostly affects the patellofemoral joints   There are no acute osseous abnormalities  No follow-ups on file  Patient is in agreement with the above plan  HPI:  Julieth Camilo is a [de-identified] y o  female  who presents in follow up  Here for   Chief Complaint   Patient presents with   • Left Knee - Follow-up   • Right Knee - Follow-up       Since last visit: See above      Following history reviewed and updated:  Past Medical History:   Diagnosis Date   • Allergic [address]    [address] tin   • Arthritis 2008   • Benign essential hypertension    • Hyperlipidemia, unspecified    • Hypertension 2016   • Osteoarthritis of left knee    • Perichondritis of ear, right      Past Surgical History:   Procedure Laterality Date   • "CARDIAC CATHETERIZATION Left 7/6/2022    Procedure: Cardiac Left Heart Cath;  Surgeon: Alber Munoz MD;  [facility]: [facility]; Service: Cardiology   • COLON SURGERY  2003    Rectoseal    • LAPAROSCOPIC OVARIAN CYSTECTOMY Right 1981   • LAPAROSCOPIC OVARIAN CYSTECTOMY Left 2003    rectoseal and cystoseal repari   • TONSILLECTOMY AND ADENOIDECTOMY  1952   • TUBAL LIGATION  1972     Social History   Social History     Substance and Sexual Activity   Alcohol Use Yes   • Alcohol/week: 5 0 standard drinks of alcohol   • Types: 5 Glasses of wine per week    Comment: 1 glass nightly     Social History     Substance and Sexual Activity   Drug Use Never     Social History     Tobacco Use   Smoking Status Never   Smokeless Tobacco Never     Family History   Problem Relation Age of Onset   • Hyperlipidemia Mother    • Heart Valve Disease Mother    • Hypertension Mother    • Arthritis Father    • Diabetes Father    • Alcohol abuse Father    • Prostate cancer Father    • Diabetes Brother    • Atrial fibrillation Brother    • Alcohol abuse Brother    • Arthritis Brother    • Pancreatic cancer Maternal Aunt 65   • No Known Problems Paternal Uncle    • No Known Problems Paternal Uncle    • No Known Problems Paternal Uncle    • No Known Problems Paternal Uncle    • Hypertension Daughter    • Bradycardia Son    • No Known Problems Son      Allergies   Allergen Reactions   • Nitrofurantoin Nausea Only, Dizziness and Headache     Macrodantin        Constitutional:  /73   Pulse 79   Ht 5' 5\" (1 651 m)   Wt 87 1 kg (192 lb)   BMI 31 95 kg/m²    General: NAD  Eyes: Clear sclerae  ENT: No inflammation, lesion, or mass of lips  No tracheal deviation  Musculoskeletal: As mentioned below  Integumentary: No visible rashes or skin lesions  Pulmonary/Chest: Effort normal  No respiratory distress  Neuro: CN's grossly intact, CUTLER  Psych: Normal affect and judgement  Vascular: WWP      Right Knee Exam     Tenderness " The patient is experiencing tenderness in the medial joint line  Range of Motion   Extension:  -5 abnormal     Tests   Eugenia:  Medial - positive   Varus: negative Valgus: negative    Other   Erythema: absent  Scars: absent  Sensation: normal  Pulse: present  Swelling: none  Effusion: no effusion present             Large joint arthrocentesis: R knee  Universal Protocol:  Consent: Verbal consent obtained  Written consent not obtained  Risks and benefits: risks, benefits and alternatives were discussed  Consent given by: patient  Timeout called at: 6/1/2023 9:55 AM   Patient understanding: patient states understanding of the procedure being performed  Patient consent: the patient's understanding of the procedure matches consent given  Site marked: the operative site was marked  Radiology Images displayed and confirmed  If images not available, report reviewed: imaging studies available  Patient identity confirmed: verbally with patient    Supporting Documentation  Indications: pain   Procedure Details  Location: knee - R knee  Needle size: 22 G  Ultrasound guidance: no  Approach: Inferolateral to patella  Medications administered: 80 mg triamcinolone acetonide 40 mg/mL; 10 mL ropivacaine 0 5 %    Patient tolerance: patient tolerated the procedure well with no immediate complications  Dressing:  Sterile dressing applied    There was little to no resistance encountered during the injection  Risks of this procedure include:    - Risk of bleeding since a needle is involved  - Risk of infection (1/10,000 chance as per recent studies)  Signs/symptoms were discussed and they would prompt an urgent evaluation at an emergency department   - Risk of pigmentation or skin dimpling in the skin (2-3% chance as per recent studies) from the steroid  - Risk of increased pain from steroid flare (1% chance as per recent studies) that typically lasts 24-48 hours    - Risk of increased blood sugars from the steroid medication that can last for a few weeks  If the patient is a diabetic or pre-diabetic, they were encouraged to closely monitor their blood sugars and discuss with PCP if elevated more than usual or if having symptoms  The benefits outweigh the risks and so the procedure was completed

## 2023-06-07 NOTE — PROGRESS NOTES
Cardiology Follow Up    March Emms 1940  [id number]  [address]  [facility] [id number] [phone]    1  PAF (paroxysmal atrial fibrillation) ([facility] [address] )        2  Hypertension, unspecified type        3  Pure hypercholesterolemia        4  Coronary artery disease involving native coronary artery of native heart without angina pectoris            Interval History:   Ms Zackery Hassan presents to our office for a follow up visit due to high BP and PAF  Last Friday evening she was out to dinner with friends and had 2 glasses of wine  Her  and in am   She denies CP, HA, blurred vision with high BP  Nona experienced atrial fibrillation on Tuesday to Wednesday associated with slight shortness of breath  According to [facility] she experiences and episode of atrial fibrillation once every 3 months  AF appears to be occurring more often        Medical History   Primary Cardiologist Dr Tapan Greer  CAD 7/06/22 [facility] minimal luminal irregularities of LAD, Prox RCA 10% stenosis   Hypertension  Hypercholesterolemia 4/12/23 , TG 75, HDL 74,     Paroxysmal atrial fibrillation [id number]= 4 on Xarelto 20mg daily   PAD   Obesity BMI 31 63     Patient Active Problem List   Diagnosis   • Essential hypertension   • Chronic left shoulder pain   • MENON (dyspnea on exertion)   • Lichen sclerosus   • Lipoma of right lower extremity   • PAD (peripheral artery disease) (HCC)   • Pure hypercholesterolemia   • Abnormal stress test   • Paroxysmal atrial fibrillation (HCC)   • Coronary artery disease involving native coronary artery of native heart without angina pectoris   • Class 1 obesity due to excess calories without serious comorbidity with body mass index (BMI) of 30 0 to 30 9 in adult   • Acute nonintractable headache   • Baker cyst, left   • Chronic pain of left knee   • Bilateral primary osteoarthritis of knee     Past Medical History:   Diagnosis Date   • Allergic [address]    [address]   • Arthritis 2008   • Benign essential hypertension    • Hyperlipidemia, unspecified    • Hypertension 2016   • Osteoarthritis of left knee    • Perichondritis of ear, right      Social History     Socioeconomic History   • Marital status: /Civil Union     Spouse name: Not on file   • Number of children: Not on file   • Years of education: Not on file   • Highest education level: Not on file   Occupational History   • Not on file   Tobacco Use   • Smoking status: Never   • Smokeless tobacco: Never   Vaping Use   • Vaping Use: Never used   Substance and Sexual Activity   • Alcohol use:  Yes     Alcohol/week: 5 0 standard drinks of alcohol     Types: 5 Glasses of wine per week     Comment: 1 glass nightly   • Drug use: Never   • Sexual activity: Not Currently     Partners: Male   Other Topics Concern   • Not on file   Social History Narrative   • Not on file     Social Determinants of Health     Financial Resource Strain: Not on file   Food Insecurity: Not on file   Transportation Needs: Not on file   Physical Activity: Not on file   Stress: Not on file   Social Connections: Not on file   Intimate Partner Violence: Not on file   Housing Stability: Not on file      Family History   Problem Relation Age of Onset   • Hyperlipidemia Mother    • Heart Valve Disease Mother    • Hypertension Mother    • Arthritis Father    • Diabetes Father    • Alcohol abuse Father    • Prostate cancer Father    • Diabetes Brother    • Atrial fibrillation Brother    • Alcohol abuse Brother    • Arthritis Brother    • Pancreatic cancer Maternal Aunt 72   • No Known Problems Paternal Uncle    • No Known Problems Paternal Uncle    • No Known Problems Paternal Uncle    • No Known Problems Paternal Uncle    • Hypertension Daughter    • Bradycardia Son    • No Known Problems Son      Past Surgical History:   Procedure Laterality Date   • CARDIAC CATHETERIZATION Left 7/6/2022    Procedure: Cardiac Left Heart Cath;  Surgeon: Fayne Cushing, MD;  [facility]: [facility]; Service: Cardiology   • COLON SURGERY  2003    Rectoseal    • LAPAROSCOPIC OVARIAN CYSTECTOMY Right 1981   • LAPAROSCOPIC OVARIAN CYSTECTOMY Left 2003    rectoseal and cystoseal repari   • TONSILLECTOMY AND ADENOIDECTOMY  1952   • TUBAL LIGATION  1972       Current Outpatient Medications:   •  cholecalciferol (VITAMIN D3) 1,000 units tablet, Take 1,000 Units by mouth daily, Disp: , Rfl:   •  clobetasol (TEMOVATE) 0 05 % ointment, Apply topically as needed , Disp: , Rfl:   •  docusate sodium (COLACE) 100 mg capsule, Take 100 mg by mouth daily, Disp: , Rfl:   •  metoprolol succinate (TOPROL-XL) 25 mg 24 hr tablet, Take 1 tablet (25 mg total) by mouth 2 (two) times a day, Disp: 180 tablet, Rfl: 3  •  Xarelto 20 MG tablet, TAKE 1 TABLET BY MOUTH EVERY DAY WITH BREAKFAST, Disp: 30 tablet, Rfl: 5  Allergies   Allergen Reactions   • Nitrofurantoin Nausea Only, Dizziness and Headache     Macrodantin       Labs:  Appointment on 04/12/2023   Component Date Value   • 25-HYDROXY VIT D 04/12/2023 30    • 25-Hydroxy D2 04/12/2023 <1 0    • 25-HYDROXY VIT D3 04/12/2023 29      Imaging: No results found  Review of Systems:  Review of Systems   Respiratory: Positive for shortness of breath  Cardiovascular: Positive for palpitations  Musculoskeletal: Positive for arthralgias and myalgias  All other systems reviewed and are negative  Physical Exam:  Physical Exam  Vitals reviewed  Constitutional:       Appearance: She is obese  Cardiovascular:      Rate and Rhythm: Normal rate and regular rhythm  Pulses: Normal pulses  Heart sounds: Normal heart sounds  Pulmonary:      Effort: Pulmonary effort is normal       Breath sounds: Normal breath sounds  Musculoskeletal:         General: Normal range of motion  Cervical back: Normal range of motion and neck supple        Right lower leg: No edema  Left lower leg: No edema  Skin:     General: Skin is warm and dry  Capillary Refill: Capillary refill takes less than 2 seconds  Neurological:      General: No focal deficit present  Mental Status: She is alert and oriented to person, place, and time  Psychiatric:         Behavior: Behavior normal          Discussion/Summary:  # Paroxysmal atrial fibrillation [id number]= 4 on Xarelto 20mg daily for stroke prevention and Metoprolol succinate 25mg BID  I have instructed Nona to take Metoprolol succinate 12 5mg if she experiences atrial fibrillation  Avoid caffeine and alcohol  Possibly referral to EP, discuss with primary cardiologist, denies snoring at night   # Hypertension RUE sitting 120/66, spike in BP possibly related to ETOH consumption  Instructed to avoid caffeine and alcohol      # Hypercholesterolemia 4/12/23 , TG 75, HDL 74,   Continue with DASH diet   # CAD 7/06/22 [facility] minimal luminal irregularities of LAD, Prox RCA 10% stenosis continue on Xarelto 20mg daily, Metoprolol succinate 25mg BID

## 2023-06-08 ENCOUNTER — OFFICE VISIT (OUTPATIENT)
Dept: CARDIOLOGY CLINIC | Facility: CLINIC | Age: 83
End: 2023-06-08
Payer: COMMERCIAL

## 2023-06-08 VITALS
DIASTOLIC BLOOD PRESSURE: 80 MMHG | HEART RATE: 68 BPM | OXYGEN SATURATION: 98 % | HEIGHT: 65 IN | SYSTOLIC BLOOD PRESSURE: 142 MMHG | BODY MASS INDEX: 31.67 KG/M2 | WEIGHT: 190.1 LBS

## 2023-06-08 DIAGNOSIS — I48.0 PAF (PAROXYSMAL ATRIAL FIBRILLATION) (HCC): Primary | ICD-10-CM

## 2023-06-08 DIAGNOSIS — I10 HYPERTENSION, UNSPECIFIED TYPE: ICD-10-CM

## 2023-06-08 DIAGNOSIS — E78.00 PURE HYPERCHOLESTEROLEMIA: ICD-10-CM

## 2023-06-08 DIAGNOSIS — I25.10 CORONARY ARTERY DISEASE INVOLVING NATIVE CORONARY ARTERY OF NATIVE HEART WITHOUT ANGINA PECTORIS: ICD-10-CM

## 2023-06-08 PROCEDURE — 99215 OFFICE O/P EST HI 40 MIN: CPT | Performed by: NURSE PRACTITIONER

## 2023-06-14 ENCOUNTER — TELEPHONE (OUTPATIENT)
Dept: CARDIOLOGY CLINIC | Facility: CLINIC | Age: 83
End: 2023-06-14

## 2023-06-14 NOTE — TELEPHONE ENCOUNTER
Ok please ask Dr Douglas Rosales if it is ok with her to refer Nona to EP   Thank you Ursula Argueta

## 2023-06-14 NOTE — TELEPHONE ENCOUNTER
P/C was reviewing her information from visit she noticed that it is documented Afib every 3 months, she states it is every 3 weeks  She would like the note corrected ? She is also interested in seeing EP, can we put the referral in      Please advise

## 2023-06-14 NOTE — TELEPHONE ENCOUNTER
Pt had an appt with Dee Dee Solis the other day, it was mentioned EP eval  Pt is calling asking to have a referral to see EP    Is this ok to refer?     Please advise

## 2023-06-15 DIAGNOSIS — I48.0 PAROXYSMAL ATRIAL FIBRILLATION (HCC): ICD-10-CM

## 2023-06-15 DIAGNOSIS — I48.0 PAROXYSMAL ATRIAL FIBRILLATION (HCC): Primary | ICD-10-CM

## 2023-06-15 RX ORDER — METOPROLOL SUCCINATE 25 MG/1
TABLET, EXTENDED RELEASE ORAL
Qty: 75 TABLET | Refills: 2 | Status: SHIPPED | OUTPATIENT
Start: 2023-06-15

## 2023-06-23 ENCOUNTER — TELEPHONE (OUTPATIENT)
Dept: OBGYN CLINIC | Facility: HOSPITAL | Age: 83
End: 2023-06-23

## 2023-06-23 DIAGNOSIS — M17.0 BILATERAL PRIMARY OSTEOARTHRITIS OF KNEE: Primary | ICD-10-CM

## 2023-06-23 NOTE — TELEPHONE ENCOUNTER
Caller: Khang  Doctor/office: Khang GUEVARA#: [id number]      called to start auth/delivery for VISCO/EUFLEXXA/Durolane injections    Body Part: right knee  Date of last Gel Injection: n/a    Educated patient on Visco procedure   Medications must first be authorized and delivered to our office BEFORE we can schedule

## 2023-07-06 ENCOUNTER — RA CDI HCC (OUTPATIENT)
Dept: OTHER | Facility: HOSPITAL | Age: 83
End: 2023-07-06

## 2023-07-06 NOTE — PROGRESS NOTES
[address] [facility] coding opportunities       Chart reviewed, no opportunity found: [address]        Patients Insurance     Medicare Insurance: [employer]age

## 2023-07-12 ENCOUNTER — OFFICE VISIT (OUTPATIENT)
Dept: FAMILY MEDICINE CLINIC | Facility: CLINIC | Age: 83
End: 2023-07-12
Payer: COMMERCIAL

## 2023-07-12 VITALS
TEMPERATURE: 97.9 F | SYSTOLIC BLOOD PRESSURE: 132 MMHG | HEIGHT: 65 IN | DIASTOLIC BLOOD PRESSURE: 92 MMHG | HEART RATE: 80 BPM | OXYGEN SATURATION: 96 % | BODY MASS INDEX: 31.9 KG/M2 | WEIGHT: 191.5 LBS

## 2023-07-12 DIAGNOSIS — Z00.00 MEDICARE ANNUAL WELLNESS VISIT, SUBSEQUENT: ICD-10-CM

## 2023-07-12 DIAGNOSIS — I10 ESSENTIAL HYPERTENSION: ICD-10-CM

## 2023-07-12 DIAGNOSIS — I83.893 VARICOSE VEINS OF BOTH LEGS WITH EDEMA: ICD-10-CM

## 2023-07-12 DIAGNOSIS — M17.0 BILATERAL PRIMARY OSTEOARTHRITIS OF KNEE: ICD-10-CM

## 2023-07-12 DIAGNOSIS — I48.0 PAROXYSMAL ATRIAL FIBRILLATION (HCC): ICD-10-CM

## 2023-07-12 DIAGNOSIS — R06.09 DOE (DYSPNEA ON EXERTION): Primary | ICD-10-CM

## 2023-07-12 DIAGNOSIS — R09.89 PURSED-LIP BREATHING: ICD-10-CM

## 2023-07-12 DIAGNOSIS — I73.9 PAD (PERIPHERAL ARTERY DISEASE) (HCC): ICD-10-CM

## 2023-07-12 PROCEDURE — G0439 PPPS, SUBSEQ VISIT: HCPCS | Performed by: FAMILY MEDICINE

## 2023-07-12 PROCEDURE — 99214 OFFICE O/P EST MOD 30 MIN: CPT | Performed by: FAMILY MEDICINE

## 2023-07-12 NOTE — PROGRESS NOTES
Assessment and Plan:     Problem List Items Addressed This Visit        Cardiovascular and Mediastinum    Essential hypertension    PAD (peripheral artery disease) ([address])     Arterial duplex completed 2018 minimal amount of plaque bilateral lower extremities without stenosis and normal CHAD. Continues to have leg swelling and pain with walking. DP/PT pulses are 2+. Initially thought swelling was due to amlodipine but that was discontinued swelling remains. Area of concern under her right knee was thought to be a lipoma but this is started to change in size and causes pain. Obtain arterial duplex and venous duplex         Relevant Orders    VAS lower limb arterial duplex, complete bilateral    Paroxysmal atrial fibrillation (HCC)     Increase in A-fib episodes. She does have an upcoming appointment with cardiology and EP. Could be contributing to MENON            Musculoskeletal and Integument    Bilateral primary osteoarthritis of knee       Other    MENON (dyspnea on exertion) - Primary     Cardiologist recommended pulmonology. Obtain pulmonary function testing. Secondhand smoke exposure along with toluene. Relevant Orders    Complete PFT with post bronchodilator   Other Visit Diagnoses     Pursed-lip breathing        Relevant Orders    Complete PFT with post bronchodilator    Varicose veins of both legs with edema        Relevant Orders    VAS lower limb venous duplex study, complete bilateral    Medicare annual wellness visit, subsequent            AWV completed today. Orders above  Continue current medications and following with cardiology     Preventive health issues were discussed with patient, and age appropriate screening tests were ordered as noted in patient's After Visit Summary. Personalized health advice and appropriate referrals for health education or preventive services given if needed, as noted in patient's After Visit Summary.      History of Present Illness:     Patient presents for a Medicare Wellness Visit    AVW     Still getting afib more frequently. Cut out drinking still experienced afib. Episodes are happening occasionally during the day. Primarily at nighttime when sleeping. Patient is wearing a Fitbit which alerts her to the A-fib episodes. Continues to have MENON. Daughter said shes pursed lip breathing. Second hand exposure growing up. Not personally a smoker. Worked at [employer] possible exposures. Toluene exposure. Worsening over the past few years. Denies SOB at rest.     Leg weakness when standing is like she is going to collapse, Denies dizziness or lightheaded. There is a chair near she sits down. She has experience this 4 times. Most recent episode 1 month ago. Sensation in the thighs where she feels like she has not had to be able to stand up anymore. Denies any numbness in the legs. No pain during these episodes but does endorse weakness and pain with extended walking. [place] [employer] when sitting down. No new med changes no back pain. Patient Care Team:  Can Hernadez MD as PCP - General (Family Medicine)     Review of Systems:     Review of Systems   Constitutional: Positive for fatigue. Negative for fever. HENT: Negative for sore throat. Eyes: Negative for visual disturbance. Respiratory: Positive for shortness of breath (exertion). Negative for cough and chest tightness. Cardiovascular: Positive for palpitations. Negative for chest pain and leg swelling. Gastrointestinal: Negative for abdominal pain, constipation, diarrhea and nausea. Endocrine: Negative for cold intolerance and heat intolerance. Genitourinary: Negative for flank pain. Musculoskeletal: Negative for back pain and neck pain. Leg pain with walking      Skin: Negative for rash. Neurological: Positive for weakness. Negative for headaches. Psychiatric/Behavioral: Negative for behavioral problems and confusion.         Problem List:     Patient Active Problem List   Diagnosis   • Essential hypertension   • Chronic left shoulder pain   • MENON (dyspnea on exertion)   • Lichen sclerosus   • Lipoma of right lower extremity   • PAD (peripheral artery disease) ([facility])   • Pure hypercholesterolemia   • Abnormal stress test   • Paroxysmal atrial fibrillation ([facility])   • Coronary artery disease involving native coronary artery of native heart without angina pectoris   • Class 1 obesity due to excess calories without serious comorbidity with body mass index (BMI) of 30.0 to 30.9 in adult   • Acute nonintractable headache   • Baker cyst, left   • Chronic pain of left knee   • Bilateral primary osteoarthritis of knee      Past Medical and Surgical History:     Past Medical History:   Diagnosis Date   • Allergic 1980    [address] tin   • Arthritis 2008   • Benign essential hypertension    • Hyperlipidemia, unspecified    • Hypertension 2016   • Osteoarthritis of left knee    • Perichondritis of ear, right      Past Surgical History:   Procedure Laterality Date   • CARDIAC CATHETERIZATION Left 7/6/2022    Procedure: Cardiac Left Heart Cath;  Surgeon: Manuel Root MD;  Location: [facility];   Service: Cardiology   • COLON SURGERY  2003    Rectoseal    • LAPAROSCOPIC OVARIAN CYSTECTOMY Right 1981   • LAPAROSCOPIC OVARIAN CYSTECTOMY Left 2003    rectoseal and cystoseal repari   • TONSILLECTOMY AND ADENOIDECTOMY  1952   • TUBAL LIGATION  1972      Family History:     Family History   Problem Relation Age of Onset   • Hyperlipidemia Mother    • Heart Valve Disease Mother    • Hypertension Mother    • Arthritis Father    • Diabetes Father    • Alcohol abuse Father    • Prostate cancer Father    • Diabetes Brother    • Atrial fibrillation Brother    • Alcohol abuse Brother    • Arthritis Brother    • Pancreatic cancer Maternal Aunt 72   • No Known Problems Paternal Uncle    • No Known Problems Paternal Uncle    • No Known Problems Paternal Uncle    • No Known Problems Paternal Uncle    • Hypertension Daughter    • Bradycardia Son    • No Known Problems Son       Social History:     Social History     Socioeconomic History   • Marital status: /Civil Union     Spouse name: None   • Number of children: None   • Years of education: None   • Highest education level: None   Occupational History   • None   Tobacco Use   • Smoking status: Never   • Smokeless tobacco: Never   Vaping Use   • Vaping Use: Never used   Substance and Sexual Activity   • Alcohol use: Yes     Alcohol/week: 5.0 standard drinks of alcohol     Types: 5 Glasses of wine per week     Comment: 1 glass nightly   • Drug use: Never   • Sexual activity: Not Currently     Partners: Male   Other Topics Concern   • None   Social History Narrative   • None     Social Determinants of Health     Financial Resource Strain: Not on file   Food Insecurity: Not on file   Transportation Needs: Not on file   Physical Activity: Not on file   Stress: Not on file   Social Connections: Not on file   Intimate Partner Violence: Not on file   Housing Stability: Not on file      Medications and Allergies:     Current Outpatient Medications   Medication Sig Dispense Refill   • cholecalciferol (VITAMIN D3) 1,000 units tablet Take 1,000 Units by mouth daily     • clobetasol (TEMOVATE) 0.05 % ointment Apply topically as needed      • docusate sodium (COLACE) 100 mg capsule Take 100 mg by mouth daily     • metoprolol succinate (TOPROL-XL) 25 mg 24 hr tablet TAKE 2 AND 1/2 TABLETS BY MOUTH EVERY DAY TAKE 25MG IN THE MORNING AND TAKE 37.5MG IN THE EVENING (Patient taking differently: 25 mg every 12 (twelve) hours) 75 tablet 2   • Xarelto 20 MG tablet TAKE 1 TABLET BY MOUTH EVERY DAY WITH BREAKFAST 30 tablet 5     No current facility-administered medications for this visit.      Allergies   Allergen Reactions   • Nitrofurantoin Nausea Only, Dizziness and Headache     Macrodantin      Immunizations:     Immunization History   Administered Date(s) Administered   • COVID-19 MODERNA VACC 0.25 ML IM BOOSTER 10/28/2022   • COVID-19 MODERNA VACC 0.5 ML IM 02/09/2021, 03/09/2021, 11/01/2021, 04/27/2022   • COVID-19 Pfizer Vac BIVALENT Jossue-sucrose 12 Yr+ IM (BOOSTER ONLY) 09/30/2022   • INFLUENZA 10/21/2020, 10/01/2022   • Influenza, high dose seasonal 0.7 mL 10/08/2021   • Pneumococcal Polysaccharide PPV23 11/05/2014   • Zoster Vaccine Recombinant 06/10/2018, 09/18/2018      Health Maintenance: There are no preventive care reminders to display for this patient. Topic Date Due   • Pneumococcal Vaccine: 65+ Years (2 - PCV) 11/05/2015   • Influenza Vaccine (1) 09/01/2023      Medicare Screening Tests and Risk Assessments:     Nehemias Nicholson is here for her Subsequent Wellness visit. Health Risk Assessment:   Patient rates overall health as good. Patient feels that their physical health rating is slightly worse. Patient is very satisfied with their life. Eyesight was rated as same. Hearing was rated as same. Patient feels that their emotional and mental health rating is same. Patients states they are never, rarely angry. Patient states they are sometimes unusually tired/fatigued. Pain experienced in the last 7 days has been some. Patient's pain rating has been 2/10. Patient states that she has experienced weight loss or gain in last 6 months. Depression Screening:   PHQ-2 Score: 0      Fall Risk Screening: In the past year, patient has experienced: no history of falling in past year      Urinary Incontinence Screening:   Patient has leaked urine accidently in the last six months. Home Safety:  Patient has trouble with stairs inside or outside of their home. Patient has working smoke alarms and has working carbon monoxide detector. Home safety hazards include: none. Nutrition:   Current diet is Regular. Medications:   Patient is currently taking over-the-counter supplements.  OTC medications include: see medication list. Patient is able to manage medications. Stool softner   Vitamin b complex  Vitamin d     Activities of Daily Living (ADLs)/Instrumental Activities of Daily Living (IADLs):   Walk and transfer into and out of bed and chair?: Yes  Dress and groom yourself?: Yes    Bathe or shower yourself?: Yes    Feed yourself? Yes  Do your laundry/housekeeping?: Yes  Manage your money, pay your bills and track your expenses?: Yes  Make your own meals?: Yes    Do your own shopping?: Yes    Previous Hospitalizations:   Any hospitalizations or ED visits within the last 12 months?: No      Advance Care Planning:   Living will: Yes    Durable POA for healthcare: Yes    Advanced directive: Yes      PREVENTIVE SCREENINGS      Cardiovascular Screening:    General: Screening Not Indicated and History Lipid Disorder      Diabetes Screening:     General: Screening Current      Breast Cancer Screening:     General: Screening Current      Cervical Cancer Screening:    General: Screening Not Indicated      Lung Cancer Screening:     General: Screening Not Indicated    Screening, Brief Intervention, and Referral to Treatment (SBIRT)    Screening  Typical number of drinks in a day: 0  Typical number of drinks in a week: 3  Interpretation: Low risk drinking behavior. Single Item Drug Screening:  How often have you used an illegal drug (including marijuana) or a prescription medication for non-medical reasons in the past year? never    Single Item Drug Screen Score: 0  Interpretation: Negative screen for possible drug use disorder    Other Counseling Topics:   Car/seat belt/driving safety, skin self-exam, sunscreen and regular weightbearing exercise and calcium and vitamin D intake. No results found. Physical Exam:     /92 (BP Location: Left arm, Patient Position: Sitting, Cuff Size: Large)   Pulse 80   Temp 97.9 °F (36.6 °C)   Ht 5' 5" (1.651 m)   Wt 86.9 kg (191 lb 8 oz)   SpO2 96%   BMI 31.87 kg/m²     Physical Exam  Vitals and nursing note reviewed. Constitutional:       General: She is not in acute distress. Appearance: Normal appearance. She is well-developed. HENT:      Head: Normocephalic and atraumatic. Eyes:      Extraocular Movements: Extraocular movements intact. Conjunctiva/sclera: Conjunctivae normal.      Pupils: Pupils are equal, round, and reactive to light. Cardiovascular:      Rate and Rhythm: Normal rate and regular rhythm. Pulmonary:      Effort: Pulmonary effort is normal.      Breath sounds: Normal breath sounds. Abdominal:      General: Bowel sounds are normal.      Palpations: Abdomen is soft. Musculoskeletal:         General: Normal range of motion. Cervical back: Normal range of motion. Skin:     General: Skin is warm and dry. Neurological:      General: No focal deficit present. Mental Status: She is alert and oriented to person, place, and time.    Psychiatric:         Mood and Affect: Mood normal.         Speech: Speech normal.         Behavior: Behavior normal.          Luis Schaeffer MD

## 2023-07-12 NOTE — ASSESSMENT & PLAN NOTE
Increase in A-fib episodes. She does have an upcoming appointment with cardiology and EP.   Could be contributing to MENON

## 2023-07-12 NOTE — ASSESSMENT & PLAN NOTE
Cardiologist recommended pulmonology. Obtain pulmonary function testing. Secondhand smoke exposure along with toluene.

## 2023-07-12 NOTE — PATIENT INSTRUCTIONS
Medicare Preventive Visit Patient Instructions  Thank you for completing your Welcome to Medicare Visit or Medicare Annual Wellness Visit today. Your next wellness visit will be due in one year (7/12/2024). The screening/preventive services that you may require over the next 5-10 years are detailed below. Some tests may not apply to you based off risk factors and/or age. Screening tests ordered at today's visit but not completed yet may show as past due. Also, please note that scanned in results may not display below. Preventive Screenings:  Service Recommendations Previous Testing/Comments   Colorectal Cancer Screening  * Colonoscopy    * Fecal Occult Blood Test (FOBT)/Fecal Immunochemical Test (FIT)  * Fecal DNA/Cologuard Test  * Flexible Sigmoidoscopy Age: 43-73 years old   Colonoscopy: every 10 years (may be performed more frequently if at higher risk)  OR  FOBT/FIT: every 1 year  OR  Cologuard: every 3 years  OR  Sigmoidoscopy: every 5 years  Screening may be recommended earlier than age 39 if at higher risk for colorectal cancer. Also, an individualized decision between you and your healthcare provider will decide whether screening between the ages of 77-80 would be appropriate. Colonoscopy: 10/13/2009  FOBT/FIT: Not on file  Cologuard: Not on file  Sigmoidoscopy: Not on file          Breast Cancer Screening Age: 36 years old  Frequency: every 1-2 years  Not required if history of left and right mastectomy Mammogram: 09/01/2021    Screening Current   Cervical Cancer Screening Between the ages of 21-29, pap smear recommended once every 3 years. Between the ages of 32-69, can perform pap smear with HPV co-testing every 5 years.    Recommendations may differ for women with a history of total hysterectomy, cervical cancer, or abnormal pap smears in past. Pap Smear: Not on file    Screening Not Indicated   Hepatitis C Screening Once for adults born between 1945 and 1965  More frequently in patients at high risk for Hepatitis C Hep C Antibody: Not on file        Diabetes Screening 1-2 times per year if you're at risk for diabetes or have pre-diabetes Fasting glucose: 79 mg/dL (4/12/2023)  A1C: 5.5 % (4/12/2023)  Screening Current   Cholesterol Screening Once every 5 years if you don't have a lipid disorder. May order more often based on risk factors. Lipid panel: 04/12/2023    Screening Not Indicated  History Lipid Disorder     Other Preventive Screenings Covered by Medicare:  1. Abdominal Aortic Aneurysm (AAA) Screening: covered once if your at risk. You're considered to be at risk if you have a family history of AAA. 2. Lung Cancer Screening: covers low dose CT scan once per year if you meet all of the following conditions: (1) Age 48-67; (2) No signs or symptoms of lung cancer; (3) Current smoker or have quit smoking within the last 15 years; (4) You have a tobacco smoking history of at least 20 pack years (packs per day multiplied by number of years you smoked); (5) You get a written order from a healthcare provider. 3. Glaucoma Screening: covered annually if you're considered high risk: (1) You have diabetes OR (2) Family history of glaucoma OR (3)  aged 48 and older OR (3)  American aged 72 and older  3. Osteoporosis Screening: covered every 2 years if you meet one of the following conditions: (1) You're estrogen deficient and at risk for osteoporosis based off medical history and other findings; (2) Have a vertebral abnormality; (3) On glucocorticoid therapy for more than 3 months; (4) Have primary hyperparathyroidism; (5) On osteoporosis medications and need to assess response to drug therapy. · Last bone density test (DXA Scan): 03/23/2016.  5. HIV Screening: covered annually if you're between the age of 15-65. Also covered annually if you are younger than 13 and older than 72 with risk factors for HIV infection.  For pregnant patients, it is covered up to 3 times per pregnancy. Immunizations:  Immunization Recommendations   Influenza Vaccine Annual influenza vaccination during flu season is recommended for all persons aged >= 6 months who do not have contraindications   Pneumococcal Vaccine   * Pneumococcal conjugate vaccine = PCV13 (Prevnar 13), PCV15 (Vaxneuvance), PCV20 (Prevnar 20)  * Pneumococcal polysaccharide vaccine = PPSV23 (Pneumovax) Adults 20-63 years old: 1-3 doses may be recommended based on certain risk factors  Adults 72 years old: 1-2 doses may be recommended based off what pneumonia vaccine you previously received   Hepatitis B Vaccine 3 dose series if at intermediate or high risk (ex: diabetes, end stage renal disease, liver disease)   Tetanus (Td) Vaccine - COST NOT COVERED BY MEDICARE PART B Following completion of primary series, a booster dose should be given every 10 years to maintain immunity against tetanus. Td may also be given as tetanus wound prophylaxis. Tdap Vaccine - COST NOT COVERED BY MEDICARE PART B Recommended at least once for all adults. For pregnant patients, recommended with each pregnancy. Shingles Vaccine (Shingrix) - COST NOT COVERED BY MEDICARE PART B  2 shot series recommended in those aged 48 and above     Health Maintenance Due:  There are no preventive care reminders to display for this patient. Immunizations Due:      Topic Date Due   • Pneumococcal Vaccine: 65+ Years (2 - PCV) 11/05/2015   • Influenza Vaccine (1) 09/01/2023     Advance Directives   What are advance directives? Advance directives are legal documents that state your wishes and plans for medical care. These plans are made ahead of time in case you lose your ability to make decisions for yourself. Advance directives can apply to any medical decision, such as the treatments you want, and if you want to donate organs. What are the types of advance directives? There are many types of advance directives, and each state has rules about how to use them.  You may choose a combination of any of the following:  · Living will: This is a written record of the treatment you want. You can also choose which treatments you do not want, which to limit, and which to stop at a certain time. This includes surgery, medicine, IV fluid, and tube feedings. · Durable power of  for healthcare [facility]): This is a written record that states who you want to make healthcare choices for you when you are unable to make them for yourself. This person, called a proxy, is usually a family member or a friend. You may choose more than 1 proxy. · Do not resuscitate (DNR) order:  A DNR order is used in case your heart stops beating or you stop breathing. It is a request not to have certain forms of treatment, such as CPR. A DNR order may be included in other types of advance directives. · Medical directive: This covers the care that you want if you are in a coma, near death, or unable to make decisions for yourself. You can list the treatments you want for each condition. Treatment may include pain medicine, surgery, blood transfusions, dialysis, IV or tube feedings, and a ventilator (breathing machine). · Values history: This document has questions about your views, beliefs, and how you feel and think about life. This information can help others choose the care that you would choose. Why are advance directives important? An advance directive helps you control your care. Although spoken wishes may be used, it is better to have your wishes written down. Spoken wishes can be misunderstood, or not followed. Treatments may be given even if you do not want them. An advance directive may make it easier for your family to make difficult choices about your care. Urinary Incontinence   Urinary incontinence (UI)  is when you lose control of your bladder. UI develops because your bladder cannot store or empty urine properly.  The 3 most common types of UI are stress incontinence, urge incontinence, or both. Medicines:   · May be given to help strengthen your bladder control. Report any side effects of medication to your healthcare provider. Do pelvic muscle exercises often:  Your pelvic muscles help you stop urinating. Squeeze these muscles tight for 5 seconds, then relax for 5 seconds. Gradually work up to squeezing for 10 seconds. Do 3 sets of 15 repetitions a day, or as directed. This will help strengthen your pelvic muscles and improve bladder control. Train your bladder:  Go to the bathroom at set times, such as every 2 hours, even if you do not feel the urge to go. You can also try to hold your urine when you feel the urge to go. For example, hold your urine for 5 minutes when you feel the urge to go. As that becomes easier, hold your urine for 10 minutes. Self-care:   · Keep a UI record. Write down how often you leak urine and how much you leak. Make a note of what you were doing when you leaked urine. · Drink liquids as directed. You may need to limit the amount of liquid you drink to help control your urine leakage. Do not drink any liquid right before you go to bed. Limit or do not have drinks that contain caffeine or alcohol. · Prevent constipation. Eat a variety of high-fiber foods. Good examples are high-fiber cereals, beans, vegetables, and whole-grain breads. Walking is the best way to trigger your intestines to have a bowel movement. · Exercise regularly and maintain a healthy weight. Weight loss and exercise will decrease pressure on your bladder and help you control your leakage. · Use a catheter as directed  to help empty your bladder. A catheter is a tiny, plastic tube that is put into your bladder to drain your urine. · Go to behavior therapy as directed. Behavior therapy may be used to help you learn to control your urge to urinate.     Weight Management   Why it is important to manage your weight:  Being overweight increases your risk of health conditions such as heart disease, high blood pressure, type 2 diabetes, and certain types of cancer. It can also increase your risk for osteoarthritis, sleep apnea, and other respiratory problems. Aim for a slow, steady weight loss. Even a small amount of weight loss can lower your risk of health problems. How to lose weight safely:  A safe and healthy way to lose weight is to eat fewer calories and get regular exercise. You can lose up about 1 pound a week by decreasing the number of calories you eat by 500 calories each day. Healthy meal plan for weight management:  A healthy meal plan includes a variety of foods, contains fewer calories, and helps you stay healthy. A healthy meal plan includes the following:  · Eat whole-grain foods more often. A healthy meal plan should contain fiber. Fiber is the part of grains, fruits, and vegetables that is not broken down by your body. Whole-grain foods are healthy and provide extra fiber in your diet. Some examples of whole-grain foods are whole-wheat breads and pastas, oatmeal, brown rice, and bulgur. · Eat a variety of vegetables every day. Include dark, leafy greens such as spinach, kale, mario greens, and mustard greens. Eat yellow and orange vegetables such as carrots, sweet potatoes, and winter squash. · Eat a variety of fruits every day. Choose fresh or canned fruit (canned in its own juice or light syrup) instead of juice. Fruit juice has very little or no fiber. · Eat low-fat dairy foods. Drink fat-free (skim) milk or 1% milk. Eat fat-free yogurt and low-fat cottage cheese. Try low-fat cheeses such as mozzarella and other reduced-fat cheeses. · Choose meat and other protein foods that are low in fat. Choose beans or other legumes such as split peas or lentils. Choose fish, skinless poultry (chicken or turkey), or lean cuts of red meat (beef or pork). Before you cook meat or poultry, cut off any visible fat. · Use less fat and oil.   Try baking foods instead of frying them. Add less fat, such as margarine, sour cream, regular salad dressing and mayonnaise to foods. Eat fewer high-fat foods. Some examples of high-fat foods include french fries, doughnuts, ice cream, and cakes. · Eat fewer sweets. Limit foods and drinks that are high in sugar. This includes candy, cookies, regular soda, and sweetened drinks. Exercise:  Exercise at least 30 minutes per day on most days of the week. Some examples of exercise include walking, biking, dancing, and swimming. You can also fit in more physical activity by taking the stairs instead of the elevator or parking farther away from stores. Ask your healthcare provider about the best exercise plan for you. © Copyright [employer] 2018 Information is for End User's use only and may not be sold, redistributed or otherwise used for commercial purposes.  All illustrations and images included in CareNotes® are the copyrighted property of [employer] or [address] [place] [address]

## 2023-07-12 NOTE — ASSESSMENT & PLAN NOTE
Arterial duplex completed 2018 minimal amount of plaque bilateral lower extremities without stenosis and normal CHAD. Continues to have leg swelling and pain with walking. DP/PT pulses are 2+. Initially thought swelling was due to amlodipine but that was discontinued swelling remains. Area of concern under her right knee was thought to be a lipoma but this is started to change in size and causes pain.   Obtain arterial duplex and venous duplex

## 2023-07-21 ENCOUNTER — HOSPITAL ENCOUNTER (OUTPATIENT)
Dept: PULMONOLOGY | Facility: HOSPITAL | Age: 83
End: 2023-07-21
Attending: FAMILY MEDICINE
Payer: COMMERCIAL

## 2023-07-21 DIAGNOSIS — R06.09 DOE (DYSPNEA ON EXERTION): ICD-10-CM

## 2023-07-21 DIAGNOSIS — R09.89 PURSED-LIP BREATHING: ICD-10-CM

## 2023-07-21 PROCEDURE — 94726 PLETHYSMOGRAPHY LUNG VOLUMES: CPT | Performed by: INTERNAL MEDICINE

## 2023-07-21 PROCEDURE — 94760 N-INVAS EAR/PLS OXIMETRY 1: CPT

## 2023-07-21 PROCEDURE — 94729 DIFFUSING CAPACITY: CPT | Performed by: INTERNAL MEDICINE

## 2023-07-21 PROCEDURE — 94729 DIFFUSING CAPACITY: CPT

## 2023-07-21 PROCEDURE — 94010 BREATHING CAPACITY TEST: CPT

## 2023-07-21 PROCEDURE — 94726 PLETHYSMOGRAPHY LUNG VOLUMES: CPT

## 2023-07-21 PROCEDURE — 94010 BREATHING CAPACITY TEST: CPT | Performed by: INTERNAL MEDICINE

## 2023-08-04 ENCOUNTER — HOSPITAL ENCOUNTER (OUTPATIENT)
Dept: RADIOLOGY | Facility: MEDICAL CENTER | Age: 83
Discharge: HOME | End: 2023-08-04
Payer: COMMERCIAL

## 2023-08-04 DIAGNOSIS — I73.9 PAD (PERIPHERAL ARTERY DISEASE) (HCC): ICD-10-CM

## 2023-08-04 DIAGNOSIS — I83.893 VARICOSE VEINS OF BOTH LEGS WITH EDEMA: ICD-10-CM

## 2023-08-04 PROCEDURE — 93970 EXTREMITY STUDY: CPT

## 2023-08-04 PROCEDURE — 93923 UPR/LXTR ART STDY 3+ LVLS: CPT

## 2023-08-04 PROCEDURE — 93925 LOWER EXTREMITY STUDY: CPT

## 2023-08-05 PROCEDURE — 93922 UPR/L XTREMITY ART 2 LEVELS: CPT | Performed by: SURGERY

## 2023-08-05 PROCEDURE — 93925 LOWER EXTREMITY STUDY: CPT | Performed by: SURGERY

## 2023-08-05 PROCEDURE — 93970 EXTREMITY STUDY: CPT | Performed by: SURGERY

## 2023-08-23 ENCOUNTER — PROCEDURE VISIT (OUTPATIENT)
Dept: OBGYN CLINIC | Facility: MEDICAL CENTER | Age: 83
End: 2023-08-23
Payer: COMMERCIAL

## 2023-08-23 VITALS — SYSTOLIC BLOOD PRESSURE: 123 MMHG | DIASTOLIC BLOOD PRESSURE: 80 MMHG | HEART RATE: 68 BPM

## 2023-08-23 DIAGNOSIS — M17.0 BILATERAL PRIMARY OSTEOARTHRITIS OF KNEE: Primary | ICD-10-CM

## 2023-08-23 PROCEDURE — 20610 DRAIN/INJ JOINT/BURSA W/O US: CPT | Performed by: PHYSICAL MEDICINE & REHABILITATION

## 2023-08-23 NOTE — PROGRESS NOTES
1. Bilateral primary osteoarthritis of knee  Large joint arthrocentesis: bilateral knee        Orders Placed This Encounter   Procedures   • Large joint arthrocentesis: bilateral knee        Impression:  Patient is here in follow up of chronic bilateral knee pain likely secondary to osteoarthritis (predominantly patellofemoral osteoarthritis) as below.  Patient was previously treated in [facility] and her treatment included hyaluronic acid injections, platelet rich plasma injections and Baker's cyst aspiration.    She previously had bilateral knee steroid injections along with PT.       Her left knee is doing well. Today, we proceeded with Synvisc 1 viscosupplementation.     Patient also presents with chronic right shoulder pain.  This is likely secondary to rotator cuff syndrome. She has done well with physical therapy.  Continue HEP.    Imaging Studies (I personally reviewed images in PACS and report):  Bilateral knee x-rays most recent to this encounter reviewed.  These images show moderate tricompartmental osteoarthritic changes which mostly affects the patellofemoral joints.  There are no acute osseous abnormalities. No follow-ups on file. Patient is in agreement with the above plan. HPI:  Garrett Jay is a 82 y.o. female  who presents in follow up. Here for   Chief Complaint   Patient presents with   • Right Knee - Injections   • Left Knee - Injections       Since last visit: See above. Following history reviewed and updated:  Past Medical History:   Diagnosis Date   • Allergic [address]    [address] tin   • Arthritis 2008   • Benign essential hypertension    • Hyperlipidemia, unspecified    • Hypertension 2016   • Osteoarthritis of left knee    • Perichondritis of ear, right      Past Surgical History:   Procedure Laterality Date   • CARDIAC CATHETERIZATION Left 7/6/2022    Procedure: Cardiac Left Heart Cath;  Surgeon: Leigh Munoz MD;  Location: [facility];   Service: Cardiology   • COLON SURGERY  2003    Rectoseal    • LAPAROSCOPIC OVARIAN CYSTECTOMY Right 1981   • LAPAROSCOPIC OVARIAN CYSTECTOMY Left 2003    rectoseal and cystoseal repari   • TONSILLECTOMY AND ADENOIDECTOMY  1952   • TUBAL LIGATION  1972     Social History   Social History     Substance and Sexual Activity   Alcohol Use Yes   • Alcohol/week: 5.0 standard drinks of alcohol   • Types: 5 Glasses of wine per week    Comment: 1 glass nightly     Social History     Substance and Sexual Activity   Drug Use Never     Social History     Tobacco Use   Smoking Status Never   Smokeless Tobacco Never     Family History   Problem Relation Age of Onset   • Hyperlipidemia Mother    • Heart Valve Disease Mother    • Hypertension Mother    • Arthritis Father    • Diabetes Father    • Alcohol abuse Father    • Prostate cancer Father    • Diabetes Brother    • Atrial fibrillation Brother    • Alcohol abuse Brother    • Arthritis Brother    • Pancreatic cancer Maternal Aunt 65   • No Known Problems Paternal Uncle    • No Known Problems Paternal Uncle    • No Known Problems Paternal Uncle    • No Known Problems Paternal Uncle    • Hypertension Daughter    • Bradycardia Son    • No Known Problems Son      Allergies   Allergen Reactions   • Nitrofurantoin Nausea Only, Dizziness and Headache     Macrodantin        Constitutional:  /80   Pulse 68    General: NAD. Eyes: Clear sclerae. ENT: No inflammation, lesion, or mass of lips. No tracheal deviation. Musculoskeletal: As mentioned below. Integumentary: No visible rashes or skin lesions. Pulmonary/Chest: Effort normal. No respiratory distress. Neuro: CN's grossly intact, CUTLER. Psych: Normal affect and judgement. Vascular: WWP. Right Knee Exam     Muscle Strength   The patient has normal right knee strength. Tenderness   The patient is experiencing tenderness in the medial joint line.     Other   Erythema: absent      Left Knee Exam     Muscle Strength   The patient has normal left knee strength. Tenderness   The patient is experiencing tenderness in the medial joint line. Other   Erythema: absent             Large joint arthrocentesis: bilateral knee  Universal Protocol:  Consent: Verbal consent obtained. Written consent not obtained. Risks and benefits: risks, benefits and alternatives were discussed  Consent given by: patient  Timeout called at: 8/23/2023 11:08 AM.  Patient understanding: patient states understanding of the procedure being performed  Site marked: the operative site was marked  Radiology Images displayed and confirmed. If images not available, report reviewed: imaging studies available  Patient identity confirmed: verbally with patient    Supporting Documentation  Indications: pain   Procedure Details  Location: knee - bilateral knee  Needle size: 22 G  Ultrasound guidance: no  Approach: Inferolateral to patella. Medications (Right): 48 mg hylan 48 MG/6MLMedications (Left): 48 mg hylan 48 MG/6ML   Patient tolerance: patient tolerated the procedure well with no immediate complications  Dressing:  Sterile dressing applied    Risks of this procedure include:    - Risk of bleeding since a needle is involved. - Risk of infection (1/10,000 chance as per recent studies). Signs/symptoms were discussed and they would prompt an urgent evaluation at an emergency department.  - Risk of synovitis from the viscosupplementation. The benefits outweigh the risks and so we proceeded with the procedure.

## 2023-08-30 ENCOUNTER — OFFICE VISIT (OUTPATIENT)
Dept: CARDIOLOGY CLINIC | Facility: CLINIC | Age: 83
End: 2023-08-30
Payer: COMMERCIAL

## 2023-08-30 VITALS
HEART RATE: 71 BPM | OXYGEN SATURATION: 96 % | DIASTOLIC BLOOD PRESSURE: 84 MMHG | WEIGHT: 191.2 LBS | BODY MASS INDEX: 31.86 KG/M2 | HEIGHT: 65 IN | SYSTOLIC BLOOD PRESSURE: 126 MMHG

## 2023-08-30 DIAGNOSIS — I10 ESSENTIAL HYPERTENSION: ICD-10-CM

## 2023-08-30 DIAGNOSIS — E78.00 PURE HYPERCHOLESTEROLEMIA: ICD-10-CM

## 2023-08-30 DIAGNOSIS — I25.10 CORONARY ARTERY DISEASE INVOLVING NATIVE CORONARY ARTERY OF NATIVE HEART WITHOUT ANGINA PECTORIS: ICD-10-CM

## 2023-08-30 DIAGNOSIS — I48.0 PAROXYSMAL ATRIAL FIBRILLATION (HCC): Primary | ICD-10-CM

## 2023-08-30 PROCEDURE — 99214 OFFICE O/P EST MOD 30 MIN: CPT | Performed by: INTERNAL MEDICINE

## 2023-08-30 PROCEDURE — 93000 ELECTROCARDIOGRAM COMPLETE: CPT | Performed by: INTERNAL MEDICINE

## 2023-08-30 NOTE — PROGRESS NOTES
Cardiology Consultation     Krystal Leiva  [id number]  1940  [address] [facility] [address]    1. Paroxysmal atrial fibrillation (HCC)  POCT ECG      2. Essential hypertension        3. Coronary artery disease involving native coronary artery of native heart without angina pectoris        4. Pure hypercholesterolemia          Chief Complaint   Patient presents with   • Follow-up     Experiencing afib episodes more frequently    • Palpitations   • Shortness of Breath     On exertion    • Dizziness   • Fatigue     HPI: Patient has noted more frequent episodes of afib occurring more frequently than in past, continues to be symptomatic with light palpitations. She has shortness of breath with exertion, separate from afib. She also has dizziness separate from these symptoms as well as fatigue. Now occurs every 3 weeks. She is under a lot of stress. Most episodes are at night, lasting 5-6 hours. Now episodes are 2-3 hours long, some during the day as well as at night. Feels them coming on. No reported chest pain, syncope, LE edema, orthopnea, PND, or significant weight changes.     Patient Active Problem List   Diagnosis   • Essential hypertension   • Chronic left shoulder pain   • MENON (dyspnea on exertion)   • Lichen sclerosus   • Lipoma of right lower extremity   • PAD (peripheral artery disease) (HCC)   • Pure hypercholesterolemia   • Abnormal stress test   • Paroxysmal atrial fibrillation (HCC)   • Coronary artery disease involving native coronary artery of native heart without angina pectoris   • Class 1 obesity due to excess calories without serious comorbidity with body mass index (BMI) of 30.0 to 30.9 in adult   • Acute nonintractable headache   • Baker cyst, left   • Chronic pain of left knee   • Bilateral primary osteoarthritis of knee     Past Medical History:   Diagnosis Date • Allergic 1980    [address] tin   • Arthritis 2008   • Benign essential hypertension    • Hyperlipidemia, unspecified    • Hypertension 2016   • Osteoarthritis of left knee    • Perichondritis of ear, right      Social History     Socioeconomic History   • Marital status: /Civil Union     Spouse name: Not on file   • Number of children: Not on file   • Years of education: Not on file   • Highest education level: Not on file   Occupational History   • Not on file   Tobacco Use   • Smoking status: Never   • Smokeless tobacco: Never   Vaping Use   • Vaping Use: Never used   Substance and Sexual Activity   • Alcohol use:  Yes     Alcohol/week: 5.0 standard drinks of alcohol     Types: 5 Glasses of wine per week     Comment: 1 glass nightly   • Drug use: Never   • Sexual activity: Not Currently     Partners: Male   Other Topics Concern   • Not on file   Social History Narrative   • Not on file     Social Determinants of Health     Financial Resource Strain: Not on file   Food Insecurity: Not on file   Transportation Needs: Not on file   Physical Activity: Not on file   Stress: Not on file   Social Connections: Not on file   Intimate Partner Violence: Not on file   Housing Stability: Not on file      Family History   Problem Relation Age of Onset   • Hyperlipidemia Mother    • Heart Valve Disease Mother    • Hypertension Mother    • Arthritis Father    • Diabetes Father    • Alcohol abuse Father    • Prostate cancer Father    • Diabetes Brother    • Atrial fibrillation Brother    • Alcohol abuse Brother    • Arthritis Brother    • Pancreatic cancer Maternal Aunt 72   • No Known Problems Paternal Uncle    • No Known Problems Paternal Uncle    • No Known Problems Paternal Uncle    • No Known Problems Paternal Uncle    • Hypertension Daughter    • Bradycardia Son    • No Known Problems Son      Past Surgical History:   Procedure Laterality Date   • CARDIAC CATHETERIZATION Left 7/6/2022    Procedure: Cardiac Left Heart Cath;  Surgeon: Jung Ojeda MD;  Location:  CARDIAC CATH LAB;   Service: Cardiology   • COLON SURGERY  2003    Rectoseal    • LAPAROSCOPIC OVARIAN CYSTECTOMY Right 1981   • LAPAROSCOPIC OVARIAN CYSTECTOMY Left 2003    rectoseal and cystoseal repari   • TONSILLECTOMY AND ADENOIDECTOMY  1952   • TUBAL LIGATION  1972       Current Outpatient Medications:   •  cholecalciferol (VITAMIN D3) 1,000 units tablet, Take 1,000 Units by mouth daily, Disp: , Rfl:   •  clobetasol (TEMOVATE) 0.05 % ointment, Apply topically as needed , Disp: , Rfl:   •  docusate sodium (COLACE) 100 mg capsule, Take 100 mg by mouth daily, Disp: , Rfl:   •  metoprolol succinate (TOPROL-XL) 25 mg 24 hr tablet, TAKE 2 AND 1/2 TABLETS BY MOUTH EVERY DAY TAKE 25MG IN THE MORNING AND TAKE 37.5MG IN THE EVENING (Patient taking differently: 25 mg every 12 (twelve) hours), Disp: 75 tablet, Rfl: 2  •  Xarelto 20 MG tablet, TAKE 1 TABLET BY MOUTH EVERY DAY WITH BREAKFAST, Disp: 30 tablet, Rfl: 5  Allergies   Allergen Reactions   • Nitrofurantoin Nausea Only, Dizziness and Headache     Macrodantin     Vitals:    08/30/23 0834   BP: 126/84   BP Location: Left arm   Patient Position: Sitting   Cuff Size: Standard   Pulse: 71   SpO2: 96%   Weight: 86.7 kg (191 lb 3.2 oz)   Height: 5' 5" (1.651 m)       Labs:  Appointment on 04/12/2023   Component Date Value   • 25-HYDROXY VIT D 04/12/2023 30    • 25-Hydroxy D2 04/12/2023 <1.0    • 25-HYDROXY VIT D3 04/12/2023 29    Office Visit on 03/29/2023   Component Date Value   • Cholesterol 04/12/2023 199    • Triglycerides 04/12/2023 75    • HDL, Direct 04/12/2023 74    • LDL Calculated 04/12/2023 110 (H)    • Non-HDL-Chol (CHOL-HDL) 04/12/2023 125    • WBC 04/12/2023 3.61 (L)    • RBC 04/12/2023 4.83    • Hemoglobin 04/12/2023 14.7    • Hematocrit 04/12/2023 45.5    • MCV 04/12/2023 94    • MCH 04/12/2023 30.4    • MCHC 04/12/2023 32.3    • RDW 04/12/2023 13.9    • MPV 04/12/2023 10.4    • Platelets 66/26/9703 171 • nRBC 04/12/2023 0    • Neutrophils Relative 04/12/2023 49    • Immat GRANS % 04/12/2023 0    • Lymphocytes Relative 04/12/2023 39    • Monocytes Relative 04/12/2023 9    • Eosinophils Relative 04/12/2023 3    • Basophils Relative 04/12/2023 0    • Neutrophils Absolute 04/12/2023 1.74 (L)    • Immature Grans Absolute 04/12/2023 0.00    • Lymphocytes Absolute 04/12/2023 1.42    • Monocytes Absolute 04/12/2023 0.33    • Eosinophils Absolute 04/12/2023 0.11    • Basophils Absolute 04/12/2023 0.01    • TSH 3RD GENERATON 04/12/2023 1.320    • Sodium 04/12/2023 138    • Potassium 04/12/2023 3.9    • Chloride 04/12/2023 109 (H)    • CO2 04/12/2023 28    • ANION GAP 04/12/2023 1 (L)    • BUN 04/12/2023 20    • Creatinine 04/12/2023 0.74    • Glucose, Fasting 04/12/2023 79    • Calcium 04/12/2023 9.1    • Corrected Calcium 04/12/2023 9.7    • AST 04/12/2023 18    • ALT 04/12/2023 23    • Alkaline Phosphatase 04/12/2023 63    • Total Protein 04/12/2023 6.4    • Albumin 04/12/2023 3.3 (L)    • Total Bilirubin 04/12/2023 0.58    • eGFR 04/12/2023 76    • Hemoglobin A1C 04/12/2023 5.5    • EAG 04/12/2023 111      Lab Results   Component Value Date    TRIG 75 04/12/2023    HDL 74 04/12/2023     Imaging: No results found. Review of Systems:  Review of Systems   Constitutional: Positive for fatigue. Negative for activity change, appetite change, chills, diaphoresis and unexpected weight change. HENT: Negative for hearing loss, nosebleeds and sore throat. Eyes: Negative for photophobia and visual disturbance. Respiratory: Positive for shortness of breath. Negative for cough, chest tightness and wheezing. Cardiovascular: Positive for palpitations. Negative for chest pain and leg swelling. Gastrointestinal: Negative for abdominal pain, diarrhea, nausea and vomiting. Endocrine: Negative for polyuria. Genitourinary: Negative for dysuria, frequency and hematuria. Musculoskeletal: Positive for arthralgias.  Negative for back pain, gait problem and neck pain. Skin: Negative for pallor and rash. Neurological: Positive for dizziness. Negative for syncope and headaches. Hematological: Does not bruise/bleed easily. Psychiatric/Behavioral: Negative for behavioral problems and confusion. Physical Exam:  Physical Exam  Vitals reviewed. Constitutional:       Appearance: She is well-developed. She is not diaphoretic. HENT:      Head: Normocephalic and atraumatic. Nose: Nose normal.   Eyes:      General: No scleral icterus. Pupils: Pupils are equal, round, and reactive to light. Neck:      Vascular: No JVD. Cardiovascular:      Rate and Rhythm: Normal rate and regular rhythm. Heart sounds: Normal heart sounds. No murmur heard. No friction rub. No gallop. Pulmonary:      Effort: Pulmonary effort is normal. No respiratory distress. Breath sounds: Normal breath sounds. No wheezing or rales. Abdominal:      General: Bowel sounds are normal. There is no distension. Palpations: Abdomen is soft. Tenderness: There is no abdominal tenderness. Musculoskeletal:         General: No deformity. Normal range of motion. Cervical back: Normal range of motion and neck supple. Skin:     General: Skin is warm and dry. Findings: No rash. Neurological:      Mental Status: She is alert and oriented to person, place, and time. Cranial Nerves: No cranial nerve deficit. Psychiatric:         Behavior: Behavior normal.       Blood pressure 126/84, pulse 71, height 5' 5" (1.651 m), weight 86.7 kg (191 lb 3.2 oz), SpO2 96 %, not currently breastfeeding. EKG:  Normal sinus rhythm   Normal ECG    Discussion/Summary:  HTN: remains well controlled today, continued on metoprolol. Had a nuclear stress test in Oct 2020 that was normal for EF and perfusion.   Echocardiogram at that time (and on repeat in May 2022) revealed normal LV & RV function, normal valves other than mild to moderate MR, moderate TR. She has had MENON for many years - also noted by prior cardiologist documentation in 2017. No changes made today. PAF/mild CAD: noted on stress testing and LHC revealed 10% pRCA lesion. Continued on Xarelto for [facility] [place], operated by [facility] and Toprol XL for rate control, dose of which has been increased for improved rate control to 25mg BID. Now appears to be having more frequent episodes of afib that are symptomatic and will be seeing EPS in consult for discussions regarding rhythm control (antiarrhythmic vs ablation therapy). HLD:  in Nov 2020, which is at goal - repeat levels in Oct 2021 revealed LDL of 113. LDL 95 in June 2022. Repeat  in April 2023.

## 2023-09-11 DIAGNOSIS — I48.0 PAROXYSMAL ATRIAL FIBRILLATION (HCC): ICD-10-CM

## 2023-09-11 RX ORDER — METOPROLOL SUCCINATE 25 MG/1
TABLET, EXTENDED RELEASE ORAL
Qty: 75 TABLET | Refills: 2 | Status: SHIPPED | OUTPATIENT
Start: 2023-09-11

## 2023-09-11 NOTE — PROGRESS NOTES
Consultation - Electrophysiology-Cardiology (EP)   Janice Moyer 82 y.o. female MRN: [id number]  Unit/Bed#:  Encounter: [id number]      9. Paroxysmal atrial fibrillation [facility])  Ambulatory referral to Cardiac Electrophysiology    POCT ECG      2. Essential hypertension        3. PAD (peripheral artery disease) ([address])        4. Coronary artery disease involving native coronary artery of native heart without angina pectoris        5. Pure hypercholesterolemia        6.  Class 1 obesity due to excess calories without serious comorbidity with body mass index (BMI) of 30.0 to 30.9 in adult              Consults  Physician Requesting Consult: Nevaeh Sanchez, [address] [place] [address]   Reason for Consult / Principal Problem: Paroxysmal Atrial Fibrillation         Summary of my recommendation for the patient  Patient is having episodes of atrial fibrillation with RVR-progressively increasing-most of the episodes are at night and last from 5 to 6 hours    Risk factors-age 82, BMI 31.3, hypertension    Her  is very sick, recently went into hospice, not expected to live more than 1 to 2 months  She cannot be admitted in the next 1 to 2 months    Continue with metoprolol and Xarelto    Use flecainide as pill in the pocket for the next 2 to 3 months-if palpitation more than 3 to 5 minutes flecainide 150 mg-if palpitation continue for 30 minutes despite the first dose of flecainide-additional 150 mg of flecainide-do not exceed 300 mg in a day    Price sotalol 160 mg twice daily and dofetilide 500 mcg twice daily  In about 3 months patient will need hospital admission to initiate this medication  Dofetilide is preferable if covered by insurance as it does not cause bradycardia    Follow-up with me in 4 months time            Clinical conditions  Paroxysmal atrial fibrillation   Long-term anticoagulation , [id number]= 4 on Xarelto 20mg daily   CAD 7/06/22 [facility] minimal luminal irregularities of LAD, Prox RCA 10% stenosis Hypertension  Hypercholesterolemia 4/12/23 , TG 75, HDL 74,     PAD   Obesity BMI 31.63        Assessment/Plan     Paroxysmal atrial fibrillation   Long-term anticoagulation , [id number]= 4 on Xarelto 20mg daily     Patient is having episodes of atrial fibrillation with RVR-progressively increasing-most of the episodes are at night and last from 5 to 6 hours    Risk factors-age 82, BMI 31.3, hypertension    Her  is very sick, recently went into hospice, not expected to live more than 1 to 2 months  She cannot be admitted in the next 1 to 2 months    Continue with metoprolol and Xarelto    Use flecainide as pill in the pocket for the next 2 to 3 months-if palpitation more than 3 to 5 minutes flecainide 150 mg-if palpitation continue for 30 minutes despite the first dose of flecainide-additional 150 mg of flecainide-do not exceed 300 mg in a day    Price sotalol 160 mg twice daily and dofetilide 500 mcg twice daily  In about 3 months patient will need hospital admission to initiate this medication  Dofetilide is preferable if covered by insurance as it does not cause bradycardia    Follow-up with me in 4 months time            CAD 7/06/22 [facility] minimal luminal irregularities of LAD, Prox RCA 10% stenosis   No ischemic symptoms  Can use flecainide in the interim till patient can be admitted for class III agents      Hypertension  Blood pressure 118/72  Continue with metoprolol      Hypercholesterolemia 4/12/23 , TG 75, HDL 74,         PAD   Currently stable  Exercises with recumbent elliptical, walking      Obesity BMI 31.63  Nutrition evaluation and avoid simple sugars          History of Present Illness   HPI: Janie Leonard is a 82y.o. year old female has been referred to me by Dr Henok Cook /  Serene Reddy for the management of A-fib and RVR    The patient has significant medical illnesses which include  Paroxysmal atrial fibrillation   Long-term anticoagulation , [id number]= 4 on Xarelto 20mg daily   CAD 7/06/22 [facility] minimal luminal irregularities of LAD, Prox RCA 10% stenosis   Hypertension  Hypercholesterolemia 4/12/23 , TG 75, HDL 74,     PAD   Obesity BMI 31.63    Patient is having episodes of atrial fibrillation with RVR-progressively increasing-most of the episodes are at night and last from 5 to 6 hours    Risk factors-age 82, BMI 31.3, hypertension    Her  is very sick, recently went into hospice, not expected to live more than 1 to 2 months  She cannot be admitted in the next 1 to 2 months      Patient is not complaining of angina, orthopnea, PND  When she does have palpitations she feels them and also has some associated shortness of breath  She has occasional exertional intolerance when she is in A-fib  There has been no history of syncope    She does not have any significant history of snoring    Her father and brother used to smoke in the house, she was never a smoker  She has reduced her alcohol intake  She does not use energy drinks    Her  is significantly sick and is in hospice  He is not expected to be alive for more than 2 months  She does not want to be admitted in the hospital in the next 2 months        Historical Information   Past Medical History:   Diagnosis Date   • Allergic [address]    [address]   • Arthritis 2008   • Benign essential hypertension    • Hyperlipidemia, unspecified    • Hypertension 2016   • Osteoarthritis of left knee    • Perichondritis of ear, right      Past Surgical History:   Procedure Laterality Date   • CARDIAC CATHETERIZATION Left 7/6/2022    Procedure: Cardiac Left Heart Cath;  Surgeon: Harrison Wilson MD;  Location: [facility] CARDIAC C[facility];   Service: Cardiology   • COLON SURGERY  2003    Rectoseal    • LAPAROSCOPIC OVARIAN CYSTECTOMY Right 1981   • LAPAROSCOPIC OVARIAN CYSTECTOMY Left 2003    rectoseal and cystoseal repari   • TONSILLECTOMY AND ADENOIDECTOMY  1952   • TUBAL LIGATION  1972     Social History     Substance and Sexual Activity   Alcohol Use Yes   • Alcohol/week: 5.0 standard drinks of alcohol   • Types: 5 Glasses of wine per week    Comment: 1 glass nightly     Social History     Substance and Sexual Activity   Drug Use Never     Social History     Tobacco Use   Smoking Status Never   Smokeless Tobacco Never     Social History     Socioeconomic History   • Marital status: /Civil Union     Spouse name: Not on file   • Number of children: Not on file   • Years of education: Not on file   • Highest education level: Not on file   Occupational History   • Not on file   Tobacco Use   • Smoking status: Never   • Smokeless tobacco: Never   Vaping Use   • Vaping Use: Never used   Substance and Sexual Activity   • Alcohol use: Yes     Alcohol/week: 5.0 standard drinks of alcohol     Types: 5 Glasses of wine per week     Comment: 1 glass nightly   • Drug use: Never   • Sexual activity: Not Currently     Partners: Male   Other Topics Concern   • Not on file   Social History Narrative   • Not on file     Social Determinants of Health     Financial Resource Strain: Not on file   Food Insecurity: Not on file   Transportation Needs: Not on file   Physical Activity: Not on file   Stress: Not on file   Social Connections: Not on file   Intimate Partner Violence: Not on file   Housing Stability: Not on file     .   Family History:  Family History   Problem Relation Age of Onset   • Hyperlipidemia Mother    • Heart Valve Disease Mother    • Hypertension Mother    • Arthritis Father    • Diabetes Father    • Alcohol abuse Father    • Prostate cancer Father    • Diabetes Brother    • Atrial fibrillation Brother    • Alcohol abuse Brother    • Arthritis Brother    • Pancreatic cancer Maternal Aunt 72   • No Known Problems Paternal Uncle    • No Known Problems Paternal Uncle    • No Known Problems Paternal Uncle    • No Known Problems Paternal Uncle    • Hypertension Daughter    • Bradycardia Son    • No Known Problems Son Meds/Allergies      No current facility-administered medications for this visit. (Not in a hospital admission)      Allergies   Allergen Reactions   • Nitrofurantoin Nausea Only, Dizziness and Headache     Macrodantin           Objective   Vitals:   Visit Vitals  /72 (BP Location: Left arm, Patient Position: Sitting, Cuff Size: Standard)   Pulse 76   Ht 5' 5" (1.651 m)   Wt 85.3 kg (188 lb)   SpO2 97%   BMI 31.28 kg/m²   OB Status Postmenopausal   Smoking Status Never   BSA 1.93 m²      Vitals:    09/12/23 0920   Weight: 85.3 kg (188 lb)   [unfilled]    Invasive Devices     None                   ROS  Review of Systems   All other systems reviewed and are negative. As described in my history of present illness        PHYSICAL EXAM  Physical Exam  Vitals reviewed. Constitutional:       General: She is not in acute distress. Appearance: Normal appearance. She is obese. She is not ill-appearing. HENT:      Head: Normocephalic and atraumatic. Right Ear: External ear normal.      Left Ear: External ear normal.      Nose: Nose normal.      Mouth/Throat:      Comments: Posterior pharynx is crowded  Eyes:      General: No scleral icterus. Extraocular Movements: Extraocular movements intact. Conjunctiva/sclera: Conjunctivae normal.      Pupils: Pupils are equal, round, and reactive to light. Cardiovascular:      Rate and Rhythm: Normal rate and regular rhythm. Pulses: Normal pulses. Heart sounds: Normal heart sounds. No murmur heard. Pulmonary:      Effort: Pulmonary effort is normal. No respiratory distress. Breath sounds: Examination of the right-middle field reveals decreased breath sounds. Examination of the left-middle field reveals decreased breath sounds. Examination of the right-lower field reveals decreased breath sounds. Examination of the left-lower field reveals decreased breath sounds. Decreased breath sounds present. No wheezing.    Abdominal: General: Bowel sounds are normal. There is no distension. Palpations: Abdomen is soft. Tenderness: There is no abdominal tenderness. Musculoskeletal:         General: No swelling, tenderness or deformity. Cervical back: No rigidity. Skin:     Coloration: Skin is not jaundiced. Findings: No bruising or lesion. Neurological:      Mental Status: She is alert and oriented to person, place, and time. Mental status is at baseline. Motor: No weakness. Psychiatric:         Mood and Affect: Mood normal.         Behavior: Behavior normal.         Thought Content: Thought content normal.         Judgment: Judgment normal.               LAB RESULTS:      CBC:  Results from Last 12 Months   Lab Units 04/12/23  0806   WBC Thousand/uL 3.61*   HEMOGLOBIN g/dL 14.7   HEMATOCRIT % 45.5   MCV fL 94   PLATELETS Thousands/uL 171   RBC Million/uL 4.83   MCH pg 30.4   MCHC g/dL 32.3   RDW % 13.9   MPV fL 10.4   NRBC AUTO /100 WBCs 0        CMP:  Results from Last 12 Months   Lab Units 04/12/23  0806   POTASSIUM mmol/L 3.9   CHLORIDE mmol/L 109*   CO2 mmol/L 28   BUN mg/dL 20   CREATININE mg/dL 0.74   CALCIUM mg/dL 9.1   AST U/L 18   ALT U/L 23   ALK PHOS U/L 63   EGFR ml/min/1.73sq m 76        Magnesium:   No results for input(s): "MG" in the last 8784 hours. TSH 3rd Gen:  Results from Last 12 Months   Lab Units 04/12/23  0806   TSH 3RD GENERATON uIU/mL 1.320        Lipid Panel:  Results from Last 12 Months   Lab Units 04/12/23  0806   CHOLESTEROL mg/dL 199   TRIGLYCERIDES mg/dL 75   HDL mg/dL 74   NON-HDL-CHOL (CHOL-HDL) mg/dl 125        Imaging:      EKG  08/30/2023          CHRIS  No results found for this or any previous visit.       Echocardiogram  Results for orders placed during the hospital encounter of 05/25/22    Echo complete w/ contrast if indicated    Interpretation Summary  •  Left Ventricle: Left ventricular cavity size is normal. Wall thickness is normal. The left ventricular ejection fraction is 60%. Systolic function is normal. Wall motion is normal.  •  Right Ventricle: Right ventricular cavity size is mildly dilated. •  Left Atrium: The atrium is mildly dilated. •  Right Atrium: The atrium is mildly dilated. •  Mitral Valve: There is mild to moderate regurgitation. •  Tricuspid Valve: There is moderate regurgitation. •  Pulmonic Valve: There is mild regurgitation. No results found for this or any previous visit. Stress Test:   Results for orders placed during the hospital encounter of 05/25/22    NM myocardial perfusion spect (rx stress and/or rest)    Interpretation Summary  •  Stress ECG: No ST deviation is noted. The ECG was not diagnostic due to pharmacological (vasodilator) stress. •  Perfusion: There are no perfusion defects. •  Stress Function: Left ventricular function post-stress is normal. Post-stress ejection fraction is 62 %. •  Stress Combined Conclusion: The ECG and SPECT imaging portions of the stress study are concordant with no evidence of stress induced myocardial ischemia. •  Resting ECG: The ECG shows atrial fibrillation. •  Perfusion Defect Conclusion: There is evidence of transient ischemic dilation (TID). TID was appreciated quantitatively but not visually. No results found for this or any previous visit. Cardiac catheterization:      Cardiac Left Heart Cath  07/06/2022          HOLTER MONITOR: 24 HOUR/48 HOUR MONITORS  No results found for this or any previous visit. AMB extended holter monitor  08/17/2022    Zio XT  08/03/2022 - 08/10/2022                   DEVICE CHECK:       No results found for this or any previous visit.         Code Status: [unfilled]  Advance Directive and Living Will:      Power of :    POLST:      Counseling / Coordination of Care  Very detailed discussion done with regards to management of atrial fibrillation      Maya Marrero MD

## 2023-09-12 ENCOUNTER — CONSULT (OUTPATIENT)
Dept: CARDIOLOGY CLINIC | Facility: CLINIC | Age: 83
End: 2023-09-12
Payer: COMMERCIAL

## 2023-09-12 VITALS
WEIGHT: 188 LBS | DIASTOLIC BLOOD PRESSURE: 72 MMHG | HEART RATE: 76 BPM | OXYGEN SATURATION: 97 % | BODY MASS INDEX: 31.32 KG/M2 | SYSTOLIC BLOOD PRESSURE: 118 MMHG | HEIGHT: 65 IN

## 2023-09-12 DIAGNOSIS — I73.9 PAD (PERIPHERAL ARTERY DISEASE) (HCC): ICD-10-CM

## 2023-09-12 DIAGNOSIS — E66.09 CLASS 1 OBESITY DUE TO EXCESS CALORIES WITHOUT SERIOUS COMORBIDITY WITH BODY MASS INDEX (BMI) OF 30.0 TO 30.9 IN ADULT: ICD-10-CM

## 2023-09-12 DIAGNOSIS — I48.0 PAROXYSMAL ATRIAL FIBRILLATION (HCC): ICD-10-CM

## 2023-09-12 DIAGNOSIS — I10 ESSENTIAL HYPERTENSION: ICD-10-CM

## 2023-09-12 DIAGNOSIS — E78.00 PURE HYPERCHOLESTEROLEMIA: ICD-10-CM

## 2023-09-12 DIAGNOSIS — I25.10 CORONARY ARTERY DISEASE INVOLVING NATIVE CORONARY ARTERY OF NATIVE HEART WITHOUT ANGINA PECTORIS: ICD-10-CM

## 2023-09-12 PROCEDURE — 93000 ELECTROCARDIOGRAM COMPLETE: CPT | Performed by: INTERNAL MEDICINE

## 2023-09-12 PROCEDURE — 99215 OFFICE O/P EST HI 40 MIN: CPT | Performed by: INTERNAL MEDICINE

## 2023-09-12 RX ORDER — FLECAINIDE ACETATE 150 MG/1
150 TABLET ORAL AS NEEDED
Qty: 20 TABLET | Refills: 1 | Status: SHIPPED | OUTPATIENT
Start: 2023-09-12

## 2023-09-12 RX ORDER — VITAMIN B COMPLEX
1 CAPSULE ORAL DAILY
COMMUNITY

## 2023-09-12 NOTE — LETTER
September 12, 2023     Malorie Jewell, [address]  [id number]    Patient: Rudi Rodriguez   YOB: 1940   Date of Visit: 9/12/2023       Dear Dr. Swetha Herndon:    Thank you for referring Rudi Rodriguez to me for evaluation. Below are my notes for this consultation. If you have questions, please do not hesitate to call me. I look forward to following your patient along with you. Sincerely,        Clarke Feng MD        CC: ANN-MARIE Ponce MD  9/12/2023 10:38 AM  Sign when Signing Visit   Consultation - Electrophysiology-Cardiology (EP)   Rudi Rodriguez 82 y.o. female MRN: [id number]  Unit/Bed#:  Encounter: [id number]      1. Paroxysmal atrial fibrillation [facility])  Ambulatory referral to Cardiac Electrophysiology    POCT ECG      2. Essential hypertension        3. PAD (peripheral artery disease) ([address])        4. Coronary artery disease involving native coronary artery of native heart without angina pectoris        5. Pure hypercholesterolemia        6.  Class 1 obesity due to excess calories without serious comorbidity with body mass index (BMI) of 30.0 to 30.9 in adult              Consults  Physician Requesting Consult: Celia Jimenes, [address] [place] [address]   Reason for Consult / Principal Problem: Paroxysmal Atrial Fibrillation         Summary of my recommendation for the patient  Patient is having episodes of atrial fibrillation with RVR-progressively increasing-most of the episodes are at night and last from 5 to 6 hours    Risk factors-age 82, BMI 31.3, hypertension    Her  is very sick, recently went into hospice, not expected to live more than 1 to 2 months  She cannot be admitted in the next 1 to 2 months    Continue with metoprolol and Xarelto    Use flecainide as pill in the pocket for the next 2 to 3 months-if palpitation more than 3 to 5 minutes flecainide 150 mg-if palpitation continue for 30 minutes despite the first dose of flecainide-additional 150 mg of flecainide-do not exceed 300 mg in a day    Price sotalol 160 mg twice daily and dofetilide 500 mcg twice daily  In about 3 months patient will need hospital admission to initiate this medication  Dofetilide is preferable if covered by insurance as it does not cause bradycardia    Follow-up with me in 4 months time            Clinical conditions  Paroxysmal atrial fibrillation   Long-term anticoagulation , [id number]= 4 on Xarelto 20mg daily   CAD 7/06/22 [facility] minimal luminal irregularities of LAD, Prox RCA 10% stenosis   Hypertension  Hypercholesterolemia 4/12/23 , TG 75, HDL 74,     PAD   Obesity BMI 31.63        Assessment/Plan     Paroxysmal atrial fibrillation   Long-term anticoagulation , [id number]= 4 on Xarelto 20mg daily     Patient is having episodes of atrial fibrillation with RVR-progressively increasing-most of the episodes are at night and last from 5 to 6 hours    Risk factors-age 82, BMI 31.3, hypertension    Her  is very sick, recently went into hospice, not expected to live more than 1 to 2 months  She cannot be admitted in the next 1 to 2 months    Continue with metoprolol and Xarelto    Use flecainide as pill in the pocket for the next 2 to 3 months-if palpitation more than 3 to 5 minutes flecainide 150 mg-if palpitation continue for 30 minutes despite the first dose of flecainide-additional 150 mg of flecainide-do not exceed 300 mg in a day    Price sotalol 160 mg twice daily and dofetilide 500 mcg twice daily  In about 3 months patient will need hospital admission to initiate this medication  Dofetilide is preferable if covered by insurance as it does not cause bradycardia    Follow-up with me in 4 months time            CAD 7/06/22 [facility] minimal luminal irregularities of LAD, Prox RCA 10% stenosis   No ischemic symptoms  Can use flecainide in the interim till patient can be admitted for class III agents      Hypertension  Blood pressure 118/72  Continue with metoprolol      Hypercholesterolemia 4/12/23 , TG 75, HDL 74,         PAD   Currently stable  Exercises with recumbent elliptical, walking      Obesity BMI 31.63  Nutrition evaluation and avoid simple sugars          History of Present Illness   HPI: Sujata Dill is a 82y.o. year old female has been referred to me by Dr Wendy Nugent /  Judah Tipton for the management of A-fib and RVR    The patient has significant medical illnesses which include  Paroxysmal atrial fibrillation   Long-term anticoagulation , [id number]= 4 on Xarelto 20mg daily   CAD 7/06/22 [facility] minimal luminal irregularities of LAD, Prox RCA 10% stenosis   Hypertension  Hypercholesterolemia 4/12/23 , TG 75, HDL 74,     PAD   Obesity BMI 31.63    Patient is having episodes of atrial fibrillation with RVR-progressively increasing-most of the episodes are at night and last from 5 to 6 hours    Risk factors-age 82, BMI 31.3, hypertension    Her  is very sick, recently went into hospice, not expected to live more than 1 to 2 months  She cannot be admitted in the next 1 to 2 months      Patient is not complaining of angina, orthopnea, PND  When she does have palpitations she feels them and also has some associated shortness of breath  She has occasional exertional intolerance when she is in A-fib  There has been no history of syncope    She does not have any significant history of snoring    Her father and brother used to smoke in the house, she was never a smoker  She has reduced her alcohol intake  She does not use energy drinks    Her  is significantly sick and is in hospice  He is not expected to be alive for more than 2 months  She does not want to be admitted in the hospital in the next 2 months        Historical Information   Past Medical History:   Diagnosis Date   • Allergic 1980    [address] tin   • Arthritis 2008   • Benign essential hypertension    • Hyperlipidemia, unspecified    • Hypertension 2016   • Osteoarthritis of left knee    • Perichondritis of ear, right      Past Surgical History:   Procedure Laterality Date   • CARDIAC CATHETERIZATION Left 7/6/2022    Procedure: Cardiac Left Heart Cath;  Surgeon: Emma Jauregui MD;  Location: [facility]; Service: Cardiology   • COLON SURGERY  2003    Rectoseal    • LAPAROSCOPIC OVARIAN CYSTECTOMY Right 1981   • LAPAROSCOPIC OVARIAN CYSTECTOMY Left 2003    rectoseal and cystoseal repari   • TONSILLECTOMY AND ADENOIDECTOMY  1952   • TUBAL LIGATION  1972     Social History     Substance and Sexual Activity   Alcohol Use Yes   • Alcohol/week: 5.0 standard drinks of alcohol   • Types: 5 Glasses of wine per week    Comment: 1 glass nightly     Social History     Substance and Sexual Activity   Drug Use Never     Social History     Tobacco Use   Smoking Status Never   Smokeless Tobacco Never     Social History     Socioeconomic History   • Marital status: /Civil Union     Spouse name: Not on file   • Number of children: Not on file   • Years of education: Not on file   • Highest education level: Not on file   Occupational History   • Not on file   Tobacco Use   • Smoking status: Never   • Smokeless tobacco: Never   Vaping Use   • Vaping Use: Never used   Substance and Sexual Activity   • Alcohol use: Yes     Alcohol/week: 5.0 standard drinks of alcohol     Types: 5 Glasses of wine per week     Comment: 1 glass nightly   • Drug use: Never   • Sexual activity: Not Currently     Partners: Male   Other Topics Concern   • Not on file   Social History Narrative   • Not on file     Social Determinants of Health     Financial Resource Strain: Not on file   Food Insecurity: Not on file   Transportation Needs: Not on file   Physical Activity: Not on file   Stress: Not on file   Social Connections: Not on file   Intimate Partner Violence: Not on file   Housing Stability: Not on file     .   Family History:  Family History   Problem Relation Age of Onset   • Hyperlipidemia Mother    • Heart Valve Disease Mother    • Hypertension Mother    • Arthritis Father    • Diabetes Father    • Alcohol abuse Father    • Prostate cancer Father    • Diabetes Brother    • Atrial fibrillation Brother    • Alcohol abuse Brother    • Arthritis Brother    • Pancreatic cancer Maternal Aunt 72   • No Known Problems Paternal Uncle    • No Known Problems Paternal Uncle    • No Known Problems Paternal Uncle    • No Known Problems Paternal Uncle    • Hypertension Daughter    • Bradycardia Son    • No Known Problems Son          Meds/Allergies      No current facility-administered medications for this visit. (Not in a hospital admission)      Allergies   Allergen Reactions   • Nitrofurantoin Nausea Only, Dizziness and Headache     Macrodantin           Objective   Vitals:   Visit Vitals  /72 (BP Location: Left arm, Patient Position: Sitting, Cuff Size: Standard)   Pulse 76   Ht 5' 5" (1.651 m)   Wt 85.3 kg (188 lb)   SpO2 97%   BMI 31.28 kg/m²   OB Status Postmenopausal   Smoking Status Never   BSA 1.93 m²      Vitals:    09/12/23 0920   Weight: 85.3 kg (188 lb)   [unfilled]    Invasive Devices       None                     ROS  Review of Systems   All other systems reviewed and are negative. As described in my history of present illness        PHYSICAL EXAM  Physical Exam  Vitals reviewed. Constitutional:       General: She is not in acute distress. Appearance: Normal appearance. She is obese. She is not ill-appearing. HENT:      Head: Normocephalic and atraumatic. Right Ear: External ear normal.      Left Ear: External ear normal.      Nose: Nose normal.      Mouth/Throat:      Comments: Posterior pharynx is crowded  Eyes:      General: No scleral icterus. Extraocular Movements: Extraocular movements intact. Conjunctiva/sclera: Conjunctivae normal.      Pupils: Pupils are equal, round, and reactive to light.    Cardiovascular:      Rate and Rhythm: Normal rate and regular rhythm. Pulses: Normal pulses. Heart sounds: Normal heart sounds. No murmur heard. Pulmonary:      Effort: Pulmonary effort is normal. No respiratory distress. Breath sounds: Examination of the right-middle field reveals decreased breath sounds. Examination of the left-middle field reveals decreased breath sounds. Examination of the right-lower field reveals decreased breath sounds. Examination of the left-lower field reveals decreased breath sounds. Decreased breath sounds present. No wheezing. Abdominal:      General: Bowel sounds are normal. There is no distension. Palpations: Abdomen is soft. Tenderness: There is no abdominal tenderness. Musculoskeletal:         General: No swelling, tenderness or deformity. Cervical back: No rigidity. Skin:     Coloration: Skin is not jaundiced. Findings: No bruising or lesion. Neurological:      Mental Status: She is alert and oriented to person, place, and time. Mental status is at baseline. Motor: No weakness. Psychiatric:         Mood and Affect: Mood normal.         Behavior: Behavior normal.         Thought Content: Thought content normal.         Judgment: Judgment normal.               LAB RESULTS:      CBC:  Results from Last 12 Months   Lab Units 04/12/23  0806   WBC Thousand/uL 3.61*   HEMOGLOBIN g/dL 14.7   HEMATOCRIT % 45.5   MCV fL 94   PLATELETS Thousands/uL 171   RBC Million/uL 4.83   MCH pg 30.4   MCHC g/dL 32.3   RDW % 13.9   MPV fL 10.4   NRBC AUTO /100 WBCs 0        CMP:  Results from Last 12 Months   Lab Units 04/12/23  0806   POTASSIUM mmol/L 3.9   CHLORIDE mmol/L 109*   CO2 mmol/L 28   BUN mg/dL 20   CREATININE mg/dL 0.74   CALCIUM mg/dL 9.1   AST U/L 18   ALT U/L 23   ALK PHOS U/L 63   EGFR ml/min/1.73sq m 76        Magnesium:   No results for input(s): "MG" in the last 8784 hours.       TSH 3rd Gen:  Results from Last 12 Months   Lab Units 04/12/23  0806   TSH 3RD GENERATON uIU/mL 1.320 Lipid Panel:  Results from Last 12 Months   Lab Units 04/12/23  0806   CHOLESTEROL mg/dL 199   TRIGLYCERIDES mg/dL 75   HDL mg/dL 74   NON-HDL-CHOL (CHOL-HDL) mg/dl 125        Imaging:      EKG  08/30/2023          CHRIS  No results found for this or any previous visit. Echocardiogram  Results for orders placed during the hospital encounter of 05/25/22    Echo complete w/ contrast if indicated    Interpretation Summary  •  Left Ventricle: Left ventricular cavity size is normal. Wall thickness is normal. The left ventricular ejection fraction is 60%. Systolic function is normal. Wall motion is normal.  •  Right Ventricle: Right ventricular cavity size is mildly dilated. •  Left Atrium: The atrium is mildly dilated. •  Right Atrium: The atrium is mildly dilated. •  Mitral Valve: There is mild to moderate regurgitation. •  Tricuspid Valve: There is moderate regurgitation. •  Pulmonic Valve: There is mild regurgitation. No results found for this or any previous visit. Stress Test:   Results for orders placed during the hospital encounter of 05/25/22    NM myocardial perfusion spect (rx stress and/or rest)    Interpretation Summary  •  Stress ECG: No ST deviation is noted. The ECG was not diagnostic due to pharmacological (vasodilator) stress. •  Perfusion: There are no perfusion defects. •  Stress Function: Left ventricular function post-stress is normal. Post-stress ejection fraction is 62 %. •  Stress Combined Conclusion: The ECG and SPECT imaging portions of the stress study are concordant with no evidence of stress induced myocardial ischemia. •  Resting ECG: The ECG shows atrial fibrillation. •  Perfusion Defect Conclusion: There is evidence of transient ischemic dilation (TID). TID was appreciated quantitatively but not visually. No results found for this or any previous visit.       Cardiac catheterization:      Cardiac Left Heart Cath  07/06/2022          HOLTER MONITOR: 24 HOUR/48 HOUR MONITORS  No results found for this or any previous visit. AMB extended holter monitor  08/17/2022    Zio XT  08/03/2022 - 08/10/2022                   DEVICE CHECK:       No results found for this or any previous visit.         Code Status: [unfilled]  Advance Directive and Living Will:      Power of :    POLST:      Counseling / Coordination of Care  Very detailed discussion done with regards to management of atrial fibrillation      Joana Quevedo MD

## 2023-09-12 NOTE — PATIENT INSTRUCTIONS
1) Use Flecainide 150 mg tablet as "Pill in a pocket"    Instructions as follows: take 1 tablet at onset of palpitations lasting >5 minutes; if palpitations last >30 minutes, take another tablet ** DO NOT exceed 2 tablets in 24 hours**    Please call the office if the palpitations do not subside after second dose of flecainide.      Prescription sent to pharmacy

## 2023-09-18 ENCOUNTER — OFFICE VISIT (OUTPATIENT)
Dept: FAMILY MEDICINE CLINIC | Facility: CLINIC | Age: 83
End: 2023-09-18
Payer: COMMERCIAL

## 2023-09-18 ENCOUNTER — TELEPHONE (OUTPATIENT)
Dept: CARDIOLOGY CLINIC | Facility: CLINIC | Age: 83
End: 2023-09-18

## 2023-09-18 VITALS
RESPIRATION RATE: 17 BRPM | WEIGHT: 184 LBS | BODY MASS INDEX: 30.66 KG/M2 | HEART RATE: 83 BPM | DIASTOLIC BLOOD PRESSURE: 82 MMHG | OXYGEN SATURATION: 97 % | HEIGHT: 65 IN | TEMPERATURE: 98.6 F | SYSTOLIC BLOOD PRESSURE: 134 MMHG

## 2023-09-18 DIAGNOSIS — F43.0 ACUTE STRESS DISORDER: Primary | ICD-10-CM

## 2023-09-18 PROCEDURE — 99214 OFFICE O/P EST MOD 30 MIN: CPT | Performed by: FAMILY MEDICINE

## 2023-09-18 RX ORDER — ALPRAZOLAM 0.25 MG/1
0.25 TABLET ORAL
Qty: 30 TABLET | Refills: 0 | Status: SHIPPED | OUTPATIENT
Start: 2023-09-18

## 2023-09-18 NOTE — TELEPHONE ENCOUNTER
Please let her know that Dr. Karli Bynum recommends that she take the pill as a "pill in the pocket", meaning that she does not take it every day in a scheduled way. She should only take it if she feels the palpitations of her afib coming on. If her symptoms last longer than 5 mins, then she is to take one flecainide pill. If after 30 mins of taking the pill, she still feels the palpitations and is still in afib, then she is to take another flecainide pill. This pill is trying to restore her back into normal rhythm. It's not for her PVCs, it's for the afib. If this does not seem to help her afib management, or is becoming too frequent, then he will change the medication to a different one that she would need a hospital admission for (he understands that this is not a possibility currently due to her 's situation). Hopefully this helps.

## 2023-09-18 NOTE — PROGRESS NOTES
Name: Rachid Gonzales      : 1940      MRN: [id number]  Encounter Provider: Murray Friedman MD  Encounter Date: 2023   Encounter department: [address] [place] [address]. Acute stress disorder  -     ALPRAZolam (XANAX) 0.25 mg tablet; Take 1 tablet (0.25 mg total) by mouth daily at bedtime as needed for anxiety or sleep      Anxiety and acute stress secondary to 's current medical situation. Currently on hospice. Experiencing trouble sleeping and severe anxiety. Discussed medication options in detail with patient and her daughter today. We will trial patient on Xanax 0.25 mg daily as needed. This will be a short-term medication due to underlying acute stressor. Patient understands and agrees        I have spent a total time of 30 minutes on 23 in caring for this patient including Prognosis, Risks and benefits of tx options, Instructions for management, Patient and family education, Importance of tx compliance, Risk factor reductions, Impressions, Documenting in the medical record and Obtaining or reviewing history  . Subjective      Stress: Patient complained of being extremely stressed, she just found out that her  was diagnosed with cancer, the past 6 weeks have been horrible for her and very emotional, she also has difficulty sleeping. patient also complained about going into Afib because of stress, she wants something to calm her down. Review of Systems   Constitutional: Positive for fatigue. Negative for fever. HENT: Negative for sore throat. Eyes: Negative for visual disturbance. Respiratory: Negative for cough, chest tightness and shortness of breath. Cardiovascular: Negative for chest pain, palpitations and leg swelling. Gastrointestinal: Negative for abdominal pain, constipation, diarrhea and nausea. Endocrine: Negative for cold intolerance and heat intolerance. Genitourinary: Negative for flank pain. Musculoskeletal: Negative for back pain and neck pain. Skin: Negative for rash. Neurological: Negative for headaches. Psychiatric/Behavioral: Positive for dysphoric mood. Negative for behavioral problems and confusion. The patient is nervous/anxious. Current Outpatient Medications on File Prior to Visit   Medication Sig   • b complex vitamins capsule Take 1 capsule by mouth daily   • cholecalciferol (VITAMIN D3) 1,000 units tablet Take 1,000 Units by mouth daily   • clobetasol (TEMOVATE) 0.05 % ointment Apply topically as needed    • docusate sodium (COLACE) 100 mg capsule Take 100 mg by mouth daily   • flecainide (TAMBOCOR) 150 MG tablet Take 1 tablet (150 mg total) by mouth as needed (at onset of palpitations lasting >5 minutes; if palpitations last >30 minutes, take another tablet) **Do not exceed 2 tablets in 24 hours**   • metoprolol succinate (TOPROL-XL) 25 mg 24 hr tablet TAKE 2 AND 1/2 TABLETS BY MOUTH EVERY DAY. TAKE 1 TABLET BY MOUTH EVERY MORNING AND 1 AND 1/2 TABLETS BY MOUTH EVERY EVENING. • Xarelto 20 MG tablet TAKE 1 TABLET BY MOUTH EVERY DAY WITH BREAKFAST       Objective     /82 (BP Location: Left arm, Patient Position: Sitting, Cuff Size: Standard)   Pulse 83   Temp 98.6 °F (37 °C) (Tympanic)   Resp 17   Ht 5' 5" (1.651 m)   Wt 83.5 kg (184 lb)   SpO2 97%   BMI 30.62 kg/m²     Physical Exam  Constitutional:       Appearance: Normal appearance. HENT:      Head: Normocephalic and atraumatic. Pulmonary:      Effort: No respiratory distress. Skin:     General: Skin is warm and dry. Neurological:      General: No focal deficit present. Mental Status: She is alert and oriented to person, place, and time. Psychiatric:         Mood and Affect: Mood is anxious. Affect is tearful.        Maricruz Champagne MD

## 2023-09-18 NOTE — TELEPHONE ENCOUNTER
Jonelle Gamez. My YOB: 1940. My phone is [phone]. My question to Doctor Berenice Arechiga. I saw Doctor Karli Bynum last week and he prescribed a medication for me called Flecainide  and I want to know if this is appropriate for me to take. I also have had a diagnosis of PVC. And so that's the reason I'm questioning it. And also he wants me to take it when my palpitations start, but they're almost always at night and I don't feel them. And so I'm wondering why he gave this to me. He was very hard for me to understand. So that's why I'm calling Doctor Berenice Arechiga.  Thank you    Please advise

## 2023-09-18 NOTE — TELEPHONE ENCOUNTER
Called and went over the message from Dr Rachna Glez. Chelle Browning completely understood, but having trouble taking the flecainide because these episodes are happening when sleeping. Advised will refer to Dr Ricco Alberts in regards to finding out then what he would want you to do in these cases.      Please advise

## 2023-10-04 ENCOUNTER — OFFICE VISIT (OUTPATIENT)
Dept: OBGYN CLINIC | Facility: MEDICAL CENTER | Age: 83
End: 2023-10-04
Payer: COMMERCIAL

## 2023-10-04 VITALS — DIASTOLIC BLOOD PRESSURE: 85 MMHG | SYSTOLIC BLOOD PRESSURE: 143 MMHG | HEART RATE: 69 BPM

## 2023-10-04 DIAGNOSIS — M17.0 BILATERAL PRIMARY OSTEOARTHRITIS OF KNEE: Primary | ICD-10-CM

## 2023-10-04 PROCEDURE — 99213 OFFICE O/P EST LOW 20 MIN: CPT | Performed by: PHYSICAL MEDICINE & REHABILITATION

## 2023-10-04 NOTE — PROGRESS NOTES
1. Bilateral primary osteoarthritis of knee  Ambulatory referral to Orthopedic Surgery    Ambulatory referral to Physical Therapy        Orders Placed This Encounter   Procedures   • Ambulatory referral to Orthopedic Surgery   • Ambulatory referral to Physical Therapy        Impression:  Patient is here in follow up of chronic bilateral knee pain likely secondary to osteoarthritis (predominantly patellofemoral osteoarthritis) as below.  Treatment has included steroid injections, hyaluronic acid injections, platelet rich plasma injections, physical therapy and right knee Baker's cyst aspiration.  She recently had a Synvisc 1 injection. Today, the patient reports that her left knee is doing well. However, her right knee continues to be symptomatic and affecting her activities of daily living. At this juncture, we will have Nona go for consultation with orthopedic surgery. In the interim, we have provided her with a straight cane and we will have her restart physical therapy. We briefly discussed bracing but decided to hold off.     "Patient also presents with chronic right shoulder pain.  This is likely secondary to rotator cuff syndrome. She has done well with physical therapy.  Continue HEP."- not assessed today.     Imaging Studies (I personally reviewed images in PACS and report):  Bilateral knee x-rays most recent to this encounter reviewed.  These images show moderate tricompartmental osteoarthritic changes which mostly affects the patellofemoral joints.  There are no acute osseous abnormalities. Venous Doppler study was within normal limits. Return if symptoms worsen or fail to improve. Patient is in agreement with the above plan. HPI:  Rudi Rodriguez is a 82 y.o. female  who presents in follow up. Here for   Chief Complaint   Patient presents with   • Right Knee - Pain, Follow-up     Pt reports very minimal relief from injection.  Pt also reports she is now experiencing pain in her lower leg, mainly when she is walking. Since last visit: See above. Following history reviewed and updated:  Past Medical History:   Diagnosis Date   • Allergic 1980    [address] tin   • Arthritis 2008   • Benign essential hypertension    • Hyperlipidemia, unspecified    • Hypertension 2016   • Osteoarthritis of left knee    • Perichondritis of ear, right      Past Surgical History:   Procedure Laterality Date   • CARDIAC CATHETERIZATION Left 7/6/2022    Procedure: Cardiac Left Heart Cath;  Surgeon: Nico Acosta MD;  Location: [facility]; Service: Cardiology   • COLON SURGERY  2003    Rectoseal    • LAPAROSCOPIC OVARIAN CYSTECTOMY Right 1981   • LAPAROSCOPIC OVARIAN CYSTECTOMY Left 2003    rectoseal and cystoseal repari   • TONSILLECTOMY AND ADENOIDECTOMY  1952   • TUBAL LIGATION  1972     Social History   Social History     Substance and Sexual Activity   Alcohol Use Yes   • Alcohol/week: 5.0 standard drinks of alcohol   • Types: 5 Glasses of wine per week    Comment: 1 glass nightly     Social History     Substance and Sexual Activity   Drug Use Never     Social History     Tobacco Use   Smoking Status Never   Smokeless Tobacco Never     Family History   Problem Relation Age of Onset   • Hyperlipidemia Mother    • Heart Valve Disease Mother    • Hypertension Mother    • Arthritis Father    • Diabetes Father    • Alcohol abuse Father    • Prostate cancer Father    • Diabetes Brother    • Atrial fibrillation Brother    • Alcohol abuse Brother    • Arthritis Brother    • Pancreatic cancer Maternal Aunt 65   • No Known Problems Paternal Uncle    • No Known Problems Paternal Uncle    • No Known Problems Paternal Uncle    • No Known Problems Paternal Uncle    • Hypertension Daughter    • Bradycardia Son    • No Known Problems Son      Allergies   Allergen Reactions   • Macrodantin [Nitrofurantoin] Nausea Only, Dizziness and Headache        Constitutional:  /85   Pulse 69    General: NAD.   Eyes: Clear sclerae. ENT: No inflammation, lesion, or mass of lips. No tracheal deviation. Musculoskeletal: As mentioned below. Integumentary: No visible rashes or skin lesions. Pulmonary/Chest: Effort normal. No respiratory distress. Neuro: CN's grossly intact, CUTLER. Psych: Normal affect and judgement. Vascular: WWP. Right Knee Exam     Tenderness   The patient is experiencing tenderness in the medial joint line.     Range of Motion   Extension: normal     Tests   Varus: negative Valgus: negative    Other   Erythema: absent  Scars: absent  Sensation: normal  Pulse: present             Procedures

## 2023-10-16 ENCOUNTER — OFFICE VISIT (OUTPATIENT)
Dept: OBGYN CLINIC | Facility: CLINIC | Age: 83
End: 2023-10-16
Payer: COMMERCIAL

## 2023-10-16 VITALS
HEART RATE: 76 BPM | SYSTOLIC BLOOD PRESSURE: 122 MMHG | HEIGHT: 65 IN | DIASTOLIC BLOOD PRESSURE: 78 MMHG | BODY MASS INDEX: 30.66 KG/M2 | WEIGHT: 184 LBS

## 2023-10-16 DIAGNOSIS — M17.0 BILATERAL PRIMARY OSTEOARTHRITIS OF KNEE: ICD-10-CM

## 2023-10-16 PROCEDURE — 99214 OFFICE O/P EST MOD 30 MIN: CPT | Performed by: ORTHOPAEDIC SURGERY

## 2023-10-16 NOTE — PROGRESS NOTES
Assessment:  1. Bilateral primary osteoarthritis of knee  Ambulatory referral to Orthopedic Surgery    DXA bone density spine hip and pelvis        Patient Active Problem List   Diagnosis   • Essential hypertension   • Chronic left shoulder pain   • MENON (dyspnea on exertion)   • Lichen sclerosus   • Lipoma of right lower extremity   • PAD (peripheral artery disease) ([facility])   • Pure hypercholesterolemia   • Abnormal stress test   • Paroxysmal atrial fibrillation ([facility])   • Coronary artery disease involving native coronary artery of native heart without angina pectoris   • Class 1 obesity due to excess calories without serious comorbidity with body mass index (BMI) of 30.0 to 30.9 in adult   • Acute nonintractable headache   • Baker cyst, left   • Chronic pain of left knee   • Bilateral primary osteoarthritis of knee       Plan:    82 y.o. female with right knee osteoarthritis recalcitrant to conservative measures    The patient would like to proceed with right total knee arthroplasty. Risks and benefits of the procedure were explained to patient in detail today questions were answered to their satisfaction. we will await her results of her DEXA scan and cardiology clearance prior to obtaining her consent and scheduling her for the procedure, will follow up after DEXA and cardiologist appt  Will plan on new XR of R knee at her next visit  May continue with PT for now, come next year recommend conserving her PT sessions for post op    The patient was seen and examined by Dr. Deana Smith and myself. The assessment and plan were formulated by Dr. Deana Smith and I assisted in carrying it out. Subjective:   Patient ID: Matilde Genao is a 82 y.o. female . HPI    Patient comes in today with regards to right knee pain osteoarthritis. Patient is referred to us by John Banks DO for further evaluation. The patient reports that the pain been going on for years. Injury or trauma prior to onset of pain: none.   Pain is located in the medial and anterior knee. It is worsened with start up pain and down hill and down stairs, and is made better with rest.  Treatments tried: tylenol, CSI, visco, topical voltaren, PT, cane . The pain does not radiate . Old injuries or prior surgeries: none. Numbness or tingling: none . The following portions of the patient's history were reviewed and updated as appropriate: allergies, current medications, past family history, past social history, past surgical history and problem list.    Social History     Socioeconomic History   • Marital status:      Spouse name: Not on file   • Number of children: Not on file   • Years of education: Not on file   • Highest education level: Not on file   Occupational History   • Not on file   Tobacco Use   • Smoking status: Never   • Smokeless tobacco: Never   Vaping Use   • Vaping Use: Never used   Substance and Sexual Activity   • Alcohol use:  Yes     Alcohol/week: 5.0 standard drinks of alcohol     Types: 5 Glasses of wine per week     Comment: 1 glass nightly   • Drug use: Never   • Sexual activity: Not Currently     Partners: Male   Other Topics Concern   • Not on file   Social History Narrative   • Not on file     Social Determinants of Health     Financial Resource Strain: Not on file   Food Insecurity: Not on file   Transportation Needs: Not on file   Physical Activity: Not on file   Stress: Not on file   Social Connections: Not on file   Intimate Partner Violence: Not on file   Housing Stability: Not on file     Past Medical History:   Diagnosis Date   • Allergic 1980    [address] tin   • Arthritis 2008   • Benign essential hypertension    • Hyperlipidemia, unspecified    • Hypertension 2016   • Osteoarthritis of left knee    • Perichondritis of ear, right      Past Surgical History:   Procedure Laterality Date   • CARDIAC CATHETERIZATION Left 7/6/2022    Procedure: Cardiac Left Heart Cath;  Surgeon: Kiesha Vidal MD;  Location: [facility]; Service: Cardiology   • COLON SURGERY  2003    Rectoseal    • LAPAROSCOPIC OVARIAN CYSTECTOMY Right 1981   • LAPAROSCOPIC OVARIAN CYSTECTOMY Left 2003    rectoseal and cystoseal repari   • TONSILLECTOMY AND ADENOIDECTOMY  1952   • TUBAL LIGATION  1972     Allergies   Allergen Reactions   • Macrodantin [Nitrofurantoin] Nausea Only, Dizziness and Headache     Current Outpatient Medications on File Prior to Visit   Medication Sig Dispense Refill   • ALPRAZolam (XANAX) 0.25 mg tablet Take 1 tablet (0.25 mg total) by mouth daily at bedtime as needed for anxiety or sleep 30 tablet 0   • b complex vitamins capsule Take 1 capsule by mouth daily     • cholecalciferol (VITAMIN D3) 1,000 units tablet Take 1,000 Units by mouth daily     • clobetasol (TEMOVATE) 0.05 % ointment Apply topically as needed      • docusate sodium (COLACE) 100 mg capsule Take 100 mg by mouth daily     • flecainide (TAMBOCOR) 150 MG tablet Take 1 tablet (150 mg total) by mouth as needed (at onset of palpitations lasting >5 minutes; if palpitations last >30 minutes, take another tablet) **Do not exceed 2 tablets in 24 hours** 20 tablet 1   • metoprolol succinate (TOPROL-XL) 25 mg 24 hr tablet TAKE 2 AND 1/2 TABLETS BY MOUTH EVERY DAY. TAKE 1 TABLET BY MOUTH EVERY MORNING AND 1 AND 1/2 TABLETS BY MOUTH EVERY EVENING. 75 tablet 2   • Xarelto 20 MG tablet TAKE 1 TABLET BY MOUTH EVERY DAY WITH BREAKFAST 30 tablet 5     No current facility-administered medications on file prior to visit. Review of Systems      Objective:    Vitals:    10/16/23 1234   BP: 122/78   Pulse: 76       Physical Exam  Musculoskeletal:      Right knee: No effusion. Right Knee Exam     Muscle Strength   The patient has normal right knee strength. Range of Motion   The patient has normal right knee ROM.   Extension:  normal   Flexion:  normal     Tests   Varus: negative Valgus: negative    Other   Erythema: absent  Scars: absent  Sensation: normal  Right knee pulse absent: skin warm and well perfused. Swelling: none  Effusion: no effusion present    Comments:  No ecchymosis, no deformity  positive patellar grind test            I have personally reviewed pertinent films in PACS and my interpretation is XR R knee from 2/1/23 shows no acute fracture, moderate medial severe PFJ DJD. Procedures  No Procedures performed today        Scribe Attestation    I,:  David Gamez PA-C am acting as a scribe while in the presence of the attending physician.:       I,:  Jeevan Wallace, DO personally performed the services described in this documentation    as scribed in my presence.:             Portions of the record may have been created with voice recognition software. Occasional wrong word or "sound a like" substitutions may have occurred due to the inherent limitations of voice recognition software. Read the chart carefully and recognize, using context, where substitutions have occurred.

## 2023-11-02 NOTE — PROGRESS NOTES
PT Evaluation     Today's date: 11/3/2023  Patient name: Edwin Mart  : 1940  MRN: [id number]  Referring provider: Poonam Taylor DO  Dx:   Encounter Diagnosis     ICD-10-CM    1. Chronic pain of right knee  M25.561     G89.29                      Assessment  Assessment details: Edwin Mart is a 82 y.o. female who presents with signs and symptoms consistent of bilateral knee OA with R knee pain. Pt will be scheduled for R TKA with Dr. Kilo Bernard in the beginning of the new year but was referred to PT by Dr. Salvador Sanderson. Pt also is experiencing pain at anterior shin of the right RLE which is relatively new (2 months). Patient presents with right knee pain (anterior/posterior), anterior tibial pain and tenderness, decreased strength in RLE, decreased ROM, decreased joint mobility, and ambulatory dysfunction. Due to these impairments, Patient has difficulty performing ambulating, prolonged standing, squatting, lifting, transferring, weight bearing over involved LE, and stair negotiating . Patient would benefit from skilled physical therapy to address the impairments, improve their level of function, and to improve their overall quality of life. Impairments: abnormal gait, abnormal or restricted ROM, activity intolerance, impaired physical strength, lacks appropriate home exercise program, pain with function and weight-bearing intolerance  Understanding of Dx/Px/POC: good   Prognosis: good    Goals  Short Term Goals: to be achieved by 4 weeks  1) Patient to be independent with basic HEP. 2) Decrease pain to 3/10 at its worst.  3) Increase right knee ROM by 5-10 degrees   4) Increase LE strength by 1/2 MMT grade in all deficient planes. Long Term Goals: to be achieved by discharge  1) FOTO equal to or greater than expected.   2) Ambulation to improve to maximal level of function  3) Stair negotiation will improve to reciprocal.  4) Sit to stand transfers will improve to maximal level of function Plan  Patient would benefit from: PT eval and skilled physical therapy  Planned modality interventions: low level laser therapy  Planned therapy interventions: joint mobilization, manual therapy, neuromuscular re-education, patient education, therapeutic activities, therapeutic exercise and home exercise program  Frequency: 2x per week for 4-6 weeks. Subjective Evaluation    History of Present Illness  Mechanism of injury: History of Current Injury: Pt referred to PT from Dr. Mac Nick secondary to bilateral knee OA and R knee pain. Pt saw Dr. Raymon Reyes on 10/16 and recommend patient undergo R TKA. Pt most recently had viscosupplmentation in August with Dr. Mac Nick (synvisc 1). Pt  admits to a lot of popping and cracking but denies buckling or giving way. Pt has difficulty negotiating stairs and performs this with compensation. Pt also reports pain in anterior shin of the left knee after her dogs tail hit her leg. She denies any falls or trauma. Her pain concern is the pain in the anterior shin vs the knee at this time. Pt lives a [employer] on the second floor. She needs to take the elevator. She was quite active prior to June as she was attending exercise class, walking, and going on [employer]  Pain location/Descriptors: P1)Right Anterior and posterior right knee pain - a couple of years. P2) Right Anterior tibia pain (length of shin) - 2 months   Aggravating factors: Post static dyskinesia, pain with tranfers. Pt notes difficulty with initial steps, prolonged walking, stairs  Easing factors: Voltren, tylenol (max limit), Injections  24 HR pattern: Depending on activity and at night time. Imaging: VAS of LE: no arterial occlusive disease noted. Special Questions: Pt denies numbness/tingling in RLE   Patient goals:  Return to an active lifestyle, going to exercise class, walking. Hobbies/Interest: See above        Pain  Pain scale: P1)0  P2)Mild.   Pain scale at highest: P1)3 P2)7.        Objective     Active Range of Motion   Left Knee   Flexion: 125 degrees   Extensor lag: 3 degrees     Right Knee   Flexion: 95 degrees with pain  Extensor lag: 10 degrees with pain    Strength/Myotome Testing     Right Hip   Planes of Motion   Flexion: 4-  Extension: 4-  Abduction: 4-  External rotation: 4-    Tests     Additional Tests Details  Moderate tenderness along right tibia and anterior tib insertion from tib plateau to distal 1/3 of tibia. Hamstring length: WNL B  Piriformis length: WNL B    Quad contraction: poor    Ambulation     Observational Gait   Gait: antalgic and asymmetric   Decreased walking speed and stride length. Additional Observational Gait Details  Significant antalgic pattern with decreased WB through RLE and small step length. Minimal fluidity of movement throughout the right knee (stiff knee pattern). Circumduction present for RLE advancement. Precautions: HTN, PAD, A-Fib, CAD, MENON      Manuals             STM to R anterior tib insertion             R PROM of the knee             R PFJ mobs                          Neuro Re-Ed             Quad sets             TKE             Standing hip abd             Standing hip extension             Mini squat                                       Ther Ex             Nustep/bicycle             Heel slides             Quad stretch             Hamstring curl                                                                 Ther Activity             Standing HR             Sit to stand transfers             Lateral walks             Step ups             Gait Training                                       Modalities                                        Pt was given a HEP with verbal and written instruction x 10 minutes  [email or web address]  Access Code: [email or web address]

## 2023-11-03 ENCOUNTER — EVALUATION (OUTPATIENT)
Dept: PHYSICAL THERAPY | Facility: CLINIC | Age: 83
End: 2023-11-03
Payer: COMMERCIAL

## 2023-11-03 DIAGNOSIS — M25.561 CHRONIC PAIN OF RIGHT KNEE: Primary | ICD-10-CM

## 2023-11-03 DIAGNOSIS — G89.29 CHRONIC PAIN OF RIGHT KNEE: Primary | ICD-10-CM

## 2023-11-03 PROCEDURE — 97110 THERAPEUTIC EXERCISES: CPT | Performed by: PHYSICAL THERAPIST

## 2023-11-03 PROCEDURE — 97162 PT EVAL MOD COMPLEX 30 MIN: CPT | Performed by: PHYSICAL THERAPIST

## 2023-11-07 ENCOUNTER — OFFICE VISIT (OUTPATIENT)
Dept: PHYSICAL THERAPY | Facility: CLINIC | Age: 83
End: 2023-11-07
Payer: COMMERCIAL

## 2023-11-07 DIAGNOSIS — M25.561 CHRONIC PAIN OF RIGHT KNEE: Primary | ICD-10-CM

## 2023-11-07 DIAGNOSIS — G89.29 CHRONIC PAIN OF RIGHT KNEE: Primary | ICD-10-CM

## 2023-11-07 PROCEDURE — 97140 MANUAL THERAPY 1/> REGIONS: CPT | Performed by: PHYSICAL THERAPIST

## 2023-11-07 PROCEDURE — 97110 THERAPEUTIC EXERCISES: CPT | Performed by: PHYSICAL THERAPIST

## 2023-11-07 PROCEDURE — 97112 NEUROMUSCULAR REEDUCATION: CPT | Performed by: PHYSICAL THERAPIST

## 2023-11-07 NOTE — PROGRESS NOTES
Daily Note     Today's date: 2023  Patient name: Rhonda Mejía  : 1940  MRN: [id number]  Referring provider: Jose David Rodriguez DO  Dx:   Encounter Diagnosis     ICD-10-CM    1. Chronic pain of right knee  M25.561     G89.29           Start Time: 07  Stop Time: 0815  Total time in clinic (min): 45 minutes    Subjective: Pt notes reduced pain in the shin and walking better. She has been working on the exercises at home. Objective: See treatment diary below      Assessment: Initiated treatment focusing on primary movement impairments identified at the initial evaluation. Tolerated treatment well. Patient exhibited good technique with therapeutic exercises and would benefit from continued PT. Plan: Continue per plan of care.       Precautions: HTN, PAD, A-Fib, CAD, MENON      Manuals             STM to R anterior tib insertion 5'            R PROM of the knee 5'            R PFJ mobs                          Neuro Re-Ed             Quad sets 30x5"            TKE             Standing hip abd 2x10            Standing hip extension 2x10            Mini squat             LAQ 3#2x10                          Ther Ex             Nustep/bicycle Nustep 7' L2            Heel slides             Quad stretch             Hamstring curl             Gastroc stretch SB 3x30"                                                      Ther Activity             Standing HR 3x10            Sit to stand transfers             Lateral walks             Step ups             Gait Training                                         1:1 with PT from [phone]

## 2023-11-10 ENCOUNTER — OFFICE VISIT (OUTPATIENT)
Dept: PHYSICAL THERAPY | Facility: CLINIC | Age: 83
End: 2023-11-10
Payer: COMMERCIAL

## 2023-11-10 DIAGNOSIS — G89.29 CHRONIC PAIN OF RIGHT KNEE: Primary | ICD-10-CM

## 2023-11-10 DIAGNOSIS — I48.0 PAROXYSMAL ATRIAL FIBRILLATION (HCC): ICD-10-CM

## 2023-11-10 DIAGNOSIS — M25.561 CHRONIC PAIN OF RIGHT KNEE: Primary | ICD-10-CM

## 2023-11-10 PROCEDURE — 97112 NEUROMUSCULAR REEDUCATION: CPT | Performed by: PHYSICAL THERAPIST

## 2023-11-10 PROCEDURE — 97140 MANUAL THERAPY 1/> REGIONS: CPT | Performed by: PHYSICAL THERAPIST

## 2023-11-10 PROCEDURE — 97110 THERAPEUTIC EXERCISES: CPT | Performed by: PHYSICAL THERAPIST

## 2023-11-10 RX ORDER — RIVAROXABAN 20 MG/1
TABLET, FILM COATED ORAL
Qty: 30 TABLET | Refills: 5 | Status: SHIPPED | OUTPATIENT
Start: 2023-11-10

## 2023-11-10 NOTE — PROGRESS NOTES
Daily Note     Today's date: 11/10/2023  Patient name: Merline Falcon  : 1940  MRN: [id number]  Referring provider: Ming Shore DO  Dx:   Encounter Diagnosis     ICD-10-CM    1. Chronic pain of right knee  M25.561     G89.29           Start Time: 945  Stop Time: 1030  Total time in clinic (min): 45 minutes    Subjective: Pt reports inconsistent discomfort in knee and shin over the last few days. She did return to the gym to do the recumbent bicycle. However, patient does note her pain in the R LE did not awake her at night which she is pleased with. Objective: See treatment diary below      Assessment: Continued with program but did not progress secondary to symptoms after last session. Tolerated treatment well. Pt continues to be tender to palpation to anterior tibialis muscle. Patient exhibited good technique with therapeutic exercises and would benefit from continued PT. Plan: Continue per plan of care.       Precautions: HTN, PAD, A-Fib, CAD, MENON      Manuals 11/7 11/10           STM to R anterior tib insertion 5' 5'           R PROM of the knee 5' 5'           R PFJ mobs                          Neuro Re-Ed             Quad sets 30x5" 2x10 5#           TKE             Standing hip abd 2x10 10x           Standing hip extension 2x10 10x           Mini squat             LAQ 3#2x10  2x10                         Ther Ex             Nustep/bicycle Nustep 7' L2 Nustep 8' L3           Heel slides             Quad stretch             Hamstring curl  2x10 rtb            Gastroc stretch SB 3x30"  SB 10x10"                                                     Ther Activity             Standing HR 3x10 3x10           Sit to stand transfers             Lateral walks             Step ups             Gait Training                                         1:1 with PT from [id number]

## 2023-11-14 ENCOUNTER — OFFICE VISIT (OUTPATIENT)
Dept: PHYSICAL THERAPY | Facility: CLINIC | Age: 83
End: 2023-11-14
Payer: COMMERCIAL

## 2023-11-14 DIAGNOSIS — G89.29 CHRONIC PAIN OF RIGHT KNEE: Primary | ICD-10-CM

## 2023-11-14 DIAGNOSIS — M25.561 CHRONIC PAIN OF RIGHT KNEE: Primary | ICD-10-CM

## 2023-11-14 PROCEDURE — 97140 MANUAL THERAPY 1/> REGIONS: CPT | Performed by: PHYSICAL THERAPIST

## 2023-11-14 PROCEDURE — 97112 NEUROMUSCULAR REEDUCATION: CPT | Performed by: PHYSICAL THERAPIST

## 2023-11-14 PROCEDURE — 97110 THERAPEUTIC EXERCISES: CPT | Performed by: PHYSICAL THERAPIST

## 2023-11-14 NOTE — PROGRESS NOTES
Daily Note     Today's date: 2023  Patient name: Krystal Leiva  : 1940  MRN: [id number]  Referring provider: Jennifer Lynn DO  Dx:   Encounter Diagnosis     ICD-10-CM    1. Chronic pain of right knee  M25.561     G89.29           Start Time: 0900  Stop Time: 0945  Total time in clinic (min): 45 minutes    Subjective: Pt reports having pain down the shin in the middle of last night. She states that her pain has no rhythm or reason and is fairly inconsistent. Objective: See treatment diary below      Assessment: Pt continues to have multiple regions of discomfort in RLE. She is having difficulty with pain which is making progression difficult. Tolerated treatment fair. No adverse effects of exercises within session. Pt admits to challenge with SLR and gastroc stretch. Patient would benefit from continued PT. Plan: Continue per plan of care.       Precautions: HTN, PAD, A-Fib, CAD, MENON      Manuals 11/7 11/10 11/14          STM to R anterior tib insertion 5' 5' 8'          R PROM of the knee 5' 5'           R PFJ mobs                          Neuro Re-Ed             Quad sets 30x5" 2x10 5# 2x10 5 "          TKE             Standing hip abd 2x10 10x 10x          Standing hip extension 2x10 10x 10x          Mini squat             LAQ 3#2x10  2x10  2x10          SLR   10x          Ther Ex             Nustep/bicycle Nustep 7' L2 Nustep 8' L3 Nustep 8' L4          Heel slides             Quad stretch             Hamstring curl  2x10 rtb  2x10 rtb           Gastroc stretch SB 3x30"  SB 10x10"  SB 10x10"                                                   Ther Activity             Standing HR 3x10 3x10 3x10          Sit to stand transfers             Lateral walks             Step ups             Gait Training                                         1:1 with PT from [id number]

## 2023-11-17 ENCOUNTER — OFFICE VISIT (OUTPATIENT)
Dept: PHYSICAL THERAPY | Facility: CLINIC | Age: 83
End: 2023-11-17
Payer: COMMERCIAL

## 2023-11-17 DIAGNOSIS — G89.29 CHRONIC PAIN OF RIGHT KNEE: Primary | ICD-10-CM

## 2023-11-17 DIAGNOSIS — M25.561 CHRONIC PAIN OF RIGHT KNEE: Primary | ICD-10-CM

## 2023-11-17 PROCEDURE — 97110 THERAPEUTIC EXERCISES: CPT | Performed by: PHYSICAL THERAPIST

## 2023-11-17 PROCEDURE — 97112 NEUROMUSCULAR REEDUCATION: CPT | Performed by: PHYSICAL THERAPIST

## 2023-11-17 PROCEDURE — 97140 MANUAL THERAPY 1/> REGIONS: CPT | Performed by: PHYSICAL THERAPIST

## 2023-11-17 NOTE — PROGRESS NOTES
Daily Note     Today's date: 2023  Patient name: Mauricio Espinal  : 1940  MRN: [id number]  Referring provider: Martita Atkins DO  Dx:   Encounter Diagnosis     ICD-10-CM    1. Chronic pain of right knee  M25.561     G89.29                      Subjective: Pt notes improvement in shin discomfort over the last few days. She is still complaining of post static dyskinesia at the anterior and posterior aspect of the knee. Objective: See treatment diary below      Assessment: Tolerated treatment well. Tenderness continues at anterior tibialis muscle belly which reduces after manual therapy. Clunking noted with mid range knee flexion during manual stretching. Patient would benefit from continued PT. Plan: Continue per plan of care.       Precautions: HTN, PAD, A-Fib, CAD, MENON      Manuals 11/7 11/10 11/14 11/17         STM to R anterior tib insertion 5' 5' 8' 5'         R PROM of the knee 5' 5'  3'         R PFJ mobs                          Neuro Re-Ed             Quad sets 30x5" 2x10 5# 2x10 5 " 10x5"         TKE             Standing hip abd 2x10 10x 10x 10x         Standing hip extension 2x10 10x 10x 10x         Mini squat             LAQ 3#2x10  2x10  2x10 2x10 2#         SLR   10x Standing 10x         Ther Ex             Nustep/bicycle Nustep 7' L2 Nustep 8' L3 Nustep 8' L4 Nustep 12' L4         Heel slides             Quad stretch             Hamstring curl  2x10 rtb  2x10 rtb  2x10 rtb          Gastroc stretch SB 3x30"  SB 10x10"  SB 10x10" SB 10x10"                                                  Ther Activity             Standing HR 3x10 3x10 3x10          Sit to stand transfers             Lateral walks             Step ups             Gait Training                                         1:1 with PT from [id number]

## 2023-11-21 ENCOUNTER — OFFICE VISIT (OUTPATIENT)
Dept: PHYSICAL THERAPY | Facility: CLINIC | Age: 83
End: 2023-11-21
Payer: COMMERCIAL

## 2023-11-21 DIAGNOSIS — M25.561 CHRONIC PAIN OF RIGHT KNEE: Primary | ICD-10-CM

## 2023-11-21 DIAGNOSIS — G89.29 CHRONIC PAIN OF RIGHT KNEE: Primary | ICD-10-CM

## 2023-11-21 PROCEDURE — 97112 NEUROMUSCULAR REEDUCATION: CPT | Performed by: PHYSICAL THERAPIST

## 2023-11-21 PROCEDURE — 97110 THERAPEUTIC EXERCISES: CPT | Performed by: PHYSICAL THERAPIST

## 2023-11-21 PROCEDURE — 97140 MANUAL THERAPY 1/> REGIONS: CPT | Performed by: PHYSICAL THERAPIST

## 2023-11-21 NOTE — PROGRESS NOTES
Daily Note     Today's date: 2023  Patient name: Eloise Galo  : 1940  MRN: [id number]  Referring provider: Amador Brock DO  Dx:   Encounter Diagnosis     ICD-10-CM    1. Chronic pain of right knee  M25.561     G89.29                      Subjective: Pt reports having acute right buttock pain after traveling to her daughters and sleeping in a different bed. She found relief with using ice and sleeping on the recliner. Her buttock pain is improving. Knee pain and shin are gradually improving. This area did not increase her hip pain. Objective: See treatment diary below      Assessment: Tolerated treatment well. Implemented piriformis stretch and bridge to help with hip discomfort. Continued with R knee and hip strengthening with good tolerance. Patient exhibited good technique with therapeutic exercises and would benefit from continued PT. Plan: Continue per plan of care.       Precautions: HTN, PAD, A-Fib, CAD, MENON      Manuals 11/7 11/10 11/14 11/17 11/21        STM to R anterior tib insertion 5' 5' 8' 5' 5'        R PROM of the knee 5' 5'  3' 3'        R PFJ mobs                          Neuro Re-Ed             [employer] 30x5" 2x10 5# 2x10 5 " 10x5" 15x5"        TKE             Standing hip abd 2x10 10x 10x 10x 2x10        Standing hip extension 2x10 10x 10x 10x 2x10        Mini squat             LAQ 3#2x10  2x10  2x10 2x10 2# 2x10 2#        SLR   10x Standing 10x 2x10  standing         Bridge     10x        Ther Ex             Nustep/bicycle Nustep 7' L2 Nustep 8' L3 Nustep 8' L4 Nustep 12' L4 Nustep 8' L5         Heel slides             Quad stretch             Hamstring curl  2x10 rtb  2x10 rtb  2x10 rtb  2x10 rtb         Gastroc stretch SB 3x30"  SB 10x10"  SB 10x10" SB 10x10" SB 5x10"         Piriformis stretch     10x10"                                    Ther Activity             Standing HR 3x10 3x10 3x10  3x10        Sit to stand transfers             Lateral walks Step ups             Gait Training                                         1:1 with PT from [phone]

## 2023-11-24 ENCOUNTER — OFFICE VISIT (OUTPATIENT)
Dept: PHYSICAL THERAPY | Facility: CLINIC | Age: 83
End: 2023-11-24
Payer: COMMERCIAL

## 2023-11-24 DIAGNOSIS — M25.561 CHRONIC PAIN OF RIGHT KNEE: Primary | ICD-10-CM

## 2023-11-24 DIAGNOSIS — G89.29 CHRONIC PAIN OF RIGHT KNEE: Primary | ICD-10-CM

## 2023-11-24 PROCEDURE — 97140 MANUAL THERAPY 1/> REGIONS: CPT | Performed by: PHYSICAL THERAPIST

## 2023-11-24 PROCEDURE — 97110 THERAPEUTIC EXERCISES: CPT | Performed by: PHYSICAL THERAPIST

## 2023-11-24 NOTE — PROGRESS NOTES
Daily Note     Today's date: 2023  Patient name: Kelly Nicole  : 1940  MRN: [id number]  Referring provider: Raquel Delgado DO  Dx:   Encounter Diagnosis     ICD-10-CM    1. Chronic pain of right knee  M25.561     G89.29                      Subjective: Pt reports completing her HEP. She denies any new symptoms or discomfort from last treatment session. Pt notes her shin is much improved. Pt also notes her right  hamstring is bothersome. Objective: See treatment diary below      Assessment: Tolerated treatment well. Clunking continues when positioning the knee from an extended to flexed position. Implemented hamstring stretching into POC. Patient would benefit from continued PT. Plan: Continue per plan of care.       Precautions: HTN, PAD, A-Fib, CAD, MENON      Manuals 11/7 11/10 11/14 11/17 11/21 11/24       STM to R anterior tib insertion 5' 5' 8' 5' 5' 5'       R PROM of the knee 5' 5'  3' 3' 3'       R PFJ mobs                          Neuro Re-Ed             [employer] 30x5" 2x10 5# 2x10 5 " 10x5" 15x5" 20x5"       TKE             Standing hip abd 2x10 10x 10x 10x 2x10 2x10       Standing hip extension 2x10 10x 10x 10x 2x10 2x10       Mini squat             LAQ 3#2x10  2x10  2x10 2x10 2# 2x10 2#        SLR   10x Standing 10x 2x10  standing  2x10 standing       Bridge     10x 2x10       Ther Ex             Nustep/bicycle Nustep 7' L2 Nustep 8' L3 Nustep 8' L4 Nustep 12' L4 Nustep 8' L5  Nustep 8' L5        Heel slides             Quad stretch             Hamstring curl  2x10 rtb  2x10 rtb  2x10 rtb  2x10 rtb         Gastroc stretch SB 3x30"  SB 10x10"  SB 10x10" SB 10x10" SB 5x10"  SB 5x10"        Piriformis stretch     10x10"   10x10"       Hamstring stretch      10x10"                     Ther Activity             Standing HR 3x10 3x10 3x10  3x10 3x10       Sit to stand transfers             Lateral walks             Step ups             Gait Training 1:1 with PT from [phone]

## 2023-11-28 ENCOUNTER — OFFICE VISIT (OUTPATIENT)
Dept: PHYSICAL THERAPY | Facility: CLINIC | Age: 83
End: 2023-11-28
Payer: COMMERCIAL

## 2023-11-28 DIAGNOSIS — M25.561 CHRONIC PAIN OF RIGHT KNEE: Primary | ICD-10-CM

## 2023-11-28 DIAGNOSIS — G89.29 CHRONIC PAIN OF RIGHT KNEE: Primary | ICD-10-CM

## 2023-11-28 PROCEDURE — 97110 THERAPEUTIC EXERCISES: CPT | Performed by: PHYSICAL THERAPIST

## 2023-11-28 PROCEDURE — 97112 NEUROMUSCULAR REEDUCATION: CPT | Performed by: PHYSICAL THERAPIST

## 2023-11-28 PROCEDURE — 97140 MANUAL THERAPY 1/> REGIONS: CPT | Performed by: PHYSICAL THERAPIST

## 2023-11-28 NOTE — PROGRESS NOTES
Daily Note     Today's date: 2023  Patient name: Rudi Rodriguez  : 1940  MRN: [id number]  Referring provider: Malena Esposito DO  Dx:   Encounter Diagnosis     ICD-10-CM    1. Chronic pain of right knee  M25.561     G89.29                      Subjective: Pt reports feeling muscle fatigue in her right leg when walking and performing other activities of daily living. Objective: See treatment diary below      Assessment: Tolerated treatment well. RLE fatigues appropriately with prescribed exercises. No adverse effects of treatment. Patient exhibited good technique with therapeutic exercises and would benefit from continued PT. Plan: Continue per plan of care.       Precautions: HTN, PAD, A-Fib, CAD, MENON      Manuals 11/7 11/10 11/14 11/17 11/21 11/24 11/28      STM to R anterior tib insertion 5' 5' 8' 5' 5' 5' 5'      R PROM of the knee 5' 5'  3' 3' 3' 3'      R PFJ mobs                          Neuro Re-Ed             [employer] sets 30x5" 2x10 5# 2x10 5 " 10x5" 15x5" 20x5" 20x5"      TKE             Standing hip abd 2x10 10x 10x 10x 2x10 2x10 2x10      Standing hip extension 2x10 10x 10x 10x 2x10 2x10 2x10      Mini squat             LAQ 3#2x10  2x10  2x10 2x10 2# 2x10 2#        SLR   10x Standing 10x 2x10  standing  2x10 standing 2x10 standing      Bridge     10x 2x10 2x10      Ther Ex             Nustep/bicycle Nustep 7' L2 Nustep 8' L3 Nustep 8' L4 Nustep 12' L4 Nustep 8' L5  Nustep 8' L5        Heel slides             Quad stretch             Hamstring curl  2x10 rtb  2x10 rtb  2x10 rtb  2x10 rtb         Gastroc stretch SB 3x30"  SB 10x10"  SB 10x10" SB 10x10" SB 5x10"  SB 5x10"  SB 5x10       Piriformis stretch     10x10"   10x10" 10x10"      Hamstring stretch      10x10"  10x10"                   Ther Activity             Standing HR 3x10 3x10 3x10  3x10 3x10 3x10      Sit to stand transfers             Lateral walks             Step ups             Gait Training 1:1 with PT qpmv [id number]

## 2023-11-30 ENCOUNTER — HOSPITAL ENCOUNTER (OUTPATIENT)
Dept: RADIOLOGY | Facility: IMAGING CENTER | Age: 83
End: 2023-11-30
Payer: COMMERCIAL

## 2023-11-30 ENCOUNTER — TELEPHONE (OUTPATIENT)
Dept: OBGYN CLINIC | Facility: CLINIC | Age: 83
End: 2023-11-30

## 2023-11-30 DIAGNOSIS — M17.0 BILATERAL PRIMARY OSTEOARTHRITIS OF KNEE: ICD-10-CM

## 2023-11-30 PROCEDURE — 77080 DXA BONE DENSITY AXIAL: CPT

## 2023-11-30 NOTE — TELEPHONE ENCOUNTER
The osteopenia would not be a reason for her not to have surgery however speaking to her PCP and taking any appropriate steps to optimize her bone density is recommended prior to surgery.   Having lower bone density does increase risk of a fracture during a knee replacement however the odds of this are very low

## 2023-11-30 NOTE — TELEPHONE ENCOUNTER
Called and spoke w/pt and relayed MCKINLEY Baez's msg. She is calling PCP to address osteopenia. Appt given 12/14/23 w/Dr Manjeet Ruiz to discuss TKA.

## 2023-11-30 NOTE — TELEPHONE ENCOUNTER
Please contact the patient and let them know that they have osteopenia based on her DEXA scan. Please advise them to contact their PCP for further guidance and management of this condition.

## 2023-11-30 NOTE — TELEPHONE ENCOUNTER
Called and spoke w/pt and relayed MAXINE Baez's msg to pt. She will call her PCP. Pt would like to know:   Does osteopenia affect her ability to have knee surgery? Should she make a f/u appt to discuss knee surgery? Please advise. Thank you.

## 2023-12-01 ENCOUNTER — EVALUATION (OUTPATIENT)
Dept: PHYSICAL THERAPY | Facility: CLINIC | Age: 83
End: 2023-12-01
Payer: COMMERCIAL

## 2023-12-01 ENCOUNTER — TELEPHONE (OUTPATIENT)
Age: 83
End: 2023-12-01

## 2023-12-01 DIAGNOSIS — M25.561 CHRONIC PAIN OF RIGHT KNEE: Primary | ICD-10-CM

## 2023-12-01 DIAGNOSIS — G89.29 CHRONIC PAIN OF RIGHT KNEE: Primary | ICD-10-CM

## 2023-12-01 PROCEDURE — 97110 THERAPEUTIC EXERCISES: CPT | Performed by: PHYSICAL THERAPIST

## 2023-12-01 PROCEDURE — 97530 THERAPEUTIC ACTIVITIES: CPT | Performed by: PHYSICAL THERAPIST

## 2023-12-01 PROCEDURE — 97112 NEUROMUSCULAR REEDUCATION: CPT | Performed by: PHYSICAL THERAPIST

## 2023-12-01 NOTE — PROGRESS NOTES
[id number]    Today's date: 2023  Patient name: Merline Falcon  : 1940  MRN: [id number]  Referring provider: Ming Shore DO  Dx:   Encounter Diagnosis     ICD-10-CM    1. Chronic pain of right knee  M25.561     G89.29           Start Time: 945  Stop Time: 1030  Total time in clinic (min): 45 minutes      Assessment  Assessment details: Merline Falcon is a 82 y.o. female who presents with signs and symptoms consistent of bilateral knee OA with R knee pain. Pt will be scheduled for R TKA with Dr. Jeanette Villasenor in the beginning of the new year but was referred to PT by Dr. Allyssa Clements. Pt was also experiencing pain at anterior shin of the right RLE which is relatively new (2 months). This has improved greatly over the course of PT. She denies much shin pain at this time. Pt continues to note clicking and clunking at involved knee with flexion. Her ROM and strength are gradually improving, although continue to be limited. She did have a bone density scan that indicated osteopenia. Pt will be following up with PCP to discuss treatment option to improve bone health and quality. Pt continues to have decreased muscle performance (right hip and knee), weakness in RLE, and flexibility/joint ROM restrictions. Pt will benefit from continuation of skilled PT to address impairments, improve strength, and continue to prehab prior to TKA in the near future.        Impairments: abnormal gait, abnormal or restricted ROM, activity intolerance, impaired physical strength, lacks appropriate home exercise program, pain with function and weight-bearing intolerance  Understanding of Dx/Px/POC: good   Prognosis: good    Goals  Short Term Goals: to be achieved by 4 weeks  1) Patient to be independent with basic HEP.-MET  2) Decrease pain to 3/10 at its worst.-Partially met  3) Increase right knee ROM by 5-10 degrees -Partially met  4) Increase LE strength by 1/2 MMT grade in all deficient planes.-Partially met    Long Term Goals: to be achieved by discharge  1) FOTO equal to or greater than expected. -Partially met  2) Ambulation to improve to maximal level of function-Partially met  3) Stair negotiation will improve to reciprocal.-Partially met  4) Sit to stand transfers will improve to maximal level of function -Partially met      Plan  Patient would benefit from: PT eval and skilled physical therapy  Planned modality interventions: low level laser therapy  Planned therapy interventions: joint mobilization, manual therapy, neuromuscular re-education, patient education, therapeutic activities, therapeutic exercise and home exercise program  Frequency: 2x per week for 4-6 weeks. Subjective Evaluation    History of Present Illness  Mechanism of injury: History of Current Injury: Pt referred to PT from Dr. Nadeen Montenegro secondary to bilateral knee OA and R knee pain. Pt saw Dr. Augustine Campa on 10/16 and recommend patient undergo R TKA. Pt most recently had viscosupplmentation in August with Dr. Nadeen Montenegro (synvisc 1). Pt  admits to a lot of popping and cracking but denies buckling or giving way. Pt has difficulty negotiating stairs and performs this with compensation. Pt also reports pain in anterior shin of the left knee after her dogs tail hit her leg. She denies any falls or trauma. Her pain concern is the pain in the anterior shin vs the knee at this time. Pt lives a [employer] on the second floor. She needs to take the elevator. She was quite active prior to June as she was attending exercise class, walking, and going on hikes  Pain location/Descriptors: P1)Right Anterior and posterior right knee pain - a couple of years. P2) Right Anterior tibia pain (length of shin) - 2 months   Aggravating factors: Post static dyskinesia, pain with tranfers. Pt notes difficulty with initial steps, prolonged walking, stairs  Easing factors: Voltren, tylenol (max limit), Injections  24 HR pattern: Depending on activity and at night time.    Imaging: VAS of LE: no arterial occlusive disease noted. Special Questions: Pt denies numbness/tingling in RLE   Patient goals:  Return to an active lifestyle, going to exercise class, walking. Hobbies/Interest: See above        Pain  Pain scale: P1)0  P2)Mild. Pain scale at highest: P1)3  P2)Low. Objective     Active Range of Motion   Left Knee   Flexion: 125 degrees   Extensor lag: 3 degrees     Right Knee   Flexion: 120 degrees with pain  Extensor la degrees with pain    Strength/Myotome Testing     Right Hip   Planes of Motion   Flexion: 4  Extension: 4  Abduction: 4  External rotation: 4    Right knee:  Flexion: 4  Extension: 4    Left knee:   Flexion: 5  Extension: 5    Tests     Additional Tests Details  Moderate tenderness along right tibia and anterior tib insertion from tib plateau to distal 1/3 of tibia. Hamstring length: WNL B  Piriformis length: WNL B    Quad contraction: poor    Ambulation     Observational Gait   Gait: antalgic and asymmetric   Decreased walking speed and stride length. Additional Observational Gait Details  Significant antalgic pattern with decreased WB through RLE and small step length. Minimal fluidity of movement throughout the right knee (stiff knee pattern). Circumduction present for RLE advancement. Step up test: 8"-Signification UE compensation and vaulting. Stairs: 6" stairs- vaulting noted with RLE step up. Decreased quad control with eccentric lowering. R Hip IR and hip drop present during RLE descending to avoid loaded knee flexion.            Precautions: HTN, PAD, A-Fib, CAD, MENON      Manuals 11/7 11/10 11/14 11/17 11/21 11/24 11/28 12/1     STM to R anterior tib insertion 5' 5' 8' 5' 5' 5' 5' 5'     R PROM of the knee 5' 5'  3' 3' 3' 3' 3'     R PFJ mobs                          Neuro Re-Ed             [employer] 30x5" 2x10 5# 2x10 5 " 10x5" 15x5" 20x5" 20x5"      TKE        Gtb 30x      Standing hip abd 2x10 10x 10x 10x 2x10 2x10 2x10      Standing hip extension 2x10 10x 10x 10x 2x10 2x10 2x10      Mini squat             LAQ 3#2x10  2x10  2x10 2x10 2# 2x10 2#        SLR   10x Standing 10x 2x10  standing  2x10 standing 2x10 standing      Bridge     10x 2x10 2x10      Ther Ex             Nustep/bicycle Nustep 7' L2 Nustep 8' L3 Nustep 8' L4 Nustep 12' L4 Nustep 8' L5  Nustep 8' L5   9' L5     Heel slides             Quad stretch             Hamstring curl  2x10 rtb  2x10 rtb  2x10 rtb  2x10 rtb         Gastroc stretch SB 3x30"  SB 10x10"  SB 10x10" SB 10x10" SB 5x10"  SB 5x10"  SB 5x10       Piriformis stretch     10x10"   10x10" 10x10"      Hamstring stretch      10x10"  10x10"                   Ther Activity             Standing HR 3x10 3x10 3x10  3x10 3x10 3x10      Sit to stand transfers             Lateral walks             Step ups        6" 10x      Gait Training                                         1:1 with PT from [phone]

## 2023-12-01 NOTE — TELEPHONE ENCOUNTER
Pt called in stating that her Orthopedic doctor recommended that she speak with her PCP about starting a treatment for Osteopenia. Pt was wondering if the Dr. Jessica Zamorano can give her a prescription or if that is something she needs to see him for. Pt was scheduled for a visit on 12/15 if the appointment is needed please advise with the pt in regards to this matter, thank you.

## 2023-12-05 ENCOUNTER — OFFICE VISIT (OUTPATIENT)
Dept: PHYSICAL THERAPY | Facility: CLINIC | Age: 83
End: 2023-12-05
Payer: COMMERCIAL

## 2023-12-05 DIAGNOSIS — G89.29 CHRONIC PAIN OF RIGHT KNEE: Primary | ICD-10-CM

## 2023-12-05 DIAGNOSIS — M25.561 CHRONIC PAIN OF RIGHT KNEE: Primary | ICD-10-CM

## 2023-12-05 PROCEDURE — 97112 NEUROMUSCULAR REEDUCATION: CPT | Performed by: PHYSICAL THERAPIST

## 2023-12-05 PROCEDURE — 97140 MANUAL THERAPY 1/> REGIONS: CPT | Performed by: PHYSICAL THERAPIST

## 2023-12-05 NOTE — PROGRESS NOTES
Daily Note     Today's date: 2023  Patient name: Rudi Rodriguez  : 1940  MRN: [id number]  Referring provider: Malena Esposito DO  Dx:   Encounter Diagnosis     ICD-10-CM    1. Chronic pain of right knee  M25.561     G89.29                      Subjective: Pt reports beings very sore on Saturday; however, symptoms subsided by . Pt reports continued pain in her right hip which is making her avoid sleeping on the right side. Now that she is sleeping on her left side, her left shoulder is bothersome. Objective: See treatment diary below      Assessment: Tolerated treatment well. Continued POC focusing on primary movement impairments identified at IE. Decreasing anterior tibial soft tissue tenderness noted during STM. Pt experienced hip with pain several exercises in today's session; step ups, HR, and stretching. This is a limiting factor of progression of exercises. Patient would benefit from continued PT      Plan: Continue per plan of care.       Precautions: HTN, PAD, A-Fib, CAD, MENON      Manuals 11/7 11/10 11/14 11/17 11/21 11/24 11/28 12/1 12/5    STM to R anterior tib insertion 5' 5' 8' 5' 5' 5' 5' 5' 5'    R PROM of the knee 5' 5'  3' 3' 3' 3' 3' 3'    R PFJ mobs                          Neuro Re-Ed             [employer] sets 30x5" 2x10 5# 2x10 5 " 10x5" 15x5" 20x5" 20x5"      TKE        Gtb 30x  Gtb 30x    Standing hip abd 2x10 10x 10x 10x 2x10 2x10 2x10  2x10    Standing hip extension 2x10 10x 10x 10x 2x10 2x10 2x10  2x10    Mini squat             LAQ 3#2x10  2x10  2x10 2x10 2# 2x10 2#        SLR   10x Standing 10x 2x10  standing  2x10 standing 2x10 standing      Bridge     10x 2x10 2x10      Ther Ex             Nustep/bicycle Nustep 7' L2 Nustep 8' L3 Nustep 8' L4 Nustep 12' L4 Nustep 8' L5  Nustep 8' L5   9' L5 8' L5     Heel slides             Quad stretch             Hamstring curl  2x10 rtb  2x10 rtb  2x10 rtb  2x10 rtb         Gastroc stretch SB 3x30"  SB 10x10"  SB 10x10" SB 10x10" SB 5x10"  SB 5x10"  SB 5x10   SB 5x10"    Piriformis stretch     10x10"   10x10" 10x10" 10x10" 10x10"    Hamstring stretch      10x10"  10x10"  10x10"                 Ther Activity             Standing HR 3x10 3x10 3x10  3x10 3x10 3x10  3x10    Sit to stand transfers             Lateral walks             Step ups        6" 10x  6" 10x    Gait Training                                         1:1 with PT from [id number]

## 2023-12-07 ENCOUNTER — OFFICE VISIT (OUTPATIENT)
Dept: PHYSICAL THERAPY | Facility: CLINIC | Age: 83
End: 2023-12-07
Payer: COMMERCIAL

## 2023-12-07 ENCOUNTER — OFFICE VISIT (OUTPATIENT)
Dept: FAMILY MEDICINE CLINIC | Facility: CLINIC | Age: 83
End: 2023-12-07
Payer: COMMERCIAL

## 2023-12-07 VITALS
RESPIRATION RATE: 16 BRPM | OXYGEN SATURATION: 99 % | HEART RATE: 70 BPM | DIASTOLIC BLOOD PRESSURE: 68 MMHG | BODY MASS INDEX: 31.9 KG/M2 | SYSTOLIC BLOOD PRESSURE: 124 MMHG | HEIGHT: 65 IN | WEIGHT: 191.5 LBS | TEMPERATURE: 96.5 F

## 2023-12-07 DIAGNOSIS — M70.61 GREATER TROCHANTERIC BURSITIS OF RIGHT HIP: ICD-10-CM

## 2023-12-07 DIAGNOSIS — M25.561 CHRONIC PAIN OF RIGHT KNEE: Primary | ICD-10-CM

## 2023-12-07 DIAGNOSIS — M17.11 PRIMARY OSTEOARTHRITIS OF RIGHT KNEE: ICD-10-CM

## 2023-12-07 DIAGNOSIS — G89.29 CHRONIC PAIN OF RIGHT KNEE: Primary | ICD-10-CM

## 2023-12-07 DIAGNOSIS — I10 ESSENTIAL HYPERTENSION: ICD-10-CM

## 2023-12-07 DIAGNOSIS — M79.89 SWELLING OF LOWER EXTREMITY: ICD-10-CM

## 2023-12-07 DIAGNOSIS — M85.89 OSTEOPENIA OF MULTIPLE SITES: Primary | ICD-10-CM

## 2023-12-07 PROBLEM — I73.9 PAD (PERIPHERAL ARTERY DISEASE) (HCC): Status: RESOLVED | Noted: 2021-09-29 | Resolved: 2023-12-07

## 2023-12-07 PROCEDURE — 97140 MANUAL THERAPY 1/> REGIONS: CPT | Performed by: PHYSICAL THERAPIST

## 2023-12-07 PROCEDURE — 97110 THERAPEUTIC EXERCISES: CPT | Performed by: PHYSICAL THERAPIST

## 2023-12-07 PROCEDURE — 97112 NEUROMUSCULAR REEDUCATION: CPT | Performed by: PHYSICAL THERAPIST

## 2023-12-07 PROCEDURE — 99214 OFFICE O/P EST MOD 30 MIN: CPT | Performed by: FAMILY MEDICINE

## 2023-12-07 NOTE — ASSESSMENT & PLAN NOTE
Continue physical therapy. Has a follow-up appointment next week with orthopedic surgery. Will speak to orthopedic surgery regarding bursa injection. If unable to complete through orthopedic surgery she can follow-up in office for bursa injection prior to flying to [facility].

## 2023-12-07 NOTE — ASSESSMENT & PLAN NOTE
Continue following orthopedic surgery. Continue physical therapy. Patient will likely be undergoing knee replacement in the next few months.

## 2023-12-07 NOTE — ASSESSMENT & PLAN NOTE
Likely secondary to mild venous insufficiency. Compression stockings. Follow-up appointment with cardiology scheduled for March.

## 2023-12-07 NOTE — PROGRESS NOTES
Name: Marily Webb      : 1940      MRN: [id number]  Encounter Provider: Kayy Vickers MD  Encounter Date: 2023   Encounter department: [address] [place] [address]     1. Osteopenia of multiple sites  Assessment & Plan:  Encouraged regular weight bearing exercises, healthy diet, calcium and vitamin d supplementation. Repeat DEXA scan in 1 year      2. Essential hypertension  Assessment & Plan:  Continue metoprolol 25 mg daily. 3. Primary osteoarthritis of right knee  Assessment & Plan:  Continue following orthopedic surgery. Continue physical therapy. Patient will likely be undergoing knee replacement in the next few months. 4. Greater trochanteric bursitis of right hip  Assessment & Plan:  Continue physical therapy. Has a follow-up appointment next week with orthopedic surgery. Will speak to orthopedic surgery regarding bursa injection. If unable to complete through orthopedic surgery she can follow-up in office for bursa injection prior to flying to [facility]. 5. Swelling of lower extremity  Assessment & Plan:  Likely secondary to mild venous insufficiency. Compression stockings. Follow-up appointment with cardiology scheduled for March. BMI Counseling: Body mass index is 31.87 kg/m². The BMI is above normal. Nutrition recommendations include decreasing portion sizes, consuming healthier snacks, reducing intake of saturated and trans fat and reducing intake of cholesterol. Exercise recommendations include moderate physical activity 150 minutes/week. No pharmacotherapy was ordered. Patient referred to PCP. Rationale for BMI follow-up plan is due to patient being overweight or obese. Depression Screening and Follow-up Plan: Patient's depression screening was positive with a PHQ-2 score of 3. Clincally patient does not have depression. No treatment is required.          Subjective      Patient presents today to discuss results of DEXA scan.  She was told to follow-up in our office by orthopedic surgery. She continues to follow with orthopedic surgery and physical therapy for ongoing knee and hip pains. Patient's  recently passed away. She plans on flying out to [facility] for [place] with her family. Patient states she has her good days and bad days. She takes Xanax as needed. Review of Systems   Constitutional:  Negative for fatigue and fever. HENT:  Negative for sore throat. Eyes:  Negative for visual disturbance. Respiratory:  Negative for cough, chest tightness and shortness of breath. Cardiovascular:  Negative for chest pain, palpitations and leg swelling. Gastrointestinal:  Negative for abdominal pain, constipation, diarrhea and nausea. Endocrine: Negative for cold intolerance and heat intolerance. Genitourinary:  Negative for flank pain. Musculoskeletal:  Negative for back pain and neck pain. Right knee and hip pain   Skin:  Negative for rash. Neurological:  Negative for headaches. Psychiatric/Behavioral:  Negative for behavioral problems and confusion. Current Outpatient Medications on File Prior to Visit   Medication Sig   • ALPRAZolam (XANAX) 0.25 mg tablet Take 1 tablet (0.25 mg total) by mouth daily at bedtime as needed for anxiety or sleep   • b complex vitamins capsule Take 1 capsule by mouth daily   • cholecalciferol (VITAMIN D3) 1,000 units tablet Take 1,000 Units by mouth daily   • clobetasol (TEMOVATE) 0.05 % ointment Apply topically as needed    • docusate sodium (COLACE) 100 mg capsule Take 100 mg by mouth daily   • metoprolol succinate (TOPROL-XL) 25 mg 24 hr tablet TAKE 2 AND 1/2 TABLETS BY MOUTH EVERY DAY. TAKE 1 TABLET BY MOUTH EVERY MORNING AND 1 AND 1/2 TABLETS BY MOUTH EVERY EVENING.    • Xarelto 20 MG tablet TAKE 1 TABLET BY MOUTH EVERY DAY WITH BREAKFAST   • [DISCONTINUED] flecainide (TAMBOCOR) 150 MG tablet Take 1 tablet (150 mg total) by mouth as needed (at onset of palpitations lasting >5 minutes; if palpitations last >30 minutes, take another tablet) **Do not exceed 2 tablets in 24 hours**       Objective     /68 (BP Location: Left arm, Patient Position: Sitting, Cuff Size: Large)   Pulse 70   Temp (!) 96.5 °F (35.8 °C)   Resp 16   Ht 5' 5" (1.651 m)   Wt 86.9 kg (191 lb 8 oz)   SpO2 99%   BMI 31.87 kg/m²     Physical Exam  Vitals and nursing note reviewed. Constitutional:       Appearance: She is well-developed. HENT:      Head: Normocephalic and atraumatic. Cardiovascular:      Rate and Rhythm: Normal rate and regular rhythm. Pulmonary:      Effort: Pulmonary effort is normal.      Breath sounds: Normal breath sounds. Musculoskeletal:         General: Tenderness present. Neurological:      General: No focal deficit present. Mental Status: She is alert.    Psychiatric:         Mood and Affect: Mood normal.         Behavior: Behavior normal.       Jolane Ahumada, MD

## 2023-12-07 NOTE — ASSESSMENT & PLAN NOTE
Encouraged regular weight bearing exercises, healthy diet, calcium and vitamin d supplementation.   Repeat DEXA scan in 1 year

## 2023-12-07 NOTE — PROGRESS NOTES
Daily Note     Today's date: 2023  Patient name: Lizzeth Iglesias  : 1940  MRN: [id number]  Referring provider: Yuliana Khan DO  Dx:   Encounter Diagnosis     ICD-10-CM    1. Chronic pain of right knee  M25.561     G89.29                      Subjective: Pt reports having discomfort in right hip and knee. She notes that her discomfort is keeping her up at night time. She denies any pain in her lumbar spine or radicular symptoms. Objective: See treatment diary below      Assessment: Implemented additional hip exercises into POC. Tolerated treatment well. Shin discomfort is much improved. Patient exhibited good technique with therapeutic exercises and would benefit from continued PT. Plan: Continue per plan of care.       Precautions: HTN, PAD, A-Fib, CAD, MENON      Manuals 11/7 11/10 11/14 11/17 11/21 11/24 11/28 12/1 12/5 12/7   STM to R anterior tib insertion 5' 5' 8' 5' 5' 5' 5' 5' 5' 5'   R PROM of the knee 5' 5'  3' 3' 3' 3' 3' 3' 3'   R PFJ mobs                          Neuro Re-Ed             Quad sets 30x5" 2x10 5# 2x10 5 " 10x5" 15x5" 20x5" 20x5"      TKE        Gtb 30x  Gtb 30x Gtb 30x   Standing hip abd 2x10 10x 10x 10x 2x10 2x10 2x10  2x10 2x10   Standing hip extension 2x10 10x 10x 10x 2x10 2x10 2x10  2x10 2x10   Mini squat             LAQ 3#2x10  2x10  2x10 2x10 2# 2x10 2#        SLR   10x Standing 10x 2x10  standing  2x10 standing 2x10 standing      Bridge     10x 2x10 2x10      Ther Ex             Nustep/bicycle Nustep 7' L2 Nustep 8' L3 Nustep 8' L4 Nustep 12' L4 Nustep 8' L5  Nustep 8' L5   9' L5 8' L5  8' L5    Heel slides             Quad stretch             Hamstring curl  2x10 rtb  2x10 rtb  2x10 rtb  2x10 rtb         Gastroc stretch SB 3x30"  SB 10x10"  SB 10x10" SB 10x10" SB 5x10"  SB 5x10"  SB 5x10   SB 5x10" SB 5x10"   Piriformis stretch     10x10"   10x10" 10x10" 10x10" 10x10" 10x10 R"   Hamstring stretch      10x10"  10x10"  10x10" 10x10" R   Standing L/S extension over plinth          10x5"   SKTC          5x10" R   Ther Activity             Standing HR 3x10 3x10 3x10  3x10 3x10 3x10  3x10    Sit to stand transfers             Lateral walks             Step ups        6" 10x  6" 10x 6"    Gait Training                                         1:1 with PT from 230-310pm

## 2023-12-12 ENCOUNTER — OFFICE VISIT (OUTPATIENT)
Dept: PHYSICAL THERAPY | Facility: CLINIC | Age: 83
End: 2023-12-12
Payer: COMMERCIAL

## 2023-12-12 DIAGNOSIS — G89.29 CHRONIC PAIN OF RIGHT KNEE: Primary | ICD-10-CM

## 2023-12-12 DIAGNOSIS — M25.561 CHRONIC PAIN OF RIGHT KNEE: Primary | ICD-10-CM

## 2023-12-12 PROCEDURE — 97140 MANUAL THERAPY 1/> REGIONS: CPT | Performed by: PHYSICAL THERAPIST

## 2023-12-12 PROCEDURE — 97112 NEUROMUSCULAR REEDUCATION: CPT | Performed by: PHYSICAL THERAPIST

## 2023-12-12 PROCEDURE — 97110 THERAPEUTIC EXERCISES: CPT | Performed by: PHYSICAL THERAPIST

## 2023-12-12 NOTE — PROGRESS NOTES
Daily Note     Today's date: 2023  Patient name: Lizzeth Iglesias  : 1940  MRN: [id number]  Referring provider: Yuliana Khan DO  Dx:   Encounter Diagnosis     ICD-10-CM    1. Chronic pain of right knee  M25.561     G89.29                      Subjective: Pt reports no worse right hip discomfort and improving knee pain. Pt follows up with Dr. Champ Ramirez tomorrow regarding knee surgery. Objective: See treatment diary below      Assessment: Pt is finding PT very valuable thus far. Tolerated treatment well. Continued to implemented hip flexibility and stretching to help manage this symptoms. Mechanical clicking and clunking continues as knee ranges from flexion to extension. Pt also complains of pain with initial weight bearing due to mechanical instability. Patient demonstrated fatigue post treatment and exhibited good technique with therapeutic exercises. Plan: Continue per plan of care.       Precautions: HTN, PAD, A-Fib, CAD, MENON      Manuals    STM to R anterior tib insertion 5'   5' 5' 5' 5' 5' 5' 5'   R PROM of the knee 5'   3' 3' 3' 3' 3' 3' 3'   R PFJ mobs                          Neuro Re-Ed             Quad sets    10x5" 15x5" 20x5" 20x5"      TKE        Gtb 30x  Gtb 30x Gtb 30x   Standing hip abd 2x10   10x 2x10 2x10 2x10  2x10 2x10   Standing hip extension 2x10   10x 2x10 2x10 2x10  2x10 2x10   Mini squat             LAQ 2x10 2#   2x10 2# 2x10 2#        SLR    Standing 10x 2x10  standing  2x10 standing 2x10 standing      Bridge     10x 2x10 2x10      Ther Ex             Nustep/bicycle 9' L5   Nustep 12' L4 Nustep 8' L5  Nustep 8' L5   9' L5 8' L5  8' L5    Heel slides             Quad stretch             Hamstring curl    2x10 rtb  2x10 rtb         Gastroc stretch    SB 10x10" SB 5x10"  SB 5x10"  SB 5x10   SB 5x10" SB 5x10"   Piriformis stretch 10x10 R"    10x10"   10x10" 10x10" 10x10" 10x10" 10x10 R"   Hamstring stretch 10x10 R"     10x10" 10x10"  10x10" 10x10" R   Standing L/S extension over plinth          10x5"   SKTC 10x10 R"         5x10" R   Ther Activity             Standing HR     3x10 3x10 3x10  3x10    Sit to stand transfers             Lateral walks             Step ups 6" 20x       6" 10x  6" 10x 6"    Gait Training                                         1:1 with PT from [id number]

## 2023-12-14 ENCOUNTER — OFFICE VISIT (OUTPATIENT)
Dept: OBGYN CLINIC | Facility: CLINIC | Age: 83
End: 2023-12-14
Payer: COMMERCIAL

## 2023-12-14 VITALS
SYSTOLIC BLOOD PRESSURE: 132 MMHG | DIASTOLIC BLOOD PRESSURE: 85 MMHG | BODY MASS INDEX: 31.49 KG/M2 | HEART RATE: 81 BPM | HEIGHT: 65 IN | WEIGHT: 189 LBS

## 2023-12-14 DIAGNOSIS — M85.80 OSTEOPENIA, UNSPECIFIED LOCATION: ICD-10-CM

## 2023-12-14 DIAGNOSIS — M17.11 PRIMARY OSTEOARTHRITIS OF RIGHT KNEE: ICD-10-CM

## 2023-12-14 DIAGNOSIS — G89.29 CHRONIC PAIN OF RIGHT KNEE: Primary | ICD-10-CM

## 2023-12-14 DIAGNOSIS — M25.561 CHRONIC PAIN OF RIGHT KNEE: Primary | ICD-10-CM

## 2023-12-14 PROCEDURE — 99213 OFFICE O/P EST LOW 20 MIN: CPT | Performed by: ORTHOPAEDIC SURGERY

## 2023-12-14 NOTE — PROGRESS NOTES
Assessment:  1. Chronic pain of right knee        2. Primary osteoarthritis of right knee        3. Osteopenia, unspecified location  Vitamin D Panel    Calcium        Patient Active Problem List   Diagnosis    Essential hypertension    Chronic left shoulder pain    MENON (dyspnea on exertion)    Lichen sclerosus    Lipoma of right lower extremity    Pure hypercholesterolemia    Abnormal stress test    Paroxysmal atrial fibrillation (HCC)    Coronary artery disease involving native coronary artery of native heart without angina pectoris    Class 1 obesity due to excess calories without serious comorbidity with body mass index (BMI) of 30.0 to 30.9 in adult    Acute nonintractable headache    Baker cyst, left    Chronic pain of left knee    Bilateral primary osteoarthritis of knee    Osteopenia of multiple sites    Primary osteoarthritis of right knee    Greater trochanteric bursitis of right hip    Swelling of lower extremity           Plan      82 y.o. female with right knee osteoarthritis  Patient has tried and failed conservative treatments outlined below. Patient needs clearance from her Cardiologist before we can schedule surgery. I did reach out to Dr Bertha Baca regarding this. Patient has recent diagnosis of osteopenia, calcium and vitamin D panels ordered for further evaluation   The pre and post operative expectations of a total knee arthroplasty were discussed, all her questions were addressed. Once she has cardiac clearance she can follow up to sign consent and schedule surgery. Subjective:     Patient ID:    Chief Complaint:Mikayla Breaux 82 y.o. female      HPI    Patient presents for follow up evaluation of chronic right knee pain secondary to osteoarthritis. Patient has tried tylenol, CSI, visco, voltaren, physical therapy, assistive devices with mild benefit. Since her last visit patient got a dexa scan demonstrating osteopenia she is now taking calcium and vitamin d daily.  She would like to discuss a right knee total arthroplasty today. The following portions of the patient's history were reviewed and updated as appropriate: allergies, current medications, past family history, past social history, past surgical history and problem list.        Social History     Socioeconomic History    Marital status:      Spouse name: Not on file    Number of children: Not on file    Years of education: Not on file    Highest education level: Not on file   Occupational History    Not on file   Tobacco Use    Smoking status: Never    Smokeless tobacco: Never   Vaping Use    Vaping status: Never Used   Substance and Sexual Activity    Alcohol use: Yes     Alcohol/week: 5.0 standard drinks of alcohol     Types: 5 Glasses of wine per week     Comment: 1 glass nightly    Drug use: Never    Sexual activity: Not Currently     Partners: Male   Other Topics Concern    Not on file   Social History Narrative    Not on file     Social Determinants of Health     Financial Resource Strain: Not on file   Food Insecurity: Not on file   Transportation Needs: Not on file   Physical Activity: Not on file   Stress: Not on file   Social Connections: Not on file   Intimate Partner Violence: Not on file   Housing Stability: Not on file     Past Medical History:   Diagnosis Date    Allergic 1980    [address] tin    Arthritis 2008    Benign essential hypertension     Hyperlipidemia, unspecified     Hypertension 2016    Osteoarthritis of left knee     Osteoporosis     PAD (peripheral artery disease) ([address]) 09/29/2021    Perichondritis of ear, right      Past Surgical History:   Procedure Laterality Date    CARDIAC CATHETERIZATION Left 7/6/2022    Procedure: Cardiac Left Heart Cath;  Surgeon: Leigh Munoz MD;  Location: [facility];   Service: Cardiology    COLON SURGERY  2003    Rectoseal     LAPAROSCOPIC OVARIAN CYSTECTOMY Right 1981    LAPAROSCOPIC OVARIAN CYSTECTOMY Left 2003    rectoseal and cystoseal maite TONSILLECTOMY AND ADENOIDECTOMY  1952    TUBAL LIGATION  1972     Allergies   Allergen Reactions    Macrodantin [Nitrofurantoin] Nausea Only, Dizziness and Headache     Current Outpatient Medications on File Prior to Visit   Medication Sig Dispense Refill    ALPRAZolam (XANAX) 0.25 mg tablet Take 1 tablet (0.25 mg total) by mouth daily at bedtime as needed for anxiety or sleep 30 tablet 0    b complex vitamins capsule Take 1 capsule by mouth daily      cholecalciferol (VITAMIN D3) 1,000 units tablet Take 1,000 Units by mouth daily      clobetasol (TEMOVATE) 0.05 % ointment Apply topically as needed       docusate sodium (COLACE) 100 mg capsule Take 100 mg by mouth daily      metoprolol succinate (TOPROL-XL) 25 mg 24 hr tablet TAKE 2 AND 1/2 TABLETS BY MOUTH EVERY DAY. TAKE 1 TABLET BY MOUTH EVERY MORNING AND 1 AND 1/2 TABLETS BY MOUTH EVERY EVENING. 75 tablet 2    Xarelto 20 MG tablet TAKE 1 TABLET BY MOUTH EVERY DAY WITH BREAKFAST 30 tablet 5     No current facility-administered medications on file prior to visit. Objective:    Review of Systems   Constitutional:  Negative for chills and fever. HENT:  Negative for ear pain and sore throat. Eyes:  Negative for pain and visual disturbance. Respiratory:  Negative for cough and shortness of breath. Cardiovascular:  Negative for chest pain and palpitations. Gastrointestinal:  Negative for abdominal pain and vomiting. Genitourinary:  Negative for dysuria and hematuria. Musculoskeletal:  Negative for arthralgias and back pain. Skin:  Negative for color change and rash. Neurological:  Negative for seizures and syncope. All other systems reviewed and are negative. Ortho Exam  Unchanged since previous    Physical Exam  Vitals and nursing note reviewed. HENT:      Head: Normocephalic. Eyes:      Extraocular Movements: Extraocular movements intact. Cardiovascular:      Rate and Rhythm: Normal rate. Pulses: Normal pulses. Pulmonary:      Effort: Pulmonary effort is normal.   Musculoskeletal:         General: Normal range of motion. Cervical back: Normal range of motion. Comments: See ortho exam   Skin:     General: Skin is warm and dry. Neurological:      General: No focal deficit present. Mental Status: She is alert. Psychiatric:         Behavior: Behavior normal.         Procedures  No Procedures performed today    I have personally reviewed pertinent films in PACS. Portions of the record may have been created with voice recognition software. Occasional wrong word or "sound a like" substitutions may have occurred due to the inherent limitations of voice recognition software. Read the chart carefully and recognize, using context, where substitutions have occurred.

## 2023-12-15 ENCOUNTER — OFFICE VISIT (OUTPATIENT)
Dept: PHYSICAL THERAPY | Facility: CLINIC | Age: 83
End: 2023-12-15
Payer: COMMERCIAL

## 2023-12-15 ENCOUNTER — APPOINTMENT (OUTPATIENT)
Dept: LAB | Facility: CLINIC | Age: 83
End: 2023-12-15
Payer: COMMERCIAL

## 2023-12-15 DIAGNOSIS — G89.29 CHRONIC PAIN OF RIGHT KNEE: Primary | ICD-10-CM

## 2023-12-15 DIAGNOSIS — M85.80 OSTEOPENIA, UNSPECIFIED LOCATION: ICD-10-CM

## 2023-12-15 DIAGNOSIS — M25.561 CHRONIC PAIN OF RIGHT KNEE: Primary | ICD-10-CM

## 2023-12-15 LAB — CALCIUM SERPL-MCNC: 9.5 MG/DL (ref 8.4–10.2)

## 2023-12-15 PROCEDURE — 97112 NEUROMUSCULAR REEDUCATION: CPT | Performed by: PHYSICAL THERAPIST

## 2023-12-15 PROCEDURE — 82306 VITAMIN D 25 HYDROXY: CPT

## 2023-12-15 PROCEDURE — 97140 MANUAL THERAPY 1/> REGIONS: CPT | Performed by: PHYSICAL THERAPIST

## 2023-12-15 PROCEDURE — 36415 COLL VENOUS BLD VENIPUNCTURE: CPT

## 2023-12-15 PROCEDURE — 82310 ASSAY OF CALCIUM: CPT

## 2023-12-15 PROCEDURE — 97110 THERAPEUTIC EXERCISES: CPT | Performed by: PHYSICAL THERAPIST

## 2023-12-15 NOTE — PROGRESS NOTES
Daily Note     Today's date: 12/15/2023  Patient name: Sara Salas  : 1940  MRN: [id number]  Referring provider: France Morejon DO  Dx:   Encounter Diagnosis     ICD-10-CM    1. Chronic pain of right knee  M25.561     G89.29                      Subjective: Pt saw Dr. Flo Coker this week. She will be undergoing additional lab work. Objective: See treatment diary below      Assessment: Tolerated treatment well. Implemented PA hip mobilizations and STM to the gluteals. Patient unable to perform LAQ as her knee felt it was grabbing and catching (mechanical symptoms). Recommend patient move knee in different directions to help reduce that sensation of catching. However, patient able to perform WB step ups. Plan: Continue per plan of care.       Precautions: HTN, PAD, A-Fib, CAD, MENON      Manuals 12/12 12/15    11/24 11/28 12/1 12/5 12/7   STM to R anterior tib insertion 5'     5' 5' 5' 5' 5'   R PROM of the knee 5' 5'    3' 3' 3' 3' 3'   R PFJ mobs             Foam roller STM to R gluteals  3'           PA hip mob  R Gr III           Neuro Re-Ed             Quad sets      20x5" 20x5"      TKE        Gtb 30x  Gtb 30x Gtb 30x   Standing hip abd 2x10 2x10    2x10 2x10  2x10 2x10   Standing hip extension 2x10 2x10    2x10 2x10  2x10 2x10   Mini squat             LAQ 2x10 2# Unable to perform today            SLR  2x10 standing    2x10 standing 2x10 standing      Clam shell  2x10           Bridge  2x10    2x10 2x10      Ther Ex             Nustep/bicycle 9' L5 9' L5    Nustep 8' L5   9' L5 8' L5  8' L5    Heel slides             Quad stretch             Hamstring curl             Gastroc stretch      SB 5x10"  SB 5x10   SB 5x10" SB 5x10"   Piriformis stretch 10x10 R" 10x10 R"    10x10" 10x10" 10x10" 10x10" 10x10 R"   Hamstring stretch 10x10 R"     10x10"  10x10"  10x10" 10x10" R   Standing L/S extension over plinth          10x5"   SKTC 10x10 R"         5x10" R   Ther Activity             Standing HR 3x10 3x10  3x10    Sit to stand transfers             Lateral walks             Step ups 6" 20x 6" 2x10       6" 10x  6" 10x 6"    Gait Training                                         1:1 with PT from [id number]

## 2023-12-19 ENCOUNTER — OFFICE VISIT (OUTPATIENT)
Dept: PHYSICAL THERAPY | Facility: CLINIC | Age: 83
End: 2023-12-19
Payer: COMMERCIAL

## 2023-12-19 DIAGNOSIS — M25.561 CHRONIC PAIN OF RIGHT KNEE: Primary | ICD-10-CM

## 2023-12-19 DIAGNOSIS — G89.29 CHRONIC PAIN OF RIGHT KNEE: Primary | ICD-10-CM

## 2023-12-19 PROCEDURE — 97140 MANUAL THERAPY 1/> REGIONS: CPT | Performed by: PHYSICAL THERAPIST

## 2023-12-19 PROCEDURE — 97110 THERAPEUTIC EXERCISES: CPT | Performed by: PHYSICAL THERAPIST

## 2023-12-19 PROCEDURE — 97112 NEUROMUSCULAR REEDUCATION: CPT | Performed by: PHYSICAL THERAPIST

## 2023-12-19 NOTE — PROGRESS NOTES
"Daily Note     Today's date: 2023  Patient name: Mikayla Breaux  : 1940  MRN: [id number]  Referring provider: Meliton Quiñonez DO  Dx:   Encounter Diagnosis     ICD-10-CM    1. Chronic pain of right knee  M25.561     G89.29           Start Time: 1030  Stop Time: 1115  Total time in clinic (min): 45 minutes    Subjective: Pt reports improving hip pain but continued issues with straightening her right knee and a tooth infection.       Objective: See treatment diary below      Assessment: Tolerated treatment well. Increased LAQ to 3#. However, pt needs to perform a hamstring stretch prior to terminally extending her knee. Recommend patient perform this regularly, as it significantly helped her symptoms in her knee. Patient demonstrated fatigue post treatment, exhibited good technique with therapeutic exercises, and would benefit from continued PT.      Plan: Continue per plan of care.      Precautions: HTN, PAD, A-Fib, CAD, MENON      Manuals 12/12 12/15 12/19   11/24 11/28 12/1 12/5 12/7   STM to R anterior tib insertion 5'     5' 5' 5' 5' 5'   R PROM of the knee 5' 5' 5'   3' 3' 3' 3' 3'   R PFJ mobs             Foam roller STM to R gluteals  3' 3'          PA hip mob  R Gr III R Gr III          Neuro Re-Ed             Quad sets      20x5\" 20x5\"      TKE        Gtb 30x  Gtb 30x Gtb 30x   Standing hip abd 2x10 2x10 2x10   2x10 2x10  2x10 2x10   Standing hip extension 2x10 2x10 2x10   2x10 2x10  2x10 2x10   Mini squat             LAQ 2x10 2# Unable to perform today  3# 2x10 (need to perform hamstring stretch first)           SLR  2x10 standing 2x10 standing   2x10 standing 2x10 standing      Clam shell  2x10 2x10          Bridge  2x10 2x10   2x10 2x10      Ther Ex             Nustep/bicycle 9' L5 9' L5 10'L5   Nustep 8' L5   9' L5 8' L5  8' L5    Heel slides             Quad stretch             Hamstring curl             Gastroc stretch      SB 5x10\"  SB 5x10   SB 5x10\" SB 5x10\"   Piriformis stretch 10x10 " "R\" 10x10 R\" 10x10\" R   10x10\" 10x10\" 10x10\" 10x10\" 10x10 R\"   Hamstring stretch 10x10 R\"  10x10\" R   10x10\"  10x10\"  10x10\" 10x10\" R   Standing L/S extension over plinth          10x5\"   SKTC 10x10 R\"         5x10\" R   Ther Activity             Standing HR      3x10 3x10  3x10    Sit to stand transfers             Lateral walks             Step ups 6\" 20x 6\" 2x10  6\" 2x10      6\" 10x  6\" 10x 6\"    Gait Training                                         1:1 with PT from [id number]                                                                                             "

## 2023-12-21 LAB
25(OH)D2 SERPL-MCNC: <1 NG/ML
25(OH)D3 SERPL-MCNC: 32 NG/ML
25(OH)D3+25(OH)D2 SERPL-MCNC: 32 NG/ML

## 2023-12-28 ENCOUNTER — APPOINTMENT (OUTPATIENT)
Dept: PHYSICAL THERAPY | Facility: CLINIC | Age: 83
End: 2023-12-28
Payer: COMMERCIAL

## 2023-12-29 ENCOUNTER — APPOINTMENT (OUTPATIENT)
Dept: PHYSICAL THERAPY | Facility: CLINIC | Age: 83
End: 2023-12-29
Payer: COMMERCIAL

## 2024-01-02 ENCOUNTER — OFFICE VISIT (OUTPATIENT)
Dept: PHYSICAL THERAPY | Facility: CLINIC | Age: 84
End: 2024-01-02
Payer: COMMERCIAL

## 2024-01-02 DIAGNOSIS — M25.561 CHRONIC PAIN OF RIGHT KNEE: Primary | ICD-10-CM

## 2024-01-02 DIAGNOSIS — G89.29 CHRONIC PAIN OF RIGHT KNEE: Primary | ICD-10-CM

## 2024-01-02 PROCEDURE — 97112 NEUROMUSCULAR REEDUCATION: CPT | Performed by: PHYSICAL THERAPIST

## 2024-01-02 PROCEDURE — 97140 MANUAL THERAPY 1/> REGIONS: CPT | Performed by: PHYSICAL THERAPIST

## 2024-01-02 PROCEDURE — 97110 THERAPEUTIC EXERCISES: CPT | Performed by: PHYSICAL THERAPIST

## 2024-01-02 NOTE — PROGRESS NOTES
"Daily Note     Today's date: 2024  Patient name: Mikayla Breaux  : 1940  MRN: [id number]  Referring provider: Meliton Quiñonez DO  Dx:   Encounter Diagnosis     ICD-10-CM    1. Chronic pain of right knee  M25.561     G89.29           Start Time: 08  Stop Time: 919  Total time in clinic (min): 49 minutes    Subjective: Pt reports fluctuating pain in her right knee. She was away for 2 weeks. She has still be doing her home exercises.       Objective: See treatment diary below      Assessment: Tolerated treatment well. Pt struggles to obtain terminal knee extension after her knee is stretched into flexion. She needed to weight bear and walk/move her knee around prior to obtaining full extension again. This seems to occur frequently. Most of patient's stretch and discomfort is in posterior knee with knee flexion. Patient demonstrated fatigue post treatment and would benefit from continued PT      Plan: Continue per plan of care.      Precautions: HTN, PAD, A-Fib, CAD, MENON      Manuals 12/12 12/15 12/19 1/2    12/1 12/5 12/7   STM to R anterior tib insertion 5'       5' 5' 5'   R PROM of the knee 5' 5' 5' 5'    3' 3' 3'   R PFJ mobs             Foam roller STM to R gluteals  3' 3' 3'         PA hip mob  R Gr III R Gr III R hip PROM 3'         Neuro Re-Ed             Quad sets             TKE    Gtb  30x    Gtb 30x  Gtb 30x Gtb 30x   Standing hip abd 2x10 2x10 2x10 2x10     2x10 2x10   Standing hip extension 2x10 2x10 2x10 2x10     2x10 2x10   Mini squat             LAQ 2x10 2# Unable to perform today  3# 2x10 (need to perform hamstring stretch first)  3# 2x10 (need to perform hamstring stretch first)          SLR  2x10 standing 2x10 standing 2x10 standing         Clam shell  2x10 2x10          Bridge  2x10 2x10          Ther Ex             Nustep/bicycle 9' L5 9' L5 10'L5 8' L 5    9' L5 8' L5  8' L5    SB gastroc stretch    10x10\" R         Heel slides             Quad stretch             Hamstring curl   " "          Gastroc stretch         SB 5x10\" SB 5x10\"   Piriformis stretch 10x10 R\" 10x10 R\" 10x10\" R     10x10\" 10x10\" 10x10 R\"   Hamstring stretch 10x10 R\"  10x10\" R 10x10\" R     10x10\" 10x10\" R   Standing L/S extension over plinth          10x5\"   SKTC 10x10 R\"         5x10\" R   Ther Activity             Standing HR         3x10    Sit to stand transfers             Lateral walks             Step ups 6\" 20x 6\" 2x10  6\" 2x10  6\" 2x10     6\" 10x  6\" 10x 6\"    Gait Training                                         1:1 with PT from [id number]                                                                                               "

## 2024-01-04 ENCOUNTER — OFFICE VISIT (OUTPATIENT)
Dept: PHYSICAL THERAPY | Facility: CLINIC | Age: 84
End: 2024-01-04
Payer: COMMERCIAL

## 2024-01-04 DIAGNOSIS — M25.561 CHRONIC PAIN OF RIGHT KNEE: Primary | ICD-10-CM

## 2024-01-04 DIAGNOSIS — G89.29 CHRONIC PAIN OF RIGHT KNEE: Primary | ICD-10-CM

## 2024-01-04 PROCEDURE — 97110 THERAPEUTIC EXERCISES: CPT | Performed by: PHYSICAL THERAPIST

## 2024-01-04 PROCEDURE — 97140 MANUAL THERAPY 1/> REGIONS: CPT | Performed by: PHYSICAL THERAPIST

## 2024-01-04 PROCEDURE — 97112 NEUROMUSCULAR REEDUCATION: CPT | Performed by: PHYSICAL THERAPIST

## 2024-01-04 NOTE — PROGRESS NOTES
"Daily Note     Today's date: 2024  Patient name: Mikayla Breaux  : 1940  MRN: [id number]  Referring provider: Meliton Quiñonez DO  Dx:   Encounter Diagnosis     ICD-10-CM    1. Chronic pain of right knee  M25.561     G89.29           Start Time: 09  Stop Time: 1015  Total time in clinic (min): 45 minutes    Subjective: Pt doing well this morning. No new complaints.       Objective: See treatment diary below      Assessment: Improved step ups with less discomfort. Reduce posterior knee pain after manual knee flexion stretch. I did provide mild traction to the knee after flexion stretch which helped. Tolerated treatment well. Patient demonstrated fatigue post treatment.      Plan: Continue per plan of care.      Precautions: HTN, PAD, A-Fib, CAD, MENON      Manuals 12/12 12/15 12/19 1/2 1/4  12/1 12/5 12/7   STM to R anterior tib insertion 5'      5' 5' 5'   R PROM of the knee 5' 5' 5' 5' 8' with mild traction after flexion stretch to assist with achieving extension  3' 3' 3'   R PFJ mobs            Foam roller STM to R gluteals  3' 3' 3'        PA hip mob  R Gr III R Gr III R hip PROM 3'        Neuro Re-Ed            Quad sets            TKE    Gtb  30x   Gtb 30x  Gtb 30x Gtb 30x   Standing hip abd 2x10 2x10 2x10 2x10 2x10 3#   2x10 2x10   Standing hip extension 2x10 2x10 2x10 2x10 2x10 3#   2x10 2x10   Mini squat            LAQ 2x10 2# Unable to perform today  3# 2x10 (need to perform hamstring stretch first)  3# 2x10 (need to perform hamstring stretch first)  3# 3x10 (need to perform hamstring stretch first)        SLR  2x10 standing 2x10 standing 2x10 standing 2x10 3#       Clam shell  2x10 2x10         Bridge  2x10 2x10         Ther Ex            Nustep/bicycle 9' L5 9' L5 10'L5 8' L 5 8' L 5  9' L5 8' L5  8' L5    SB gastroc stretch    10x10\" R 10x10\" R       Heel slides            Quad stretch            Hamstring curl            Gastroc stretch        SB 5x10\" SB 5x10\"   Piriformis stretch 10x10 R\" " "10x10 R\" 10x10\" R    10x10\" 10x10\" 10x10 R\"   Hamstring stretch 10x10 R\"  10x10\" R 10x10\" R 10x10\" R   10x10\" 10x10\" R   Standing L/S extension over plinth         10x5\"   SKTC 10x10 R\"        5x10\" R   Ther Activity            Standing HR        3x10    Sit to stand transfers            Lateral walks            Step ups 6\" 20x 6\" 2x10  6\" 2x10  6\" 2x10  6\" 2x10   6\" 10x  6\" 10x 6\"    Gait Training                                      1:1 with PT from [id number]                                                                                               "

## 2024-01-09 ENCOUNTER — OFFICE VISIT (OUTPATIENT)
Dept: PHYSICAL THERAPY | Facility: CLINIC | Age: 84
End: 2024-01-09
Payer: COMMERCIAL

## 2024-01-09 DIAGNOSIS — G89.29 CHRONIC PAIN OF RIGHT KNEE: Primary | ICD-10-CM

## 2024-01-09 DIAGNOSIS — M25.561 CHRONIC PAIN OF RIGHT KNEE: Primary | ICD-10-CM

## 2024-01-09 PROCEDURE — 97140 MANUAL THERAPY 1/> REGIONS: CPT | Performed by: PHYSICAL THERAPIST

## 2024-01-09 PROCEDURE — 97110 THERAPEUTIC EXERCISES: CPT | Performed by: PHYSICAL THERAPIST

## 2024-01-09 PROCEDURE — 97112 NEUROMUSCULAR REEDUCATION: CPT | Performed by: PHYSICAL THERAPIST

## 2024-01-09 NOTE — PROGRESS NOTES
Daily Note     Today's date: 2024  Patient name: Mikayla Breaux  : 1940  MRN: [id number]  Referring provider: Meliton Quiñonez DO  Dx:   Encounter Diagnosis     ICD-10-CM    1. Chronic pain of right knee  M25.561     G89.29                      Subjective: Pt reports experiencing a lot of pain and stiffness after sitting in particular chair over the weekend. She states that when she went to sit down to do a puzzle, her knee was extremely bothersome. After patient loosened up her knee, she was able to resume sitting in the same chair without difficulty.        Objective: See treatment diary below      Assessment: Patient tolerated treatment well until manual knee stretching. When flexing patient's knee, she felt a painful click. Pt usually experiences as click when transitioning from extension to flexion at about 90 degrees. However, today was painful. She has some symptoms radiating into the lateral LE. Our vigour of PROM was gentle and light despite complaints of current knee pain.  Pt was able to ambulate out of office after treatment however, still had difficult with terminal knee extension. This seems to be a common occurrence with her knee pain. Patient seems to have a heightening pain state with certain positions of the knee. The activities that increase her pain are disproportionate to the response. We will resume exercises next session as tolerated.       Plan: Continue per plan of care.      Precautions: HTN, PAD, A-Fib, CAD, MENON      Manuals 12/12 12/15 12/19 1 1   STM to R anterior tib insertion 5'       5'   R PROM of the knee 5' 5' 5' 5' 8' with mild traction after flexion stretch to assist with achieving extension 5' -unable to tolerate  3'   R PFJ mobs           Foam roller STM to R gluteals  3' 3' 3'       PA hip mob  R Gr III R Gr III R hip PROM 3'       Neuro Re-Ed           Quad sets           TKE    Gtb  30x    Gtb 30x   Standing hip abd 2x10 2x10 2x10 2x10 2x10 3# 2x10  "3#  2x10   Standing hip extension 2x10 2x10 2x10 2x10 2x10 3# 2x10 3#  2x10   Mini squat           LAQ 2x10 2# Unable to perform today  3# 2x10 (need to perform hamstring stretch first)  3# 2x10 (need to perform hamstring stretch first)  3# 3x10 (need to perform hamstring stretch first)  3# 3x10 (need to perform hamstring stretch first)      SLR  2x10 standing 2x10 standing 2x10 standing 2x10 3# 2x10 3#     Clam shell  2x10 2x10        Bridge  2x10 2x10        Ther Ex           Nustep/bicycle 9' L5 9' L5 10'L5 8' L 5 8' L 5 8' L 5  10' L5    SB gastroc stretch    10x10\" R 10x10\" R 10x10\" R     Heel slides           Quad stretch           Hamstring curl           Gastroc stretch        SB 5x10\"   Piriformis stretch 10x10 R\" 10x10 R\" 10x10\" R     10x10 R\"   Hamstring stretch 10x10 R\"  10x10\" R 10x10\" R 10x10\" R 10x10\" R  10x10\" R   Standing L/S extension over plinth        10x5\"   SKTC 10x10 R\"       5x10\" R   Ther Activity           Standing HR           Sit to stand transfers           Lateral walks           Step ups 6\" 20x 6\" 2x10  6\" 2x10  6\" 2x10  6\" 2x10  6\" 2x10   6\"    Gait Training                                   1:1 with PT from [phone]                                                                                                 "

## 2024-01-11 ENCOUNTER — EVALUATION (OUTPATIENT)
Dept: PHYSICAL THERAPY | Facility: CLINIC | Age: 84
End: 2024-01-11
Payer: COMMERCIAL

## 2024-01-11 DIAGNOSIS — M25.561 CHRONIC PAIN OF RIGHT KNEE: Primary | ICD-10-CM

## 2024-01-11 DIAGNOSIS — G89.29 CHRONIC PAIN OF RIGHT KNEE: Primary | ICD-10-CM

## 2024-01-11 PROCEDURE — 97112 NEUROMUSCULAR REEDUCATION: CPT | Performed by: PHYSICAL THERAPIST

## 2024-01-11 PROCEDURE — 97110 THERAPEUTIC EXERCISES: CPT | Performed by: PHYSICAL THERAPIST

## 2024-01-11 PROCEDURE — 97530 THERAPEUTIC ACTIVITIES: CPT | Performed by: PHYSICAL THERAPIST

## 2024-01-11 NOTE — PROGRESS NOTES
[id number]    Today's date: 2024  Patient name: Mikayla Breaux  : 1940  MRN: [id number]  Referring provider: Meliton Quiñonez DO  Dx:   Encounter Diagnosis     ICD-10-CM    1. Chronic pain of right knee  M25.561     G89.29           Start Time: 1200  Stop Time: 1255  Total time in clinic (min): 55 minutes    Assessment  Assessment details: Mikayla Breaux is a 82 y.o. female who presents with signs and symptoms consistent of bilateral knee OA with R knee pain. Pt is eager to schedule TKA with Dr. Robles; however, current has dental issues which are taking a priority. Pt has been attending PT and compliant with HEP. She has good and bad days with her involved knee. Sometimes not consistent with activity, patient's right knee locks and she is unable to terminally extend her knee without effort and measures to assist that motion. Her strength and endurance at involved LE is gradually improving. She finds PT to be particularly beneficial and helping her prepare for total knee. Pt denies much hip pain or shin pain at this time, both which were limiting functional strength and progression. Pt continues to ambulate with a SPC for safety and support. Pt continues to display decreased muscle performance (right hip and knee), weakness in RLE, and flexibility/joint ROM restrictions. Pt will attend 2 more sessions of PT then discharge to our fitness program. She was given the exercises for review and the next 2 sessions will focus on improving independence.       Impairments: abnormal gait, abnormal or restricted ROM, activity intolerance, impaired physical strength, lacks appropriate home exercise program, pain with function and weight-bearing intolerance  Understanding of Dx/Px/POC: good   Prognosis: good    Goals  Short Term Goals: to be achieved by 4 weeks  1) Patient to be independent with basic HEP.-MET  2) Decrease pain to 3/10 at its worst.-Partially met  3) Increase right knee ROM by 5-10 degrees  -Partially met  4) Increase LE strength by 1/2 MMT grade in all deficient planes.-Partially met    Long Term Goals: to be achieved by discharge  1) FOTO equal to or greater than expected.-Partially met  2) Ambulation to improve to maximal level of function-Partially met  3) Stair negotiation will improve to reciprocal.-Partially met  4) Sit to stand transfers will improve to maximal level of function -Partially met      Plan  Patient would benefit from: PT eval and skilled physical therapy  Planned modality interventions: low level laser therapy  Planned therapy interventions: joint mobilization, manual therapy, neuromuscular re-education, patient education, therapeutic activities, therapeutic exercise and home exercise program  Frequency: 2 more sessions prior to fitness program.       Subjective Evaluation    History of Present Illness  Mechanism of injury: History of Current Injury: Pt referred to PT from Dr. Quiñonez secondary to bilateral knee OA and R knee pain. Pt saw Dr. Robles on 10/16 and recommend patient undergo R TKA. Pt most recently had viscosupplmentation in August with Dr. Quiñonez (synvisc 1). Pt  admits to a lot of popping and cracking but denies buckling or giving way. Pt has difficulty negotiating stairs and performs this with compensation. Pt also reports pain in anterior shin of the left knee after her dogs tail hit her leg. She denies any falls or trauma. Her pain concern is the pain in the anterior shin vs the knee at this time.   Pt lives a [facility] on the second floor. She needs to take the elevator. She was quite active prior to June as she was attending exercise class, walking, and going on hikes  Pain location/Descriptors: P1)Right Anterior and posterior right knee pain - a couple of years. P2) Right Anterior tibia pain (length of shin) - 2 months   Aggravating factors: Post static dyskinesia, pain with tranfers. Pt notes difficulty with initial steps, prolonged walking,  "stairs  Easing factors: Voltren, tylenol (max limit), Injections  24 HR pattern: Depending on activity and at night time.   Imaging: VAS of LE: no arterial occlusive disease noted.   Special Questions: Pt denies numbness/tingling in RLE   Patient goals:  Return to an active lifestyle, going to exercise class, walking.  Hobbies/Interest: See above        Pain  Pain scale: P1)0  P2)Mild.  Pain scale at highest: P1)3  P2)Low.        Objective     Active Range of Motion   Left Knee   Flexion: 125 degrees   Extensor lag: 3 degrees     Right Knee   Flexion: 120 degrees with pain  Extensor la degrees with pain    Strength/Myotome Testing     Right Hip   Planes of Motion   Flexion: 4+  Extension: 4  Abduction: 4  External rotation: 4+    Right knee:  Flexion: 4+  Extension: 4+    Left knee:   Flexion: 5  Extension: 5    Tests     Additional Tests Details  Moderate tenderness along right tibia and anterior tib insertion from tib plateau to distal 1/3 of tibia.     Hamstring length: WNL B  Piriformis length: WNL B    Quad contraction: Good    Ambulation     Observational Gait   Gait: antalgic and asymmetric - this continues. Patient is now using a SPC.  Decreased walking speed and stride length.     Additional Observational Gait Details  Significant antalgic pattern with decreased WB through RLE and small step length. Minimal fluidity of movement throughout the right knee (stiff knee pattern). Circumduction present for RLE advancement.     Step up test: 8\"-Signification UE compensation and vaulting. - Much improved  but vaulting continues.     Stairs: 6\" stairs- vaulting noted with RLE step up.- No longer vaulting on    Normal eccentric lowering -6\"       TU.68 seconds (No SPC, no UE support during transfer)     5x sit to stand: 14.92 second with hands on knees x2          Precautions: HTN, PAD, A-Fib, CAD, MENON      Manuals 12/12 12/15 12/19 1/2 1/4 1/9 1/11    STM to R anterior tib insertion 5'          R " "PROM of the knee 5' 5' 5' 5' 8' with mild traction after flexion stretch to assist with achieving extension 5' -unable to tolerate     R PFJ mobs           Foam roller STM to R gluteals  3' 3' 3'       PA hip mob  R Gr III R Gr III R hip PROM 3'       Neuro Re-Ed           Quad sets           TKE    Gtb  30x       Standing hip abd 2x10 2x10 2x10 2x10 2x10 3# 2x10 3#     Standing hip extension 2x10 2x10 2x10 2x10 2x10 3# 2x10 3#     Mini squat           LAQ 2x10 2# Unable to perform today  3# 2x10 (need to perform hamstring stretch first)  3# 2x10 (need to perform hamstring stretch first)  3# 3x10 (need to perform hamstring stretch first)  3# 3x10 (need to perform hamstring stretch first)      SLR  2x10 standing 2x10 standing 2x10 standing 2x10 3# 2x10 3#     Clam shell  2x10 2x10        Bridge  2x10 2x10        Ther Ex           Nustep/bicycle 9' L5 9' L5 10'L5 8' L 5 8' L 5 8' L 5 10' L5    SB gastroc stretch    10x10\" R 10x10\" R 10x10\" R     Heel slides       10x10\" R with PB and strap    Quad stretch           Hamstring curl           Gastroc stretch           Piriformis stretch 10x10 R\" 10x10 R\" 10x10\" R        Hamstring stretch 10x10 R\"  10x10\" R 10x10\" R 10x10\" R 10x10\" R     Standing L/S extension over plinth           SKTC 10x10 R\"          Ther Activity           Standing HR           Sit to stand transfers           Lateral walks           Step ups 6\" 20x 6\" 2x10  6\" 2x10  6\" 2x10  6\" 2x10  6\" 2x10      Gait Training                                   1:1 with PT from 12-1255pm  Updated HEP: [email or web address] Access Code: [email or web address]  Pt was re-evaluated x 40 minutes                                                                                                   "

## 2024-01-15 ENCOUNTER — TELEPHONE (OUTPATIENT)
Age: 84
End: 2024-01-15

## 2024-01-15 NOTE — TELEPHONE ENCOUNTER
Nona called has an appt scheduled for tomorrow 1/16 in the am and w the upcoming bad weather prediction she doesn't think she can make the appt.    She's ok w doing a VV, but it didn't allow me to change it. I tried to warm transfer w no success.    If VV is not allowed for this visit, please call pt to reschedule.    Thank you

## 2024-01-16 ENCOUNTER — APPOINTMENT (OUTPATIENT)
Dept: PHYSICAL THERAPY | Facility: CLINIC | Age: 84
End: 2024-01-16
Payer: COMMERCIAL

## 2024-01-17 ENCOUNTER — APPOINTMENT (OUTPATIENT)
Dept: PHYSICAL THERAPY | Facility: CLINIC | Age: 84
End: 2024-01-17
Payer: COMMERCIAL

## 2024-01-17 NOTE — PROGRESS NOTES
"Daily Note     Today's date: 2024  Patient name: Mikayla Breaux  : 1940  MRN: [id number]  Referring provider: Meliton Quiñonez DO  Dx: No diagnosis found.               Subjective: Patient reports      Objective: See treatment diary below      Assessment: Tolerated treatment well. Patient would benefit from continued PT      Plan: Continue per plan of care.      Precautions: HTN, PAD, A-Fib, CAD, MENON      Manuals 12/12 12/15 12/19 1/2 1/4 1/9 1/11 1/17   STM to R anterior tib insertion 5'          R PROM of the knee 5' 5' 5' 5' 8' with mild traction after flexion stretch to assist with achieving extension 5' -unable to tolerate     R PFJ mobs           Foam roller STM to R gluteals  3' 3' 3'       PA hip mob  R Gr III R Gr III R hip PROM 3'       Neuro Re-Ed           Quad sets           TKE    Gtb  30x       Standing hip abd 2x10 2x10 2x10 2x10 2x10 3# 2x10 3#     Standing hip extension 2x10 2x10 2x10 2x10 2x10 3# 2x10 3#     Mini squat           LAQ 2x10 2# Unable to perform today  3# 2x10 (need to perform hamstring stretch first)  3# 2x10 (need to perform hamstring stretch first)  3# 3x10 (need to perform hamstring stretch first)  3# 3x10 (need to perform hamstring stretch first)      SLR  2x10 standing 2x10 standing 2x10 standing 2x10 3# 2x10 3#     Clam shell  2x10 2x10        Bridge  2x10 2x10        Ther Ex           Nustep/bicycle 9' L5 9' L5 10'L5 8' L 5 8' L 5 8' L 5 10' L5    SB gastroc stretch    10x10\" R 10x10\" R 10x10\" R     Heel slides       10x10\" R with PB and strap    Quad stretch           Hamstring curl           Gastroc stretch           Piriformis stretch 10x10 R\" 10x10 R\" 10x10\" R        Hamstring stretch 10x10 R\"  10x10\" R 10x10\" R 10x10\" R 10x10\" R     Standing L/S extension over plinth           SKTC 10x10 R\"          Ther Activity           Standing HR           Sit to stand transfers           Lateral walks           Step ups 6\" 20x 6\" 2x10  6\" 2x10  6\" 2x10  6\" 2x10  6\" 2x10 "      Gait Training                                   1:1 with PT from 12-1255pm  Updated HEP: [email or web address] Access Code: [email or web address]  Pt was re-evaluated x 40 minutes

## 2024-01-19 ENCOUNTER — APPOINTMENT (OUTPATIENT)
Dept: PHYSICAL THERAPY | Facility: CLINIC | Age: 84
End: 2024-01-19
Payer: COMMERCIAL

## 2024-01-22 DIAGNOSIS — I48.0 PAROXYSMAL ATRIAL FIBRILLATION (HCC): ICD-10-CM

## 2024-01-22 RX ORDER — METOPROLOL SUCCINATE 25 MG/1
TABLET, EXTENDED RELEASE ORAL
Qty: 75 TABLET | Refills: 2 | Status: SHIPPED | OUTPATIENT
Start: 2024-01-22

## 2024-01-23 ENCOUNTER — APPOINTMENT (OUTPATIENT)
Dept: PHYSICAL THERAPY | Facility: CLINIC | Age: 84
End: 2024-01-23
Payer: COMMERCIAL

## 2024-01-26 ENCOUNTER — APPOINTMENT (OUTPATIENT)
Dept: PHYSICAL THERAPY | Facility: CLINIC | Age: 84
End: 2024-01-26
Payer: COMMERCIAL

## 2024-02-02 ENCOUNTER — OFFICE VISIT (OUTPATIENT)
Dept: FAMILY MEDICINE CLINIC | Facility: CLINIC | Age: 84
End: 2024-02-02
Payer: COMMERCIAL

## 2024-02-02 VITALS
RESPIRATION RATE: 18 BRPM | DIASTOLIC BLOOD PRESSURE: 74 MMHG | HEIGHT: 65 IN | OXYGEN SATURATION: 98 % | HEART RATE: 67 BPM | WEIGHT: 189 LBS | TEMPERATURE: 97.8 F | SYSTOLIC BLOOD PRESSURE: 136 MMHG | BODY MASS INDEX: 31.49 KG/M2

## 2024-02-02 DIAGNOSIS — I10 ESSENTIAL HYPERTENSION: ICD-10-CM

## 2024-02-02 DIAGNOSIS — R20.2 PARESTHESIA OF RIGHT FOOT: ICD-10-CM

## 2024-02-02 DIAGNOSIS — E78.00 PURE HYPERCHOLESTEROLEMIA: ICD-10-CM

## 2024-02-02 DIAGNOSIS — I48.0 PAROXYSMAL ATRIAL FIBRILLATION (HCC): Primary | ICD-10-CM

## 2024-02-02 DIAGNOSIS — M17.11 PRIMARY OSTEOARTHRITIS OF RIGHT KNEE: ICD-10-CM

## 2024-02-02 PROCEDURE — 99214 OFFICE O/P EST MOD 30 MIN: CPT | Performed by: FAMILY MEDICINE

## 2024-02-02 NOTE — ASSESSMENT & PLAN NOTE
Following orthopedic surgery.  Failed conservative measures.  Orthopedic surgery recommended TKA.  Patient would like a second opinion.  Referral placed

## 2024-02-02 NOTE — PROGRESS NOTES
Name: Mikayla Breaux      : 1940      MRN: [id number]  Encounter Provider: Cheyenne Gomez MD  Encounter Date: 2024   Encounter department: [facility]    Assessment & Plan     1. Paroxysmal atrial fibrillation (HCC)  Assessment & Plan:  Remains on metoprolol and Xarelto.  Continue following with cardiology    Orders:  -     CBC and differential; Future  -     Comprehensive metabolic panel; Future  -     TSH, 3rd generation with Free T4 reflex; Future    2. Primary osteoarthritis of right knee  Assessment & Plan:  Following orthopedic surgery.  Failed conservative measures.  Orthopedic surgery recommended TKA.  Patient would like a second opinion.  Referral placed    Orders:  -     Ambulatory Referral to Orthopedic Surgery; Future    3. Essential hypertension  Assessment & Plan:  BP Readings from Last 3 Encounters:   24 136/74   23 132/85   23 124/68     Blood pressure remains well-controlled.  Continue metoprolol 25 mg daily      4. Paresthesia of right foot  -     Vitamin B12; Future    5. Pure hypercholesterolemia  -     Lipid panel; Future       Recommend wearing compression stockings consistently.  Orthostatic vitals in office today within normal limits..  Obtain blood work listed above.  Tolerating beta-blocker well.    Follow-up appointment with cardiology scheduled for March.  She would like a second opinion regarding her knees before committing to surgery.  Follow-up with oral surgery regarding dental infection.    Subjective      Patient presents with:  Follow-up: 6 mos follow up    Patient presents today for 6-month follow-up.  Patient had a tooth infection on the bottom right.  Oral surgeon consultation next week.  Dentist feels tooth needs to be pulled.  Treated with antibiotics.  Completed antibiotics which caused gastric distress.  Continues to follow with cardiology. Planning on TKA with ortho.     3 episodes of feeling like she is going to pass  out.  You have an denies any lightheadedness or dizziness during these episodes.  Is a full body sensation.  She feels weakness in her legs the top of the thigh down.  Feels that her legs might give out on her.  Equal on both sides.  Does not feel off balance.  Denies any pain numbness or tingling.  denies any changes in vision or speech.  Denies any palpitations or changes in her heart rate.  Episodes last less than a minute.  She will sit down and she is able to get back up and continue which she is doing.  1 episode she was standing in the kitchen cooking.  The other episode was getting off the toilet while she was walking back to the bedroom.  Episodes are random.  Of note these episodes have occurred when she was not wearing compression stockings.      Review of Systems   Constitutional:  Negative for activity change, fatigue and fever.   Eyes:  Negative for visual disturbance.   Respiratory:  Negative for shortness of breath.    Cardiovascular:  Negative for chest pain.   Gastrointestinal:  Negative for abdominal pain, constipation, diarrhea and nausea.   Endocrine: Negative for cold intolerance and heat intolerance.   Musculoskeletal:  Negative for back pain.        Bilateral knee pain   Skin:  Negative for rash.   Neurological:  Positive for weakness. Negative for headaches.   Psychiatric/Behavioral:  Negative for confusion.        Current Outpatient Medications on File Prior to Visit   Medication Sig   • b complex vitamins capsule Take 1 capsule by mouth daily   • cholecalciferol (VITAMIN D3) 1,000 units tablet Take 1,000 Units by mouth daily   • clobetasol (TEMOVATE) 0.05 % ointment Apply topically as needed    • metoprolol succinate (TOPROL-XL) 25 mg 24 hr tablet TAKE 1 TABLET BY MOUTH EVERY MORNING AND TAKE 1 AND 1/2 TABLETS BY MOUTH EVERY EVENING   • Xarelto 20 MG tablet TAKE 1 TABLET BY MOUTH EVERY DAY WITH BREAKFAST   • docusate sodium (COLACE) 100 mg capsule Take 100 mg by mouth daily (Patient not  "taking: Reported on 2/2/2024)   • [DISCONTINUED] ALPRAZolam (XANAX) 0.25 mg tablet Take 1 tablet (0.25 mg total) by mouth daily at bedtime as needed for anxiety or sleep       Objective     /74 (BP Location: Left arm, Patient Position: Standing, Cuff Size: Large)   Pulse 67   Temp 97.8 °F (36.6 °C)   Resp 18   Ht 5' 5\" (1.651 m)   Wt 85.7 kg (189 lb)   SpO2 98%   BMI 31.45 kg/m²     Physical Exam  Vitals and nursing note reviewed.   Constitutional:       Appearance: She is well-developed.   HENT:      Head: Normocephalic and atraumatic.   Cardiovascular:      Rate and Rhythm: Normal rate and regular rhythm.   Pulmonary:      Effort: Pulmonary effort is normal.      Breath sounds: Normal breath sounds.   Musculoskeletal:         General: Tenderness present.   Neurological:      General: No focal deficit present.      Mental Status: She is alert.   Psychiatric:         Mood and Affect: Mood normal.         Behavior: Behavior normal.       Cheyenne Gomez MD    "

## 2024-02-02 NOTE — ASSESSMENT & PLAN NOTE
BP Readings from Last 3 Encounters:   02/02/24 136/74   12/14/23 132/85   12/07/23 124/68     Blood pressure remains well-controlled.  Continue metoprolol 25 mg daily

## 2024-02-15 ENCOUNTER — TELEPHONE (OUTPATIENT)
Dept: CARDIOLOGY CLINIC | Facility: CLINIC | Age: 84
End: 2024-02-15

## 2024-02-15 ENCOUNTER — APPOINTMENT (OUTPATIENT)
Dept: LAB | Facility: CLINIC | Age: 84
End: 2024-02-15
Payer: COMMERCIAL

## 2024-02-15 DIAGNOSIS — R20.2 PARESTHESIA OF RIGHT FOOT: ICD-10-CM

## 2024-02-15 DIAGNOSIS — I48.0 PAROXYSMAL ATRIAL FIBRILLATION (HCC): ICD-10-CM

## 2024-02-15 DIAGNOSIS — E78.00 PURE HYPERCHOLESTEROLEMIA: ICD-10-CM

## 2024-02-15 LAB
ALBUMIN SERPL BCP-MCNC: 3.8 G/DL (ref 3.5–5)
ALP SERPL-CCNC: 70 U/L (ref 34–104)
ALT SERPL W P-5'-P-CCNC: 17 U/L (ref 7–52)
ANION GAP SERPL CALCULATED.3IONS-SCNC: 8 MMOL/L
AST SERPL W P-5'-P-CCNC: 18 U/L (ref 13–39)
BASOPHILS # BLD AUTO: 0.01 THOUSANDS/ÂΜL (ref 0–0.1)
BASOPHILS NFR BLD AUTO: 0 % (ref 0–1)
BILIRUB SERPL-MCNC: 0.68 MG/DL (ref 0.2–1)
BUN SERPL-MCNC: 17 MG/DL (ref 5–25)
CALCIUM SERPL-MCNC: 9 MG/DL (ref 8.4–10.2)
CHLORIDE SERPL-SCNC: 104 MMOL/L (ref 96–108)
CHOLEST SERPL-MCNC: 199 MG/DL
CO2 SERPL-SCNC: 28 MMOL/L (ref 21–32)
CREAT SERPL-MCNC: 0.71 MG/DL (ref 0.6–1.3)
EOSINOPHIL # BLD AUTO: 0.13 THOUSAND/ÂΜL (ref 0–0.61)
EOSINOPHIL NFR BLD AUTO: 3 % (ref 0–6)
ERYTHROCYTE [DISTWIDTH] IN BLOOD BY AUTOMATED COUNT: 12.9 % (ref 11.6–15.1)
GFR SERPL CREATININE-BSD FRML MDRD: 80 ML/MIN/1.73SQ M
GLUCOSE P FAST SERPL-MCNC: 82 MG/DL (ref 65–99)
HCT VFR BLD AUTO: 47.9 % (ref 34.8–46.1)
HDLC SERPL-MCNC: 74 MG/DL
HGB BLD-MCNC: 15 G/DL (ref 11.5–15.4)
IMM GRANULOCYTES # BLD AUTO: 0 THOUSAND/UL (ref 0–0.2)
IMM GRANULOCYTES NFR BLD AUTO: 0 % (ref 0–2)
LDLC SERPL CALC-MCNC: 110 MG/DL (ref 0–100)
LYMPHOCYTES # BLD AUTO: 1.72 THOUSANDS/ÂΜL (ref 0.6–4.47)
LYMPHOCYTES NFR BLD AUTO: 45 % (ref 14–44)
MCH RBC QN AUTO: 29.9 PG (ref 26.8–34.3)
MCHC RBC AUTO-ENTMCNC: 31.3 G/DL (ref 31.4–37.4)
MCV RBC AUTO: 95 FL (ref 82–98)
MONOCYTES # BLD AUTO: 0.42 THOUSAND/ÂΜL (ref 0.17–1.22)
MONOCYTES NFR BLD AUTO: 11 % (ref 4–12)
NEUTROPHILS # BLD AUTO: 1.61 THOUSANDS/ÂΜL (ref 1.85–7.62)
NEUTS SEG NFR BLD AUTO: 41 % (ref 43–75)
NONHDLC SERPL-MCNC: 125 MG/DL
NRBC BLD AUTO-RTO: 0 /100 WBCS
PLATELET # BLD AUTO: 186 THOUSANDS/UL (ref 149–390)
PMV BLD AUTO: 10.1 FL (ref 8.9–12.7)
POTASSIUM SERPL-SCNC: 4.3 MMOL/L (ref 3.5–5.3)
PROT SERPL-MCNC: 6.4 G/DL (ref 6.4–8.4)
RBC # BLD AUTO: 5.02 MILLION/UL (ref 3.81–5.12)
SODIUM SERPL-SCNC: 140 MMOL/L (ref 135–147)
TRIGL SERPL-MCNC: 77 MG/DL
TSH SERPL DL<=0.05 MIU/L-ACNC: 1.68 UIU/ML (ref 0.45–4.5)
VIT B12 SERPL-MCNC: 211 PG/ML (ref 180–914)
WBC # BLD AUTO: 3.89 THOUSAND/UL (ref 4.31–10.16)

## 2024-02-15 PROCEDURE — 80061 LIPID PANEL: CPT

## 2024-02-15 PROCEDURE — 85025 COMPLETE CBC W/AUTO DIFF WBC: CPT

## 2024-02-15 PROCEDURE — 80053 COMPREHEN METABOLIC PANEL: CPT

## 2024-02-15 PROCEDURE — 82607 VITAMIN B-12: CPT

## 2024-02-15 PROCEDURE — 36415 COLL VENOUS BLD VENIPUNCTURE: CPT

## 2024-02-15 PROCEDURE — 84443 ASSAY THYROID STIM HORMONE: CPT

## 2024-02-15 NOTE — TELEPHONE ENCOUNTER
Pt having a dental extraction the end of the month and asking if she should hold or continue Xarelto?

## 2024-03-05 ENCOUNTER — OFFICE VISIT (OUTPATIENT)
Dept: CARDIOLOGY CLINIC | Facility: CLINIC | Age: 84
End: 2024-03-05
Payer: COMMERCIAL

## 2024-03-05 ENCOUNTER — TELEPHONE (OUTPATIENT)
Dept: CARDIOLOGY CLINIC | Facility: CLINIC | Age: 84
End: 2024-03-05

## 2024-03-05 VITALS
WEIGHT: 190.3 LBS | HEIGHT: 65 IN | BODY MASS INDEX: 31.71 KG/M2 | DIASTOLIC BLOOD PRESSURE: 70 MMHG | OXYGEN SATURATION: 97 % | SYSTOLIC BLOOD PRESSURE: 132 MMHG | HEART RATE: 71 BPM

## 2024-03-05 DIAGNOSIS — E78.00 PURE HYPERCHOLESTEROLEMIA: ICD-10-CM

## 2024-03-05 DIAGNOSIS — I48.0 PAROXYSMAL ATRIAL FIBRILLATION (HCC): Primary | ICD-10-CM

## 2024-03-05 DIAGNOSIS — I25.10 CORONARY ARTERY DISEASE INVOLVING NATIVE CORONARY ARTERY OF NATIVE HEART WITHOUT ANGINA PECTORIS: ICD-10-CM

## 2024-03-05 DIAGNOSIS — I10 ESSENTIAL HYPERTENSION: ICD-10-CM

## 2024-03-05 PROCEDURE — 93000 ELECTROCARDIOGRAM COMPLETE: CPT | Performed by: INTERNAL MEDICINE

## 2024-03-05 PROCEDURE — 99214 OFFICE O/P EST MOD 30 MIN: CPT | Performed by: INTERNAL MEDICINE

## 2024-03-05 NOTE — PROGRESS NOTES
Cardiology Consultation     Mikayla Breaux  [id number]  1940  [facility]  [address] [facility] [address]    1. Paroxysmal atrial fibrillation (HCC)  POCT ECG      2. Essential hypertension        3. Coronary artery disease involving native coronary artery of native heart without angina pectoris        4. Pure hypercholesterolemia          Chief Complaint   Patient presents with    Follow-up     F/u     Shortness of Breath     On exertion     Dizziness     Sporadically - weak lower extremities     HPI: Patient was recently seen by Dr. Plunkett from  to discuss options regarding management of atrial fibrillation.  For the time being, she has been continued on beta-blocker and anticoagulation.  There was a plan to try flecainide as a pill in the pocket and then consider possible admission for dofetilide or sotalol for continued rhythm control.  She does occasionally still have some shortness of breath and palpitations in the setting of atrial fibrillation, but seems to be less than before.  No reported chest pain, lightheadedness, syncope, LE edema, orthopnea, PND, or significant weight changes.  Patient remains active without any increased fatigue out of the ordinary.        Patient Active Problem List   Diagnosis    Essential hypertension    Chronic left shoulder pain    MENON (dyspnea on exertion)    Lichen sclerosus    Lipoma of right lower extremity    Pure hypercholesterolemia    Abnormal stress test    Paroxysmal atrial fibrillation (HCC)    Coronary artery disease involving native coronary artery of native heart without angina pectoris    Class 1 obesity due to excess calories without serious comorbidity with body mass index (BMI) of 30.0 to 30.9 in adult    Acute nonintractable headache    Baker cyst, left    Chronic pain of left knee    Bilateral primary osteoarthritis of knee    Osteopenia of  multiple sites    Primary osteoarthritis of right knee    Greater trochanteric bursitis of right hip    Swelling of lower extremity     Past Medical History:   Diagnosis Date    Allergic 1980    [address] tin    Arthritis 2008    Benign essential hypertension     Hyperlipidemia, unspecified     Hypertension 2016    Osteoarthritis of left knee     Osteoporosis     PAD (peripheral artery disease) ([facility]) 09/29/2021    Perichondritis of ear, right      Social History     Socioeconomic History    Marital status:      Spouse name: Not on file    Number of children: Not on file    Years of education: Not on file    Highest education level: Not on file   Occupational History    Not on file   Tobacco Use    Smoking status: Never    Smokeless tobacco: Never   Vaping Use    Vaping status: Never Used   Substance and Sexual Activity    Alcohol use: Yes     Alcohol/week: 5.0 standard drinks of alcohol     Types: 5 Glasses of wine per week     Comment: 1 glass nightly    Drug use: Never    Sexual activity: Not Currently     Partners: Male   Other Topics Concern    Not on file   Social History Narrative    Not on file     Social Determinants of Health     Financial Resource Strain: Not on file   Food Insecurity: Not on file   Transportation Needs: Not on file   Physical Activity: Not on file   Stress: Not on file   Social Connections: Not on file   Intimate Partner Violence: Not on file   Housing Stability: Not on file      Family History   Problem Relation Age of Onset    Hyperlipidemia Mother     Heart Valve Disease Mother     Hypertension Mother     Arthritis Father     Diabetes Father     Alcohol abuse Father     Prostate cancer Father 80    Diabetes Brother     Atrial fibrillation Brother     Alcohol abuse Brother     Arthritis Brother     Pancreatic cancer Maternal Aunt 65    No Known Problems Paternal Uncle     No Known Problems Paternal Uncle     No Known Problems Paternal Uncle     No Known Problems Paternal Uncle      "Hypertension Daughter     Bradycardia Son     No Known Problems Son      Past Surgical History:   Procedure Laterality Date    CARDIAC CATHETERIZATION Left 7/6/2022    Procedure: Cardiac Left Heart Cath;  Surgeon: Grover Sommer MD;  Location: [facility];  Service: Cardiology    COLON SURGERY  2003    Rectoseal     LAPAROSCOPIC OVARIAN CYSTECTOMY Right 1981    LAPAROSCOPIC OVARIAN CYSTECTOMY Left 2003    rectoseal and cystoseal repari    TONSILLECTOMY AND ADENOIDECTOMY  1952    TUBAL LIGATION  1972       Current Outpatient Medications:     b complex vitamins capsule, Take 1 capsule by mouth daily, Disp: , Rfl:     cholecalciferol (VITAMIN D3) 1,000 units tablet, Take 1,000 Units by mouth daily, Disp: , Rfl:     metoprolol succinate (TOPROL-XL) 25 mg 24 hr tablet, TAKE 1 TABLET BY MOUTH EVERY MORNING AND TAKE 1 AND 1/2 TABLETS BY MOUTH EVERY EVENING, Disp: 75 tablet, Rfl: 2    Xarelto 20 MG tablet, TAKE 1 TABLET BY MOUTH EVERY DAY WITH BREAKFAST, Disp: 30 tablet, Rfl: 5    clobetasol (TEMOVATE) 0.05 % ointment, Apply topically as needed  (Patient not taking: Reported on 3/5/2024), Disp: , Rfl:     docusate sodium (COLACE) 100 mg capsule, Take 100 mg by mouth daily (Patient not taking: Reported on 2/2/2024), Disp: , Rfl:   Allergies   Allergen Reactions    Macrodantin [Nitrofurantoin] Nausea Only, Dizziness and Headache     Vitals:    03/05/24 0938   BP: 132/70   BP Location: Left arm   Patient Position: Sitting   Cuff Size: Standard   Pulse: 71   SpO2: 97%   Weight: 86.3 kg (190 lb 4.8 oz)   Height: 5' 5\" (1.651 m)       Labs:  Appointment on 02/15/2024   Component Date Value    WBC 02/15/2024 3.89 (L)     RBC 02/15/2024 5.02     Hemoglobin 02/15/2024 15.0     Hematocrit 02/15/2024 47.9 (H)     MCV 02/15/2024 95     MCH 02/15/2024 29.9     MCHC 02/15/2024 31.3 (L)     RDW 02/15/2024 12.9     MPV 02/15/2024 10.1     Platelets 02/15/2024 186     nRBC 02/15/2024 0     Neutrophils Relative 02/15/2024 41 (L)     " Immat GRANS % 02/15/2024 0     Lymphocytes Relative 02/15/2024 45 (H)     Monocytes Relative 02/15/2024 11     Eosinophils Relative 02/15/2024 3     Basophils Relative 02/15/2024 0     Neutrophils Absolute 02/15/2024 1.61 (L)     Immature Grans Absolute 02/15/2024 0.00     Lymphocytes Absolute 02/15/2024 1.72     Monocytes Absolute 02/15/2024 0.42     Eosinophils Absolute 02/15/2024 0.13     Basophils Absolute 02/15/2024 0.01     TSH 3RD GENERATON 02/15/2024 1.684     Cholesterol 02/15/2024 199     Triglycerides 02/15/2024 77     HDL, Direct 02/15/2024 74     LDL Calculated 02/15/2024 110 (H)     Non-HDL-Chol (CHOL-HDL) 02/15/2024 125     Vitamin B-12 02/15/2024 211     Sodium 02/15/2024 140     Potassium 02/15/2024 4.3     Chloride 02/15/2024 104     CO2 02/15/2024 28     ANION GAP 02/15/2024 8     BUN 02/15/2024 17     Creatinine 02/15/2024 0.71     Glucose, Fasting 02/15/2024 82     Calcium 02/15/2024 9.0     AST 02/15/2024 18     ALT 02/15/2024 17     Alkaline Phosphatase 02/15/2024 70     Total Protein 02/15/2024 6.4     Albumin 02/15/2024 3.8     Total Bilirubin 02/15/2024 0.68     eGFR 02/15/2024 80    Appointment on 12/15/2023   Component Date Value    25-HYDROXY VIT D 12/15/2023 32     25-Hydroxy D2 12/15/2023 <1.0     25-HYDROXY VIT D3 12/15/2023 32     Calcium 12/15/2023 9.5      Lab Results   Component Value Date    TRIG 77 02/15/2024    HDL 74 02/15/2024     Imaging: No results found.    Review of Systems:  Review of Systems   Constitutional:  Negative for activity change, appetite change, chills, diaphoresis, fatigue and unexpected weight change.   HENT:  Negative for hearing loss, nosebleeds and sore throat.    Eyes:  Negative for photophobia and visual disturbance.   Respiratory:  Positive for shortness of breath. Negative for cough, chest tightness and wheezing.    Cardiovascular:  Positive for palpitations. Negative for chest pain and leg swelling.   Gastrointestinal:  Negative for abdominal pain,  "diarrhea, nausea and vomiting.   Endocrine: Negative for polyuria.   Genitourinary:  Negative for dysuria, frequency and hematuria.   Musculoskeletal:  Positive for arthralgias. Negative for back pain, gait problem and neck pain.   Skin:  Negative for pallor and rash.   Neurological:  Negative for dizziness, syncope and headaches.   Hematological:  Does not bruise/bleed easily.   Psychiatric/Behavioral:  Negative for behavioral problems and confusion.        Physical Exam:  Physical Exam  Vitals reviewed.   Constitutional:       Appearance: Normal appearance. She is well-developed. She is not ill-appearing or diaphoretic.   HENT:      Head: Normocephalic and atraumatic.      Nose: Nose normal.   Eyes:      General: No scleral icterus.     Extraocular Movements: Extraocular movements intact.      Pupils: Pupils are equal, round, and reactive to light.   Neck:      Vascular: No JVD.   Cardiovascular:      Rate and Rhythm: Normal rate and regular rhythm.      Heart sounds: Normal heart sounds. No murmur heard.     No friction rub. No gallop.   Pulmonary:      Effort: Pulmonary effort is normal. No respiratory distress.      Breath sounds: Normal breath sounds. No wheezing or rales.   Abdominal:      General: Bowel sounds are normal. There is no distension.      Palpations: Abdomen is soft.      Tenderness: There is no abdominal tenderness.   Musculoskeletal:         General: No deformity. Normal range of motion.      Cervical back: Normal range of motion and neck supple.      Right lower leg: No edema.      Left lower leg: No edema.   Skin:     General: Skin is warm and dry.      Findings: No rash.   Neurological:      Mental Status: She is alert and oriented to person, place, and time.      Cranial Nerves: No cranial nerve deficit.   Psychiatric:         Mood and Affect: Mood normal.         Behavior: Behavior normal.       Blood pressure 132/70, pulse 71, height 5' 5\" (1.651 m), weight 86.3 kg (190 lb 4.8 oz), SpO2 " 97%, not currently breastfeeding.    EKG:  Normal sinus rhythm   Normal ECG    Discussion/Summary:  HTN: remains well controlled today, continued on metoprolol.  Had a nuclear stress test in Oct 2020 that was normal for EF and perfusion.  Echocardiogram at that time (and on repeat in May 2022) revealed normal LV & RV function, normal valves other than mild to moderate MR, moderate TR.  She has had MENON for many years - also noted by prior cardiologist documentation in 2017.  No changes made today, continue current regiment.    PAF/mild CAD: noted on stress testing and LHC revealed 10% pRCA lesion.  Continued on Xarelto for AC and Toprol XL for rate control, dose of which has been increased for improved rate control to 25mg BID.  Now appears to be having more frequent episodes of afib that are symptomatic and was seen by EP for further evaluation regarding rhythm management.  There was recommendation regarding flecainide as pill in pocket for the next few months until a possible hospital admission for dofetilide or sotalol therapy for rhythm control.  Considering that her symptoms are less, she would like to hold off on further rhythm control strategy for the time being.      HLD:  in Nov 2020, which is at goal - repeat levels in Oct 2021 revealed LDL of 113.  LDL 95 in June 2022.  Repeat  in April 2023.  LDL remains well-controlled at 110 in February 2024.

## 2024-04-05 ENCOUNTER — OFFICE VISIT (OUTPATIENT)
Dept: OBGYN CLINIC | Facility: MEDICAL CENTER | Age: 84
End: 2024-04-05
Payer: COMMERCIAL

## 2024-04-05 ENCOUNTER — TELEPHONE (OUTPATIENT)
Dept: CARDIOLOGY CLINIC | Facility: CLINIC | Age: 84
End: 2024-04-05

## 2024-04-05 ENCOUNTER — APPOINTMENT (OUTPATIENT)
Dept: RADIOLOGY | Facility: MEDICAL CENTER | Age: 84
End: 2024-04-05
Payer: COMMERCIAL

## 2024-04-05 VITALS
HEART RATE: 71 BPM | SYSTOLIC BLOOD PRESSURE: 151 MMHG | WEIGHT: 190 LBS | DIASTOLIC BLOOD PRESSURE: 84 MMHG | HEIGHT: 65 IN | BODY MASS INDEX: 31.65 KG/M2

## 2024-04-05 DIAGNOSIS — M17.11 PRIMARY OSTEOARTHRITIS OF RIGHT KNEE: Primary | ICD-10-CM

## 2024-04-05 DIAGNOSIS — G89.29 CHRONIC PAIN OF RIGHT KNEE: ICD-10-CM

## 2024-04-05 DIAGNOSIS — Z01.812 PRE-OPERATIVE LABORATORY EXAMINATION: ICD-10-CM

## 2024-04-05 DIAGNOSIS — Z47.1 AFTERCARE FOLLOWING RIGHT KNEE JOINT REPLACEMENT SURGERY: ICD-10-CM

## 2024-04-05 DIAGNOSIS — M25.561 RIGHT KNEE PAIN, UNSPECIFIED CHRONICITY: ICD-10-CM

## 2024-04-05 DIAGNOSIS — Z01.810 PRE-OPERATIVE CARDIOVASCULAR EXAMINATION: ICD-10-CM

## 2024-04-05 DIAGNOSIS — Z51.81 ANTICOAGULATION MANAGEMENT ENCOUNTER: ICD-10-CM

## 2024-04-05 DIAGNOSIS — M25.561 CHRONIC PAIN OF RIGHT KNEE: ICD-10-CM

## 2024-04-05 DIAGNOSIS — Z96.651 AFTERCARE FOLLOWING RIGHT KNEE JOINT REPLACEMENT SURGERY: ICD-10-CM

## 2024-04-05 DIAGNOSIS — Z79.01 ANTICOAGULATION MANAGEMENT ENCOUNTER: ICD-10-CM

## 2024-04-05 PROCEDURE — 99214 OFFICE O/P EST MOD 30 MIN: CPT | Performed by: ORTHOPAEDIC SURGERY

## 2024-04-05 PROCEDURE — 73560 X-RAY EXAM OF KNEE 1 OR 2: CPT

## 2024-04-05 RX ORDER — CHLORHEXIDINE GLUCONATE ORAL RINSE 1.2 MG/ML
15 SOLUTION DENTAL ONCE
OUTPATIENT
Start: 2024-04-05 | End: 2024-04-05

## 2024-04-05 RX ORDER — TRANEXAMIC ACID 10 MG/ML
1000 INJECTION, SOLUTION INTRAVENOUS ONCE
OUTPATIENT
Start: 2024-04-05 | End: 2024-04-05

## 2024-04-05 RX ORDER — CEFAZOLIN SODIUM 2 G/50ML
2000 SOLUTION INTRAVENOUS ONCE
OUTPATIENT
Start: 2024-04-05 | End: 2024-04-05

## 2024-04-05 NOTE — PROGRESS NOTES
83 y.o.female presents for evaluation of longstanding problems that pertain to her right knee.  She has been diagnosed osteoarthritis of the right knee.  She admits to despite prior treatments of injections of corticosteroid and viscosupplementation physical therapy, gradual worsening of weightbearing pain.  She has pain level the right knee joint, the pain is made worse bearing weight, the pain increases with increased activities.  Currently pain levels are 9 out of 10 and uses the term severe and unrelenting to describe them.  In addition she has had a towel from a heavy dog strike the midportion of right tibia which has resulted in pain in the right lower leg in addition to numbness and tingling over the dorsum of the right foot.  Those symptoms have not improved with the above-mentioned treatments including not limited to physical therapy for massage therapy    Review of Systems  Review of systems negative unless otherwise specified in HPI    Past Medical History  Past Medical History:   Diagnosis Date    Allergic 1980    [address] tin    Arthritis 2008    Benign essential hypertension     Hyperlipidemia, unspecified     Hypertension 2016    Osteoarthritis of left knee     Osteoporosis     PAD (peripheral artery disease) ([facility]) 09/29/2021    Perichondritis of ear, right        Past Surgical History  Past Surgical History:   Procedure Laterality Date    CARDIAC CATHETERIZATION Left 7/6/2022    Procedure: Cardiac Left Heart Cath;  Surgeon: Grover Sommer MD;  Location: [facility];  Service: Cardiology    COLON SURGERY  2003    Rectoseal     LAPAROSCOPIC OVARIAN CYSTECTOMY Right 1981    LAPAROSCOPIC OVARIAN CYSTECTOMY Left 2003    rectoseal and cystoseal repari    TONSILLECTOMY AND ADENOIDECTOMY  1952    TUBAL LIGATION  1972       Current Medications  Current Outpatient Medications on File Prior to Visit   Medication Sig Dispense Refill    b complex vitamins capsule Take 1 capsule by mouth daily       cholecalciferol (VITAMIN D3) 1,000 units tablet Take 1,000 Units by mouth daily      metoprolol succinate (TOPROL-XL) 25 mg 24 hr tablet TAKE 1 TABLET BY MOUTH EVERY MORNING AND TAKE 1 AND 1/2 TABLETS BY MOUTH EVERY EVENING 75 tablet 2    Xarelto 20 MG tablet TAKE 1 TABLET BY MOUTH EVERY DAY WITH BREAKFAST 30 tablet 5    clobetasol (TEMOVATE) 0.05 % ointment Apply topically as needed  (Patient not taking: Reported on 3/5/2024)      docusate sodium (COLACE) 100 mg capsule Take 100 mg by mouth daily (Patient not taking: Reported on 2/2/2024)       No current facility-administered medications on file prior to visit.       Recent Labs (HCT,HGB,PT,INR,ESR,CRP,GLU,HgA1C)  0   Lab Value Date/Time    HCT 47.9 (H) 02/15/2024 0811    HGB 15.0 02/15/2024 0811    WBC 3.89 (L) 02/15/2024 0811    HGBA1C 5.5 04/12/2023 0806         Physical exam  General: Awake, Alert, Oriented  Eyes: Pupils equal, round and reactive to light  Heart: regular rate and rhythm  Lungs: No audible wheezing  Abdomen: soft  Gait pattern is with antalgia despite use of a single-point cane.  Right hip is good mobility.  The right thigh has no atrophy.  Right knee is in varus.  There is no effusion.  There is bony enlargement and tenderness medially.  There is crepitation flexion-extension.  There is no palp warmth of the synovium.  There is moderate swelling from the mid leg distal.  Toes of the right foot are warm and mobile    Imaging  I have personally reviewed x-rays of the right knee from today and my interpretation as follows:  Greater than 50% medial and patellofemoral compartment joint space narrowing is seen.  Subchondral sclerosis and periarticular osteophytes are seen as well    Procedure  None indicated performed today    Assessment/Plan:   83 y.o.female who is anticoagulant by nature due to her atrial fibrillation, who has right knee arthritis that remains symptomatic despite appropriate nonsurgical treatments.  All diagnoses were discussed  with the patient.  Treatment option including risk, benefits, return discussed in detail.  I think elective right knee replacement surgery is warranted.  Consent was established to reflect this.  No guarantees were given.  I explained to the patient that her current symptoms relative to the dog related event are unlikely to improve with knee replacement, and that the nature of her knee replacement is to establish a cure for the root source of her current knee problem which is osteoarthritis

## 2024-04-05 NOTE — TELEPHONE ENCOUNTER
Tuyet called from Dr. Buitrago's (orthopedics) office.  Patient has surgery on 6/26/2024 and they need a clearance letter 30 days prior to appointment date. Pt was just seen in March. Orthopedic  fax  number is [phone] and phone number is [phone]. If pt needs to be scheduled for pre-op clearance, please contact her at [phone]. Thank you!

## 2024-04-09 NOTE — TELEPHONE ENCOUNTER
Clerical staff messaged to contact patient to arrange for appointment within 30 days of surgery for clearance.

## 2024-04-10 ENCOUNTER — TELEPHONE (OUTPATIENT)
Dept: CARDIOLOGY CLINIC | Facility: CLINIC | Age: 84
End: 2024-04-10

## 2024-04-10 DIAGNOSIS — M17.11 PRIMARY OSTEOARTHRITIS OF RIGHT KNEE: Primary | ICD-10-CM

## 2024-04-10 RX ORDER — FOLIC ACID 1 MG/1
1 TABLET ORAL DAILY
Qty: 30 TABLET | Refills: 0 | Status: SHIPPED | OUTPATIENT
Start: 2024-04-10 | End: 2024-05-10

## 2024-04-10 RX ORDER — ASCORBATE CALCIUM 500 MG
500 TABLET ORAL 2 TIMES DAILY
Qty: 60 TABLET | Refills: 0 | Status: SHIPPED | OUTPATIENT
Start: 2024-04-10 | End: 2024-05-10

## 2024-04-10 RX ORDER — FERROUS SULFATE 324(65)MG
324 TABLET, DELAYED RELEASE (ENTERIC COATED) ORAL
Qty: 30 TABLET | Refills: 0 | Status: SHIPPED | OUTPATIENT
Start: 2024-04-10 | End: 2024-05-10

## 2024-04-10 NOTE — TELEPHONE ENCOUNTER
.Caller: Nona Breaux    Doctor: Christiano     Reason for call: patient needs to schedule a EKG visit, please give patent a call     Call back#: [phone]

## 2024-04-12 DIAGNOSIS — I48.0 PAROXYSMAL ATRIAL FIBRILLATION (HCC): ICD-10-CM

## 2024-04-12 RX ORDER — METOPROLOL SUCCINATE 25 MG/1
TABLET, EXTENDED RELEASE ORAL
Qty: 75 TABLET | Refills: 1 | Status: SHIPPED | OUTPATIENT
Start: 2024-04-12

## 2024-04-17 ENCOUNTER — TELEPHONE (OUTPATIENT)
Dept: OBGYN CLINIC | Facility: CLINIC | Age: 84
End: 2024-04-17

## 2024-04-17 NOTE — TELEPHONE ENCOUNTER
Lm for pt advising she does not need to get auth for sx as our auth team will handle that and if there are any issues, we will reach out to her

## 2024-05-01 DIAGNOSIS — I48.0 PAROXYSMAL ATRIAL FIBRILLATION (HCC): ICD-10-CM

## 2024-05-01 DIAGNOSIS — M17.11 PRIMARY OSTEOARTHRITIS OF RIGHT KNEE: ICD-10-CM

## 2024-05-02 RX ORDER — FERROUS SULFATE 324(65)MG
324 TABLET, DELAYED RELEASE (ENTERIC COATED) ORAL
Qty: 30 TABLET | Refills: 5 | Status: SHIPPED | OUTPATIENT
Start: 2024-05-02

## 2024-05-02 RX ORDER — ASCORBIC ACID 500 MG
500 TABLET ORAL 2 TIMES DAILY
Qty: 60 TABLET | Refills: 5 | Status: SHIPPED | OUTPATIENT
Start: 2024-05-02

## 2024-05-02 RX ORDER — FOLIC ACID 1 MG/1
1000 TABLET ORAL DAILY
Qty: 30 TABLET | Refills: 5 | Status: SHIPPED | OUTPATIENT
Start: 2024-05-02

## 2024-05-03 RX ORDER — RIVAROXABAN 20 MG/1
TABLET, FILM COATED ORAL
Qty: 30 TABLET | Refills: 5 | Status: SHIPPED | OUTPATIENT
Start: 2024-05-03

## 2024-05-08 NOTE — PROGRESS NOTES
PT Evaluation     Today's date: 2024  Patient name: Mikayla Breaux  : 1940  MRN: [id number]  Referring provider: Nico Buitrago,*  Dx:   Encounter Diagnosis     ICD-10-CM    1. Primary osteoarthritis of right knee  M17.11 Ambulatory referral to Physical Therapy      2. Chronic pain of right knee  M25.561 Ambulatory referral to Physical Therapy    G89.29       3. Aftercare following right knee joint replacement surgery  Z47.1 Ambulatory referral to Physical Therapy    Z96.651                      Assessment  Assessment details: Mikayla Breaux is a 83 y.o. female who presents to PT for pre-op evaluation. Mikayla Breaux  is schedule for right TKA on 24 with Dr. Buitrago. Today, I reviewed the home preparation check list, virtual home evaluation, and gave patient a HEP to complete after surgery. Functional tests were measured and no barriers were identified. Pt would also like to perform PT prior to her knee surgery which is respectable. Pt would benefit from skilled physical therapy at this time prior to surgery to improve function, reduce pain, increase ROM, increase strength, and return to premorbid function. She will then attend PT 3-5 days after her scheduled surgery at the end of .       Impairments: abnormal gait, abnormal muscle firing, abnormal or restricted ROM, abnormal movement, activity intolerance, impaired balance, impaired physical strength, lacks appropriate home exercise program, pain with function and weight-bearing intolerance  Understanding of Dx/Px/POC: good   Prognosis: good    Goals  Short Term Goals: to be achieved by 4 weeks  1) Patient to be independent with basic HEP.  2) Decrease pain to 3/10 at its worst.  3) Increase Right knee ROM by 5-10 degrees   4) Increase LE strength by 1/2 MMT grade in all deficient planes.    Long Term Goals: to be achieved by discharge  1) FOTO equal to or greater than expected.  2) Ambulation to improve to maximal level of function  3)  Stair negotiation will improve to reciprocal.  4) Sit to stand transfers will improve to maximal level of function       Plan  Patient would benefit from: PT eval and skilled physical therapy  Planned modality interventions: cryotherapy, TENS and thermotherapy: hydrocollator packs  Planned therapy interventions: joint mobilization, manual therapy, neuromuscular re-education, patient education, therapeutic activities, therapeutic exercise, transfer training, home exercise program, self care and balance  Frequency: 2x per week for 4-6 weeks.  Treatment plan discussed with: patient        Subjective Evaluation    History of Present Illness  Mechanism of injury: History of Current Injury: Pt attend pre-op PT appointment for R TKA scheduled on 6/26/24 with Dr. Buitrago. Pt is intermittently using a SPC. Pt  lives at [facility]. Pt plans to go home the day after surgery and she will have her son and daughter to help out. Pt has , elevator, handicap shower, etc. Pt plans to get a raised toilet seat.  Pt admits to having issues straightening her knee. She has been doing some exercises I gave her last year. Pt did 9000 steps today. Pt also reports numbness in her right ankle, anteriorly.  Pt was treated last year for right knee strengthening.  Pain location/Descriptors: R anterior knee pain.   Aggravating factors: prolonged walking, standing, stairs (worse going down) , squatting.   Easing factors: Voltaren and tylenol     Patient goals:  Return to hobbies, walking, and exercising.   Hobbies/Interest: Gardening, exercising.   Occupation: Retired       Patient Goals  Patient goals for therapy: increased strength, independence with ADLs/IADLs, decreased pain, improved balance, increased motion and return to sport/leisure activities    Pain  Pain scale: Mild.  Pain scale at highest: Moderate.    Treatments  Previous treatment: injection treatment, medication and physical therapy  Current  treatment: medication        Objective     Active Range of Motion   Left Knee   Flexion: 120 degrees   Extension: 0 degrees     Right Knee   Flexion: 110 degrees   Extensor lag: 10 degrees with pain    Strength/Myotome Testing     Left Hip   Planes of Motion   Flexion: 4  Abduction: 4  Adduction: 4  External rotation: 4  Internal rotation: 4    Right Hip   Planes of Motion   Flexion: 4-  Abduction: 4  Adduction: 4  External rotation: 4  Internal rotation: 4    Left Knee   Flexion: 5  Extension: 5    Right Knee   Flexion: 4  Extension: 4    Left Ankle/Foot   Dorsiflexion: 5  Plantar flexion: 5  Inversion: 5  Eversion: 5    Right Ankle/Foot   Dorsiflexion: 5  Plantar flexion: 5  Inversion: 5  Eversion: 5    Tests     Additional Tests Details  Timed up and go:  Date: 9.32 seconds. No use of hands on rails for assistance     Sit to stand Transfers:   Date: No use of hands on rails for assistance    5x sit to stand:   Date: 11.84 seconds            Ambulation     Observational Gait   Gait: antalgic   Decreased walking speed and stride length.     Additional Observational Gait Details  No assisted device: Gait pattern: decreased stance with stiff need gait pattern.              Precautions: HTN, PAD, A-Fib, CAD, MENON      Manuals             PROM of R knee                                                    Neuro Re-Ed             TKE             Quad sets             SLR                                                                 Ther Ex             Nustep             Low load knee extension             Hamstring stretch             SB stretch                                                                 Ther Activity             Mini squats             Standing HR             Lateral walks              Gait Training                                       Modalities                                        Updated HEP   [email or web address]  Access Code: [email or web address]

## 2024-05-09 ENCOUNTER — EVALUATION (OUTPATIENT)
Dept: PHYSICAL THERAPY | Facility: CLINIC | Age: 84
End: 2024-05-09
Payer: COMMERCIAL

## 2024-05-09 DIAGNOSIS — M25.561 CHRONIC PAIN OF RIGHT KNEE: ICD-10-CM

## 2024-05-09 DIAGNOSIS — G89.29 CHRONIC PAIN OF RIGHT KNEE: ICD-10-CM

## 2024-05-09 DIAGNOSIS — Z47.1 AFTERCARE FOLLOWING RIGHT KNEE JOINT REPLACEMENT SURGERY: ICD-10-CM

## 2024-05-09 DIAGNOSIS — M17.11 PRIMARY OSTEOARTHRITIS OF RIGHT KNEE: Primary | ICD-10-CM

## 2024-05-09 DIAGNOSIS — Z96.651 AFTERCARE FOLLOWING RIGHT KNEE JOINT REPLACEMENT SURGERY: ICD-10-CM

## 2024-05-09 PROCEDURE — 97110 THERAPEUTIC EXERCISES: CPT | Performed by: PHYSICAL THERAPIST

## 2024-05-09 PROCEDURE — 97161 PT EVAL LOW COMPLEX 20 MIN: CPT | Performed by: PHYSICAL THERAPIST

## 2024-05-10 DIAGNOSIS — Z01.89 ENCOUNTER FOR GERIATRIC ASSESSMENT: Primary | ICD-10-CM

## 2024-05-14 ENCOUNTER — OFFICE VISIT (OUTPATIENT)
Dept: PHYSICAL THERAPY | Facility: CLINIC | Age: 84
End: 2024-05-14
Payer: COMMERCIAL

## 2024-05-14 DIAGNOSIS — G89.29 CHRONIC PAIN OF RIGHT KNEE: ICD-10-CM

## 2024-05-14 DIAGNOSIS — M25.561 CHRONIC PAIN OF RIGHT KNEE: ICD-10-CM

## 2024-05-14 DIAGNOSIS — M17.11 PRIMARY OSTEOARTHRITIS OF RIGHT KNEE: Primary | ICD-10-CM

## 2024-05-14 PROCEDURE — 97112 NEUROMUSCULAR REEDUCATION: CPT | Performed by: PHYSICAL THERAPIST

## 2024-05-14 PROCEDURE — 97110 THERAPEUTIC EXERCISES: CPT | Performed by: PHYSICAL THERAPIST

## 2024-05-16 ENCOUNTER — OFFICE VISIT (OUTPATIENT)
Dept: PHYSICAL THERAPY | Facility: CLINIC | Age: 84
End: 2024-05-16
Payer: COMMERCIAL

## 2024-05-16 DIAGNOSIS — G89.29 CHRONIC PAIN OF RIGHT KNEE: ICD-10-CM

## 2024-05-16 DIAGNOSIS — M25.561 CHRONIC PAIN OF RIGHT KNEE: ICD-10-CM

## 2024-05-16 DIAGNOSIS — M17.11 PRIMARY OSTEOARTHRITIS OF RIGHT KNEE: Primary | ICD-10-CM

## 2024-05-16 PROCEDURE — 97110 THERAPEUTIC EXERCISES: CPT | Performed by: PHYSICAL THERAPIST

## 2024-05-16 PROCEDURE — 97112 NEUROMUSCULAR REEDUCATION: CPT | Performed by: PHYSICAL THERAPIST

## 2024-05-16 NOTE — PROGRESS NOTES
"Daily Note     Today's date: 2024  Patient name: Mikayla Breaux  : 1940  MRN: [id number]  Referring provider: Nico Buitrago,*  Dx:   Encounter Diagnosis     ICD-10-CM    1. Primary osteoarthritis of right knee  M17.11       2. Chronic pain of right knee  M25.561     G89.29           Start Time: 1705  Stop Time: 1745  Total time in clinic (min): 40 minutes    Subjective: Pt reports some soreness after last treatment but overall doing well. Denies any locking episodes. She has been compliant with HEP.       Objective: See treatment diary below  Knee flexion 105 degrees    Assessment: Tolerated treatment well. Implemented knee extension mobilization over bolster with good tolerance. Knee extension remains limited around -5 degrees today. Knee flexion mildly improved. No adverse effect of strengthening exercises. Patient exhibited good technique with therapeutic exercises and would benefit from continued PT.      Plan: Continue per plan of care.      Precautions: HTN, PAD, A-Fib, CAD, MENON      Manuals            PROM of R knee             Knee extension mobilization over bolster  Gr II           R PFJ mobs  Gr II                        Neuro Re-Ed             TKE Green tb 20x             Quad sets 2x10 5\"  2x10 5\"            SLR 2x5            LAQ 2x10  2x10            Bridge  2x10                                      Ther Ex             Nustep 8' L3 8' L4           Low load knee extension 2'  2'           Hamstring stretch 10x10\"  10x10\"           SB stretch 10x10\" 10x10\"            Heel slides w/ PB 2x10 5\"  2x10 5\"            Hamstring curls  2x10 rtb                                      Ther Activity             Mini squats             Standing HR 20x 20x            Lateral walks              Gait Training                                       Modalities                                       1:1 with PT from 330-408pm       "

## 2024-05-20 ENCOUNTER — TELEPHONE (OUTPATIENT)
Dept: OBGYN CLINIC | Facility: HOSPITAL | Age: 84
End: 2024-05-20

## 2024-05-20 NOTE — TELEPHONE ENCOUNTER
Preoperative Elective Admission Assessment- spoke with patient     Living Situation:    Who does pt live with: lives alone- children (son or daughter for the first week)  What kind of home:  [facility]   How do they enter the home: front  How many levels in home: second floor apt with elevator   # of steps to enter home: 0  # of steps to second floor: 0  Are there handrails: N/A  Are there landings: N/A  Sleeping arrangement: first/entry floor  Where is Bathroom: first floor next to bedroom  Where is the tub or shower: walk in shower   Dogs or ther pets: no     First Floor Setup:   Is there a bathroom: Yes  Where would pt sleep: bed or recliner      DME: ordering RW and RTS. PROVIDED [facility] HEALTH NUMBER TO CONTACT: [phone]   We discussed clearing pathways in the home and making sure there is accessibly to use the walker, for example, removing throw rugs.      Patient's Current Level of Function: Ambulates: Independently, cane as needed    Post-op Caregiver: child (daughter or son for the first week )  Caregiver Name and phone number for Inpatient discharge needs: Chayito (daughter) [phone] and Ciera [phone]  Currently receive any HHC/aides/community supports: No     Post-op Transport: child (ciera)  To/from hospital: child  To/from PT 2-3x/week: child  Uses community transport now: No     Outpatient Physical Therapy Site:  Site: [facility]  pre and post-op appts scheduled? Yes     Medication Management: self  Preferred Pharmacy for Post-op Medications: [id number] - [place] - [address] [[id number]]   Blood Management Vitamin Regimen: Pt confirms taking as prescribed  Post-op anticoagulant: currently on xarelto      DC Plan: Pt plans to be discharged home      Barriers to DC identified preoperatively: none identified    BMI: 31.62        Patient Education:  Pt educated on post-op pain, early mobilization (POD0), LOS goals, OP PT goals, and  preoperative bathing. Patient educated that our goal is to appropriately discharge patient based off their post-op function while striving to maintain maximal independence. The goal is to discharge patient to home and for them to attend outpatient physical therapy.    Assigned to care team? Yes

## 2024-05-21 ENCOUNTER — OFFICE VISIT (OUTPATIENT)
Dept: PHYSICAL THERAPY | Facility: CLINIC | Age: 84
End: 2024-05-21
Payer: COMMERCIAL

## 2024-05-21 DIAGNOSIS — M25.561 CHRONIC PAIN OF RIGHT KNEE: ICD-10-CM

## 2024-05-21 DIAGNOSIS — M17.11 PRIMARY OSTEOARTHRITIS OF RIGHT KNEE: Primary | ICD-10-CM

## 2024-05-21 DIAGNOSIS — G89.29 CHRONIC PAIN OF RIGHT KNEE: ICD-10-CM

## 2024-05-21 PROCEDURE — 97110 THERAPEUTIC EXERCISES: CPT | Performed by: PHYSICAL THERAPIST

## 2024-05-21 PROCEDURE — 97112 NEUROMUSCULAR REEDUCATION: CPT | Performed by: PHYSICAL THERAPIST

## 2024-05-21 PROCEDURE — 97140 MANUAL THERAPY 1/> REGIONS: CPT | Performed by: PHYSICAL THERAPIST

## 2024-05-21 NOTE — PROGRESS NOTES
"Daily Note     Today's date: 2024  Patient name: Mikayla Breaux  : 1940  MRN: [id number]  Referring provider: Nico Buitrago,*  Dx:   Encounter Diagnosis     ICD-10-CM    1. Primary osteoarthritis of right knee  M17.11       2. Chronic pain of right knee  M25.561     G89.29                      Subjective: Pt reports taking significantly less tylenol this weekend.       Objective: See treatment diary below      Assessment: Tolerated treatment well. Knee flexion measured at 125 degrees today. Good pain modulation with knee extension mobilization. All exercise tolerated without adverse effects. Increased resistance with LAQ. Patient would benefit from continued PT.       Plan: Continue per plan of care.      Precautions: HTN, PAD, A-Fib, CAD, MENON      Manuals           PROM of R knee             Knee extension mobilization over bolster  Gr II Gr II          R PFJ mobs  Gr II Gr III sup/inf/med/lat                       Neuro Re-Ed             TKE Green tb 20x             Quad sets 2x10 5\"  2x10 5\"  2x10 5\"           SLR 2x5            LAQ 2x10  2x10  2x10           Bridge  2x10  2x10                                     Ther Ex             Nustep 8' L3 8' L4 8' L4          Low load knee extension 2'  2'           Hamstring stretch 10x10\"  10x10\" 10x10\"          SB stretch 10x10\" 10x10\"            Heel slides w/ PB 2x10 5\"  2x10 5\"  2x10 5\"           Hamstring curls  2x10 rtb                                      Ther Activity             Mini squats             Standing HR 20x 20x            Lateral walks              Gait Training                                       Modalities                                       1:1 with PT from 431-515pm         "

## 2024-05-23 LAB
DME PARACHUTE DELIVERY DATE ACTUAL: NORMAL
DME PARACHUTE DELIVERY DATE REQUESTED: NORMAL
DME PARACHUTE ITEM DESCRIPTION: NORMAL
DME PARACHUTE ITEM DESCRIPTION: NORMAL
DME PARACHUTE ORDER STATUS: NORMAL
DME PARACHUTE SUPPLIER NAME: NORMAL
DME PARACHUTE SUPPLIER PHONE: NORMAL

## 2024-05-28 ENCOUNTER — OFFICE VISIT (OUTPATIENT)
Dept: PHYSICAL THERAPY | Facility: CLINIC | Age: 84
End: 2024-05-28
Payer: COMMERCIAL

## 2024-05-28 DIAGNOSIS — Z96.651 AFTERCARE FOLLOWING RIGHT KNEE JOINT REPLACEMENT SURGERY: ICD-10-CM

## 2024-05-28 DIAGNOSIS — M17.11 PRIMARY OSTEOARTHRITIS OF RIGHT KNEE: Primary | ICD-10-CM

## 2024-05-28 DIAGNOSIS — Z47.1 AFTERCARE FOLLOWING RIGHT KNEE JOINT REPLACEMENT SURGERY: ICD-10-CM

## 2024-05-28 DIAGNOSIS — M25.561 CHRONIC PAIN OF RIGHT KNEE: ICD-10-CM

## 2024-05-28 DIAGNOSIS — G89.29 CHRONIC PAIN OF RIGHT KNEE: ICD-10-CM

## 2024-05-28 PROCEDURE — 97110 THERAPEUTIC EXERCISES: CPT | Performed by: PHYSICAL THERAPIST

## 2024-05-28 PROCEDURE — 97112 NEUROMUSCULAR REEDUCATION: CPT | Performed by: PHYSICAL THERAPIST

## 2024-05-28 PROCEDURE — 97140 MANUAL THERAPY 1/> REGIONS: CPT | Performed by: PHYSICAL THERAPIST

## 2024-05-28 NOTE — PROGRESS NOTES
"Daily Note     Today's date: 2024  Patient name: Mikayla Breaux  : 1940  MRN: [id number]  Referring provider: Nico Buitrago,*  Dx:   Encounter Diagnosis     ICD-10-CM    1. Primary osteoarthritis of right knee  M17.11       2. Chronic pain of right knee  M25.561     G89.29       3. Aftercare following right knee joint replacement surgery  Z47.1     Z96.651           Start Time: 0900  Stop Time: 945  Total time in clinic (min): 45 minutes    Subjective: Pt offers no new complaints this morning. She notes feeling better after attending PT. Mild soreness from working in the garden.       Objective: See treatment diary below      Assessment: Tolerated treatment well. Implemented sit to stand squats into POC. Patient exhibited good technique with therapeutic exercises and would benefit from continued PT.      Plan: Continue per plan of care.      Precautions: HTN, PAD, A-Fib, CAD, MENON      Manuals          PROM of R knee             Knee extension mobilization over bolster  Gr II Gr II Gr II         R PFJ mobs  Gr II Gr III sup/inf/med/lat Gr III sup/inf/med/lat                      Neuro Re-Ed             TKE Green tb 20x             Quad sets 2x10 5\"  2x10 5\"  2x10 5\"  2x10 5\"          SLR 2x5            LAQ 2x10  2x10  2x10  2x10          Bridge  2x10  2x10  3x10                                   Ther Ex             Nustep 8' L3 8' L4 8' L4 8' L4         Low load knee extension 2'  2'           Hamstring stretch 10x10\"  10x10\" 10x10\" 10x10\"         SB stretch 10x10\" 10x10\"            Heel slides w/ PB 2x10 5\"  2x10 5\"  2x10 5\"  2x10 5\"          Hamstring curls  2x10 rtb   2x10 rtb                                    Ther Activity             Mini squats    2x10         Standing HR 20x 20x   20x          Lateral walks              Gait Training                                       Modalities                                       1:1 with PT from [phone]           "

## 2024-05-29 ENCOUNTER — TELEMEDICINE (OUTPATIENT)
Dept: ANESTHESIOLOGY | Facility: CLINIC | Age: 84
End: 2024-05-29
Payer: COMMERCIAL

## 2024-05-29 DIAGNOSIS — I10 ESSENTIAL HYPERTENSION: Primary | ICD-10-CM

## 2024-05-29 DIAGNOSIS — M17.11 PRIMARY OSTEOARTHRITIS OF RIGHT KNEE: ICD-10-CM

## 2024-05-29 DIAGNOSIS — Z01.89 ENCOUNTER FOR GERIATRIC ASSESSMENT: ICD-10-CM

## 2024-05-29 DIAGNOSIS — I48.0 PAROXYSMAL ATRIAL FIBRILLATION (HCC): ICD-10-CM

## 2024-05-29 PROCEDURE — 99443 PR PHYS/QHP TELEPHONE EVALUATION 21-30 MIN: CPT | Performed by: NURSE PRACTITIONER

## 2024-05-29 NOTE — PROGRESS NOTES
[facility]  CONSULT: GERIATRIC SURGERY   Brief Visit    This Visit is being completed by telephone. The Patient is located at Home and in the following state in which I hold an active license [place]    The patient was identified by name and date of birth. Mikayla Breaux was informed that this is a telemedicine visit and that the visit is being conducted through Telephone.  My office door was closed. No one else was in the room.  She acknowledged consent and understanding of privacy and security of the platform. The patient has agreed to participate and understands they can discontinue the visit at any time.    Patient is aware this is a billable service.     Assessment/Plan:  83  year old female referred to [facility] for pre-surgery geriatric screening 2.2 age   Other consult concerns include: surgical optimization & BEST program. Advanced AGE   She is scheduled on     Case: [id number] Date/Time: 06/26/24 1200   Procedure: RIGHT TOTAL KNEE ARTHROPLASTY W ROBOT (Right: Knee)   Anesthesia type: Choice   Diagnosis:      Primary osteoarthritis of right knee [M17.11]      Chronic pain of right knee [M25.561, G89.29]   Pre-op diagnosis:      Primary osteoarthritis of right knee [M17.11]      Chronic pain of right knee [M25.561, G89.29]   Location: BE OR ROOM 18 / [facility]   Surgeons: Nico Buitrago MD       LAST ANESTHESIA   RECOMMEND SPINAL  EDUCATION PROVIDED     Surgery to do list   6.5 BLOOD WORK   6.10 PAT CALL   6.12 PCP  6.13 CARDIO  WORK ON BEST     Problem List Items Addressed This Visit       Essential hypertension  AFIB  DENIES CONCERNS WITH BP   DX AFIB MAY 2023  STABLE   WILL HAVE MC  WILL HAVE CC   METS 8.33        Primary osteoarthritis of right knee  Seen for SO   Seen for geriatric   NEEDS MC 6.12  NEEDS CC 6.13  Started BEST   At risk for post-op EVELIN -PEND  At risk for post-op SSI- PEND  BEST   Breathing- instructed to exercise lungs prior  to surgery via deep breathing  Eat- discussed increasing protein intake prior to surgery   Sleep/stress- encouraged 8-10 sleep @ night, stress reduction, avoid sick contacts and handwashing   Train- encouraged to remain active            Encounter for geriatric assessment  NO COGNITIVE CONCERNS TODAY   NO IMMEDIATE GERIATRIC CONCERNS   RECOMMEND SPINAL   RECOMMEND GERIATRIC PAIN PROTOCOL              Recent Visits  No visits were found meeting these conditions.  Showing recent visits within past 7 days and meeting all other requirements  Today's Visits  Date Type Provider Dept   05/29/24 Telemedicine [facility]   Showing today's visits and meeting all other requirements  Future Appointments  No visits were found meeting these conditions.  Showing future appointments within next 150 days and meeting all other requirements     ROS  Denies fevers and denies chills  Denies congestion and denies sore throat  Denies chest pain  Denies palpitations  Denies shortness of breath  Denies abdominal pain  Denies nausea, vomiting, and diarrhea  Denies any issues with their skin... example NO open wounds or sores  Denies rashes  Denies difficulty urinating  Denies any issues with their urine... example a dark color or an odor  Denies dizziness  Denies headaches  Denies confusion and denies hallucinations    Admits to being in well health today     Physical Assessment   We did not connect via video  Patient was alert and orientated times 3  Mood appropriate  Thought appropriate    I heard no respiratory distress over the phone today        Dede Degroot is an 83-year-old female who was referred to [facility] for presurgery geriatric screening secondary to advanced age.  She has been having ongoing right knee pain and is electing to have a right knee replacement.  I spoke with Nona over the telephone.  We did not connect via video.  She was offered a live visit in the [facility] and declined.   States that she has other things to do today and this was much more convenient.  She was pleasant and a pleasure to care for.  She lives at home alone.  She resides in an independent nursing senior community.  She has help when she calls for them.     She is independent with all ADLs.  There were no cognitive concerns identified today.  No immediate geriatric concerns other than advanced age.  I recommend spinal anesthesia.  Recommend geriatric pain protocol.  Education was provided.    Admits to being in well health today.      She needs to complete her surgical blood work.  she is in to do this the week of June 5, 2023.  She will also have medical clearance and cardiac clearance.  Await results for any further needs.  AAs always we discussed having your BEST surgery, and BEST recovery.  Surgery goals reviewed today.      Breathing exercises   Patient was encouraged to begin lung exercises today.  This could be accomplished through deep breathing and cough exercises.      Eating/nutrition   Encouraged patient to increase oral protein intake prior to surgery.   This can be accomplished by consuming chicken, fish, tuna fish, cottage cheese, cheese, eggs, Greek yogurt, and protein shakes as needed.  I encouraged use of protein shakes such ENLIVE.  I also recommended making your own protein shakes with protein powder.   Sleep/Stress management  Patient was encouraged to rest their body prior to surgery.  Encouraged attempting to get 8 hours of sleep at night.  Avoid stress.  Avoid sick contacts.  Encouraged to find a relaxing hobby such as reading, meditation, listening to music.  Training exercises  Patient was encouraged to remain active as possible.  Today bilateral lower extremity generic exercises were taught for muscle strengthening and balance.  All exercises to be done sitting down.         Visit Time  Total Visit Duration: 31 MINUTES       [facility]  CONSULT       Patient has the  ability to take their vital signs at home no  Allergies reviewed today   PMH reviewed today   Medications reviewed today     See Geriatric Assessment below...  Cognitive Assessment: NO CONCERNS   Falls (last 6 months): no  Antonino Total Score: 21  PHQ- 9 Depression Scale: 0  Nutrition Assessment Score:14  METS: 8.33  Health goals:  -What are your overall health goals? Exercise classes, loose weight    -What brings you strength? Daughter, community friends    -What activities are important to you? Puzzles, quilting     As always we discussed having your BEST surgery, and BEST recovery.  Surgery goals reviewed today.      Breathing exercises   Patient was encouraged to begin lung exercises today.  This could be accomplished through deep breathing and cough exercises.    Eating/nutrition   Encouraged patient to increase oral protein intake prior to surgery.  This can be accomplished by consuming chicken, fish, tuna fish, cottage cheese, cheese, eggs, Greek yogurt, and protein shakes as needed.  I encouraged use of protein shakes such ENLIVE.  I also recommended making your own protein shakes with protein powder.     Sleep/Stress management  Patient was encouraged to rest their body prior to surgery.  Encouraged attempting to get 8 hours of sleep at night.  Avoid stress.  Avoid sick contacts.  Encouraged to find a relaxing hobby such as reading, meditation, listening to music.  Training exercises  Patient was encouraged to remain active as possible.  Today bilateral lower extremity generic exercises were taught for muscle strengthening and balance.  All exercises to be done sitting down.

## 2024-05-30 ENCOUNTER — OFFICE VISIT (OUTPATIENT)
Dept: PHYSICAL THERAPY | Facility: CLINIC | Age: 84
End: 2024-05-30
Payer: COMMERCIAL

## 2024-05-30 DIAGNOSIS — Z96.651 AFTERCARE FOLLOWING RIGHT KNEE JOINT REPLACEMENT SURGERY: ICD-10-CM

## 2024-05-30 DIAGNOSIS — G89.29 CHRONIC PAIN OF RIGHT KNEE: ICD-10-CM

## 2024-05-30 DIAGNOSIS — M17.11 PRIMARY OSTEOARTHRITIS OF RIGHT KNEE: Primary | ICD-10-CM

## 2024-05-30 DIAGNOSIS — Z47.1 AFTERCARE FOLLOWING RIGHT KNEE JOINT REPLACEMENT SURGERY: ICD-10-CM

## 2024-05-30 DIAGNOSIS — M25.561 CHRONIC PAIN OF RIGHT KNEE: ICD-10-CM

## 2024-05-30 PROCEDURE — 97112 NEUROMUSCULAR REEDUCATION: CPT | Performed by: PHYSICAL THERAPIST

## 2024-05-30 PROCEDURE — 97110 THERAPEUTIC EXERCISES: CPT | Performed by: PHYSICAL THERAPIST

## 2024-05-30 PROCEDURE — 97140 MANUAL THERAPY 1/> REGIONS: CPT | Performed by: PHYSICAL THERAPIST

## 2024-05-30 RX ORDER — METOPROLOL SUCCINATE 25 MG/1
TABLET, EXTENDED RELEASE ORAL
Qty: 75 TABLET | Refills: 1 | Status: SHIPPED | OUTPATIENT
Start: 2024-05-30

## 2024-05-30 NOTE — PROGRESS NOTES
"Daily Note     Today's date: 2024  Patient name: Mikayla Breaux  : 1940  MRN: [id number]  Referring provider: Nico Buitrago,*  Dx:   Encounter Diagnosis     ICD-10-CM    1. Primary osteoarthritis of right knee  M17.11       2. Chronic pain of right knee  M25.561     G89.29       3. Aftercare following right knee joint replacement surgery  Z47.1     Z96.651                      Subjective: Pt offers no new complaints this morning. Pt reports feeling really good with PT thus far. She purchased a stretch-out strap to continue with her flexibility and ROM exercises prior to surgery.       Objective: See treatment diary below      Assessment: Tolerated treatment well. Mild crepitus present with knee extension (open chain). Knee extension continues to be limited but flexion remains at 125. Continued with strength and ROM exercises with good tolerance.  Patient exhibited good technique with therapeutic exercises and would benefit from continued PT.       Plan: Continue per plan of care.      Precautions: HTN, PAD, A-Fib, CAD, MENON      Manuals         PROM of R knee             Knee extension mobilization over bolster  Gr II Gr II Gr II Gr II        R PFJ mobs  Gr II Gr III sup/inf/med/lat Gr III sup/inf/med/lat Gr III sup/inf/med/lat                     Neuro Re-Ed             TKE Green tb 20x             Quad sets 2x10 5\"  2x10 5\"  2x10 5\"  2x10 5\"  2x10 5\"         SLR 2x5            LAQ 2x10  2x10  2x10  2x10  3x10 2#        Bridge  2x10  2x10  3x10 3x10                                   Ther Ex             Nustep 8' L3 8' L4 8' L4 8' L4 8' L4        Low load knee extension 2'  2'           Hamstring stretch 10x10\"  10x10\" 10x10\" 10x10\" 10x10\"        SB stretch 10x10\" 10x10\"            Heel slides w/ PB 2x10 5\"  2x10 5\"  2x10 5\"  2x10 5\"  2x10 5\"         Hamstring curls  2x10 rtb   2x10 rtb                                    Ther Activity             Mini squats    2x10 Sit " to stand squat 2x10         Standing HR 20x 20x   20x  20x        Lateral walks              Gait Training                                       Modalities                                       1:1 with PT from [id number]

## 2024-06-04 ENCOUNTER — OFFICE VISIT (OUTPATIENT)
Dept: PHYSICAL THERAPY | Facility: CLINIC | Age: 84
End: 2024-06-04
Payer: COMMERCIAL

## 2024-06-04 DIAGNOSIS — M25.561 CHRONIC PAIN OF RIGHT KNEE: ICD-10-CM

## 2024-06-04 DIAGNOSIS — M17.11 PRIMARY OSTEOARTHRITIS OF RIGHT KNEE: Primary | ICD-10-CM

## 2024-06-04 DIAGNOSIS — G89.29 CHRONIC PAIN OF RIGHT KNEE: ICD-10-CM

## 2024-06-04 PROCEDURE — 97110 THERAPEUTIC EXERCISES: CPT | Performed by: PHYSICAL THERAPIST

## 2024-06-04 PROCEDURE — 97112 NEUROMUSCULAR REEDUCATION: CPT | Performed by: PHYSICAL THERAPIST

## 2024-06-04 NOTE — PROGRESS NOTES
"Daily Note     Today's date: 2024  Patient name: Mikayla Breaux  : 1940  MRN: [id number]  Referring provider: Nico Buitrago,*  Dx:   Encounter Diagnosis     ICD-10-CM    1. Primary osteoarthritis of right knee  M17.11       2. Chronic pain of right knee  M25.561     G89.29                      Subjective: Pt reports some soreness in her knee after exercise class in the pool today. Pt is very compliant with HEP and exercising in general.       Objective: See treatment diary below      Assessment: Tolerated treatment well. Increased LAQ to 3#. Resumed gastroc/soleus stretching. Knee ROM continues to improve in both directions. Pt will be discharged to [facility] aft next treatment. Her surgery is at the end of . Patient would benefit from continued PT.       Plan: Continue per plan of care.      Precautions: HTN, PAD, A-Fib, CAD, MENON      Manuals  6       PROM of R knee             Knee extension mobilization over bolster  Gr II Gr II Gr II Gr II Gr II       R PFJ mobs  Gr II Gr III sup/inf/med/lat Gr III sup/inf/med/lat Gr III sup/inf/med/lat Gr III sup/inf/med/lat                    Neuro Re-Ed             TKE Green tb 20x             Quad sets 2x10 5\"  2x10 5\"  2x10 5\"  2x10 5\"  2x10 5\"  2x10 5\"        SLR 2x5            LAQ 2x10  2x10  2x10  2x10  3x10 2# 3x10 3#       Bridge  2x10  2x10  3x10 3x10  3x10                                 Ther Ex             Nustep 8' L3 8' L4 8' L4 8' L4 8' L4 8' L4       Low load knee extension 2'  2'           Hamstring stretch 10x10\"  10x10\" 10x10\" 10x10\" 10x10\" 10x10\"       SB stretch 10x10\" 10x10\"     10x10\"        Heel slides w/ PB 2x10 5\"  2x10 5\"  2x10 5\"  2x10 5\"  2x10 5\"  2x10 5\"        Hamstring curls  2x10 rtb   2x10 rtb   Standing 2x10                                 Ther Activity             Mini squats    2x10 Sit to stand squat 2x10         Standing HR 20x 20x   20x  20x 20x        Lateral walks              Gait Training  "                                      Modalities                                       1:1 with PT from 355-425PM

## 2024-06-05 ENCOUNTER — APPOINTMENT (OUTPATIENT)
Dept: LAB | Facility: CLINIC | Age: 84
End: 2024-06-05
Payer: COMMERCIAL

## 2024-06-05 DIAGNOSIS — Z79.01 ANTICOAGULATION MANAGEMENT ENCOUNTER: ICD-10-CM

## 2024-06-05 DIAGNOSIS — Z51.81 ANTICOAGULATION MANAGEMENT ENCOUNTER: ICD-10-CM

## 2024-06-05 DIAGNOSIS — Z01.812 PRE-OPERATIVE LABORATORY EXAMINATION: ICD-10-CM

## 2024-06-05 LAB
ABO GROUP BLD: NORMAL
ALBUMIN SERPL BCG-MCNC: 3.7 G/DL (ref 3.5–5)
ALP SERPL-CCNC: 70 U/L (ref 34–104)
ALT SERPL W P-5'-P-CCNC: 22 U/L (ref 7–52)
ANION GAP SERPL CALCULATED.3IONS-SCNC: 6 MMOL/L (ref 4–13)
APTT PPP: 36 SECONDS (ref 23–37)
AST SERPL W P-5'-P-CCNC: 24 U/L (ref 13–39)
BASOPHILS # BLD AUTO: 0.01 THOUSANDS/ÂΜL (ref 0–0.1)
BASOPHILS NFR BLD AUTO: 0 % (ref 0–1)
BILIRUB SERPL-MCNC: 0.59 MG/DL (ref 0.2–1)
BLD GP AB SCN SERPL QL: NEGATIVE
BUN SERPL-MCNC: 24 MG/DL (ref 5–25)
CALCIUM SERPL-MCNC: 9.1 MG/DL (ref 8.4–10.2)
CHLORIDE SERPL-SCNC: 106 MMOL/L (ref 96–108)
CO2 SERPL-SCNC: 29 MMOL/L (ref 21–32)
CREAT SERPL-MCNC: 0.72 MG/DL (ref 0.6–1.3)
EOSINOPHIL # BLD AUTO: 0.11 THOUSAND/ÂΜL (ref 0–0.61)
EOSINOPHIL NFR BLD AUTO: 3 % (ref 0–6)
ERYTHROCYTE [DISTWIDTH] IN BLOOD BY AUTOMATED COUNT: 13.1 % (ref 11.6–15.1)
EST. AVERAGE GLUCOSE BLD GHB EST-MCNC: 114 MG/DL
GFR SERPL CREATININE-BSD FRML MDRD: 78 ML/MIN/1.73SQ M
GLUCOSE P FAST SERPL-MCNC: 84 MG/DL (ref 65–99)
HBA1C MFR BLD: 5.6 %
HCT VFR BLD AUTO: 46.8 % (ref 34.8–46.1)
HGB BLD-MCNC: 14.9 G/DL (ref 11.5–15.4)
IMM GRANULOCYTES # BLD AUTO: 0.01 THOUSAND/UL (ref 0–0.2)
IMM GRANULOCYTES NFR BLD AUTO: 0 % (ref 0–2)
INR PPP: 1.15 (ref 0.84–1.19)
LYMPHOCYTES # BLD AUTO: 1.43 THOUSANDS/ÂΜL (ref 0.6–4.47)
LYMPHOCYTES NFR BLD AUTO: 40 % (ref 14–44)
MCH RBC QN AUTO: 30.5 PG (ref 26.8–34.3)
MCHC RBC AUTO-ENTMCNC: 31.8 G/DL (ref 31.4–37.4)
MCV RBC AUTO: 96 FL (ref 82–98)
MONOCYTES # BLD AUTO: 0.37 THOUSAND/ÂΜL (ref 0.17–1.22)
MONOCYTES NFR BLD AUTO: 10 % (ref 4–12)
NEUTROPHILS # BLD AUTO: 1.67 THOUSANDS/ÂΜL (ref 1.85–7.62)
NEUTS SEG NFR BLD AUTO: 47 % (ref 43–75)
NRBC BLD AUTO-RTO: 0 /100 WBCS
PLATELET # BLD AUTO: 188 THOUSANDS/UL (ref 149–390)
PMV BLD AUTO: 10.2 FL (ref 8.9–12.7)
POTASSIUM SERPL-SCNC: 4.5 MMOL/L (ref 3.5–5.3)
PROT SERPL-MCNC: 6.4 G/DL (ref 6.4–8.4)
PROTHROMBIN TIME: 14.6 SECONDS (ref 11.6–14.5)
RBC # BLD AUTO: 4.89 MILLION/UL (ref 3.81–5.12)
RH BLD: POSITIVE
SODIUM SERPL-SCNC: 141 MMOL/L (ref 135–147)
SPECIMEN EXPIRATION DATE: NORMAL
WBC # BLD AUTO: 3.6 THOUSAND/UL (ref 4.31–10.16)

## 2024-06-05 PROCEDURE — 83036 HEMOGLOBIN GLYCOSYLATED A1C: CPT

## 2024-06-05 PROCEDURE — 36415 COLL VENOUS BLD VENIPUNCTURE: CPT

## 2024-06-05 PROCEDURE — 86901 BLOOD TYPING SEROLOGIC RH(D): CPT

## 2024-06-05 PROCEDURE — 85610 PROTHROMBIN TIME: CPT

## 2024-06-05 PROCEDURE — 86900 BLOOD TYPING SEROLOGIC ABO: CPT

## 2024-06-05 PROCEDURE — 80053 COMPREHEN METABOLIC PANEL: CPT

## 2024-06-05 PROCEDURE — 86850 RBC ANTIBODY SCREEN: CPT

## 2024-06-05 PROCEDURE — 85730 THROMBOPLASTIN TIME PARTIAL: CPT

## 2024-06-05 PROCEDURE — 85025 COMPLETE CBC W/AUTO DIFF WBC: CPT

## 2024-06-06 ENCOUNTER — EVALUATION (OUTPATIENT)
Dept: PHYSICAL THERAPY | Facility: CLINIC | Age: 84
End: 2024-06-06
Payer: COMMERCIAL

## 2024-06-06 DIAGNOSIS — Z47.1 AFTERCARE FOLLOWING RIGHT KNEE JOINT REPLACEMENT SURGERY: ICD-10-CM

## 2024-06-06 DIAGNOSIS — M17.11 PRIMARY OSTEOARTHRITIS OF RIGHT KNEE: Primary | ICD-10-CM

## 2024-06-06 DIAGNOSIS — Z96.651 AFTERCARE FOLLOWING RIGHT KNEE JOINT REPLACEMENT SURGERY: ICD-10-CM

## 2024-06-06 DIAGNOSIS — M25.561 CHRONIC PAIN OF RIGHT KNEE: ICD-10-CM

## 2024-06-06 DIAGNOSIS — G89.29 CHRONIC PAIN OF RIGHT KNEE: ICD-10-CM

## 2024-06-06 PROCEDURE — 97110 THERAPEUTIC EXERCISES: CPT | Performed by: PHYSICAL THERAPIST

## 2024-06-06 PROCEDURE — 97140 MANUAL THERAPY 1/> REGIONS: CPT | Performed by: PHYSICAL THERAPIST

## 2024-06-06 PROCEDURE — 97530 THERAPEUTIC ACTIVITIES: CPT | Performed by: PHYSICAL THERAPIST

## 2024-06-06 NOTE — PROGRESS NOTES
[id number] (Hold until post op)    Today's date: 2024  Patient name: Mikayla Breaux  : 1940  MRN: [id number]  Referring provider: Nico Buitrago,*  Dx:   Encounter Diagnosis     ICD-10-CM    1. Primary osteoarthritis of right knee  M17.11       2. Chronic pain of right knee  M25.561     G89.29       3. Aftercare following right knee joint replacement surgery  Z47.1     Z96.651           Start Time: 1515  Stop Time: 1601  Total time in clinic (min): 46 minutes  Assessment  Assessment details: Mikayla Breaux is a 83 y.o. female who completed 7 sessions of prehab for TKA scheduled on  with Dr. Buitrago. Pt has made good progress with knee strength and ROM. She gained 15 degrees of flexion to 125 degrees and 5 degrees of extension to -5 degrees. Strength in R hip and quad improved nearly 1/2 MMT. Overall, patient is progressing well. She should maintain compliance with her HEP prior to TKA. Pt will be put on hold until 1st post op appointment. Thank you for this referral.       Impairments: abnormal gait, abnormal muscle firing, abnormal or restricted ROM, abnormal movement, activity intolerance, impaired balance, impaired physical strength, lacks appropriate home exercise program, pain with function and weight-bearing intolerance  Understanding of Dx/Px/POC: good   Prognosis: good    Goals  Short Term Goals: to be achieved by 4 weeks  1) Patient to be independent with basic HEP.  2) Decrease pain to 3/10 at its worst.  3) Increase Right knee ROM by 5-10 degrees   4) Increase LE strength by 1/2 MMT grade in all deficient planes.    Long Term Goals: to be achieved by discharge  1) FOTO equal to or greater than expected.  2) Ambulation to improve to maximal level of function  3) Stair negotiation will improve to reciprocal.  4) Sit to stand transfers will improve to maximal level of function       Plan  Hold until post op appointment.         Subjective Evaluation    History of Present  Illness  Mechanism of injury: History of Current Injury: Pt attend pre-op PT appointment for R TKA scheduled on 6/26/24 with Dr. Buitrago. Pt is intermittently using a SPC. Pt  lives at [facility]. Pt plans to go home the day after surgery and she will have her son and daughter to help out. Pt has , elevator, handicap shower, etc. Pt plans to get a raised toilet seat.  Pt admits to having issues straightening her knee. She has been doing some exercises I gave her last year. Pt did 9000 steps today. Pt also reports numbness in her right ankle, anteriorly.  Pt was treated last year for right knee strengthening.  Pain location/Descriptors: R anterior knee pain.   Aggravating factors: prolonged walking, standing, stairs (worse going down) , squatting.   Easing factors: Voltaren and tylenol     Patient goals:  Return to hobbies, walking, and exercising.   Hobbies/Interest: Gardening, exercising.   Occupation: Retired       Patient Goals  Patient goals for therapy: increased strength, independence with ADLs/IADLs, decreased pain, improved balance, increased motion and return to sport/leisure activities    Pain  Pain scale: Mild.  Pain scale at highest: Moderate.    Treatments  Previous treatment: injection treatment, medication and physical therapy  Current treatment: medication        Objective     Active Range of Motion   Left Knee   Flexion: 120 degrees   Extension: 0 degrees     Right Knee   Flexion: 125 degrees   Extensor lag: 10 degrees with pain    Strength/Myotome Testing     Left Hip   Planes of Motion   Flexion: 4  Abduction: 4  Adduction: 4  External rotation: 4  Internal rotation: 4    Right Hip   Planes of Motion   Flexion: 4  Abduction: 4  Adduction: 4+  External rotation: 4+  Internal rotation: 4    Left Knee   Flexion: 5  Extension: 5    Right Knee   Flexion: 4+  Extension: 4+    Left Ankle/Foot   Dorsiflexion: 5  Plantar flexion: 5  Inversion: 5  Eversion:  "5    Right Ankle/Foot   Dorsiflexion: 5  Plantar flexion: 5  Inversion: 5  Eversion: 5    Tests     Additional Tests Details  Timed up and go:  Date: 9.32 seconds. No use of hands on rails for assistance     Sit to stand Transfers:   Date: No use of hands on rails for assistance    5x sit to stand:   Date: 11.84 seconds            Ambulation     Observational Gait   Gait: antalgic   Decreased walking speed and stride length.     Additional Observational Gait Details  No assisted device: Gait pattern: decreased stance with stiff need gait pattern.              Precautions: HTN, PAD, A-Fib, CAD, MENON      Manuals 5/14 5/16 5/21 5/28 5/30 6/4 6/6      PROM of R knee             Knee extension mobilization over bolster  Gr II Gr II Gr II Gr II Gr II Gr II      R PFJ mobs  Gr II Gr III sup/inf/med/lat Gr III sup/inf/med/lat Gr III sup/inf/med/lat Gr III sup/inf/med/lat                    Neuro Re-Ed             TKE Green tb 20x             Quad sets 2x10 5\"  2x10 5\"  2x10 5\"  2x10 5\"  2x10 5\"  2x10 5\"        SLR 2x5            LAQ 2x10  2x10  2x10  2x10  3x10 2# 3x10 3# 3x10 3#      Bridge  2x10  2x10  3x10 3x10  3x10                                 Ther Ex             Nustep 8' L3 8' L4 8' L4 8' L4 8' L4 8' L4 8' L5      Low load knee extension 2'  2'           Hamstring stretch 10x10\"  10x10\" 10x10\" 10x10\" 10x10\" 10x10\" 10x10\"      SB stretch 10x10\" 10x10\"     10x10\"  10x10\"      Heel slides w/ PB 2x10 5\"  2x10 5\"  2x10 5\"  2x10 5\"  2x10 5\"  2x10 5\"  2x10 5\"       Hamstring curls  2x10 rtb   2x10 rtb   Standing 2x10 Standing 2x10                                Ther Activity             Mini squats    2x10 Sit to stand squat 2x10   Sit to stand squat 2x10       Standing HR 20x 20x   20x  20x 20x  20x      Lateral walks              Gait Training                                       Modalities                                       1:1 with PT from 323-401pm                   "

## 2024-06-10 NOTE — PRE-PROCEDURE INSTRUCTIONS
Pre-Surgery Instructions:   Medication Instructions    ascorbic acid (VITAMIN C) 500 mg tablet Hold day of surgery.    b complex vitamins capsule Stop taking 7 days prior to surgery.    CALCIUM PO Hold day of surgery.    cholecalciferol (VITAMIN D3) 1,000 units tablet Hold day of surgery.    docusate sodium (COLACE) 100 mg capsule Uses PRN- do not take on day of surgery.    ferrous sulfate 324 (65 Fe) mg Hold day of surgery.    folic acid (FOLVITE) 1 mg tablet Hold day of surgery.    metoprolol succinate (TOPROL-XL) 25 mg 24 hr tablet Take day of surgery.    Multiple Vitamins-Minerals (MULTIVITAMIN ADULTS PO) Hold day of surgery.    Xarelto 20 MG tablet Instructions provided by MD- pending cardiac clearance   Medication instructions for day surgery reviewed. Please use only a sip of water to take your instructed medications. Avoid all over the counter vitamins, supplements and NSAIDS for one week prior to surgery per anesthesia guidelines. Tylenol is ok to take as needed.     You will receive a call one business day prior to surgery with an arrival time and hospital directions. If your surgery is scheduled on a Monday, the hospital will be calling you on the Friday prior to your surgery. If you have not heard from anyone by 8pm, please call the hospital supervisor through the hospital  at [phone]. ([place] [phone] or [place] [phone]).    Do not eat or drink anything after midnight the night before your surgery, including candy, mints, lifesavers, or chewing gum. Do not drink alcohol 24hrs before your surgery. Try not to smoke at least 24hrs before your surgery.       Follow the pre surgery showering instructions as listed in the “My Surgical Experience Booklet” or otherwise provided by your surgeon's office. Do not use a blade to shave the surgical area 1 week before surgery. It is okay to use a clean electric clippers up to 24 hours before surgery. Do not apply any lotions, creams,  including makeup, cologne, deodorant, or perfumes after showering on the day of your surgery. Do not use dry shampoo, hair spray, hair gel, or any type of hair products.     No contact lenses, eye make-up, or artificial eyelashes. Remove nail polish, including gel polish, and any artificial, gel, or acrylic nails if possible. Remove all jewelry including rings and body piercing jewelry.     Wear causal clothing that is easy to take on and off. Consider your type of surgery.    Keep any valuables, jewelry, piercings at home. Please bring any specially ordered equipment (sling, braces) if indicated.    Arrange for a responsible person to drive you to and from the hospital on the day of your surgery. Please confirm the visitor policy for the day of your procedure when you receive your phone call with an arrival time.     Call the surgeon's office with any new illnesses, exposures, or additional questions prior to surgery.    Please reference your “My Surgical Experience Booklet” for additional information to prepare for your upcoming surgery.     Pt will bring walker DOS.

## 2024-06-12 ENCOUNTER — CONSULT (OUTPATIENT)
Dept: FAMILY MEDICINE CLINIC | Facility: CLINIC | Age: 84
End: 2024-06-12
Payer: COMMERCIAL

## 2024-06-12 VITALS
DIASTOLIC BLOOD PRESSURE: 60 MMHG | TEMPERATURE: 97.5 F | HEART RATE: 75 BPM | SYSTOLIC BLOOD PRESSURE: 106 MMHG | WEIGHT: 193.5 LBS | OXYGEN SATURATION: 100 % | RESPIRATION RATE: 16 BRPM | HEIGHT: 65 IN | BODY MASS INDEX: 32.24 KG/M2

## 2024-06-12 DIAGNOSIS — M25.561 CHRONIC PAIN OF RIGHT KNEE: ICD-10-CM

## 2024-06-12 DIAGNOSIS — G89.29 CHRONIC PAIN OF RIGHT KNEE: ICD-10-CM

## 2024-06-12 DIAGNOSIS — M17.11 PRIMARY OSTEOARTHRITIS OF RIGHT KNEE: ICD-10-CM

## 2024-06-12 DIAGNOSIS — I10 ESSENTIAL HYPERTENSION: ICD-10-CM

## 2024-06-12 DIAGNOSIS — I48.0 PAROXYSMAL ATRIAL FIBRILLATION (HCC): ICD-10-CM

## 2024-06-12 DIAGNOSIS — Z01.818 PRE-OP EXAMINATION: Primary | ICD-10-CM

## 2024-06-12 PROCEDURE — 99214 OFFICE O/P EST MOD 30 MIN: CPT | Performed by: FAMILY MEDICINE

## 2024-06-12 PROCEDURE — G2211 COMPLEX E/M VISIT ADD ON: HCPCS | Performed by: FAMILY MEDICINE

## 2024-06-12 NOTE — PROGRESS NOTES
PRE-OPERATIVE EVALUATION  [facility]    NAME: Mikayla Breaux  AGE: 83 y.o. SEX: female  : 1940     DATE: 2024    [facility] Pre-Operative Evaluation     Surgery Information: had concerns including Pre-op Exam (Complete knee replacement right knee on 24 with  EKG done tomorrow 24 at cardiologist blood work done last week).    Right total knee replacement  with Dr. Buitrago.  EKG scheduled for tomorrow  Blood work completed     History of Present Illness:     Mikayla Breaux is a 83 y.o. female who presents to the office today for a preoperative consultation at the request of the surgeon for the procedure above. Current anti-platelet/anti-coagulation medications that the patient is prescribed includes: Rivaroxaban (Xarelto).      Assessment of Cardiac Risk:  Denies unstable or severe angina or MI in the last 6 weeks or history of stent placement in the last year   Denies decompensated heart failure (e.g. New onset heart failure, NYHA functional class IV heart failure, or worsening existing heart failure)  Denies significant arrhythmias such as high grade AV block, symptomatic ventricular arrhythmia, newly recognized ventricular tachycardia, supraventricular tachycardia with resting heart rate >100, or symptomatic bradycardia  Denies severe heart valve disease including aortic stenosis or symptomatic mitral stenosis     Exercise Capacity:  Able to walk 4 blocks without symptoms?: Yes  Able to walk 2 flights without symptoms?: Yes    Prior Anesthesia Reactions: No     Personal history of venous thromboembolic disease? No    History of steroid use for >2 weeks within last year? No    Review of Systems:     Review of Systems  Current Problem List:     Patient Active Problem List   Diagnosis    Essential hypertension    Chronic left shoulder pain    MENON (dyspnea on exertion)    Lichen sclerosus    Lipoma of right lower extremity    Pure hypercholesterolemia    Abnormal  stress test    Paroxysmal atrial fibrillation (HCC)    Coronary artery disease involving native coronary artery of native heart without angina pectoris    Class 1 obesity due to excess calories without serious comorbidity with body mass index (BMI) of 30.0 to 30.9 in adult    Acute nonintractable headache    Baker cyst, left    Chronic pain of left knee    Bilateral primary osteoarthritis of knee    Osteopenia of multiple sites    Primary osteoarthritis of right knee    Greater trochanteric bursitis of right hip    Swelling of lower extremity    Encounter for geriatric assessment     Allergies:     Allergies   Allergen Reactions    Macrodantin [Nitrofurantoin] Nausea Only, Dizziness and Headache     Medications:       Current Outpatient Medications:     ascorbic acid (VITAMIN C) 500 mg tablet, TAKE 1 TABLET BY MOUTH TWO TIMES DAILY, Disp: 60 tablet, Rfl: 5    b complex vitamins capsule, Take 1 capsule by mouth daily, Disp: , Rfl:     Calcium Ascorbate (VITAMIN C) 500 mg tablet, Take 1 tablet (500 mg total) by mouth 2 (two) times a day, Disp: 60 tablet, Rfl: 0    CALCIUM PO, Take by mouth, Disp: , Rfl:     cholecalciferol (VITAMIN D3) 1,000 units tablet, Take 1,000 Units by mouth daily, Disp: , Rfl:     docusate sodium (COLACE) 100 mg capsule, Take 100 mg by mouth daily, Disp: , Rfl:     ferrous sulfate 324 (65 Fe) mg, TAKE 1 TABLET BY MOUTH EVERY DAY BEFORE BREAKFAST, Disp: 30 tablet, Rfl: 5    folic acid (FOLVITE) 1 mg tablet, TAKE 1 TABLET BY MOUTH EVERY DAY, Disp: 30 tablet, Rfl: 5    metoprolol succinate (TOPROL-XL) 25 mg 24 hr tablet, TAKE 1 TABLET BY MOUTH EVERY MORNING AND TAKE 1 AND 1/2 TABLETS BY MOUTH EVERY EVENING, Disp: 75 tablet, Rfl: 1    Multiple Vitamins-Minerals (MULTIVITAMIN ADULTS PO), Take by mouth, Disp: , Rfl:     Xarelto 20 MG tablet, TAKE 1 TABLET BY MOUTH EVERY DAY WITH BREAKFAST, Disp: 30 tablet, Rfl: 5    clobetasol (TEMOVATE) 0.05 % ointment, Apply topically as needed  (Patient not  taking: Reported on 3/5/2024), Disp: , Rfl:   Past Medical History:       Past Medical History:   Diagnosis Date    Allergic 1980    [address] tin    Arthritis 2008    Benign essential hypertension     Hyperlipidemia, unspecified     Hypertension 2016    Irregular heart beat     Osteoarthritis of left knee     Osteoporosis     PAD (peripheral artery disease) ([facility]) 09/29/2021    Perichondritis of ear, right         Past Surgical History:   Procedure Laterality Date    CARDIAC CATHETERIZATION Left 7/6/2022    Procedure: Cardiac Left Heart Cath;  Surgeon: Grover Sommer MD;  Location: [facility];  Service: Cardiology    COLON SURGERY  2003    Rectoseal     LAPAROSCOPIC OVARIAN CYSTECTOMY Right 1981    LAPAROSCOPIC OVARIAN CYSTECTOMY Left 2003    rectoseal and cystoseal repari    TONSILLECTOMY AND ADENOIDECTOMY  1952    TUBAL LIGATION  1972        Family History   Problem Relation Age of Onset    Hyperlipidemia Mother     Heart Valve Disease Mother     Hypertension Mother     Arthritis Father     Diabetes Father     Alcohol abuse Father     Prostate cancer Father 80    Diabetes Brother     Atrial fibrillation Brother     Alcohol abuse Brother     Arthritis Brother     Pancreatic cancer Maternal Aunt 65    No Known Problems Paternal Uncle     No Known Problems Paternal Uncle     No Known Problems Paternal Uncle     No Known Problems Paternal Uncle     Hypertension Daughter     Bradycardia Son     No Known Problems Son         Social History     Socioeconomic History    Marital status:      Spouse name: Not on file    Number of children: Not on file    Years of education: Not on file    Highest education level: Not on file   Occupational History    Not on file   Tobacco Use    Smoking status: Never    Smokeless tobacco: Never   Vaping Use    Vaping status: Never Used   Substance and Sexual Activity    Alcohol use: Yes     Alcohol/week: 5.0 standard drinks of alcohol     Types: 5 Glasses of wine per week     "Drug use: Never    Sexual activity: Not Currently     Partners: Male   Other Topics Concern    Not on file   Social History Narrative    Not on file     Social Determinants of Health     Financial Resource Strain: Not on file   Food Insecurity: Not on file   Transportation Needs: Not on file   Physical Activity: Not on file   Stress: Not on file   Social Connections: Not on file   Intimate Partner Violence: Not on file   Housing Stability: Not on file      Physical Exam:     /60 (BP Location: Left arm, Patient Position: Sitting, Cuff Size: Large)   Pulse 75   Temp 97.5 °F (36.4 °C) (Temporal)   Resp 16   Ht 5' 5\" (1.651 m)   Wt 87.8 kg (193 lb 8 oz)   SpO2 100%   BMI 32.20 kg/m²     Physical Exam  Vitals and nursing note reviewed.   Constitutional:       Appearance: She is well-developed.   HENT:      Head: Normocephalic and atraumatic.   Cardiovascular:      Rate and Rhythm: Normal rate and regular rhythm.   Pulmonary:      Effort: Pulmonary effort is normal.      Breath sounds: Normal breath sounds.   Musculoskeletal:         General: Tenderness present.      Comments: Crepitus right knee    Neurological:      General: No focal deficit present.      Mental Status: She is alert.   Psychiatric:         Mood and Affect: Mood normal.         Behavior: Behavior normal.        Data:     Pre-operative work-up    Laboratory Results: I have personally reviewed the pertinent laboratory results/reports   Lab Results   Component Value Date    CREATININE 0.72 06/05/2024       EKG:EKG tomorrow with cardiology      Assessment & Recommendations:     Pre-Op Evaluation Assessment  Cardiac Risk Estimation: per the Revised Cardiac Risk Index (Circ. 100:1043, 1999), the patient's risk factors for cardiac complications include  None , putting her in: RCI RISK CLASS I (0 risk factors, risk of major cardiac compl. appr. 0.5%).    Mikayla Breaux is undergoing an elective Moderate Risk surgery.  They are at Low Risk for major " adverse cardiac event (MACE).  They may proceed with surgery as planned with further workup.    Pre-Op Evaluation Plan  1. Further preoperative workup as follows:   - ECG    2. Medication Management/Recommendations:   - Patient has been instructed to avoid herbs or non-directed vitamins the week prior to surgery to ensure no drug interactions with perioperative surgical and anesthetic medications.  - Patient should continue antihypertensive medications up through and including the day of surgery.   - Patient has been instructed to avoid aspirin containing medications or non-steroidal anti-inflammatory drugs for the week preceding surgery.  - Regarding anti-platelet agents: Hold Xarelto- Defer to cardiology on timing.    3. Patient requires further consultation with: Cardiology    4. Assessment of Chronic Conditions:  1. Pre-op examination        2. Primary osteoarthritis of right knee  Ambulatory referral to [facility]      3. Chronic pain of right knee  Ambulatory referral to [facility]      4. Essential hypertension        5. Paroxysmal atrial fibrillation (HCC)             Pre-op form completed and faxed to referring physician noted above.    Cheyenne Gomez MD  [address] [address]  Phone#  [phone]  Fax#  [phone]

## 2024-06-13 ENCOUNTER — CLINICAL SUPPORT (OUTPATIENT)
Dept: CARDIOLOGY CLINIC | Facility: CLINIC | Age: 84
End: 2024-06-13
Payer: COMMERCIAL

## 2024-06-13 VITALS — OXYGEN SATURATION: 98 % | DIASTOLIC BLOOD PRESSURE: 78 MMHG | SYSTOLIC BLOOD PRESSURE: 132 MMHG | HEART RATE: 54 BPM

## 2024-06-13 DIAGNOSIS — Z01.811 PRE-OP CHEST EXAM: ICD-10-CM

## 2024-06-13 DIAGNOSIS — I48.0 PAROXYSMAL ATRIAL FIBRILLATION (HCC): Primary | ICD-10-CM

## 2024-06-13 PROCEDURE — 93000 ELECTROCARDIOGRAM COMPLETE: CPT

## 2024-06-13 NOTE — LETTER
Cardiology Pre Operative Clearance      PRE OPERATIVE CARDIAC RISK ASSESSMENT    06/13/24    Mikayla Breaux  1940  [id number]    Date of Surgery: 06/26/2024    Type of Surgery: RIGHT TOTAL KNEE ARTHROPLASTY W ROBOT (Right: Knee)     Surgeon: Dr. Buitrago    No Cardiac Contraindication for Planned Surgical Procedures    Anticoagulation: Xarelto 20mg may be held for 3 days prior to surgery and resume when safe from bleeding perspective postoperatively.    Physician Comment: Proceed with intermediate cardiac risk surgery without further cardiac workup.  No significant changes on ECG of concern at the current time.    Electronically Signed: Madisyn Alvarado MD, FACC

## 2024-06-14 NOTE — PRE-PROCEDURE INSTRUCTIONS
Pre-Surgery Instructions:    Xarelto 20 MG tablet Last dose 6/22 per cardiac clearance letter     Pt made aware via phone and verbalized understanding.

## 2024-06-17 DIAGNOSIS — Z96.651 AFTERCARE FOLLOWING RIGHT KNEE JOINT REPLACEMENT SURGERY: Primary | ICD-10-CM

## 2024-06-17 DIAGNOSIS — Z47.1 AFTERCARE FOLLOWING RIGHT KNEE JOINT REPLACEMENT SURGERY: Primary | ICD-10-CM

## 2024-06-25 NOTE — DISCHARGE INSTR - AVS FIRST PAGE
Discharge Instructions - Orthopedics  Mikayla Breaux 83 y.o. female MRN: [id number]  Unit/Bed#:     Weight Bearing Status:                                           Weight Bearing as tolerated to the right lower extremity.    DVT prophylaxis:  Continue home Xarelto 20 mg once a day.     Pain:  Continue pain medications as directed.  Continue other medications provided as prescribed.     Showering Instructions:   Do not shower until first follow up appointment.     Dressing Instructions:   Keep dressing clean, dry and intact until follow up appointment.    Driving Instructions:  No driving until cleared by Orthopaedic Surgery.    PT/OT:  Continue PT/OT on outpatient basis as directed    Appt Instructions:   Follow up as scheduled on 7/3/2024 in [place].   If you need to change or cancel your appointment for any reason, please call the clinic at [phone]    Contact the office sooner if you experience any increased numbness/tingling in the extremities.    Miscellaneous:  Please contact the office if you need refills of your medications prior to your next visit.   Advise against any dental cleanings or procedures for 3 months after surgery.   - If there is a dental emergency, please contact the office for further instructions.

## 2024-06-26 ENCOUNTER — ANESTHESIA EVENT (OUTPATIENT)
Dept: PERIOP | Facility: HOSPITAL | Age: 84
End: 2024-06-26
Payer: COMMERCIAL

## 2024-06-26 ENCOUNTER — ANESTHESIA (OUTPATIENT)
Dept: PERIOP | Facility: HOSPITAL | Age: 84
End: 2024-06-26
Payer: COMMERCIAL

## 2024-06-26 ENCOUNTER — HOSPITAL ENCOUNTER (OUTPATIENT)
Facility: HOSPITAL | Age: 84
Setting detail: OUTPATIENT SURGERY
Discharge: HOME | End: 2024-06-27
Attending: ORTHOPAEDIC SURGERY | Admitting: ORTHOPAEDIC SURGERY
Payer: COMMERCIAL

## 2024-06-26 DIAGNOSIS — M25.561 CHRONIC PAIN OF RIGHT KNEE: ICD-10-CM

## 2024-06-26 DIAGNOSIS — M17.11 PRIMARY OSTEOARTHRITIS OF RIGHT KNEE: Primary | ICD-10-CM

## 2024-06-26 DIAGNOSIS — G89.29 CHRONIC PAIN OF RIGHT KNEE: ICD-10-CM

## 2024-06-26 PROBLEM — Z96.641 S/P TOTAL RIGHT HIP ARTHROPLASTY: Status: ACTIVE | Noted: 2024-06-26

## 2024-06-26 LAB
ABO GROUP BLD: NORMAL
GLUCOSE SERPL-MCNC: 89 MG/DL (ref 65–140)
RH BLD: POSITIVE

## 2024-06-26 PROCEDURE — 82948 REAGENT STRIP/BLOOD GLUCOSE: CPT

## 2024-06-26 PROCEDURE — C9290 INJ, BUPIVACAINE LIPOSOME: HCPCS | Performed by: STUDENT IN AN ORGANIZED HEALTH CARE EDUCATION/TRAINING PROGRAM

## 2024-06-26 PROCEDURE — 27447 TOTAL KNEE ARTHROPLASTY: CPT

## 2024-06-26 PROCEDURE — 99222 1ST HOSP IP/OBS MODERATE 55: CPT | Performed by: NURSE PRACTITIONER

## 2024-06-26 PROCEDURE — C1776 JOINT DEVICE (IMPLANTABLE): HCPCS | Performed by: ORTHOPAEDIC SURGERY

## 2024-06-26 PROCEDURE — 97163 PT EVAL HIGH COMPLEX 45 MIN: CPT

## 2024-06-26 PROCEDURE — NC001 PR NO CHARGE: Performed by: ORTHOPAEDIC SURGERY

## 2024-06-26 PROCEDURE — C1713 ANCHOR/SCREW BN/BN,TIS/BN: HCPCS | Performed by: ORTHOPAEDIC SURGERY

## 2024-06-26 PROCEDURE — 27447 TOTAL KNEE ARTHROPLASTY: CPT | Performed by: ORTHOPAEDIC SURGERY

## 2024-06-26 PROCEDURE — C1769 GUIDE WIRE: HCPCS | Performed by: ORTHOPAEDIC SURGERY

## 2024-06-26 PROCEDURE — S2900 ROBOTIC SURGICAL SYSTEM: HCPCS | Performed by: ORTHOPAEDIC SURGERY

## 2024-06-26 DEVICE — ATTUNE PATELLA MEDIALIZED DOME 35MM CEMENTED AOX
Type: IMPLANTABLE DEVICE | Site: KNEE | Status: FUNCTIONAL
Brand: ATTUNE

## 2024-06-26 DEVICE — 4.5MM CANNULATED SCREW PARTIALLY THREADED/72MM: Type: IMPLANTABLE DEVICE | Site: KNEE | Status: FUNCTIONAL

## 2024-06-26 DEVICE — ATTUNE KNEE SYSTEM TIBIAL BASE FIXED BEARING SIZE 4 CEMENTED
Type: IMPLANTABLE DEVICE | Site: KNEE | Status: FUNCTIONAL
Brand: ATTUNE

## 2024-06-26 DEVICE — ATTUNE KNEE SYSTEM TIBIAL INSERT FIXED BEARING POSTERIOR STABILIZED 5 5MM AOX
Type: IMPLANTABLE DEVICE | Site: KNEE | Status: FUNCTIONAL
Brand: ATTUNE

## 2024-06-26 DEVICE — SMARTSET HV HIGH VISCOSITY BONE CEMENT 40G
Type: IMPLANTABLE DEVICE | Site: KNEE | Status: FUNCTIONAL
Brand: SMARTSET

## 2024-06-26 DEVICE — ATTUNE KNEE SYSTEM FEMORAL POSTERIOR STABILIZED NARROW SIZE 5N RIGHT CEMENTED
Type: IMPLANTABLE DEVICE | Site: KNEE | Status: FUNCTIONAL
Brand: ATTUNE

## 2024-06-26 RX ORDER — BUPIVACAINE HYDROCHLORIDE 2.5 MG/ML
INJECTION, SOLUTION EPIDURAL; INFILTRATION; INTRACAUDAL
Status: COMPLETED | OUTPATIENT
Start: 2024-06-26 | End: 2024-06-26

## 2024-06-26 RX ORDER — SENNOSIDES 8.6 MG
650 CAPSULE ORAL EVERY 8 HOURS PRN
Qty: 30 TABLET | Refills: 0 | Status: SHIPPED | OUTPATIENT
Start: 2024-06-26 | End: 2024-07-05 | Stop reason: SDUPTHER

## 2024-06-26 RX ORDER — CALCIUM CARBONATE 500 MG/1
1000 TABLET, CHEWABLE ORAL DAILY PRN
Status: DISCONTINUED | OUTPATIENT
Start: 2024-06-26 | End: 2024-06-27 | Stop reason: HOSPADM

## 2024-06-26 RX ORDER — OXYCODONE HYDROCHLORIDE 5 MG/1
5 TABLET ORAL EVERY 6 HOURS PRN
Qty: 20 TABLET | Refills: 0 | Status: SHIPPED | OUTPATIENT
Start: 2024-06-26 | End: 2024-07-02 | Stop reason: SDUPTHER

## 2024-06-26 RX ORDER — CEFAZOLIN SODIUM 2 G/50ML
2000 SOLUTION INTRAVENOUS ONCE
Status: COMPLETED | OUTPATIENT
Start: 2024-06-26 | End: 2024-06-26

## 2024-06-26 RX ORDER — CHLORHEXIDINE GLUCONATE ORAL RINSE 1.2 MG/ML
15 SOLUTION DENTAL ONCE
Status: COMPLETED | OUTPATIENT
Start: 2024-06-26 | End: 2024-06-26

## 2024-06-26 RX ORDER — METOPROLOL SUCCINATE 25 MG/1
37.5 TABLET, EXTENDED RELEASE ORAL EVERY EVENING
Status: DISCONTINUED | OUTPATIENT
Start: 2024-06-26 | End: 2024-06-27 | Stop reason: HOSPADM

## 2024-06-26 RX ORDER — ENOXAPARIN SODIUM 100 MG/ML
40 INJECTION SUBCUTANEOUS ONCE
Status: COMPLETED | OUTPATIENT
Start: 2024-06-26 | End: 2024-06-26

## 2024-06-26 RX ORDER — HYDROMORPHONE HCL/PF 1 MG/ML
0.5 SYRINGE (ML) INJECTION EVERY 2 HOUR PRN
Status: DISCONTINUED | OUTPATIENT
Start: 2024-06-26 | End: 2024-06-27 | Stop reason: HOSPADM

## 2024-06-26 RX ORDER — ONDANSETRON 2 MG/ML
4 INJECTION INTRAMUSCULAR; INTRAVENOUS EVERY 6 HOURS PRN
Status: DISCONTINUED | OUTPATIENT
Start: 2024-06-26 | End: 2024-06-27 | Stop reason: HOSPADM

## 2024-06-26 RX ORDER — FENTANYL CITRATE 50 UG/ML
INJECTION, SOLUTION INTRAMUSCULAR; INTRAVENOUS AS NEEDED
Status: DISCONTINUED | OUTPATIENT
Start: 2024-06-26 | End: 2024-06-26

## 2024-06-26 RX ORDER — ACETAMINOPHEN 325 MG/1
975 TABLET ORAL EVERY 8 HOURS SCHEDULED
Status: DISCONTINUED | OUTPATIENT
Start: 2024-06-26 | End: 2024-06-27 | Stop reason: HOSPADM

## 2024-06-26 RX ORDER — ACETAMINOPHEN 325 MG/1
650 TABLET ORAL EVERY 6 HOURS PRN
Status: DISCONTINUED | OUTPATIENT
Start: 2024-06-26 | End: 2024-06-27 | Stop reason: HOSPADM

## 2024-06-26 RX ORDER — BUPIVACAINE HYDROCHLORIDE 7.5 MG/ML
INJECTION, SOLUTION INTRASPINAL AS NEEDED
Status: DISCONTINUED | OUTPATIENT
Start: 2024-06-26 | End: 2024-06-26

## 2024-06-26 RX ORDER — ONDANSETRON 2 MG/ML
INJECTION INTRAMUSCULAR; INTRAVENOUS AS NEEDED
Status: DISCONTINUED | OUTPATIENT
Start: 2024-06-26 | End: 2024-06-26

## 2024-06-26 RX ORDER — SODIUM CHLORIDE, SODIUM LACTATE, POTASSIUM CHLORIDE, CALCIUM CHLORIDE 600; 310; 30; 20 MG/100ML; MG/100ML; MG/100ML; MG/100ML
125 INJECTION, SOLUTION INTRAVENOUS CONTINUOUS
Status: DISCONTINUED | OUTPATIENT
Start: 2024-06-26 | End: 2024-06-27 | Stop reason: HOSPADM

## 2024-06-26 RX ORDER — DOCUSATE SODIUM 100 MG/1
100 CAPSULE, LIQUID FILLED ORAL 2 TIMES DAILY
Status: DISCONTINUED | OUTPATIENT
Start: 2024-06-26 | End: 2024-06-27 | Stop reason: HOSPADM

## 2024-06-26 RX ORDER — SODIUM CHLORIDE, SODIUM LACTATE, POTASSIUM CHLORIDE, CALCIUM CHLORIDE 600; 310; 30; 20 MG/100ML; MG/100ML; MG/100ML; MG/100ML
INJECTION, SOLUTION INTRAVENOUS CONTINUOUS PRN
Status: DISCONTINUED | OUTPATIENT
Start: 2024-06-26 | End: 2024-06-26

## 2024-06-26 RX ORDER — ONDANSETRON 2 MG/ML
4 INJECTION INTRAMUSCULAR; INTRAVENOUS ONCE AS NEEDED
Status: DISCONTINUED | OUTPATIENT
Start: 2024-06-26 | End: 2024-06-26 | Stop reason: HOSPADM

## 2024-06-26 RX ORDER — HYDROMORPHONE HCL/PF 1 MG/ML
0.5 SYRINGE (ML) INJECTION
Status: DISCONTINUED | OUTPATIENT
Start: 2024-06-26 | End: 2024-06-26 | Stop reason: HOSPADM

## 2024-06-26 RX ORDER — CEFAZOLIN SODIUM 1 G/50ML
1000 SOLUTION INTRAVENOUS EVERY 8 HOURS
Status: COMPLETED | OUTPATIENT
Start: 2024-06-26 | End: 2024-06-27

## 2024-06-26 RX ORDER — PROPOFOL 10 MG/ML
INJECTION, EMULSION INTRAVENOUS CONTINUOUS PRN
Status: DISCONTINUED | OUTPATIENT
Start: 2024-06-26 | End: 2024-06-26

## 2024-06-26 RX ORDER — DEXAMETHASONE SODIUM PHOSPHATE 10 MG/ML
INJECTION, SOLUTION INTRAMUSCULAR; INTRAVENOUS AS NEEDED
Status: DISCONTINUED | OUTPATIENT
Start: 2024-06-26 | End: 2024-06-26

## 2024-06-26 RX ORDER — METHOCARBAMOL 500 MG/1
500 TABLET, FILM COATED ORAL 3 TIMES DAILY PRN
Qty: 60 TABLET | Refills: 0 | Status: SHIPPED | OUTPATIENT
Start: 2024-06-26

## 2024-06-26 RX ORDER — GABAPENTIN 100 MG/1
100 CAPSULE ORAL EVERY 8 HOURS
Status: DISCONTINUED | OUTPATIENT
Start: 2024-06-26 | End: 2024-06-26

## 2024-06-26 RX ORDER — BUPIVACAINE HYDROCHLORIDE 5 MG/ML
INJECTION, SOLUTION EPIDURAL; INTRACAUDAL
Status: COMPLETED | OUTPATIENT
Start: 2024-06-26 | End: 2024-06-26

## 2024-06-26 RX ORDER — TRANEXAMIC ACID 10 MG/ML
1000 INJECTION, SOLUTION INTRAVENOUS ONCE
Status: COMPLETED | OUTPATIENT
Start: 2024-06-26 | End: 2024-06-26

## 2024-06-26 RX ORDER — METHOCARBAMOL 500 MG/1
500 TABLET, FILM COATED ORAL EVERY 6 HOURS PRN
Status: DISCONTINUED | OUTPATIENT
Start: 2024-06-26 | End: 2024-06-27 | Stop reason: HOSPADM

## 2024-06-26 RX ORDER — METHOCARBAMOL 500 MG/1
500 TABLET, FILM COATED ORAL EVERY 6 HOURS SCHEDULED
Status: DISCONTINUED | OUTPATIENT
Start: 2024-06-26 | End: 2024-06-26

## 2024-06-26 RX ORDER — LIDOCAINE HYDROCHLORIDE 10 MG/ML
INJECTION, SOLUTION EPIDURAL; INFILTRATION; INTRACAUDAL; PERINEURAL AS NEEDED
Status: DISCONTINUED | OUTPATIENT
Start: 2024-06-26 | End: 2024-06-26

## 2024-06-26 RX ORDER — METOPROLOL SUCCINATE 25 MG/1
25 TABLET, EXTENDED RELEASE ORAL DAILY
Status: DISCONTINUED | OUTPATIENT
Start: 2024-06-27 | End: 2024-06-27 | Stop reason: HOSPADM

## 2024-06-26 RX ORDER — OXYCODONE HYDROCHLORIDE 5 MG/1
5 TABLET ORAL EVERY 4 HOURS PRN
Status: DISCONTINUED | OUTPATIENT
Start: 2024-06-26 | End: 2024-06-27 | Stop reason: HOSPADM

## 2024-06-26 RX ORDER — FENTANYL CITRATE/PF 50 MCG/ML
25 SYRINGE (ML) INJECTION
Status: DISCONTINUED | OUTPATIENT
Start: 2024-06-26 | End: 2024-06-26 | Stop reason: HOSPADM

## 2024-06-26 RX ADMIN — BUPIVACAINE HYDROCHLORIDE 5 ML: 5 INJECTION, SOLUTION EPIDURAL; INTRACAUDAL; PERINEURAL at 10:38

## 2024-06-26 RX ADMIN — BUPIVACAINE 20 ML: 13.3 INJECTION, SUSPENSION, LIPOSOMAL INFILTRATION at 10:38

## 2024-06-26 RX ADMIN — SODIUM CHLORIDE, SODIUM LACTATE, POTASSIUM CHLORIDE, AND CALCIUM CHLORIDE 125 ML/HR: .6; .31; .03; .02 INJECTION, SOLUTION INTRAVENOUS at 15:30

## 2024-06-26 RX ADMIN — CEFAZOLIN SODIUM 2000 MG: 2 SOLUTION INTRAVENOUS at 11:10

## 2024-06-26 RX ADMIN — DOCUSATE SODIUM 100 MG: 100 CAPSULE, LIQUID FILLED ORAL at 17:33

## 2024-06-26 RX ADMIN — SODIUM CHLORIDE, SODIUM LACTATE, POTASSIUM CHLORIDE, AND CALCIUM CHLORIDE: .6; .31; .03; .02 INJECTION, SOLUTION INTRAVENOUS at 10:01

## 2024-06-26 RX ADMIN — TRANEXAMIC ACID 1000 MG: 10 INJECTION, SOLUTION INTRAVENOUS at 11:20

## 2024-06-26 RX ADMIN — METHOCARBAMOL 500 MG: 500 TABLET ORAL at 22:12

## 2024-06-26 RX ADMIN — ACETAMINOPHEN 975 MG: 325 TABLET, FILM COATED ORAL at 15:26

## 2024-06-26 RX ADMIN — CEFAZOLIN SODIUM 1000 MG: 1 SOLUTION INTRAVENOUS at 19:52

## 2024-06-26 RX ADMIN — OXYCODONE HYDROCHLORIDE 5 MG: 5 TABLET ORAL at 19:52

## 2024-06-26 RX ADMIN — BUPIVACAINE HYDROCHLORIDE 20 ML: 2.5 INJECTION, SOLUTION EPIDURAL; INFILTRATION; INTRACAUDAL; PERINEURAL at 10:32

## 2024-06-26 RX ADMIN — LIDOCAINE HYDROCHLORIDE 20 MG: 10 INJECTION, SOLUTION EPIDURAL; INFILTRATION; INTRACAUDAL; PERINEURAL at 11:24

## 2024-06-26 RX ADMIN — NOREPINEPHRINE BITARTRATE 2 MCG/MIN: 1 SOLUTION INTRAVENOUS at 11:35

## 2024-06-26 RX ADMIN — Medication 2.5 MG: at 15:29

## 2024-06-26 RX ADMIN — ENOXAPARIN SODIUM 40 MG: 40 INJECTION SUBCUTANEOUS at 22:13

## 2024-06-26 RX ADMIN — METOPROLOL SUCCINATE 37.5 MG: 25 TABLET, FILM COATED, EXTENDED RELEASE ORAL at 17:33

## 2024-06-26 RX ADMIN — SODIUM CHLORIDE, SODIUM LACTATE, POTASSIUM CHLORIDE, AND CALCIUM CHLORIDE: .6; .31; .03; .02 INJECTION, SOLUTION INTRAVENOUS at 12:09

## 2024-06-26 RX ADMIN — FENTANYL CITRATE 25 MCG: 50 INJECTION INTRAMUSCULAR; INTRAVENOUS at 11:20

## 2024-06-26 RX ADMIN — ACETAMINOPHEN 975 MG: 325 TABLET, FILM COATED ORAL at 22:13

## 2024-06-26 RX ADMIN — FENTANYL CITRATE 50 MCG: 50 INJECTION INTRAMUSCULAR; INTRAVENOUS at 10:28

## 2024-06-26 RX ADMIN — ONDANSETRON 4 MG: 2 INJECTION INTRAMUSCULAR; INTRAVENOUS at 11:16

## 2024-06-26 RX ADMIN — FENTANYL CITRATE 25 MCG: 50 INJECTION INTRAMUSCULAR; INTRAVENOUS at 10:32

## 2024-06-26 RX ADMIN — CHLORHEXIDINE GLUCONATE 0.12% ORAL RINSE 15 ML: 1.2 LIQUID ORAL at 08:47

## 2024-06-26 RX ADMIN — PROPOFOL 50 MCG/KG/MIN: 10 INJECTION, EMULSION INTRAVENOUS at 11:24

## 2024-06-26 RX ADMIN — DEXAMETHASONE SODIUM PHOSPHATE 10 MG: 10 INJECTION, SOLUTION INTRAMUSCULAR; INTRAVENOUS at 11:35

## 2024-06-26 RX ADMIN — BUPIVACAINE HYDROCHLORIDE IN DEXTROSE 1.6 ML: 7.5 INJECTION, SOLUTION SUBARACHNOID at 11:22

## 2024-06-26 NOTE — ANESTHESIA PREPROCEDURE EVALUATION
Procedure:  RIGHT TOTAL KNEE ARTHROPLASTY W ROBOT (Right: Knee)    Relevant Problems   CARDIO   (+) Coronary artery disease involving native coronary artery of native heart without angina pectoris   (+) MENON (dyspnea on exertion)   (+) Essential hypertension   (+) Paroxysmal atrial fibrillation (HCC)   (+) Pure hypercholesterolemia      MUSCULOSKELETAL   (+) Primary osteoarthritis of right knee      NEURO/PSYCH   (+) Acute nonintractable headache   (+) Chronic left shoulder pain      PULMONARY   (+) MENON (dyspnea on exertion)        Physical Exam    Airway    Mallampati score: III  TM Distance: >3 FB  Neck ROM: full     Dental   No notable dental hx     Cardiovascular  Cardiovascular exam normal    Pulmonary  Pulmonary exam normal     Other Findings  post-pubertal.    Last xarelto dose sat  Afib on metoprolol, took this AM    Anesthesia Plan  ASA Score- 3     Anesthesia Type- spinal with ASA Monitors.         Additional Monitors:     Airway Plan:            Plan Factors-Exercise tolerance (METS): <4 METS.    Chart reviewed. EKG reviewed.  Existing labs reviewed. Patient summary reviewed.    Patient is not a current smoker.              Induction- intravenous.    Postoperative Plan- Plan for postoperative opioid use.         Informed Consent- Anesthetic plan and risks discussed with patient.  I personally reviewed this patient with the CRNA. Discussed and agreed on the Anesthesia Plan with the CRNA..      Left Ventricle: Left ventricular cavity size is normal. Wall thickness is normal. The left ventricular ejection fraction is 60%. Systolic function is normal. Wall motion is normal.    Right Ventricle: Right ventricular cavity size is mildly dilated.    Left Atrium: The atrium is mildly dilated.    Right Atrium: The atrium is mildly dilated.    Mitral Valve: There is mild to moderate regurgitation.    Tricuspid Valve: There is moderate regurgitation.    Pulmonic Valve: There is mild regurgitation.

## 2024-06-26 NOTE — ASSESSMENT & PLAN NOTE
Continue metoprolol with appropriate adjusted parameters  Add hydralazine as needed for SBP >160

## 2024-06-26 NOTE — PHYSICAL THERAPY NOTE
Physical Therapy Evaluation    Patient's Name: Mikayla Breaux    Admitting Diagnosis  Primary osteoarthritis of right knee [M17.11]  Chronic pain of right knee [M25.561, G89.29]    Problem List  Patient Active Problem List   Diagnosis    Essential hypertension    Chronic left shoulder pain    MENON (dyspnea on exertion)    Lichen sclerosus    Lipoma of right lower extremity    Pure hypercholesterolemia    Abnormal stress test    Paroxysmal atrial fibrillation (HCC)    Coronary artery disease involving native coronary artery of native heart without angina pectoris    Class 1 obesity due to excess calories without serious comorbidity with body mass index (BMI) of 30.0 to 30.9 in adult    Acute nonintractable headache    Baker cyst, left    Chronic pain of left knee    Bilateral primary osteoarthritis of knee    Osteopenia of multiple sites    Primary osteoarthritis of right knee    Greater trochanteric bursitis of right hip    Swelling of lower extremity    Encounter for geriatric assessment    S/P total right hip arthroplasty       Past Medical History  Past Medical History:   Diagnosis Date    Allergic 1980    [address] tin    Arthritis 2008    Benign essential hypertension     Hyperlipidemia, unspecified     Hypertension 2016    Irregular heart beat     Osteoarthritis of left knee     Osteoporosis     PAD (peripheral artery disease) (HCC) 09/29/2021    Perichondritis of ear, right        Past Surgical History  Past Surgical History:   Procedure Laterality Date    CARDIAC CATHETERIZATION Left 7/6/2022    Procedure: Cardiac Left Heart Cath;  Surgeon: Grover Sommer MD;  [facility];  Service: Cardiology    COLON SURGERY  2003    Rectoseal     LAPAROSCOPIC OVARIAN CYSTECTOMY Right 1981    LAPAROSCOPIC OVARIAN CYSTECTOMY Left 2003    rectoseal and cystoseal repari    TONSILLECTOMY AND ADENOIDECTOMY  1952    TUBAL LIGATION  1972 06/26/24 1601   PT Last Visit   PT Visit Date 06/26/24   Note  Type   Note type Evaluation   Pain Assessment   Pain Assessment Tool 0-10   Pain Score 3   Pain Location/Orientation Orientation: Right;Location: Knee   Hospital Pain Intervention(s) Cold applied;Elevated   Restrictions/Precautions   Weight Bearing Precautions Per Order Yes   RLE Weight Bearing Per Order WBAT   Other Precautions Multiple lines;Fall Risk;Pain   Home Living   Type of Home Other (Comment)  ([facility])   Home Layout Elevator  (0 SUNDAY; (+) elevator access to 2nd floor)   Bathroom Shower/Tub Walk-in shower   Bathroom Toilet Raised   Bathroom Equipment Grab bars in shower;Shower chair;Grab bars around toilet   Home Equipment Walker;Cane   Prior Function   Level of [place] Independent with ADLs;Independent with functional mobility  (Marina ambulation w/ SPC)   Lives With Alone   Receives Help From Family  (family to stay w/ pt for first 10 days after surgery)   Falls in the last 6 months 0   General   Family/Caregiver Present Yes   Cognition   Arousal/Participation Alert   Orientation Level Oriented X4   Comments pleasant + cooperative   Subjective   Subjective Agreeable to mobilize.   RLE Assessment   RLE Assessment   (grossly 3/5)   LLE Assessment   LLE Assessment WFL   Coordination   Movements are Fluid and Coordinated 1   Sensation WFL   Bed Mobility   Supine to Sit 5  Supervision   Additional items HOB elevated;Bedrails;Increased time required;Verbal cues   Additional Comments Pt greeted in supine.   Transfers   Sit to Stand 4  Minimal assistance   Additional items Assist x 1;Increased time required;Verbal cues   Stand to Sit 4  Minimal assistance   Additional items Assist x 1;Increased time required;Verbal cues   Additional Comments RW   Ambulation/Elevation   Gait pattern Excessively slow;Short stride;Decreased foot clearance;Antalgic;Decreased R stance   Gait Assistance 4  Minimal assist   Additional items Assist x 1;Verbal cues;Tactile cues   Assistive Device Rolling walker    Distance 40'x2   Stair Management Assistance Not tested   Balance   Static Sitting Fair +   Dynamic Sitting Fair   Static Standing Fair -   Dynamic Standing Poor +   Ambulatory Poor +  (RW)   Endurance Deficit   Endurance Deficit Yes   Endurance Deficit Description weakness, fatigue   Activity Tolerance   Activity Tolerance Patient limited by pain;Patient limited by fatigue   Nurse Made Aware yes - cleared + updated   Assessment   Prognosis Excellent   Problem List Decreased strength;Decreased range of motion;Decreased endurance;Impaired balance;Decreased mobility;Pain   Assessment Pt is 83 y.o. female seen for a PT evaluation s/p admit to [facility] on 6/26/2024, w/p T TKA. Please see above for other active problem list / PMH.     PT now consulted to assess functional mobility and needs for safe d/c planning. Prior to admission, pt was Marina w/ community ambulation w/ SPC, lives alone in an apt w/o stairs.     Currently pt requires S for bed skills; MinAx1 for functional transfers; MinAx1 for ambulation w/ RW. Pt presents functioning below baseline and w/ overall mobility deficits 2* to: decreased LE strength; decreased R knee ROM; decreased endurance; impaired balance; gait deviations; pain; fatigue; multiple lines. These impairments place pt at risk for falls.     Pt will continue to benefit from skilled PT interventions to address stated impairments; to maximize functional potential; for ongoing pt/ family training; and DME needs. PT is currently recommending OPPT.   Barriers to Discharge None   Goals   Patient Goals walk without pain   STG Expiration Date 07/06/24   Short Term Goal #1 In 10 days pt will complete: 1) Bed mobility skills with Marina to facilitate safe return to previous living environment. 2) Functional transfers with [place] to facilitate safe return to previous living environment. 3) Ambulation with ' w/ [place] without LOB for safe ambulation in home/community environment. 4) Improve  balance scores by 1 grade to decrease fall risk. 5) Improve LE strength grades by 1 to increase independence w/ all functional mobility, transfers and gait. 6) PT for ongoing pt and family education; DME needs and D/C planning to promote highest level of function in least restrictive environment.   PT Treatment Day 0   Plan   Treatment/Interventions Functional transfer training;LE strengthening/ROM;Therapeutic exercise;Endurance training;Patient/family training;Equipment eval/education;Bed mobility;Gait training;Compensatory technique education;Spoke to nursing;Family   PT Frequency Twice a day   Discharge Recommendation   Rehab Resource Intensity Level, PT III (Minimum Resource Intensity)   Equipment Recommended   (pt already has RW)   AM-PAC Basic Mobility Inpatient   Turning in Flat Bed Without Bedrails 3   Lying on Back to Sitting on Edge of Flat Bed Without Bedrails 3   Moving Bed to Chair 3   Standing Up From Chair Using Arms 3   Walk in Room 3   Climb 3-5 Stairs With Railing 2   Basic Mobility Inpatient Raw Score 17   Basic Mobility Standardized Score 39.67   [facility] Highest Level Of Mobility   -HLM Goal 5: Stand one or more mins   JH-HLM Achieved 7: Walk 25 feet or more   End of Consult   Patient Position at End of Consult Bedside chair;All needs within reach  (on waffle cushion, BLE elevated, all lines in tact, CP to R knee, family at bedside)     Cheryl Philip, PT, DPT

## 2024-06-26 NOTE — ASSESSMENT & PLAN NOTE
Failed outpatient conservative measures  Elected to undergo arthroplasty today  Currently no post operative issues noted  DVT prophylaxis in place  Orthopedics managing pain

## 2024-06-26 NOTE — ANESTHESIA POSTPROCEDURE EVALUATION
Post-Op Assessment Note    CV Status:  Stable  Pain Score: 0 (T6 sensory level)    Pain management: adequate    Multimodal analgesia used between 6 hours prior to anesthesia start to PACU discharge    Mental Status:  Alert and awake   Hydration Status:  Euvolemic   PONV Controlled:  Controlled   Airway Patency:  Patent     Post Op Vitals Reviewed: Yes    No anethesia notable event occurred.    Staff: Anesthesiologist, CRNA               /58 (06/26/24 1303)    Temp (!) 97.1 °F (36.2 °C) (06/26/24 1303)    Pulse 71 (06/26/24 1303)   Resp 17 (06/26/24 1303)    SpO2 95 % (06/26/24 1303)

## 2024-06-26 NOTE — PROGRESS NOTES
"Orthopedics   Mikayla Breaux 83 y.o. female MRN: [id number]  Unit/Bed#: PACU 09      Subjective:  83 y.o.female post operative day 0 right total knee arthroplasty. Pt doing well. Pain controlled.    Labs:  0   Lab Value Date/Time    HCT 46.8 (H) 06/05/2024 0820    HCT 47.9 (H) 02/15/2024 0811    HCT 45.5 04/12/2023 0806    HGB 14.9 06/05/2024 0820    HGB 15.0 02/15/2024 0811    HGB 14.7 04/12/2023 0806    INR 1.15 06/05/2024 0820    WBC 3.60 (L) 06/05/2024 0820    WBC 3.89 (L) 02/15/2024 0811    WBC 3.61 (L) 04/12/2023 0806       Meds:    Current Facility-Administered Medications:     bupivacaine liposomal (EXPAREL) 1.3 % injection 20 mL, 20 mL, Infiltration, Once, Connor Hickey MD    Blood Culture:   No results found for: \"BLOODCX\"    Wound Culture:   No results found for: \"WOUNDCULT\"    Ins and Outs:  I/O last 24 hours:  In: 1450 [I.V.:1300; IV Piggyback:150]  Out: 90 [Drains:40; Blood:50]          Physical:  Vitals:    06/26/24 1400   BP: 141/57   Pulse: 64   Resp: 14   Temp:    SpO2: 95%     right lower extremity:  Dressings C/D/I  Toes warm and well perfused  HVx1    _*_*_*_*_*_*_*_*_*_*_*_*_*_*_*_*_*_*_*_*_*_*_*_*_*_*_*_*_*_*_*_*_*_*_*_*_*_*_*_*_*    Assessment: 83 y.o.female post operative day 0 right total knee arthroplasty. Doing well    Plan:  Weight Bearing as tolerated  Up and out of bed  DVT prophylaxis  Drain: monitor overnight  Analgesics  PT/OT  Will continue to assess for acute blood loss anemia    Fior Doll MD        "

## 2024-06-26 NOTE — PLAN OF CARE
Problem: PHYSICAL THERAPY ADULT  Goal: Performs mobility at highest level of function for planned discharge setting.  See evaluation for individualized goals.  Description: Treatment/Interventions: Functional transfer training, LE strengthening/ROM, Therapeutic exercise, Endurance training, Patient/family training, Equipment eval/education, Bed mobility, Gait training, Compensatory technique education, Spoke to nursing, Family  Equipment Recommended:  (pt already has RW)       See flowsheet documentation for full assessment, interventions and recommendations.  Note: Prognosis: Excellent  Problem List: Decreased strength, Decreased range of motion, Decreased endurance, Impaired balance, Decreased mobility, Pain  Assessment: Pt is 83 y.o. female seen for a PT evaluation s/p admit to [facility] on 6/26/2024, w/p T TKA. Please see above for other active problem list / PMH. PT now consulted to assess functional mobility and needs for safe d/c planning. Prior to admission, pt was [place] w/ community ambulation w/ SPC, lives alone in an apt w/o stairs. Currently pt requires S for bed skills; MinAx1 for functional transfers; MinAx1 for ambulation w/ RW. Pt presents functioning below baseline and w/ overall mobility deficits 2* to: decreased LE strength; decreased R knee ROM; decreased endurance; impaired balance; gait deviations; pain; fatigue; multiple lines. These impairments place pt at risk for falls. Pt will continue to benefit from skilled PT interventions to address stated impairments; to maximize functional potential; for ongoing pt/ family training; and DME needs. PT is currently recommending OPPT.  Barriers to Discharge: None     Rehab Resource Intensity Level, PT: III (Minimum Resource Intensity)    See flowsheet documentation for full assessment.

## 2024-06-26 NOTE — INTERVAL H&P NOTE
H&P reviewed. After examining the patient I find no changes in the patients condition since the H&P had been written.    Vitals:    06/26/24 0852   BP: 158/88   Pulse:    Resp:    Temp:    SpO2:    Head: Present  CVS: RRR  Lungs: Clear bilateral  Abdomen: Present  Assessment: Adult female osteoarthritis of the right knee that remains symptomatic despite appropriate nonsurgical treatment.  She continues to have both pain and dysfunction  Plan: Right total knee arthroplasty.  Patient is [place] with risks, benefits, and alternatives

## 2024-06-26 NOTE — ANESTHESIA PROCEDURE NOTES
Peripheral Block    Patient location during procedure: holding area  Start time: 6/26/2024 10:32 AM  Reason for block: at surgeon's request and post-op pain management  Staffing  Performed by: Connor Hickey MD  Authorized by: Connor Hickey MD    Preanesthetic Checklist  Completed: patient identified, IV checked, site marked, risks and benefits discussed, surgical consent, monitors and equipment checked, pre-op evaluation and timeout performed  Peripheral Block  Patient position: left lateral  Prep: ChloraPrep  Patient monitoring: heart rate, continuous pulse ox and frequent blood pressure checks  Block type: IPACK  Laterality: right  Injection technique: single-shot  Procedures: ultrasound guided, Ultrasound guidance required for the procedure to increase accuracy and safety of medication placement and decrease risk of complications.  Ultrasound permanent image saved  bupivacaine (PF) (MARCAINE) 0.25 % injection 20 mL - Perineural   20 mL - 6/26/2024 10:32:00 AM  Needle  Needle type: Stimuplex   Needle gauge: 22 G  Needle length: 4 in  Needle localization: ultrasound guidance  Assessment  Injection assessment: incremental injection, negative aspiration for heme, no paresthesia on injection, frequent aspiration, needle tip visualized at all times, negative aspiration, no symptoms of intraneural/intravenous injection, negative for heart rate change and injected with ease  Paresthesia pain: none  Post-procedure:  site cleaned  patient tolerated the procedure well with no immediate complications

## 2024-06-26 NOTE — H&P (VIEW-ONLY)
Office Visit  4/5/2024  [facility] [place]       Nico Buitrago MD  Orthopedic Surgery Primary osteoarthritis of right knee +5 more  Dx Right Knee - Pain; Referred by Cheyenne Gomez MD  Reason for Visit     Progress Notes  Nico Buitrago MD (Physician)  Orthopedic Surgery  Expand All Collapse All  83 y.o.female presents for evaluation of longstanding problems that pertain to her right knee.  She has been diagnosed osteoarthritis of the right knee.  She admits to despite prior treatments of injections of corticosteroid and viscosupplementation physical therapy, gradual worsening of weightbearing pain.  She has pain level the right knee joint, the pain is made worse bearing weight, the pain increases with increased activities.  Currently pain levels are 9 out of 10 and uses the term severe and unrelenting to describe them.  In addition she has had a towel from a heavy dog strike the midportion of right tibia which has resulted in pain in the right lower leg in addition to numbness and tingling over the dorsum of the right foot.  Those symptoms have not improved with the above-mentioned treatments including not limited to physical therapy for massage therapy     Review of Systems  Review of systems negative unless otherwise specified in HPI     Past Medical History  Medical History        Past Medical History:   Diagnosis Date    Allergic 1980     Ma [address] tin    Arthritis 2008    Benign essential hypertension      Hyperlipidemia, unspecified      Hypertension 2016    Osteoarthritis of left knee      Osteoporosis      PAD (peripheral artery disease) ([facility]) 09/29/2021    Perichondritis of ear, right              Past Surgical History  Surgical History         Past Surgical History:   Procedure Laterality Date    CARDIAC CATHETERIZATION Left 7/6/2022     Procedure: Cardiac Left Heart Cath;  Surgeon: Grover Sommer MD;  [facility]: [facility];  Service:  Cardiology    COLON SURGERY   2003     Rectoseal     LAPAROSCOPIC OVARIAN CYSTECTOMY Right 1981    LAPAROSCOPIC OVARIAN CYSTECTOMY Left 2003     rectoseal and cystoseal repari    TONSILLECTOMY AND ADENOIDECTOMY   1952    TUBAL LIGATION   1972            Current Medications  Medications Ordered Prior to Encounter          Current Outpatient Medications on File Prior to Visit   Medication Sig Dispense Refill    b complex vitamins capsule Take 1 capsule by mouth daily        cholecalciferol (VITAMIN D3) 1,000 units tablet Take 1,000 Units by mouth daily        metoprolol succinate (TOPROL-XL) 25 mg 24 hr tablet TAKE 1 TABLET BY MOUTH EVERY MORNING AND TAKE 1 AND 1/2 TABLETS BY MOUTH EVERY EVENING 75 tablet 2    Xarelto 20 MG tablet TAKE 1 TABLET BY MOUTH EVERY DAY WITH BREAKFAST 30 tablet 5    clobetasol (TEMOVATE) 0.05 % ointment Apply topically as needed  (Patient not taking: Reported on 3/5/2024)        docusate sodium (COLACE) 100 mg capsule Take 100 mg by mouth daily (Patient not taking: Reported on 2/2/2024)          No current facility-administered medications on file prior to visit.            Recent Labs (HCT,HGB,PT,INR,ESR,CRP,GLU,HgA1C)        0   Lab Value Date/Time     HCT 47.9 (H) 02/15/2024 0811     HGB 15.0 02/15/2024 0811     WBC 3.89 (L) 02/15/2024 0811     HGBA1C 5.5 04/12/2023 0806            Physical exam  General: Awake, Alert, Oriented  Eyes: Pupils equal, round and reactive to light  Heart: regular rate and rhythm  Lungs: No audible wheezing  Abdomen: soft  Gait pattern is with antalgia despite use of a single-point cane.  Right hip is good mobility.  The right thigh has no atrophy.  Right knee is in varus.  There is no effusion.  There is bony enlargement and tenderness medially.  There is crepitation flexion-extension.  There is no palp warmth of the synovium.  There is moderate swelling from the mid leg distal.  Toes of the right foot are warm and mobile     Imaging  I have personally  "reviewed x-rays of the right knee from today and my interpretation as follows:  Greater than 50% medial and patellofemoral compartment joint space narrowing is seen.  Subchondral sclerosis and periarticular osteophytes are seen as well     Procedure  None indicated performed today     Assessment/Plan:   83 y.o.female who is anticoagulant by nature due to her atrial fibrillation, who has right knee arthritis that remains symptomatic despite appropriate nonsurgical treatments.  All diagnoses were discussed with the patient.  Treatment option including risk, benefits, return discussed in detail.  I think elective right knee replacement surgery is warranted.  Consent was established to reflect this.  No guarantees were given.  I explained to the patient that her current symptoms relative to the dog related event are unlikely to improve with knee replacement, and that the nature of her knee replacement is to establish a cure for the root source of her current knee problem which is osteoarthritis           Instructions      Return Postop, for post-op with x-rays upon arrival right knee.  After Visit Summary (Printed 4/5/2024)  Additional Documentation    Vitals: /84     Pulse 71     Ht 5' 5\" (1.651 m)     Wt 86.2 kg (190 lb)     BMI 31.62 kg/m²     BSA 1.94 m²          More Vitals   SmartForms:  [facility] WRAP UP REQUIREMENTS ADVANCED       AMB [facility] PRE-CHARTING   Encounter Info: Billing Info,     History,     Allergies,     Detailed Report     Communications    View All Conversations on this Encounter  Orders Placed      Labs    CBC and differential    Protime-INR  ...(5 more)    Imaging    XR knee 1 or 2 vw right    Other Orders    Ambulatory referral to Family Practice Pending Review    Ambulatory referral to Physical Therapy Closed    Case request operating room: RIGHT TOTAL KNEE ARTHROPLASTY W ROBOT Once  All Encounter Results  Other Orders Performed    Ambulatory Referral to Orthopedic Surgery " Closed  Medication Changes      None  Medication List  Visit Diagnoses      Primary osteoarthritis of right knee    Chronic pain of right knee    Pre-operative laboratory examination    Pre-operative cardiovascular examination    Aftercare following right knee joint replacement surgery    Anticoagulation management encounter  Problem List  Encounters with Related Results    Appointment (6/5/2024)  Appointment (4/5/2024)

## 2024-06-26 NOTE — ANESTHESIA PROCEDURE NOTES
Spinal Block    Patient location during procedure: OR  Start time: 6/26/2024 11:22 AM  Reason for block: at surgeon's request and primary anesthetic  Staffing  Performed by: Connor Hickey MD  Authorized by: Connor Hickey MD    Preanesthetic Checklist  Completed: patient identified, IV checked, site marked, risks and benefits discussed, surgical consent, monitors and equipment checked, pre-op evaluation and timeout performed  Spinal Block  Patient position: sitting  Prep: ChloraPrep and site prepped and draped  Patient monitoring: frequent blood pressure checks, continuous pulse ox and heart rate  Approach: midline  Location: L3-4  Needle  Needle type: Pencan   Needle gauge: 24 G  Needle length: 4 in  Assessment  Sensory level: T4  Injection Assessment:  negative aspiration for heme, no paresthesia on injection and positive aspiration for clear CSF.  Post-procedure:  site cleaned

## 2024-06-26 NOTE — ASSESSMENT & PLAN NOTE
Takes metoprolol for rate control  Continue throughout the perioperative period  Resume xarelto POD #1 if OK with orthopedics

## 2024-06-26 NOTE — CONSULTS
Office Visit  4/5/2024  [facility]       Nico Buitrago MD  Orthopedic Surgery Primary osteoarthritis of right knee +5 more  Dx Right Knee - Pain; Referred by Cheyenne Gomez MD  Reason for Visit     Progress Notes  Nico Buitrago MD (Physician)  Orthopedic Surgery  Expand All Collapse All  83 y.o.female presents for evaluation of longstanding problems that pertain to her right knee.  She has been diagnosed osteoarthritis of the right knee.  She admits to despite prior treatments of injections of corticosteroid and viscosupplementation physical therapy, gradual worsening of weightbearing pain.  She has pain level the right knee joint, the pain is made worse bearing weight, the pain increases with increased activities.  Currently pain levels are 9 out of 10 and uses the term severe and unrelenting to describe them.  In addition she has had a towel from a heavy dog strike the midportion of right tibia which has resulted in pain in the right lower leg in addition to numbness and tingling over the dorsum of the right foot.  Those symptoms have not improved with the above-mentioned treatments including not limited to physical therapy for massage therapy     Review of Systems  Review of systems negative unless otherwise specified in HPI     Past Medical History  Medical History        Past Medical History:   Diagnosis Date    Allergic 1980     [address] tin    Arthritis 2008    Benign essential hypertension      Hyperlipidemia, unspecified      Hypertension 2016    Osteoarthritis of left knee      Osteoporosis      PAD (peripheral artery disease) ([facility]) 09/29/2021    Perichondritis of ear, right              Past Surgical History  Surgical History         Past Surgical History:   Procedure Laterality Date    CARDIAC CATHETERIZATION Left 7/6/2022     Procedure: Cardiac Left Heart Cath;  Surgeon: Grover Sommer MD;  Location: [facility] CARD[facility];  Service:  Cardiology    COLON SURGERY   2003     Rectoseal     LAPAROSCOPIC OVARIAN CYSTECTOMY Right 1981    LAPAROSCOPIC OVARIAN CYSTECTOMY Left 2003     rectoseal and cystoseal repari    TONSILLECTOMY AND ADENOIDECTOMY   1952    TUBAL LIGATION   1972            Current Medications  Medications Ordered Prior to Encounter          Current Outpatient Medications on File Prior to Visit   Medication Sig Dispense Refill    b complex vitamins capsule Take 1 capsule by mouth daily        cholecalciferol (VITAMIN D3) 1,000 units tablet Take 1,000 Units by mouth daily        metoprolol succinate (TOPROL-XL) 25 mg 24 hr tablet TAKE 1 TABLET BY MOUTH EVERY MORNING AND TAKE 1 AND 1/2 TABLETS BY MOUTH EVERY EVENING 75 tablet 2    Xarelto 20 MG tablet TAKE 1 TABLET BY MOUTH EVERY DAY WITH BREAKFAST 30 tablet 5    clobetasol (TEMOVATE) 0.05 % ointment Apply topically as needed  (Patient not taking: Reported on 3/5/2024)        docusate sodium (COLACE) 100 mg capsule Take 100 mg by mouth daily (Patient not taking: Reported on 2/2/2024)          No current facility-administered medications on file prior to visit.            Recent Labs (HCT,HGB,PT,INR,ESR,CRP,GLU,HgA1C)        0   Lab Value Date/Time     HCT 47.9 (H) 02/15/2024 0811     HGB 15.0 02/15/2024 0811     WBC 3.89 (L) 02/15/2024 0811     HGBA1C 5.5 04/12/2023 0806            Physical exam  General: Awake, Alert, Oriented  Eyes: Pupils equal, round and reactive to light  Heart: regular rate and rhythm  Lungs: No audible wheezing  Abdomen: soft  Gait pattern is with antalgia despite use of a single-point cane.  Right hip is good mobility.  The right thigh has no atrophy.  Right knee is in varus.  There is no effusion.  There is bony enlargement and tenderness medially.  There is crepitation flexion-extension.  There is no palp warmth of the synovium.  There is moderate swelling from the mid leg distal.  Toes of the right foot are warm and mobile     Imaging  I have personally  "reviewed x-rays of the right knee from today and my interpretation as follows:  Greater than 50% medial and patellofemoral compartment joint space narrowing is seen.  Subchondral sclerosis and periarticular osteophytes are seen as well     Procedure  None indicated performed today     Assessment/Plan:   83 y.o.female who is anticoagulant by nature due to her atrial fibrillation, who has right knee arthritis that remains symptomatic despite appropriate nonsurgical treatments.  All diagnoses were discussed with the patient.  Treatment option including risk, benefits, return discussed in detail.  I think elective right knee replacement surgery is warranted.  Consent was established to reflect this.  No guarantees were given.  I explained to the patient that her current symptoms relative to the dog related event are unlikely to improve with knee replacement, and that the nature of her knee replacement is to establish a cure for the root source of her current knee problem which is osteoarthritis           Instructions      Return Postop, for post-op with x-rays upon arrival right knee.  After Visit Summary (Printed 4/5/2024)  Additional Documentation    Vitals: /84     Pulse 71     Ht 5' 5\" (1.651 m)     Wt 86.2 kg (190 lb)     BMI 31.62 kg/m²     BSA 1.94 m²          More Vitals   SmartForms:  [facility] WRAP UP REQUIREMENTS ADVANCED       AMB [facility] PRE-CHARTING   Encounter Info: Billing Info,     History,     Allergies,     Detailed Report     Communications    View All Conversations on this Encounter  Orders Placed      Labs    CBC and differential    Protime-INR  ...(5 more)    Imaging    XR knee 1 or 2 vw right    Other Orders    Ambulatory referral to Family Practice Pending Review    Ambulatory referral to Physical Therapy Closed    Case request operating room: RIGHT TOTAL KNEE ARTHROPLASTY W ROBOT Once  All Encounter Results  Other Orders Performed    Ambulatory Referral to Orthopedic Surgery " Closed  Medication Changes      None  Medication List  Visit Diagnoses      Primary osteoarthritis of right knee    Chronic pain of right knee    Pre-operative laboratory examination    Pre-operative cardiovascular examination    Aftercare following right knee joint replacement surgery    Anticoagulation management encounter  Problem List  Encounters with Related Results    Appointment (6/5/2024)  Appointment (4/5/2024)

## 2024-06-26 NOTE — OP NOTE
OPERATIVE REPORT  PATIENT NAME: Mikayla Breaux  : 1940  MRN: [id number]  Pt Location:  [facility] OR ROOM 18    Surgery Date: 2024    Surgeons and Role:     * Nico Buitrago MD - Primary     * Astrid Fontenot PA-C - Assisting     Preop Diagnosis:  Primary osteoarthritis of right knee [M17.11]  Chronic pain of right knee [M25.561, G89.29]    Post-Op Diagnosis Codes:     * Primary osteoarthritis of right knee [M17.11]     * Chronic pain of right knee [M25.561, G89.29]    Procedure(s):  Right - RIGHT TOTAL KNEE ARTHROPLASTY W ROBOT    Specimens:  * No specimens in log *    Estimated Blood Loss:   Minimal    Drains:  Closed/Suction Drain Anterior;Right Knee Accordion 10 Fr. (Active)   Number of days: 0       Anesthesia Type:   Choice     Operative Indications:  Primary osteoarthritis of right knee [M17.11]  Chronic pain of right knee [M25.561, G89.29]    Operative Findings:  Depuy attune   Femur-5   Poly-5   Tibia-4   Patella-35   Cannulated screw X 1    Complications:   None    Knee Technique: Suture (direct) Repair  Knee Approach: Medial Parapatellar    Procedure and Technique:  Following the induction medical level of spinal anesthesia, antibiotics administered.  The right thigh was then fitted with a thigh-high tourniquet.  The right lower extremity then underwent sterile prep and drape.  The right lower extremity was exsanguinated to gravity, the tourniquet inflated to 300 mmHg.  A midline knee incision was created the knee in flexion.  Full-thickness flaps were raised in order to access the extensor mechanism.  A medial arthrotomy was created to open up the knee joint.  2 half pins placed in proximal tibia, 2 half pins in place with distal femur.  In doing so, modules were created.  The arrays were attached to the modules.  Checkpoints made the proximal tibia distal femur.  The knee was then registered.  The surgery was planned out on the computer, plan as follows.  The robot was docked.  The first  maneuver involve the distal femoral cut followed with anterior posterior cuts.  The chamfer cuts completed the process.  The proximal tibia cuts made next.  Care was taken preserve the taken the medial collateral, lateral collateral, and posterior structures during these maneuvers.  The box cut was made for the posterior stabilized unit, and remnant medial and lateral meniscectomies and performed.  Trials were inserted, and the knee was taken through range of motion, found to be capable full extension, good flexion, stable throughout.  The patella was then resurfaced while utilizing manufactures equipment, is found to be a size 35 mm button.  The trial components removed and the knee was prepared for insertion of cemented components.  At this point time a nondisplaced fissure line was seen propagating from the deep aspect of the box cut near the medial femoral condyle.  The area was stressed, I could not elicit displacement, but on the basis of it was presumed to be a nondisplaced fracture, a single 4.5 mm cannulated screw was placed from lateral to medial securing this bone segment the cemented tibia, cemented femur, trial poly-, cemented patella placed.  Excess cement was removed, and the knee was brought into extension.  The cement was allowed to cure.  The trial poly was taken out, the knee was packed off.  The tourniquet is deflated, and hemostasis was secured.  The insert polyethylene was snapped into position.  The knee was taken their final range of motion, found to be capable full extension, good flexion, stable throughout with excellent patella tracking.  Satisfied with the extent of surgery, the wounds were flushed with saline and closed.  Betadine soak initiated.  A drain is placed deep brought via cephalad stab incision.  The arthrotomy was closed with number Vicryl suture.  The subcu tissues were closed with 2-0 Vicryl suture.  The skin was ago with staples.  Sterile dressings were applied.  She was  then transferred to recovery room in stable condition with plans to include physical therapy weightbearing tolerance, she will require DVT prophylaxis with Lovenox.  Please note, as no qualified orthopedic resident was available to assist, the assistance of Astrid Fontenot PA-C was instrumental in safe execution this patient surgery.  Started the patient position, patient prepped draped, IntraOp assistance, wound closure, dressing application, patient transfers, all of these were performed under my direct supervision   I was present for the entire procedure.    Patient Disposition:  PACU             SIGNATURE: Nico Buitrago MD  DATE: June 26, 2024  TIME: 12:37 PM

## 2024-06-26 NOTE — CONSULTS
[facility]  Consult  Name: Mikayla Breaux 83 y.o. female I MRN: [id number]  Unit/Bed#: -01 I Date of Admission: 6/26/2024   Date of Service: 6/26/2024 I Hospital Day: 0    Inpatient consult to Internal Medicine  Consult performed by: ANN-MARIE Pop  Consult ordered by: Astrid Fontenot PA-C          Assessment & Plan   S/P total right hip arthroplasty  Assessment & Plan  Failed outpatient conservative measures  Elected to undergo arthroplasty today  Currently no post operative issues noted  DVT prophylaxis in place  Orthopedics managing pain        Paroxysmal atrial fibrillation (HCC)  Assessment & Plan  Takes metoprolol for rate control  Continue throughout the perioperative period  Resume xarelto POD #1 if OK with orthopedics    Essential hypertension  Assessment & Plan  Continue metoprolol with appropriate adjusted parameters  Add hydralazine as needed for SBP >160      Swelling of lower extremity  Assessment & Plan  Chronic  Uses compression stockings for same    MENON (dyspnea on exertion)  Assessment & Plan  chronic         PRE-OP HGB LEVEL: 14.9    Subjective/ HPI: Mikayla Breaux was seen and examined. Hx of HIP pain failed out patient conservative measures. Elected to undergo total HIP arthroplasty We are asked to see patient for post op management of underlying medical co-morbidities as outlined above. Pt did well intra and post operatively with good hemodynamics. Pt currently comfortable and without any reported post op nausea.             ROS:   A 10 point ROS was performed; negative except as noted above.     Past medical history    Past Medical History:   Diagnosis Date    Allergic 1980    [address] tin    Arthritis 2008    Benign essential hypertension     Hyperlipidemia, unspecified     Hypertension 2016    Irregular heart beat     Osteoarthritis of left knee     Osteoporosis     PAD (peripheral artery disease) ([facility]) 09/29/2021    Perichondritis of ear, right         Social History:    Substance Use History:   Social History     Substance and Sexual Activity   Alcohol Use Yes    Alcohol/week: 5.0 standard drinks of alcohol    Types: 5 Glasses of wine per week     Social History     Tobacco Use   Smoking Status Never   Smokeless Tobacco Never     Social History     Substance and Sexual Activity   Drug Use Never       Family History:    Family History   Problem Relation Age of Onset    Hyperlipidemia Mother     Heart Valve Disease Mother     Hypertension Mother     Arthritis Father     Diabetes Father     Alcohol abuse Father     Prostate cancer Father 80    Diabetes Brother     Atrial fibrillation Brother     Alcohol abuse Brother     Arthritis Brother     Pancreatic cancer Maternal Aunt 65    No Known Problems Paternal Uncle     No Known Problems Paternal Uncle     No Known Problems Paternal Uncle     No Known Problems Paternal Uncle     Hypertension Daughter     Bradycardia Son     No Known Problems Son          Review of Scheduled Meds:  Current Facility-Administered Medications   Medication Dose Route Frequency Provider Last Rate    acetaminophen  650 mg Oral Q6H PRN Astrid Fontenot, PA-C      calcium carbonate  1,000 mg Oral Daily PRN Astrid Fontenot, PA-C      cefazolin  1,000 mg Intravenous Q8H Astrid Fontenot PA-C      docusate sodium  100 mg Oral BID Astrid Fontenot PA-C      enoxaparin  40 mg Subcutaneous Once LEANNA Tobar-C      gabapentin  100 mg Oral Q8H Astrid Fontenot PA-C      HYDROmorphone  0.5 mg Intravenous Q2H PRN Astrid Fontenot, PA-C      lactated ringers  1,000 mL Intravenous Once PRN Astrid Fontenot PA-C      And    lactated ringers  1,000 mL Intravenous Once PRN Astrid Aranano, PA-C      lactated ringers  125 mL/hr Intravenous Continuous Astrid Fontenot PA-C      lactated ringers  125 mL/hr Intravenous Continuous Maira Norris CRNA Stopped (06/26/24 1427)    methocarbamol  500 mg Oral Q6H ARMANDO LEANNA Tobar-BRENDA      [START ON 6/27/2024] metoprolol succinate  25 mg Oral  "Daily Astrid Fontenot PA-C      ondansetron  4 mg Intravenous Q6H PRN Astrid Fontenot PA-C      oxyCODONE  5 mg Oral Q4H PRN LEANNA Tobar-BRENDA      oxyCODONE  2.5 mg Oral Q4H PRN Astrid Fontenot PA-C      [START ON 2024] rivaroxaban  20 mg Oral Daily With Breakfast Astrid Fontenot PA-C      sodium chloride  1,000 mL Intravenous Once PRN LEANNA Tobar-BRENDA      And    sodium chloride  1,000 mL Intravenous Once PRN LEANNA Tobar-BRENDA         Labs:               Invalid input(s): \"LABGLOM\", \"CMP\"               Results from last 7 days   Lab Units 24  1301   POC GLUCOSE mg/dl 89       No results found for: \"BLOODCX\", \"URINECX\", \"WOUNDCULT\", \"SPUTUMCULTUR\"    Input and Output Summary (last 24 hours):       Intake/Output Summary (Last 24 hours) at 2024 1505  Last data filed at 2024 1345  Gross per 24 hour   Intake 1450 ml   Output 90 ml   Net 1360 ml       Imaging:     No orders to display       *Labs /Radiology studiesLabs reviewed  *Medications reviewed and reconciled as needed  *Please refer to order section for additional ordered labs studies  *Case discussed with primary attending during morning huddle case rounds    Vitals:   Temp (24hrs), Av.2 °F (36.2 °C), Min:97.1 °F (36.2 °C), Max:97.4 °F (36.3 °C)    Temp:  [97.1 °F (36.2 °C)-97.4 °F (36.3 °C)] 97.3 °F (36.3 °C)  HR:  [64-71] 66  Resp:  [14-21] 16  BP: (121-193)/() 152/76  SpO2:  [94 %-98 %] 94 %  Body mass index is 31.62 kg/m².     Physical Exam:   General Appearance: no distress, conversive  HEENT: PERRLA, conjuctiva normal; oropharynx clear; mucous membranes moist;   Neck:  Supple, no lymphadenopathy or thyromegaly  Lungs: clear bilaterally, normal respiratory effort, no retractions, expiratory effort normal  CV: S1 S2, no murmurs rubs or gallops, rate is irregular   ABD: soft non tender, +BS x4, obese  EXT: DP pulses intact, no lymphadenopathy, no edema ;  right HIP dressing in place  Skin: normal turgor, normal texture, no rash  Psych: " affect normal, mood normal  Neuro: AAOx3          Invasive Devices       Peripheral Intravenous Line  Duration             Peripheral IV 06/26/24 Dorsal (posterior);Left Hand <1 day              Drain  Duration             Closed/Suction Drain Anterior;Right Knee Accordion 10 Fr. <1 day                       Code Status: Level 1 - Full Code  Current Length of Stay: 0 day(s)    Total floor / unit time spent today 30 minutes  Coordination of patient's care was performed in conjunction with primary service. Time invested included review of patient's labs, vitals, and management of their comorbidities with continued monitoring, examination of patient as well as answering patient questions, documenting her findings and creating progress note in electronic medical record,  ordering appropriate diagnostic testing. Medical decision making for the day was made by supervising physician unless otherwise noted in their attestation statement.    ** Please Note: Fluency Direct voice to text software may have been used in the creation of this document. Audio transcription errors may occur**

## 2024-06-26 NOTE — ANESTHESIA PROCEDURE NOTES
Peripheral Block    Start time: 6/26/2024 10:38 AM  Reason for block: at surgeon's request and post-op pain management  Staffing  Performed by: Connor Hickey MD  Authorized by: Connor Hickey MD    Preanesthetic Checklist  Completed: patient identified, IV checked, site marked, risks and benefits discussed, surgical consent, monitors and equipment checked, pre-op evaluation and timeout performed  Peripheral Block  Patient position: supine  Prep: ChloraPrep  Patient monitoring: heart rate, continuous pulse ox and frequent blood pressure checks  Block type: Adductor Canal  Laterality: right  Injection technique: single-shot  Procedures: ultrasound guided, Ultrasound guidance required for the procedure to increase accuracy and safety of medication placement and decrease risk of complications.  Ultrasound permanent image saved  bupivacaine liposomal (EXPAREL) 1.3 % injection 20 mL - Perineural   20 mL - 6/26/2024 10:38:00 AM  bupivacaine (PF) (MARCAINE) 0.5 % injection 20 mL - Perineural   5 mL - 6/26/2024 10:38:00 AM  Needle  Needle type: Stimuplex   Needle gauge: 22 G  Needle length: 4 in  Needle localization: ultrasound guidance  Assessment  Injection assessment: incremental injection, local visualized surrounding nerve on ultrasound, negative aspiration for heme, no paresthesia on injection, frequent aspiration, needle tip visualized at all times, negative aspiration, no symptoms of intraneural/intravenous injection, negative for heart rate change and injected with ease  Paresthesia pain: none  Post-procedure:  site cleaned  patient tolerated the procedure well with no immediate complications

## 2024-06-27 VITALS
RESPIRATION RATE: 17 BRPM | SYSTOLIC BLOOD PRESSURE: 114 MMHG | TEMPERATURE: 97.9 F | BODY MASS INDEX: 31.65 KG/M2 | HEIGHT: 65 IN | WEIGHT: 190 LBS | DIASTOLIC BLOOD PRESSURE: 57 MMHG | HEART RATE: 74 BPM | OXYGEN SATURATION: 92 %

## 2024-06-27 LAB
ANION GAP SERPL CALCULATED.3IONS-SCNC: 5 MMOL/L (ref 4–13)
BUN SERPL-MCNC: 15 MG/DL (ref 5–25)
CALCIUM SERPL-MCNC: 8.3 MG/DL (ref 8.4–10.2)
CHLORIDE SERPL-SCNC: 108 MMOL/L (ref 96–108)
CO2 SERPL-SCNC: 26 MMOL/L (ref 21–32)
CREAT SERPL-MCNC: 0.63 MG/DL (ref 0.6–1.3)
ERYTHROCYTE [DISTWIDTH] IN BLOOD BY AUTOMATED COUNT: 13.1 % (ref 11.6–15.1)
GFR SERPL CREATININE-BSD FRML MDRD: 84 ML/MIN/1.73SQ M
GLUCOSE P FAST SERPL-MCNC: 128 MG/DL (ref 65–99)
GLUCOSE SERPL-MCNC: 128 MG/DL (ref 65–140)
HCT VFR BLD AUTO: 39.2 % (ref 34.8–46.1)
HGB BLD-MCNC: 12.6 G/DL (ref 11.5–15.4)
MCH RBC QN AUTO: 30.7 PG (ref 26.8–34.3)
MCHC RBC AUTO-ENTMCNC: 32.1 G/DL (ref 31.4–37.4)
MCV RBC AUTO: 95 FL (ref 82–98)
PLATELET # BLD AUTO: 163 THOUSANDS/UL (ref 149–390)
PMV BLD AUTO: 10.2 FL (ref 8.9–12.7)
POTASSIUM SERPL-SCNC: 4 MMOL/L (ref 3.5–5.3)
RBC # BLD AUTO: 4.11 MILLION/UL (ref 3.81–5.12)
SODIUM SERPL-SCNC: 139 MMOL/L (ref 135–147)
WBC # BLD AUTO: 7.19 THOUSAND/UL (ref 4.31–10.16)

## 2024-06-27 PROCEDURE — 85027 COMPLETE CBC AUTOMATED: CPT

## 2024-06-27 PROCEDURE — NC001 PR NO CHARGE: Performed by: ORTHOPAEDIC SURGERY

## 2024-06-27 PROCEDURE — 99232 SBSQ HOSP IP/OBS MODERATE 35: CPT | Performed by: INTERNAL MEDICINE

## 2024-06-27 PROCEDURE — 97167 OT EVAL HIGH COMPLEX 60 MIN: CPT

## 2024-06-27 PROCEDURE — 97116 GAIT TRAINING THERAPY: CPT

## 2024-06-27 PROCEDURE — 99213 OFFICE O/P EST LOW 20 MIN: CPT | Performed by: ANESTHESIOLOGY

## 2024-06-27 PROCEDURE — 97110 THERAPEUTIC EXERCISES: CPT

## 2024-06-27 PROCEDURE — 80048 BASIC METABOLIC PNL TOTAL CA: CPT

## 2024-06-27 RX ADMIN — OXYCODONE HYDROCHLORIDE 5 MG: 5 TABLET ORAL at 05:14

## 2024-06-27 RX ADMIN — DOCUSATE SODIUM 100 MG: 100 CAPSULE, LIQUID FILLED ORAL at 08:06

## 2024-06-27 RX ADMIN — OXYCODONE HYDROCHLORIDE 5 MG: 5 TABLET ORAL at 09:44

## 2024-06-27 RX ADMIN — ACETAMINOPHEN 975 MG: 325 TABLET, FILM COATED ORAL at 05:14

## 2024-06-27 RX ADMIN — RIVAROXABAN 20 MG: 20 TABLET, FILM COATED ORAL at 08:05

## 2024-06-27 RX ADMIN — METHOCARBAMOL 500 MG: 500 TABLET ORAL at 08:05

## 2024-06-27 RX ADMIN — METOPROLOL SUCCINATE 25 MG: 25 TABLET, EXTENDED RELEASE ORAL at 08:05

## 2024-06-27 RX ADMIN — OXYCODONE HYDROCHLORIDE 5 MG: 5 TABLET ORAL at 00:35

## 2024-06-27 RX ADMIN — CEFAZOLIN SODIUM 1000 MG: 1 SOLUTION INTRAVENOUS at 03:46

## 2024-06-27 NOTE — OCCUPATIONAL THERAPY NOTE
Occupational Therapy Evaluation     Patient Name: Mikayla Breaux  Today's Date: 6/27/2024  Problem List  Principal Problem:    Primary osteoarthritis of right knee  Active Problems:    Essential hypertension    MENON (dyspnea on exertion)    Paroxysmal atrial fibrillation (HCC)    Swelling of lower extremity    S/P total right hip arthroplasty    Past Medical History  Past Medical History:   Diagnosis Date    Allergic 1980    [address] tin    Arthritis 2008    Benign essential hypertension     Hyperlipidemia, unspecified     Hypertension 2016    Irregular heart beat     Osteoarthritis of left knee     Osteoporosis     PAD (peripheral artery disease) (HCC) 09/29/2021    Perichondritis of ear, right      Past Surgical History  Past Surgical History:   Procedure Laterality Date    CARDIAC CATHETERIZATION Left 7/6/2022    Procedure: Cardiac Left Heart Cath;  Surgeon: Grover Sommer MD;  Location: [facility];  Service: Cardiology    COLON SURGERY  2003    Rectoseal     LAPAROSCOPIC OVARIAN CYSTECTOMY Right 1981    LAPAROSCOPIC OVARIAN CYSTECTOMY Left 2003    rectoseal and cystoseal repari    TONSILLECTOMY AND ADENOIDECTOMY  1952    TUBAL LIGATION  1972 06/27/24 0843   OT Last Visit   OT Visit Date 06/27/24   Note Type   Note type Evaluation   Pain Assessment   Pain Assessment Tool 0-10   Pain Score 7   Pain Location/Orientation Orientation: Right;Location: Knee   Pain Onset/Description Onset: Ongoing;Frequency: Constant/Continuous   Effect of Pain on Daily Activities Increased pain with functional activities   Patient's Stated Pain Goal No pain   Hospital Pain Intervention(s) Repositioned;Ambulation/increased activity;Emotional support   Restrictions/Precautions   Weight Bearing Precautions Per Order Yes   RLE Weight Bearing Per Order (S)  WBAT   Other Precautions WBS;Multiple lines;Fall Risk;Pain   Home Living   Type of Home Apartment  ([facility])   Home Layout One level;Performs  "ADLs on one level;Able to live on main level with bedroom/bathroom;Elevator  (0STE, elevator access to 2nd floor)   Bathroom Shower/Tub Walk-in shower   Bathroom Toilet Raised   Bathroom Equipment Grab bars in shower;Shower chair;Grab bars around toilet   Bathroom Accessibility Accessible   Home Equipment Walker;Cane  (Not used PTA)   Additional Comments ILF Apartment, elevator access, 0STE, FFSU   Prior Function   Level of [place] Independent with ADLs;Independent with functional mobility;Independent with IADLS   Lives With Alone   Receives Help From Family  (Reports family will be coming to stay with pt for first 10 days following sx.)   IADLs Independent with driving;Independent with meal prep;Independent with medication management   Falls in the last 6 months 0   Vocational Retired   Lifestyle   Autonomy IND with all ADLs/IADLs PTA with no DME used. +   Reciprocal Relationships Lives alone in [facility] apartment, family will be coming to stay with her for 10 days post op   Service to Others Retired   Intrinsic Gratification Enjoys walking/hiking   Subjective   Subjective \"This is hard for me\"   ADL   Where Assessed Chair   Eating Assistance 7  Independent   Grooming Assistance 7  Independent   UB Bathing Assistance 5  Supervision/Setup   LB Bathing Assistance 5  Supervision/Setup   UB Dressing Assistance 5  Supervision/Setup   LB Dressing Assistance 5  Supervision/Setup   LB Dressing Deficit Use of adaptive equipment  (Use of Sock aid to don/doff socks. Reacher for LB dressing to don pants.)   Toileting Assistance  5  Supervision/Setup   Functional Assistance 5  Supervision/Setup   Bed Mobility   Supine to Sit Unable to assess   Sit to Supine Unable to assess   Additional Comments Pt OOB in recliner pre/post session, all needs met, call bell within reach.   Transfers   Sit to Stand 5  Supervision   Additional items Increased time required;Verbal cues   Stand to Sit 5  Supervision   Additional items " Increased time required;Verbal cues   Additional Comments RW   Functional Mobility   Functional Mobility 5  Supervision   Additional Comments household distances, RW   Additional items Rolling walker   Balance   Static Sitting Fair +   Dynamic Sitting Fair   Static Standing Fair -   Dynamic Standing Fair -   Ambulatory Fair -   Activity Tolerance   Activity Tolerance Patient limited by fatigue;Patient limited by pain   Nurse Made Aware RN cleared for therapy   RUE Assessment   RUE Assessment WFL   LUE Assessment   LUE Assessment WFL   Hand Function   Gross Motor Coordination Functional   Fine Motor Coordination Functional   Cognition   Overall Cognitive Status WFL   Arousal/Participation Alert;Cooperative   Attention Within functional limits   Orientation Level Oriented X4   Memory Within functional limits   Following Commands Follows all commands and directions without difficulty   Comments Pt very pleasant and cooperative, motivated to participate.   Assessment   Limitation Decreased ADL status;Decreased endurance;Decreased self-care trans;Decreased high-level ADLs   Prognosis Good   Assessment 83 year old pt seen today for an OT evaluation following admission to [facility] 2/2 Primary osteoarthritis of R knee, now s/p R TKA with WBAT RLE orders with present symptoms significant for pain, fatigue, weakness, decreased ADL status, decreased functional mobility. Pt  has a past medical history of Allergic, Arthritis, Benign essential hypertension, Hyperlipidemia, unspecified, Hypertension, Irregular heart beat, Osteoarthritis of left knee, Osteoporosis, PAD (peripheral artery disease) (HCC), and Perichondritis of ear, right. Pt reported living alone in an independent living Apt at [facility] with 0STE and elevator access to 2nd floor. Pt reports being IND with all ADLs/IADLs PTA with no DME used. +. Does go down to the cafeteria half the time, but cooks in her Apt the other half. Pt  reports her family will be staying with her upon d/c. Uses a reacher PRN at home. Pt very pleasant and cooperative t/o session. Pt completed functional STS txfs with SUP, RW. SUP for functional mobility with RW. Pt was SUP for UB ADLs and SUP for LB ADLs. Pt educated on use of LHAE for LB dressing 2/2 R LE pain, demonstrated good ability to complete LB dressing with use of sock aid and reacher with SUP and good carryover of learning. Pt stated she will have her daughter order her a sock aid for her to use at home. The patient's raw score on the AM-PAC Daily Activity Inpatient Short Form is 21. A raw score of greater than or equal to 19 suggests the patient may benefit from discharge to home. Please refer to the recommendation of the Occupational Therapist for safe discharge planning. Pt is functioning at or near baseline level of function with appropriate use of LHAE for LB ADLs and no further skilled OT needs are identified. At this time, current OT recommendation is Level 4 - no needs upon d/c, with increased family support and assist initially upon d/c. Will remain available if skilled acute OT needs arise. D/c OT.   Goals   Patient Goals to have less pain   Plan   OT Frequency Eval only   Discharge Recommendation   Rehab Resource Intensity Level, OT No post-acute rehabilitation needs   Equipment Recommended Hip Kit ($);Reacher ($);Sock aid ($)   AM-PAC Daily Activity Inpatient   Lower Body Dressing 3   Bathing 3   Toileting 3   Upper Body Dressing 4   Grooming 4   Eating 4   Daily Activity Raw Score 21   Daily Activity Standardized Score (Calc for Raw Score >=11) 44.27   AM-PAC Applied Cognition Inpatient   Following a Speech/Presentation 4   Understanding Ordinary Conversation 4   Taking Medications 4   Remembering Where Things Are Placed or Put Away 4   Remembering List of 4-5 Errands 4   Taking Care of Complicated Tasks 4   Applied Cognition Raw Score 24   Applied Cognition Standardized Score 62.21   End of  Consult   Education Provided Yes   Patient Position at End of Consult Bedside chair;All needs within reach   Nurse Communication Nurse aware of consult         Jackson Russ MS, OTR/L     [facility] CERTIFIED RATER ID [id number]

## 2024-06-27 NOTE — PROGRESS NOTES
Progress Note - Orthopedics   Mikayla Breaux 83 y.o. female MRN: [id number]  Unit/Bed#: -01      Subjective:    83 y.o.female POD 1 right total knee arthroplasty. No acute events, no new complaints. Pain well controlled. Denies fevers, chills, CP, SOB, N/V, numbness or tingling. Patient reports no issues with urination or bowel movements.     Labs:  0   Lab Value Date/Time    HCT 46.8 (H) 06/05/2024 0820    HCT 47.9 (H) 02/15/2024 0811    HCT 45.5 04/12/2023 0806    HGB 14.9 06/05/2024 0820    HGB 15.0 02/15/2024 0811    HGB 14.7 04/12/2023 0806    INR 1.15 06/05/2024 0820    WBC 3.60 (L) 06/05/2024 0820    WBC 3.89 (L) 02/15/2024 0811    WBC 3.61 (L) 04/12/2023 0806       Meds:    Current Facility-Administered Medications:     acetaminophen (TYLENOL) tablet 650 mg, 650 mg, Oral, Q6H PRN, LEANNA Tobar-BRENDA    acetaminophen (TYLENOL) tablet 975 mg, 975 mg, Oral, Q8H ARMANDO, Sue Smith, CRNP, 975 mg at 06/27/24 0514    calcium carbonate (TUMS) chewable tablet 1,000 mg, 1,000 mg, Oral, Daily PRN, Astrid Fontenot PA-C    docusate sodium (COLACE) capsule 100 mg, 100 mg, Oral, BID, LEANNA Tobar-C, 100 mg at 06/26/24 1733    HYDROmorphone (DILAUDID) injection 0.5 mg, 0.5 mg, Intravenous, Q2H PRN, LEANNA Tobar-BRENDA    lactated ringers bolus 1,000 mL, 1,000 mL, Intravenous, Once PRN **AND** lactated ringers bolus 1,000 mL, 1,000 mL, Intravenous, Once PRN, Astrid Fontenot PA-C    lactated ringers infusion, 125 mL/hr, Intravenous, Continuous, LEANNA Tobar-BRENDA, Last Rate: 125 mL/hr at 06/26/24 1530, 125 mL/hr at 06/26/24 1530    lactated ringers infusion, 125 mL/hr, Intravenous, Continuous, Maira Norris CRNA, Stopped at 06/26/24 1427    methocarbamol (ROBAXIN) tablet 500 mg, 500 mg, Oral, Q6H PRN, ANN-MARIE Pop, 500 mg at 06/26/24 2212    metoprolol succinate (TOPROL-XL) 24 hr tablet 25 mg, 25 mg, Oral, Daily, Astrid Fontenot PA-C    metoprolol succinate (TOPROL-XL) 24 hr tablet 37.5 mg, 37.5 mg, Oral, QPM, Sue  "Smith, CRNP, 37.5 mg at 06/26/24 1733    ondansetron (ZOFRAN) injection 4 mg, 4 mg, Intravenous, Q6H PRN, Astrid Fontenot PA-C    oxyCODONE (ROXICODONE) IR tablet 5 mg, 5 mg, Oral, Q4H PRN, Astrid Fontenot, PA-C, 5 mg at 06/27/24 0514    oxyCODONE (ROXICODONE) split tablet 2.5 mg, 2.5 mg, Oral, Q4H PRN, LEANNA Tobar-C, 2.5 mg at 06/26/24 1529    rivaroxaban (XARELTO) tablet 20 mg, 20 mg, Oral, Daily With Breakfast, Astrid Fontenot PA-C    sodium chloride 0.9 % bolus 1,000 mL, 1,000 mL, Intravenous, Once PRN **AND** sodium chloride 0.9 % bolus 1,000 mL, 1,000 mL, Intravenous, Once PRN, Astrid Fontenot PA-C    Blood Culture:   No results found for: \"BLOODCX\"    Wound Culture:   No results found for: \"WOUNDCULT\"    Ins and Outs:  I/O last 24 hours:  In: 1930 [P.O.:480; I.V.:1300; IV Piggyback:150]  Out: 1415 [Urine:1100; Drains:265; Blood:50]          Physical:  Vitals:    06/27/24 0438   BP: 124/68   Pulse: 69   Resp:    Temp: 98.2 °F (36.8 °C)   SpO2: 92%     Musculoskeletal: right Lower Extremity  Skin without erythema or ecchymosis.  Dressing c/d/i  TTP maycol incisional  Sensation intact to saphenous, sural, tibial, superficial peroneal nerve, and deep peroneal  Motor intact to +FHL/EHL, +ankle dorsi/plantar flexion  2+ DP pulse  Digits warm and well perfused  Capillary refill < 2 seconds    Assessment:    83 y.o.female POD 1 right total knee arthroplasty .     Plan:  WBAT  Will monitor for ABLA and administer IVF/prbc as indicated for Greater than 2 gram drop or Hgb < 7   PT/OT  Pain control  DVT ppx   Medical co-morbidities include are being managed per primary team  Dispo planning    Fior Doll MD      "

## 2024-06-27 NOTE — PROGRESS NOTES
[facility]  Progress Note  Name: Mikayla Breaux I  MRN: [id number]  Unit/Bed#: -01 I Date of Admission: 6/26/2024   Date of Service: 6/27/2024 I Hospital Day: 0    Assessment & Plan   S/P total right hip arthroplasty  Assessment & Plan  Failed outpatient conservative measures  Elected to undergo arthroplasty   Currently no post operative issues noted  DVT prophylaxis in place  Orthopedics managing pain        Swelling of lower extremity  Assessment & Plan  Chronic  Uses compression stockings for same    Paroxysmal atrial fibrillation (HCC)  Assessment & Plan  Takes metoprolol for rate control  Continue throughout the perioperative period  Resume xarelto this am    MENON (dyspnea on exertion)  Assessment & Plan  chronic    Essential hypertension  Assessment & Plan  Continue metoprolol with appropriate adjusted parameters  continue hydralazine as needed for SBP >160  stable               The above assessment and plan was reviewed and updated as determined by my evaluation of the patient on 6/27/2024.    Labs:   Results from last 7 days   Lab Units 06/27/24  0453   WBC Thousand/uL 7.19   HEMOGLOBIN g/dL 12.6   HEMATOCRIT % 39.2   PLATELETS Thousands/uL 163     Results from last 7 days   Lab Units 06/27/24  0453   SODIUM mmol/L 139   POTASSIUM mmol/L 4.0   CHLORIDE mmol/L 108   CO2 mmol/L 26   BUN mg/dL 15   CREATININE mg/dL 0.63   CALCIUM mg/dL 8.3*             Results from last 7 days   Lab Units 06/26/24  1301   POC GLUCOSE mg/dl 89       Review of Scheduled Meds:  Current Facility-Administered Medications   Medication Dose Route Frequency Provider Last Rate    acetaminophen  650 mg Oral Q6H PRN Astrid Fontenot PA-C      acetaminophen  975 mg Oral Q8H ANN-MARIE Davila      calcium carbonate  1,000 mg Oral Daily PRN Astrid Fontenot PA-C      docusate sodium  100 mg Oral BID Astrid Fontenot PA-C      HYDROmorphone  0.5 mg Intravenous Q2H PRN Astrid Fontenot PA-C      lactated  ringers  1,000 mL Intravenous Once PRN Astrid Ciano, PA-C      And    lactated ringers  1,000 mL Intravenous Once PRN Astrid Ciano, PA-C      lactated ringers  125 mL/hr Intravenous Continuous Astrid Ciano, PA-C Stopped (06/27/24 0723)    lactated ringers  125 mL/hr Intravenous Continuous Maira Norris CRNA Stopped (06/26/24 1427)    methocarbamol  500 mg Oral Q6H PRN Sue Smith, CRNP      metoprolol succinate  25 mg Oral Daily Astrid Ciano, PA-C      metoprolol succinate  37.5 mg Oral QPM Sue Smith, CRNP      ondansetron  4 mg Intravenous Q6H PRN Astrid Ciano, PA-C      oxyCODONE  5 mg Oral Q4H PRN Astrid Ciano, PA-C      oxyCODONE  2.5 mg Oral Q4H PRN Astrid Ciano, PA-C      rivaroxaban  20 mg Oral Daily With Breakfast Astrid Ciano, PA-C      sodium chloride  1,000 mL Intravenous Once PRN Astrid Ciano, PA-C      And    sodium chloride  1,000 mL Intravenous Once PRN Astrid Ciano, PA-C         Subjective/ HPI: Patient seen and examined. Patients overnight issues or events were reviewed with nursing or staff during rounds or morning huddle session. New or overnight issues include the following:     Pt without any overnight events or reported nursing issues      ROS:   A 10 point ROS was performed; negative except as noted above.       Imaging:     No orders to display       *Labs /Radiology studies Reviewed  *Medications  reviewed and reconciled as needed  *Please refer to order section for additional ordered labs studies  *Case discussed with primary attending during morning huddle case rounds    Physical Examination:  Vitals:   Vitals:    06/26/24 1908 06/26/24 2304 06/27/24 0438 06/27/24 0803   BP: 148/67 123/67 124/68 114/57   BP Location:       Pulse: 83 71 69 74   Resp: 17 15  17   Temp: 97.8 °F (36.6 °C) 98.2 °F (36.8 °C) 98.2 °F (36.8 °C) 97.9 °F (36.6 °C)   TempSrc:       SpO2: 96% 92% 92% 92%   Weight:       Height:           General Appearance: no distress, conversive  HEENT: PERRLA, conjuctiva normal;  oropharynx clear; mucous membranes moist  Neck:  Supple  Lungs: clear bilaterally, normal respiratory effort, no retractions, expiratory effort normal  CV: irregular rate and rhythm , PMI normal   ABD: soft non tender, +BS x4  EXT: DP pulses intact, no lymphadenopathy, no edema; right hip surgical dressing in place  Skin: normal turgor, normal texture, no rash  Psych: affect normal, mood normal  Neuro: AAOx3        The above physical exam was reviewed and updated as determined by my evaluation of the patient on 6/27/2024.    Invasive Devices       Peripheral Intravenous Line  Duration             Peripheral IV 06/26/24 Dorsal (posterior);Left Hand 1 day                       VTE Pharmacologic Prophylaxis: Xarelto  Code Status: Level 1 - Full Code  Current Length of Stay: 0 day(s)      Total floor / unit time spent today 30 minutes  Coordination of patient's care was performed in conjunction with primary service. Time invested included review of patient's labs, vitals, and management of their comorbidities with continued monitoring, examination of patient as well as answering patient questions, documenting her findings and creating progress note in electronic medical record,  ordering appropriate diagnostic testing. Medical decision making for the day was made by supervising physician unless otherwise noted in their attestation statement.    ** Please Note:  voice to text software may have been used in the creation of this document. Although proof errors in transcription or interpretation are a potential of such software**

## 2024-06-27 NOTE — ASSESSMENT & PLAN NOTE
Continue metoprolol with appropriate adjusted parameters  continue hydralazine as needed for SBP >160  stable

## 2024-06-27 NOTE — ASSESSMENT & PLAN NOTE
Takes metoprolol for rate control  Continue throughout the perioperative period  Resume xarelto this am

## 2024-06-27 NOTE — DISCHARGE SUMMARY
ORTHOPEDICS DISCHARGE SUMMARY  Mikayla Breaux 83 y.o. female MRN: [id number]  Unit/Bed#: -01    Attending Physician: Iftikhar    Admitting diagnosis: Primary osteoarthritis of right knee [M17.11]  Chronic pain of right knee [M25.561, G89.29]    Discharge diagnosis: Primary osteoarthritis of right knee [M17.11]  Chronic pain of right knee [M25.561, G89.29]    Date of admission: 6/26/2024    Date of discharge: 06/27/24         Procedure: Right total knee arthroplasty    HPI:  This is a 83 y.o. year old female that presented to the office with signs and symptoms of right knee osteoarthritis. They tried and failed conservative treatment measures and wished to proceed with surgical intervention. The risks, benefits, and complications of the procedure were discussed with the patient and informed consent was obtained.    Hospital Course:  The patient was admitted to the hospital on 6/26/2024 and underwent an uncomplicated right total knee arthroplasty. They were transferred to the floor after a brief stay in the post-anesthesia care unit. Their pain was well managed with IV and oral pain medications. They began therapy on post operative day #1. Lovenox was also started for DVT prophylaxis 12 hours post operatively.  Hemovac drain was removed on POD1. On discharge date pt was cleared by PT and the medicine team and determined to be safe for discharge.  Daily discussion was had with the patient, nursing staff, orthopaedic team, and family members if present.  All questions were answered to the patients satisifaction.      0   Lab Value Date/Time    HGB 12.6 06/27/2024 0453    HGB 14.9 06/05/2024 0820    HGB 15.0 02/15/2024 0811    HGB 14.7 04/12/2023 0806    HGB 15.3 07/06/2022 0719       Greater than 2 gram drop which qualifies for diagnosis of acute blood loss anemia.  Vital signs remained stable and pt was resuscitated with IVF as needed   Body mass index is 31.62 kg/m². moderately obese. Recommend behavior  modifications, nutrition, and physical activity.    Discharge Instructions:  The patient was discharged weight bearing as tolerated to the right lower extremity. Her home Xarelto 20mg QD will be continued for DVT ppx. Continue PT/OT. Take pain medications as instructed.    Discharge Medications:  For the complete list of discharge medications, please refer to the patient's medication reconciliation.

## 2024-06-27 NOTE — CONSULTS
Consultation - Acute Pain Service   Mikayla Breaux 83 y.o. female MRN: [id number]  Unit/Bed#: -01 Encounter: [id number]               Assessment & Plan     Assessment:   Patient Active Problem List   Diagnosis    Essential hypertension    Chronic left shoulder pain    MENON (dyspnea on exertion)    Lichen sclerosus    Lipoma of right lower extremity    Pure hypercholesterolemia    Abnormal stress test    Paroxysmal atrial fibrillation (HCC)    Coronary artery disease involving native coronary artery of native heart without angina pectoris    Class 1 obesity due to excess calories without serious comorbidity with body mass index (BMI) of 30.0 to 30.9 in adult    Acute nonintractable headache    Baker cyst, left    Chronic pain of left knee    Bilateral primary osteoarthritis of knee    Osteopenia of multiple sites    Primary osteoarthritis of right knee    Greater trochanteric bursitis of right hip    Swelling of lower extremity    Encounter for geriatric assessment    S/P total right hip arthroplasty    82 yo female POD #1 RTKA. Doing well on pathway. To be discharged home.    Plan:   Normal outpatient management.      APS sign off.  Thank you for the Consult.  Please contact APS ([phone]) with any further questions    History of Present Illness    Admit Date:  6/26/2024  Hospital Day:  0 days  Primary Service:  Orthopedic Surgery  Attending Provider:  Nico Buitrago MD  Reason for Consult / Principal Problem: s/p exparel adductor  HPI: Mikayla Breaux is a 83 y.o. year old female who presents with right knee osteoarthritis. She was taken to the operating room. Nerve blocks were placed perioperatively.    Current pain location(s): right knee  Pain Scale:   6/10  Quality: throbbing  Current Analgesic regimen:  see MAR    Pain History: none  Pain Management Provider:  n/a    I have reviewed the patient's controlled substance dispensing history in the Prescription Drug Monitoring Program in  compliance with the [place] regulations before prescribing any controlled substances.       Inpatient consult to Acute Pain Service  Consult performed by: Andry Sotomayor MD  Consult ordered by: Connor Hickey MD          Review of Systems   Constitutional: Negative.    HENT: Negative.     Eyes: Negative.    Respiratory: Negative.     Cardiovascular: Negative.    Gastrointestinal: Negative.    Endocrine: Negative.    Genitourinary: Negative.    Musculoskeletal:  Positive for arthralgias, gait problem and joint swelling.   Skin: Negative.    Allergic/Immunologic: Negative.    Hematological: Negative.    Psychiatric/Behavioral: Negative.         Historical Information   Past Medical History:   Diagnosis Date    Allergic 1980    [address] tin    Arthritis 2008    Benign essential hypertension     Hyperlipidemia, unspecified     Hypertension 2016    Irregular heart beat     Osteoarthritis of left knee     Osteoporosis     PAD (peripheral artery disease) ([facility]) 09/29/2021    Perichondritis of ear, right      Past Surgical History:   Procedure Laterality Date    CARDIAC CATHETERIZATION Left 7/6/2022    Procedure: Cardiac Left Heart Cath;  Surgeon: Grover Sommer MD;  [facility];  Service: Cardiology    COLON SURGERY  2003    Rectoseal     LAPAROSCOPIC OVARIAN CYSTECTOMY Right 1981    LAPAROSCOPIC OVARIAN CYSTECTOMY Left 2003    rectoseal and cystoseal repari    TONSILLECTOMY AND ADENOIDECTOMY  1952    TUBAL LIGATION  1972     Social History   Social History     Substance and Sexual Activity   Alcohol Use Yes    Alcohol/week: 5.0 standard drinks of alcohol    Types: 5 Glasses of wine per week     Social History     Substance and Sexual Activity   Drug Use Never     Social History     Tobacco Use   Smoking Status Never   Smokeless Tobacco Never     Family History: non-contributory    Meds/Allergies   all current active meds have been reviewed    Allergies   Allergen Reactions    Macrodantin  [Nitrofurantoin] Nausea Only, Dizziness and Headache       Objective   Temp:  [97.1 °F (36.2 °C)-98.2 °F (36.8 °C)] 97.9 °F (36.6 °C)  HR:  [64-83] 74  Resp:  [14-21] 17  BP: (114-162)/(57-87) 114/57    Intake/Output Summary (Last 24 hours) at 6/27/2024 0938  Last data filed at 6/27/2024 0801  Gross per 24 hour   Intake 2110 ml   Output 1490 ml   Net 620 ml       Physical Exam  HENT:      Head: Normocephalic.      Nose: Nose normal.      Mouth/Throat:      Mouth: Mucous membranes are moist.   Cardiovascular:      Pulses: Normal pulses.   Pulmonary:      Effort: Pulmonary effort is normal.   Abdominal:      General: Abdomen is flat.   Musculoskeletal:         General: Swelling and tenderness present.   Skin:     General: Skin is warm.   Neurological:      General: No focal deficit present.      Mental Status: She is alert and oriented to person, place, and time. Mental status is at baseline.   Psychiatric:         Mood and Affect: Mood normal.         Behavior: Behavior normal.         Thought Content: Thought content normal.         Judgment: Judgment normal.         Lab Results: I have personally reviewed pertinent labs.    Imaging Studies: I have personally reviewed pertinent reports.    EKG, Pathology, and Other Studies: I have personally reviewed pertinent reports.        Andry Sotomayor MD

## 2024-06-27 NOTE — CASE MANAGEMENT
Case Management Assessment & Discharge Planning Note    Patient name Mikayla Breaux  Location /-01 MRN [id number]  : 1940 Date 2024       Current Admission Date: 2024  Current Admission Diagnosis:Primary osteoarthritis of right knee   Patient Active Problem List    Diagnosis Date Noted Date Diagnosed    S/P total right hip arthroplasty 2024     Encounter for geriatric assessment 2024     Osteopenia of multiple sites 2023     Primary osteoarthritis of right knee 2023     Greater trochanteric bursitis of right hip 2023     Swelling of lower extremity 2023     Chronic pain of left knee 2023     Bilateral primary osteoarthritis of knee 2023     Baker cyst, left 2022     Class 1 obesity due to excess calories without serious comorbidity with body mass index (BMI) of 30.0 to 30.9 in adult 2022     Acute nonintractable headache 2022     Paroxysmal atrial fibrillation (HCC) 2022     Coronary artery disease involving native coronary artery of native heart without angina pectoris 2022     Abnormal stress test 2022     Pure hypercholesterolemia 2021     Lichen sclerosus 2021     Lipoma of right lower extremity 2021     MENON (dyspnea on exertion) 04/15/2021     Essential hypertension 2021     Chronic left shoulder pain 2021       LOS (days): 0  Geometric Mean LOS (GMLOS) (days):   Days to GMLOS:     OBJECTIVE:              Current admission status: Outpatient Surgery       Preferred Pharmacy:   [id number] - [place] - [address] [place] [address]  Phone: [phone] Fax: [phone]    [facility] - [place] - [address]  [address]  [place] [address]  Phone: [phone] Fax: [phone]    Primary Care Provider: Cheyenne Gomez  MD    Primary Insurance: A[employer] REP  Secondary Insurance:     ASSESSMENT:  Active Health Care Proxies    There are no active Health Care Proxies on file.                 Readmission Root Cause  30 Day Readmission: No    Patient Information  Admitted from:: Home  Mental Status: Alert  During Assessment patient was accompanied by: Son  Assessment information provided by:: Patient  Primary Caregiver: Self  Support Systems: Family members  Home entry access options. Select all that apply.: Elevator  Type of Current Residence: Apartment  Floor Level: 2  Upon entering residence, is there a bedroom on the main floor (no further steps)?: Yes  Upon entering residence, is there a bathroom on the main floor (no further steps)?: Yes  Living Arrangements: Lives Alone    Activities of Daily Living Prior to Admission  Functional Status: Independent  Completes ADLs independently?: Yes  Ambulates independently?: Yes  Does patient use assisted devices?: Yes  Assisted Devices (DME) used: Straight Cane, Walker  Does patient currently own DME?: Yes  What DME does the patient currently own?: Straight Cane, Walker  Does patient have a history of Outpatient Therapy (PT/OT)?: Yes         Patient Information Continued  Income Source: SSI/SSD  Does patient have prescription coverage?: Yes  Does patient have a history of substance abuse?: No  Does patient have a history of Mental Health Diagnosis?: No                Social Determinants of Health (SDOH)      Flowsheet Row Most Recent Value   Housing Stability    In the last 12 months, was there a time when you were not able to pay the mortgage or rent on time? N   In the past 12 months, how many times have you moved where you were living? 1   At any time in the past 12 months, were you homeless or living in a shelter (including now)? N   Transportation Needs    In the past 12 months, has lack of transportation kept you from medical appointments or from getting medications? no   In the past 12  months, has lack of transportation kept you from meetings, work, or from getting things needed for daily living? No   Food Insecurity    Within the past 12 months, you worried that your food would run out before you got the money to buy more. Never true   Within the past 12 months, the food you bought just didn't last and you didn't have money to get more. Never true   Utilities    In the past 12 months has the electric, gas, oil, or water company threatened to shut off services in your home? No            DISCHARGE DETAILS:    Discharge planning discussed with:: patient & son  Freedom of Choice: Yes     CM contacted family/caregiver?: Yes  Were Treatment Team discharge recommendations reviewed with patient/caregiver?: Yes  Did patient/caregiver verbalize understanding of patient care needs?: Yes  Were patient/caregiver advised of the risks associated with not following Treatment Team discharge recommendations?: Yes    Contacts  Patient Contacts: kahlil Stratton  Relationship to Patient:: Family  Contact Method: Phone  Phone Number: [phone]  Reason/Outcome: Emergency Contact       Met with pt & son at bedside & explained CM role. Pt discussed during care coordination rounds. For dc today. Son to transport home. Cleared for oppt. List of locations provided. Pt informs she already has this set up.

## 2024-06-27 NOTE — PLAN OF CARE
Problem: PAIN - ADULT  Goal: Verbalizes/displays adequate comfort level or baseline comfort level  Description: Interventions:  - Encourage patient to monitor pain and request assistance  - Assess pain using appropriate pain scale  - Administer analgesics based on type and severity of pain and evaluate response  - Implement non-pharmacological measures as appropriate and evaluate response  - Consider cultural and social influences on pain and pain management  - Notify physician/advanced practitioner if interventions unsuccessful or patient reports new pain  6/27/2024 1231 by Coco Odom RN  Outcome: Adequate for Discharge  6/27/2024 1230 by Coco Odom RN  Outcome: Progressing     Problem: DISCHARGE PLANNING  Goal: Discharge to home or other facility with appropriate resources  Description: INTERVENTIONS:  - Identify barriers to discharge w/patient and caregiver  - Arrange for needed discharge resources and transportation as appropriate  - Identify discharge learning needs (meds, wound care, etc.)  - Arrange for interpretive services to assist at discharge as needed  - Refer to Case Management Department for coordinating discharge planning if the patient needs post-hospital services based on physician/advanced practitioner order or complex needs related to functional status, cognitive ability, or social support system  6/27/2024 1231 by Coco Odom RN  Outcome: Adequate for Discharge  6/27/2024 1230 by Coco Odom RN  Outcome: Progressing     Problem: Knowledge Deficit  Goal: Patient/family/caregiver demonstrates understanding of disease process, treatment plan, medications, and discharge instructions  Description: Complete learning assessment and assess knowledge base.  Interventions:  - Provide teaching at level of understanding  - Provide teaching via preferred learning methods  6/27/2024 1231 by Coco Odom RN  Outcome: Adequate for Discharge  6/27/2024 1230 by Coco Odom RN  Outcome:  Progressing

## 2024-06-27 NOTE — PLAN OF CARE
Problem: PAIN - ADULT  Goal: Verbalizes/displays adequate comfort level or baseline comfort level  Description: Interventions:  - Encourage patient to monitor pain and request assistance  - Assess pain using appropriate pain scale  - Administer analgesics based on type and severity of pain and evaluate response  - Implement non-pharmacological measures as appropriate and evaluate response  - Consider cultural and social influences on pain and pain management  - Notify physician/advanced practitioner if interventions unsuccessful or patient reports new pain  6/26/2024 2351 by Jorge Ahumada RN  Outcome: Progressing  6/26/2024 2351 by Jorge Ahumada RN  Outcome: Progressing     Problem: DISCHARGE PLANNING  Goal: Discharge to home or other facility with appropriate resources  Description: INTERVENTIONS:  - Identify barriers to discharge w/patient and caregiver  - Arrange for needed discharge resources and transportation as appropriate  - Identify discharge learning needs (meds, wound care, etc.)  - Arrange for interpretive services to assist at discharge as needed  - Refer to Case Management Department for coordinating discharge planning if the patient needs post-hospital services based on physician/advanced practitioner order or complex needs related to functional status, cognitive ability, or social support system  6/26/2024 2351 by Jorge Ahumada RN  Outcome: Progressing  6/26/2024 2351 by Jorge Ahumada RN  Outcome: Progressing     Problem: Knowledge Deficit  Goal: Patient/family/caregiver demonstrates understanding of disease process, treatment plan, medications, and discharge instructions  Description: Complete learning assessment and assess knowledge base.  Interventions:  - Provide teaching at level of understanding  - Provide teaching via preferred learning methods  6/26/2024 2351 by Jorge Ahumada RN  Outcome: Progressing  6/26/2024 2351 by Jorge Ahumada RN  Outcome: Progressing

## 2024-06-27 NOTE — ASSESSMENT & PLAN NOTE
Failed outpatient conservative measures  Elected to undergo arthroplasty   Currently no post operative issues noted  DVT prophylaxis in place  Orthopedics managing pain

## 2024-06-27 NOTE — PHYSICAL THERAPY NOTE
PHYSICAL THERAPY EVALUATION  NAME:  Mikayla Breaux  DATE: 06/27/24    AGE:   83 y.o.  Mrn:   [id number]  ADMIT DX:  Primary osteoarthritis of right knee [M17.11]  Chronic pain of right knee [M25.561, G89.29]    Past Medical History:   Diagnosis Date    Allergic 1980    Ma [address] tin    Arthritis 2008    Benign essential hypertension     Hyperlipidemia, unspecified     Hypertension 2016    Irregular heart beat     Osteoarthritis of left knee     Osteoporosis     PAD (peripheral artery disease) ([facility]) 09/29/2021    Perichondritis of ear, right        Past Surgical History:   Procedure Laterality Date    CARDIAC CATHETERIZATION Left 7/6/2022    Procedure: Cardiac Left Heart Cath;  Surgeon: Grover Sommer MD;  [facility];  Service: Cardiology    COLON SURGERY  2003    Rectoseal     LAPAROSCOPIC OVARIAN CYSTECTOMY Right 1981    LAPAROSCOPIC OVARIAN CYSTECTOMY Left 2003    rectoseal and cystoseal repari    TONSILLECTOMY AND ADENOIDECTOMY  1952    TUBAL LIGATION  1972       Length Of Stay: 0    PHYSICAL THERAPY EVALUATION:       06/27/24 0849   Note Type   Note Type Treatment   Pain Assessment   Pain Assessment Tool 0-10   Pain Score 7   Pain Location/Orientation Orientation: Right;Location: Knee   Pain Onset/Description Onset: Ongoing;Frequency: Constant/Continuous;Descriptor: Aching   Effect of Pain on Daily Activities increased pain with activity   Patient's Stated Pain Goal No pain   Restrictions/Precautions   Weight Bearing Precautions Per Order Yes   RLE Weight Bearing Per Order WBAT   Other Precautions Multiple lines;Fall Risk;Pain   General   Chart Reviewed Yes   Response to Previous Treatment Patient with no complaints from previous session.   Family/Caregiver Present No   Cognition   Overall Cognitive Status WFL   Arousal/Participation Alert   Attention Within functional limits   Orientation Level Oriented X4   Memory Within functional limits   Following Commands Follows all commands and  "directions without difficulty   Subjective   Subjective Pt willing and motivated to participate in PT treatment session   Bed Mobility   Additional Comments NA, Pt seated OOB in chair at time of PT treatment session   Transfers   Sit to Stand 5  Supervision   Additional items Increased time required;Verbal cues   Stand to Sit 5  Supervision   Additional items Increased time required;Verbal cues   Ambulation/Elevation   Gait pattern Short stride;Foward flexed   Gait Assistance 5  Supervision   Assistive Device Rolling walker   Distance 50ftx2  (seated rest break required)   Balance   Static Sitting Fair +   Static Standing Fair -   Ambulatory Fair -   Endurance Deficit   Endurance Deficit Yes   Endurance Deficit Description fatigue, pain   Activity Tolerance   Activity Tolerance Patient limited by fatigue;Patient limited by pain   Nurse Made Aware Pt appropriate to be seen and mobilize per nsg   Exercises   Quad Sets Sitting;AROM;Right;10 reps   Heelslides Sitting;10 reps;AROM;Right   Glute Sets Sitting;AROM;Bilateral;10 reps   Knee AROM Long Arc Quad Sitting;AROM;10 reps;Right   Assessment   Prognosis Excellent   Problem List Decreased strength;Decreased range of motion;Decreased endurance;Decreased mobility;Pain   Assessment Patient seated out of bed in chair at time of PT treatment session.  Patient continues to report right knee pain however is willing and motivated to participate in PT.  Patient performed all transfers with supervision which is improved compared to previous session however slight cueing still needed for proper hand placement during sit to stand transfers.  Patient tolerated increased ambulation distances with supervision and use of rolling walker, but distances remain limited by fatigue and pain.  Seated rest break was required during gait training.  No gross loss of balance noted with gait training.  Post ambulation patient reported slight \" warm\" feeling as well as feeling slightly " "lightheaded. BP was taken in seated position post ambulation: 116/62.  Additionally, patient was able to tolerate and perform all lower extremity TherEX seated out of bed to chair without any increased complaints.  At conclusion of PT treatment session patient was assisted back into chair with all needs within reach.  D/C recommendation when he cleared is home with increased support, OPPT   Barriers to Discharge None   Goals   Patient Goals \" to have less pain\"   [facility] Expiration Date 07/06/24   PT Treatment Day 1   Plan   Treatment/Interventions Functional transfer training;LE strengthening/ROM;Therapeutic exercise;Endurance training;Patient/family training;Equipment eval/education;Bed mobility;Gait training;Spoke to nursing;OT   Progress Progressing toward goals   PT Frequency Twice a day   Discharge Recommendation   Rehab Resource Intensity Level, PT III (Minimum Resource Intensity)   Equipment Recommended Walker   Walker Package Recommended Wheeled walker   AM-PAC Basic Mobility Inpatient   Turning in Flat Bed Without Bedrails 4   Lying on Back to Sitting on Edge of Flat Bed Without Bedrails 4   Moving Bed to Chair 3   Standing Up From Chair Using Arms 3   Walk in Room 3   Climb 3-5 Stairs With Railing 3   Basic Mobility Inpatient Raw Score 20   Basic Mobility Standardized Score 43.99   [facility] Highest Level Of Mobility   JH-HLM Goal 6: Walk 10 steps or more   JH-HLM Achieved 7: Walk 25 feet or more   Portions of the documentation may have been created using voice recognition software.Occasional wrong word or sound alike substitutions may have occurred due to the inherent limitations of the voice recognition software. Read the chart carefully and recognize, using context, where substitutions have occurred.    Roscoe Arana, PT, DPT      "

## 2024-06-27 NOTE — PLAN OF CARE
"  Problem: PHYSICAL THERAPY ADULT  Goal: Performs mobility at highest level of function for planned discharge setting.  See evaluation for individualized goals.  Description: Treatment/Interventions: Functional transfer training, LE strengthening/ROM, Therapeutic exercise, Endurance training, Patient/family training, Equipment eval/education, Bed mobility, Gait training, Compensatory technique education, Spoke to nursing, Family  Equipment Recommended:  (pt already has RW)       See flowsheet documentation for full assessment, interventions and recommendations.  Outcome: Progressing  Note: Prognosis: Excellent  Problem List: Decreased strength, Decreased range of motion, Decreased endurance, Decreased mobility, Pain  Assessment: Patient seated out of bed in chair at time of PT treatment session.  Patient continues to report right knee pain however is willing and motivated to participate in PT.  Patient performed all transfers with supervision which is improved compared to previous session however slight cueing still needed for proper hand placement during sit to stand transfers.  Patient tolerated increased ambulation distances with supervision and use of rolling walker, but distances remain limited by fatigue and pain.  Seated rest break was required during gait training.  No gross loss of balance noted with gait training.  Post ambulation patient reported slight \" warm\" feeling as well as feeling slightly lightheaded. BP was taken in seated position post ambulation: 116/62.  Additionally, patient was able to tolerate and perform all lower extremity TherEX seated out of bed to chair without any increased complaints.  At conclusion of PT treatment session patient was assisted back into chair with all needs within reach.  D/C recommendation when he cleared is home with increased support, OPPT  Barriers to Discharge: None     Rehab Resource Intensity Level, PT: III (Minimum Resource Intensity)    See flowsheet " documentation for full assessment.

## 2024-06-28 ENCOUNTER — TELEPHONE (OUTPATIENT)
Dept: OBGYN CLINIC | Facility: HOSPITAL | Age: 84
End: 2024-06-28

## 2024-06-28 ENCOUNTER — OFFICE VISIT (OUTPATIENT)
Dept: PHYSICAL THERAPY | Facility: CLINIC | Age: 84
End: 2024-06-28
Payer: COMMERCIAL

## 2024-06-28 DIAGNOSIS — Z47.1 AFTERCARE FOLLOWING RIGHT KNEE JOINT REPLACEMENT SURGERY: ICD-10-CM

## 2024-06-28 DIAGNOSIS — G89.29 CHRONIC PAIN OF RIGHT KNEE: ICD-10-CM

## 2024-06-28 DIAGNOSIS — M25.561 CHRONIC PAIN OF RIGHT KNEE: ICD-10-CM

## 2024-06-28 DIAGNOSIS — Z96.651 AFTERCARE FOLLOWING RIGHT KNEE JOINT REPLACEMENT SURGERY: ICD-10-CM

## 2024-06-28 DIAGNOSIS — M17.11 PRIMARY OSTEOARTHRITIS OF RIGHT KNEE: Primary | ICD-10-CM

## 2024-06-28 PROCEDURE — 97530 THERAPEUTIC ACTIVITIES: CPT | Performed by: PHYSICAL THERAPIST

## 2024-06-28 PROCEDURE — 97110 THERAPEUTIC EXERCISES: CPT | Performed by: PHYSICAL THERAPIST

## 2024-06-28 NOTE — TELEPHONE ENCOUNTER
"  Patient returned my call,  for a postoperative follow up assessment. Patient reports doing  \"pretty well\" and had PT this morning. Patient states current pain level of a  5/10  and is ambulating with RW, and reports \"aching\" more since PT. Patient denies increase in swelling, stating \"minimal\" and dressing is clean, dry and intact. Patient is icing the site regularly, and we discussed continuing to ICE areas of pain and swelling, especially after increased activity.     We reviewed patients AVS medication list. Patient is taking Tylenol 650mg every 8 hours (discussed increasing to 1000MG TID if needed), Robaxin 500mg TID PRN, Oxycodone 5mg PRN, Xarelto 20mg Daily, and Colace 100mg BID. Patient has not yet had a BM, and we discussed continuing to the Colace until going regularly.     Patient denies nausea, vomiting, abdominal pain, chest pain, shortness of breath, fever, dizziness and calf pain. Patient does not have any other questions or concerns at this time. Pt was encouraged to call with any questions, concerns or issues.    "

## 2024-06-28 NOTE — PROGRESS NOTES
PT Re-Evaluation 1st post op treatment    Today's date: 2024  Patient name: Mikayla Breaux  : 1940  MRN: [id number]  Referring provider: Nico Buitrago,*  Dx:   Encounter Diagnosis     ICD-10-CM    1. Primary osteoarthritis of right knee  M17.11       2. Chronic pain of right knee  M25.561     G89.29       3. Aftercare following right knee joint replacement surgery  Z47.1     Z96.651                      Assessment  Impairments: abnormal gait, abnormal or restricted ROM, abnormal movement, activity intolerance, impaired physical strength, lacks appropriate home exercise program and pain with function  Functional limitations: ambulation, stair negotiation, squatting, transfers, weight bearing ADLs.    Assessment details: Mikayla is s/p 2 days right total knee arthroplasty on 24 with Dr. Buitrago. Patient is doing well post operatively. Incisions are intact with no observable sign of infection. Moderate swelling is present. ROM and strength of the right knee is reduced as expected. Mikayla would benefit from skilled physical therapy at this time to improve function, reduce pain, increase ROM, increase strength, and return to premorbid function.   Understanding of Dx/Px/POC: good     Prognosis: good    Goals  Short Term Goals: to be achieved by 4 weeks  1) Patient to be independent with basic HEP.  2) Decrease pain to 2/10 at its worst.  3) Increase right ROM by 5-10 degrees   4) Increase LE strength by 1/2 MMT grade in all deficient planes.    Long Term Goals: to be achieved by discharge  1) FOTO equal to or greater than expected.  2) Ambulation to improve to maximal level of function  3) Stair negotiation will improve to reciprocal.  4) Sit to stand transfers will improve to maximal level of function     Plan  Patient would benefit from: skilled PT  Planned modality interventions: cryotherapy and TENS    Planned therapy interventions: ADL training, manual therapy, motor coordination training,  neuromuscular re-education, therapeutic activities, therapeutic exercise, gait training and functional ROM exercises    Frequency: 2-3x per week for 4-6 weeks.  Treatment plan discussed with: patient      Subjective Evaluation    History of Present Illness  Date of surgery: 2024  Mechanism of injury: surgery  Mechanism of injury: History of Current Injury: Pt is 2 days post op R-TKA with Dr. Buitrago. Pt is ambulating with a RW. She is taking oxycodone, tylenol, and methocarbamol for pain relief. She is back on Xeralto. Denies any fever, chills, night sweats. Pt is accompanied by son. No SOB and appetite is improving.     Special Questions: Numbness in ankle which is improving.   Patient goals:  Resume active lifestyle  Hobbies/Interest: Gardening and exercising.   Occupation: Retired       Quality of life: good    Patient Goals  Patient goals for therapy: decreased pain, improved balance, increased motion, increased strength and independence with ADLs/IADLs    Pain  Current pain ratin  At worst pain ratin        Objective     Active Range of Motion     Right Knee   Flexion: 65 degrees   Extensor la degrees     Passive Range of Motion     Right Knee   Flexion: 85 degrees   Extension: -15 degrees     Strength/Myotome Testing     Right Hip   Planes of Motion   Flexion: 3-  Abduction: 3  Adduction: 3    Right Knee   Quadriceps contraction: poor    Right Ankle/Foot   Dorsiflexion: 4  Plantar flexion: 4  Inversion: 4  Eversion: 4    Additional Strength Details  Performed seated   Minimal activation of the quad on the R.  Unable to perform SLR without lag     Tests     Additional Tests Details  Bandage around incision: this is to be maintained until 1st clinical post op appointment with Dr. Buitrago.  Sit to stand transfers: requires UE assist  Sit to supine: Assist x 1  Distal sensation: intact  Distal pulses: good      Ambulation     Observational Gait   Gait: antalgic   Decreased walking speed and stride  length.     Additional Observational Gait Details  RW: step to pattern with reliance on UE             Precautions: HTN, PAD, A-Fib, CAD, MENON      Manuals 6/28            PROM of the R assess                                                   Neuro Re-Ed             Quad sets             LAQ             SLR with assist             Standing hip abd             Standing hip ext             TKE                          Ther Ex             Nustep             Heel slides Seated             Gastroc stretch             Hamstring stretch                                                                  Ther Activity             Standing HR             Rocking to RLE WB             Gait Training                                       Modalities                                        1:1 with PT from [id number]   Pt was re-evaluated today: reviewed HEP

## 2024-07-02 ENCOUNTER — TELEPHONE (OUTPATIENT)
Age: 84
End: 2024-07-02

## 2024-07-02 ENCOUNTER — OFFICE VISIT (OUTPATIENT)
Dept: PHYSICAL THERAPY | Facility: CLINIC | Age: 84
End: 2024-07-02
Payer: COMMERCIAL

## 2024-07-02 DIAGNOSIS — M25.561 CHRONIC PAIN OF RIGHT KNEE: ICD-10-CM

## 2024-07-02 DIAGNOSIS — Z47.1 AFTERCARE FOLLOWING RIGHT KNEE JOINT REPLACEMENT SURGERY: ICD-10-CM

## 2024-07-02 DIAGNOSIS — M17.11 PRIMARY OSTEOARTHRITIS OF RIGHT KNEE: Primary | ICD-10-CM

## 2024-07-02 DIAGNOSIS — G89.29 CHRONIC PAIN OF RIGHT KNEE: ICD-10-CM

## 2024-07-02 DIAGNOSIS — Z96.651 AFTERCARE FOLLOWING RIGHT KNEE JOINT REPLACEMENT SURGERY: ICD-10-CM

## 2024-07-02 DIAGNOSIS — M17.11 PRIMARY OSTEOARTHRITIS OF RIGHT KNEE: ICD-10-CM

## 2024-07-02 PROCEDURE — 97110 THERAPEUTIC EXERCISES: CPT | Performed by: PHYSICAL THERAPIST

## 2024-07-02 PROCEDURE — 97140 MANUAL THERAPY 1/> REGIONS: CPT | Performed by: PHYSICAL THERAPIST

## 2024-07-02 NOTE — TELEPHONE ENCOUNTER
Medication:  PDMP   Refill must be reviewed and completed by the office or provider. The refill is unable to be approved or denied by the medication management team.

## 2024-07-02 NOTE — TELEPHONE ENCOUNTER
Patient is asking for another refill. She has PT today and only one pill left. She is concerned how she is going to feel after and during the night. Patient has an appointment tomorrow, 07/03/24.    Please is requesting a call back if refill is NOT approved. # [phone]    Reason for call:   [x] Refill   [] Prior Auth  [] Other:     Office:   [] PCP/Provider -   [x] Specialty/Provider - ortho / [facility]    Medication: oxycodone    Dose/Frequency: 5mg q6 prn    Quantity: 20    Pharmacy: [id number] - [place] - [address]     Does the patient have enough for 3 days?   [] Yes   [x] No - Send as HP to POD

## 2024-07-02 NOTE — TELEPHONE ENCOUNTER
Caller: patient     Doctor: Iftikhar    Reason for call: pt called requesting an update on her medication refill    Call back#: [phone]

## 2024-07-02 NOTE — PROGRESS NOTES
"Daily Note     Today's date: 2024  Patient name: Mikayla Breaux  : 1940  MRN: [id number]  Referring provider: Nico Buitrago,*  Dx:   Encounter Diagnosis     ICD-10-CM    1. Primary osteoarthritis of right knee  M17.11       2. Chronic pain of right knee  M25.561     G89.29       3. Aftercare following right knee joint replacement surgery  Z47.1     Z96.651                      Subjective: Pt reports feeling pretty good this afternoon. She took her pain medication prior to attending PT. She notes that nurse change her dressing yesterday.       Objective: See treatment diary below      Assessment: Tolerated treatment well. Incisions are intact with minimal drainage. Mild erythema around knee but no visible sign of infection. Knee flexion measured at 90 degrees today. Initiated treatment to tolerance. Quad inhibition continues.  Patient exhibited good technique with therapeutic exercises and would benefit from continued PT.      Plan: Continue per plan of care.      Precautions: HTN, PAD, A-Fib, CAD, MENON      Manuals            PROM of the R assess 8' to tolerance                                                   Neuro Re-Ed             Quad sets  30x 5\"           LAQ             SLR with assist             Standing hip abd             Standing hip ext             TKE  10x rtb                        Ther Ex             Nustep  8' for ROM only            Heel slides Seated  PB and strap x 10 for 5\"           Gastroc stretch               Hamstring stretch              Seated knee extension/flexion  20x                                                  Ther Activity             Standing HR  20x           Rocking to RLE WB             Gait Training                                       Modalities                                       1:1 with PT from 2-245pm     "

## 2024-07-02 NOTE — TELEPHONE ENCOUNTER
Caller: Nona    Doctor: Iftikhar    Reason for call: checking status of refill    Call back#: [phone]

## 2024-07-02 NOTE — TELEPHONE ENCOUNTER
Caller: Patient    Doctor: Iftikhar    Reason for call:     Patient is calling back on the medication refill, she knows Dr Buitrago is out and she is asking if this will be sent through today.  She uses [id number] on file.    Call back#: [phone]

## 2024-07-02 NOTE — TELEPHONE ENCOUNTER
Medication:  PDMP   Refill must be reviewed and completed by the office or provider. The refill is unable to be approved or denied by the medication management team.    See Note below Patient is requesting a call back if refill will not be approved

## 2024-07-02 NOTE — TELEPHONE ENCOUNTER
Patient called the RX Refill Line. Message is being forwarded to the office.     Patient is requesting a call back with directions to get to [facility]. She has an appointment tomorrow, 07/03/24. She has never been to this location before and doesn't want to have to do extensive walking while trying to find the office.    Please contact patient at #[phone]

## 2024-07-03 ENCOUNTER — HOSPITAL ENCOUNTER (OUTPATIENT)
Dept: RADIOLOGY | Facility: HOSPITAL | Age: 84
Discharge: HOME | End: 2024-07-03
Attending: ORTHOPAEDIC SURGERY
Payer: COMMERCIAL

## 2024-07-03 ENCOUNTER — OFFICE VISIT (OUTPATIENT)
Dept: OBGYN CLINIC | Facility: HOSPITAL | Age: 84
End: 2024-07-03

## 2024-07-03 VITALS
DIASTOLIC BLOOD PRESSURE: 79 MMHG | BODY MASS INDEX: 31.62 KG/M2 | HEIGHT: 65 IN | SYSTOLIC BLOOD PRESSURE: 132 MMHG | HEART RATE: 78 BPM

## 2024-07-03 DIAGNOSIS — Z47.1 AFTERCARE FOLLOWING RIGHT KNEE JOINT REPLACEMENT SURGERY: ICD-10-CM

## 2024-07-03 DIAGNOSIS — Z96.651 AFTERCARE FOLLOWING RIGHT KNEE JOINT REPLACEMENT SURGERY: ICD-10-CM

## 2024-07-03 DIAGNOSIS — Z96.651 AFTERCARE FOLLOWING RIGHT KNEE JOINT REPLACEMENT SURGERY: Primary | ICD-10-CM

## 2024-07-03 DIAGNOSIS — Z47.1 AFTERCARE FOLLOWING RIGHT KNEE JOINT REPLACEMENT SURGERY: Primary | ICD-10-CM

## 2024-07-03 PROCEDURE — 99024 POSTOP FOLLOW-UP VISIT: CPT

## 2024-07-03 PROCEDURE — 73560 X-RAY EXAM OF KNEE 1 OR 2: CPT

## 2024-07-03 RX ORDER — OXYCODONE HYDROCHLORIDE 5 MG/1
5 TABLET ORAL EVERY 6 HOURS PRN
Qty: 20 TABLET | Refills: 0 | Status: SHIPPED | OUTPATIENT
Start: 2024-07-03 | End: 2024-07-13

## 2024-07-03 NOTE — PROGRESS NOTES
Assessment:   Diagnosis ICD-10-CM Associated Orders   1. Aftercare following right knee joint replacement surgery  Z47.1     Z96.651           Plan:  83 y.o. female 1 week s/p right TKA  Continue physical therapy   Continue weight bearing activities as tolerated   Continue pain medications as needed   Able to shower, letting warm soapy water run over incision and only pat dry   Follow up in 1 week for 2 week post-op appointment and removal of staples      The above stated was discussed in layman's terms and the patient expressed understanding.  All questions were answered to the patient's satisfaction.     To do next visit:  Return in about 1 week (around 7/10/2024) for 2 week post-op and removal of staples .      Subjective:   Mikayla Breaux is a 83 y.o. female who presents 7 days s/p right total knee arthroplasty.  She is doing okay.  She presents today frustrated and in pain due to calling the office yesterday to refill her medications without a provider being available to do so.  Patient was informed of the 48 hour suggestion, medication was refilled this morning.  She admits to taking tylenol and oxycodone which has been managing her pain well.  She admits to attending physical therapy and doing well.  Patient ambulating with a walker today.  She takes her Xarelto daily.        Review of systems negative unless otherwise specified in HPI    Past Medical History:   Diagnosis Date    Allergic 1980    [address] tin    Arthritis 2008    Benign essential hypertension     Hyperlipidemia, unspecified     Hypertension 2016    Irregular heart beat     Osteoarthritis of left knee     Osteoporosis     PAD (peripheral artery disease) ([facility]) 09/29/2021    Perichondritis of ear, right        Past Surgical History:   Procedure Laterality Date    CARDIAC CATHETERIZATION Left 7/6/2022    Procedure: Cardiac Left Heart Cath;  Surgeon: Grover Sommer MD;  Location: [facility];  Service: Cardiology    COLON SURGERY  2003     Rectoseal     LAPAROSCOPIC OVARIAN CYSTECTOMY Right 1981    LAPAROSCOPIC OVARIAN CYSTECTOMY Left 2003    rectoseal and cystoseal repari    DE ARTHRP KNE CONDYLE&PLATU MEDIAL&LAT COMPARTMENTS Right 6/26/2024    Procedure: RIGHT TOTAL KNEE ARTHROPLASTY W ROBOT;  Surgeon: Nico Buitrago MD;  Location: [facility] MAIN OR;  Service: Orthopedics    TONSILLECTOMY AND ADENOIDECTOMY  1952    TUBAL LIGATION  1972       Family History   Problem Relation Age of Onset    Hyperlipidemia Mother     Heart Valve Disease Mother     Hypertension Mother     Arthritis Father     Diabetes Father     Alcohol abuse Father     Prostate cancer Father 80    Diabetes Brother     Atrial fibrillation Brother     Alcohol abuse Brother     Arthritis Brother     Pancreatic cancer Maternal Aunt 65    No Known Problems Paternal Uncle     No Known Problems Paternal Uncle     No Known Problems Paternal Uncle     No Known Problems Paternal Uncle     Hypertension Daughter     Bradycardia Son     No Known Problems Son        Social History     Occupational History    Not on file   Tobacco Use    Smoking status: Never    Smokeless tobacco: Never   Vaping Use    Vaping status: Never Used   Substance and Sexual Activity    Alcohol use: Yes     Alcohol/week: 5.0 standard drinks of alcohol     Types: 5 Glasses of wine per week    Drug use: Never    Sexual activity: Not Currently     Partners: Male         Current Outpatient Medications:     acetaminophen (TYLENOL) 650 mg CR tablet, Take 1 tablet (650 mg total) by mouth every 8 (eight) hours as needed for mild pain, Disp: 30 tablet, Rfl: 0    b complex vitamins capsule, Take 1 capsule by mouth daily, Disp: , Rfl:     Calcium Ascorbate (VITAMIN C) 500 mg tablet, Take 1 tablet (500 mg total) by mouth 2 (two) times a day, Disp: 60 tablet, Rfl: 0    CALCIUM PO, Take by mouth, Disp: , Rfl:     cholecalciferol (VITAMIN D3) 1,000 units tablet, Take 1,000 Units by mouth daily, Disp: , Rfl:     clobetasol (TEMOVATE)  0.05 % ointment, Apply topically as needed  (Patient not taking: Reported on 3/5/2024), Disp: , Rfl:     docusate sodium (COLACE) 100 mg capsule, Take 100 mg by mouth daily, Disp: , Rfl:     methocarbamol (ROBAXIN) 500 mg tablet, Take 1 tablet (500 mg total) by mouth 3 (three) times a day as needed for muscle spasms, Disp: 60 tablet, Rfl: 0    metoprolol succinate (TOPROL-XL) 25 mg 24 hr tablet, TAKE 1 TABLET BY MOUTH EVERY MORNING AND TAKE 1 AND 1/2 TABLETS BY MOUTH EVERY EVENING, Disp: 75 tablet, Rfl: 1    Multiple Vitamins-Minerals (MULTIVITAMIN ADULTS PO), Take by mouth, Disp: , Rfl:     oxyCODONE (Roxicodone) 5 immediate release tablet, Take 1 tablet (5 mg total) by mouth every 6 (six) hours as needed for moderate pain for up to 10 days Max Daily Amount: 20 mg, Disp: 20 tablet, Rfl: 0    Xarelto 20 MG tablet, TAKE 1 TABLET BY MOUTH EVERY DAY WITH BREAKFAST, Disp: 30 tablet, Rfl: 5    Allergies   Allergen Reactions    Macrodantin [Nitrofurantoin] Nausea Only, Dizziness and Headache            Vitals:    07/03/24 1523   BP: 132/79   Pulse: 78       Objective:  Physical exam  General: Awake, Alert, Oriented  Eyes: Pupils equal, round and reactive to light  Heart: regular rate and rhythm  Lungs: No audible wheezing  Abdomen: soft                    Ortho Exam  Right knee:   Incision clean dry and intact  Staples well approximated   No erythema and no ecchymosis  Appropriate swelling of lower limb  Appropriate warmth of knee  Extensor mechanism intact  Extension 10  Flexion 90  Calf compartments soft and supple  Sensation intact  Toes are warm sensate and mobile     Diagnostics, reviewed and taken today if performed as documented:    Right knee x-ray:   - Well aligned prosthesis without evidence of fractures, acute changes, or hardware failure   - Presence of screw through distal femur   - presence of skin staples         Procedures, if performed today:    None performed      Portions of the record may have been  "created with voice recognition software.  Occasional wrong word or \"sound a like\" substitutions may have occurred due to the inherent limitations of voice recognition software.  Read the chart carefully and recognize, using context, where substitutions have occurred.      "

## 2024-07-05 ENCOUNTER — OFFICE VISIT (OUTPATIENT)
Dept: PHYSICAL THERAPY | Facility: CLINIC | Age: 84
End: 2024-07-05
Payer: COMMERCIAL

## 2024-07-05 DIAGNOSIS — M17.11 PRIMARY OSTEOARTHRITIS OF RIGHT KNEE: ICD-10-CM

## 2024-07-05 DIAGNOSIS — Z96.651 AFTERCARE FOLLOWING RIGHT KNEE JOINT REPLACEMENT SURGERY: ICD-10-CM

## 2024-07-05 DIAGNOSIS — M17.11 PRIMARY OSTEOARTHRITIS OF RIGHT KNEE: Primary | ICD-10-CM

## 2024-07-05 DIAGNOSIS — G89.29 CHRONIC PAIN OF RIGHT KNEE: ICD-10-CM

## 2024-07-05 DIAGNOSIS — M25.561 CHRONIC PAIN OF RIGHT KNEE: ICD-10-CM

## 2024-07-05 DIAGNOSIS — Z47.1 AFTERCARE FOLLOWING RIGHT KNEE JOINT REPLACEMENT SURGERY: ICD-10-CM

## 2024-07-05 PROCEDURE — 97140 MANUAL THERAPY 1/> REGIONS: CPT | Performed by: PHYSICAL THERAPIST

## 2024-07-05 PROCEDURE — 97112 NEUROMUSCULAR REEDUCATION: CPT | Performed by: PHYSICAL THERAPIST

## 2024-07-05 PROCEDURE — 97110 THERAPEUTIC EXERCISES: CPT | Performed by: PHYSICAL THERAPIST

## 2024-07-05 RX ORDER — SENNOSIDES 8.6 MG
650 CAPSULE ORAL EVERY 8 HOURS PRN
Qty: 30 TABLET | Refills: 2 | Status: SHIPPED | OUTPATIENT
Start: 2024-07-05

## 2024-07-05 NOTE — PROGRESS NOTES
"Daily Note     Today's date: 2024  Patient name: Mikayla Breaux  : 1940  MRN: [id number]  Referring provider: Nico Buitrago,*  Dx:   Encounter Diagnosis     ICD-10-CM    1. Primary osteoarthritis of right knee  M17.11       2. Chronic pain of right knee  M25.561     G89.29       3. Aftercare following right knee joint replacement surgery  Z47.1     Z96.651                      Subjective: Pt has been diligently working on HEP. Her pain is controlled with medication. Notes having an uncomfortable night after PT last time as she did run out of the oxycodone medication.      Objective: See treatment diary below      Assessment: Pt followed up with Dr. Buitrago's team on Wednesday. Her knee ROM has improved to 100 degrees of flexion and -4 of extension. SLR still requiring assistance; however, only 10% PT assist needed to complete without lag. Tolerated treatment well. Patient would benefit from continued PT.      Plan: Continue per plan of care.      Precautions: HTN, PAD, A-Fib, CAD, MENON      Manuals  7          PROM of the R assess 8' to tolerance  8' to tolerance                                                  Neuro Re-Ed             Quad sets  30x 5\" 20x 5\"          LAQ             SLR with assist   2x5          Standing hip abd             Standing hip ext             TKE  10x rtb 2x10 rtb                        Ther Ex             Nustep  8' for ROM only  8' for ROM only           Heel slides Seated  PB and strap x 10 for 5\" PB and strap x 10 for 5\"          Gastroc stretch               Hamstring stretch              Seated knee extension/flexion  20x 20x          LLL extension   1'           Hamstring curl   2x10                        Ther Activity             Standing HR  20x 20x          Rocking to RLE WB             Gait Training                                       Modalities                                       1:1 with PT from 1145-1230pm       "

## 2024-07-05 NOTE — TELEPHONE ENCOUNTER
Reason for call:   [x] Refill   [] Prior Auth  [] Other: pt states she will be out of tylenol tomorrow    Office:   [] PCP/Provider -   [x] Specialty/Provider -   Astrid Fontenot PA-C  Orthopedic Surgery    Medication:     acetaminophen (TYLENOL) 650 mg CR tablet 650 mg, Every 8 hours PRN #30     oxyCODONE (Roxicodone) 5 immediate release tablet 5 mg, Every 6 hours PRN # 20     Pharmacy: [id number] - [place] - [address]     Does the patient have enough for 3 days?   [] Yes   [x] No - Send as HP to POD

## 2024-07-08 RX ORDER — OXYCODONE HYDROCHLORIDE 5 MG/1
5 TABLET ORAL EVERY 6 HOURS PRN
Qty: 20 TABLET | Refills: 0 | Status: SHIPPED | OUTPATIENT
Start: 2024-07-08 | End: 2024-07-12 | Stop reason: SDUPTHER

## 2024-07-09 ENCOUNTER — OFFICE VISIT (OUTPATIENT)
Dept: PHYSICAL THERAPY | Facility: CLINIC | Age: 84
End: 2024-07-09
Payer: COMMERCIAL

## 2024-07-09 DIAGNOSIS — G89.29 CHRONIC PAIN OF RIGHT KNEE: ICD-10-CM

## 2024-07-09 DIAGNOSIS — Z96.651 AFTERCARE FOLLOWING RIGHT KNEE JOINT REPLACEMENT SURGERY: ICD-10-CM

## 2024-07-09 DIAGNOSIS — M17.11 PRIMARY OSTEOARTHRITIS OF RIGHT KNEE: Primary | ICD-10-CM

## 2024-07-09 DIAGNOSIS — Z47.1 AFTERCARE FOLLOWING RIGHT KNEE JOINT REPLACEMENT SURGERY: ICD-10-CM

## 2024-07-09 DIAGNOSIS — M25.561 CHRONIC PAIN OF RIGHT KNEE: ICD-10-CM

## 2024-07-09 PROCEDURE — 97110 THERAPEUTIC EXERCISES: CPT | Performed by: PHYSICAL THERAPIST

## 2024-07-09 PROCEDURE — 97140 MANUAL THERAPY 1/> REGIONS: CPT | Performed by: PHYSICAL THERAPIST

## 2024-07-09 PROCEDURE — 97112 NEUROMUSCULAR REEDUCATION: CPT | Performed by: PHYSICAL THERAPIST

## 2024-07-09 NOTE — PROGRESS NOTES
"Daily Note     Today's date: 2024  Patient name: Mikayla Breaux  : 1940  MRN: [id number]  Referring provider: Nico Buitrago,*  Dx:   Encounter Diagnosis     ICD-10-CM    1. Primary osteoarthritis of right knee  M17.11       2. Chronic pain of right knee  M25.561     G89.29       3. Aftercare following right knee joint replacement surgery  Z47.1     Z96.651                      Subjective: Pt reports continued swelling in R LE. She was fairly uncomfortable sitting at dinner the other night secondary to discomfort in the back of the knee. She will follow up with Dr. Buitrago on Friday this week.     Objective: See treatment diary below  Flexion: 110 degrees   Extension: -4 degrees     Assessment: Tolerated treatment well. Pt continues to progress with ROM. SLR is improving as quad function returns. Swelling continues at RLE. Reviewed elevation with patient. Progressed resistance with good tolerance. Patient exhibited good technique with therapeutic exercises and would benefit from continued PT.      Plan: Continue per plan of care.      Precautions: HTN, PAD, A-Fib, CAD, MENON      Manuals          PROM of the R assess 8' to tolerance  8' to tolerance  10' focusing on extension                                                Neuro Re-Ed             Quad sets  30x 5\" 20x 5\" 20x5\"         LAQ    10x 3#         SLR with assist   2x5 1x5, 1x7          Standing hip abd             Standing hip ext             TKE  10x rtb 2x10 rtb                        Ther Ex             Nustep  8' for ROM only  8' for ROM only  8' for ROM only          Heel slides Seated  PB and strap x 10 for 5\" PB and strap x 10 for 5\" PB and strap x 10 for 5\"         Gastroc stretch                            Hamstring stretch     10x10\"         Seated knee extension/flexion  20x 20x          LLL extension   1'  2x1'          Hamstring curl   2x10  20 ytb                      Ther Activity             Standing HR  20x " 20x 20x         Mini squat    HHA 10x          Rocking to RLE WB    3'         Gait Training                                       Modalities                                       1:1 with PT from 2-245p

## 2024-07-11 ENCOUNTER — OFFICE VISIT (OUTPATIENT)
Dept: PHYSICAL THERAPY | Facility: CLINIC | Age: 84
End: 2024-07-11
Payer: COMMERCIAL

## 2024-07-11 DIAGNOSIS — M17.11 PRIMARY OSTEOARTHRITIS OF RIGHT KNEE: Primary | ICD-10-CM

## 2024-07-11 DIAGNOSIS — Z47.1 AFTERCARE FOLLOWING RIGHT KNEE JOINT REPLACEMENT SURGERY: ICD-10-CM

## 2024-07-11 DIAGNOSIS — Z96.651 AFTERCARE FOLLOWING RIGHT KNEE JOINT REPLACEMENT SURGERY: ICD-10-CM

## 2024-07-11 DIAGNOSIS — M25.561 CHRONIC PAIN OF RIGHT KNEE: ICD-10-CM

## 2024-07-11 DIAGNOSIS — G89.29 CHRONIC PAIN OF RIGHT KNEE: ICD-10-CM

## 2024-07-11 PROCEDURE — 97110 THERAPEUTIC EXERCISES: CPT | Performed by: PHYSICAL THERAPIST

## 2024-07-11 PROCEDURE — 97140 MANUAL THERAPY 1/> REGIONS: CPT | Performed by: PHYSICAL THERAPIST

## 2024-07-12 ENCOUNTER — OFFICE VISIT (OUTPATIENT)
Dept: OBGYN CLINIC | Facility: MEDICAL CENTER | Age: 84
End: 2024-07-12

## 2024-07-12 VITALS
SYSTOLIC BLOOD PRESSURE: 151 MMHG | HEART RATE: 76 BPM | WEIGHT: 190 LBS | DIASTOLIC BLOOD PRESSURE: 84 MMHG | BODY MASS INDEX: 31.65 KG/M2 | HEIGHT: 65 IN

## 2024-07-12 DIAGNOSIS — M17.11 PRIMARY OSTEOARTHRITIS OF RIGHT KNEE: Primary | ICD-10-CM

## 2024-07-12 PROCEDURE — 99024 POSTOP FOLLOW-UP VISIT: CPT | Performed by: ORTHOPAEDIC SURGERY

## 2024-07-12 RX ORDER — OXYCODONE HYDROCHLORIDE 5 MG/1
5 TABLET ORAL EVERY 6 HOURS PRN
Qty: 20 TABLET | Refills: 0 | Status: SHIPPED | OUTPATIENT
Start: 2024-07-12

## 2024-07-12 RX ORDER — OXYCODONE HYDROCHLORIDE 5 MG/1
5 TABLET ORAL EVERY 6 HOURS PRN
Qty: 20 TABLET | Refills: 0 | Status: SHIPPED | OUTPATIENT
Start: 2024-07-12 | End: 2024-07-22

## 2024-07-12 NOTE — PROGRESS NOTES
Assessment:   Diagnosis ICD-10-CM Associated Orders   1. Primary osteoarthritis of right knee  M17.11 oxyCODONE (Roxicodone) 5 immediate release tablet          Plan:  83 y.o. female 2 weeks s/p right TKA. Doing well  Continue physical therapy   Continue weight bearing activities as tolerated   Continue pain medications as needed   Staples removed in office today, incision cleaned, steri-strips applied   Follow up in 4 weeks for 6 week post-op appointment   Pain medications sent to pharmacy     The above stated was discussed in layman's terms and the patient expressed understanding.  All questions were answered to the patient's satisfaction.     To do next visit:  Return in about 4 weeks (around 8/9/2024).      Subjective:   Mikayla Breaux is a 83 y.o. female who presents 2 weeks from right TKA. Doing well, pain under control. No instability or recent trauma to the knee. Is working with PT, tolerating well. Denies new onset numbness or tingling.      Review of systems negative unless otherwise specified in HPI    Past Medical History:   Diagnosis Date    Allergic 1980    [address] tin    Arthritis 2008    Benign essential hypertension     Hyperlipidemia, unspecified     Hypertension 2016    Irregular heart beat     Osteoarthritis of left knee     Osteoporosis     PAD (peripheral artery disease) ([facility]) 09/29/2021    Perichondritis of ear, right        Past Surgical History:   Procedure Laterality Date    CARDIAC CATHETERIZATION Left 7/6/2022    Procedure: Cardiac Left Heart Cath;  Surgeon: Grover Sommer MD;  Location: [facility];  Service: Cardiology    COLON SURGERY  2003    Rectoseal     LAPAROSCOPIC OVARIAN CYSTECTOMY Right 1981    LAPAROSCOPIC OVARIAN CYSTECTOMY Left 2003    rectoseal and cystoseal repari    CA ARTHRP KNE CONDYLE&PLATU MEDIAL&LAT COMPARTMENTS Right 6/26/2024    Procedure: RIGHT TOTAL KNEE ARTHROPLASTY W ROBOT;  Surgeon: Nico Buitrago MD;  Location: [facility] MAIN [facility];  Service:  Orthopedics    TONSILLECTOMY AND ADENOIDECTOMY  1952    TUBAL LIGATION  1972       Family History   Problem Relation Age of Onset    Hyperlipidemia Mother     Heart Valve Disease Mother     Hypertension Mother     Arthritis Father     Diabetes Father     Alcohol abuse Father     Prostate cancer Father 80    Diabetes Brother     Atrial fibrillation Brother     Alcohol abuse Brother     Arthritis Brother     Pancreatic cancer Maternal Aunt 65    No Known Problems Paternal Uncle     No Known Problems Paternal Uncle     No Known Problems Paternal Uncle     No Known Problems Paternal Uncle     Hypertension Daughter     Bradycardia Son     No Known Problems Son        Social History     Occupational History    Not on file   Tobacco Use    Smoking status: Never    Smokeless tobacco: Never   Vaping Use    Vaping status: Never Used   Substance and Sexual Activity    Alcohol use: Yes     Alcohol/week: 5.0 standard drinks of alcohol     Types: 5 Glasses of wine per week    Drug use: Never    Sexual activity: Not Currently     Partners: Male         Current Outpatient Medications:     acetaminophen (TYLENOL) 650 mg CR tablet, Take 1 tablet (650 mg total) by mouth every 8 (eight) hours as needed for mild pain, Disp: 30 tablet, Rfl: 2    b complex vitamins capsule, Take 1 capsule by mouth daily, Disp: , Rfl:     CALCIUM PO, Take by mouth, Disp: , Rfl:     cholecalciferol (VITAMIN D3) 1,000 units tablet, Take 1,000 Units by mouth daily, Disp: , Rfl:     docusate sodium (COLACE) 100 mg capsule, Take 100 mg by mouth daily, Disp: , Rfl:     methocarbamol (ROBAXIN) 500 mg tablet, Take 1 tablet (500 mg total) by mouth 3 (three) times a day as needed for muscle spasms, Disp: 60 tablet, Rfl: 0    metoprolol succinate (TOPROL-XL) 25 mg 24 hr tablet, TAKE 1 TABLET BY MOUTH EVERY MORNING AND TAKE 1 AND 1/2 TABLETS BY MOUTH EVERY EVENING, Disp: 75 tablet, Rfl: 1    Multiple Vitamins-Minerals (MULTIVITAMIN ADULTS PO), Take by mouth, Disp: ,  "Rfl:     oxyCODONE (Roxicodone) 5 immediate release tablet, Take 1 tablet (5 mg total) by mouth every 6 (six) hours as needed for moderate pain for up to 10 days Max Daily Amount: 20 mg, Disp: 20 tablet, Rfl: 0    oxyCODONE (Roxicodone) 5 immediate release tablet, Take 1 tablet (5 mg total) by mouth every 6 (six) hours as needed for moderate pain for up to 10 days Max Daily Amount: 20 mg, Disp: 20 tablet, Rfl: 0    Xarelto 20 MG tablet, TAKE 1 TABLET BY MOUTH EVERY DAY WITH BREAKFAST, Disp: 30 tablet, Rfl: 5    Calcium Ascorbate (VITAMIN C) 500 mg tablet, Take 1 tablet (500 mg total) by mouth 2 (two) times a day, Disp: 60 tablet, Rfl: 0    clobetasol (TEMOVATE) 0.05 % ointment, Apply topically as needed  (Patient not taking: Reported on 3/5/2024), Disp: , Rfl:     Allergies   Allergen Reactions    Macrodantin [Nitrofurantoin] Nausea Only, Dizziness and Headache            Vitals:    07/12/24 1036   BP: 151/84   Pulse: 76       Objective:  Physical exam  General: Awake, Alert, Oriented  Eyes: Pupils equal, round and reactive to light  Heart: regular rate and rhythm  Lungs: No audible wheezing  Abdomen: soft                    Ortho Exam    Diagnostics, reviewed and taken today if performed as documented:    None performed        Procedures, if performed today:    None performed      Portions of the record may have been created with voice recognition software.  Occasional wrong word or \"sound a like\" substitutions may have occurred due to the inherent limitations of voice recognition software.  Read the chart carefully and recognize, using context, where substitutions have occurred.        "

## 2024-07-16 ENCOUNTER — OFFICE VISIT (OUTPATIENT)
Dept: PHYSICAL THERAPY | Facility: CLINIC | Age: 84
End: 2024-07-16
Payer: COMMERCIAL

## 2024-07-16 DIAGNOSIS — Z47.1 AFTERCARE FOLLOWING RIGHT KNEE JOINT REPLACEMENT SURGERY: ICD-10-CM

## 2024-07-16 DIAGNOSIS — Z96.651 AFTERCARE FOLLOWING RIGHT KNEE JOINT REPLACEMENT SURGERY: ICD-10-CM

## 2024-07-16 DIAGNOSIS — M17.11 PRIMARY OSTEOARTHRITIS OF RIGHT KNEE: Primary | ICD-10-CM

## 2024-07-16 DIAGNOSIS — G89.29 CHRONIC PAIN OF RIGHT KNEE: ICD-10-CM

## 2024-07-16 DIAGNOSIS — M25.561 CHRONIC PAIN OF RIGHT KNEE: ICD-10-CM

## 2024-07-16 PROCEDURE — 97530 THERAPEUTIC ACTIVITIES: CPT | Performed by: PHYSICAL THERAPIST

## 2024-07-16 PROCEDURE — 97110 THERAPEUTIC EXERCISES: CPT | Performed by: PHYSICAL THERAPIST

## 2024-07-16 PROCEDURE — 97140 MANUAL THERAPY 1/> REGIONS: CPT | Performed by: PHYSICAL THERAPIST

## 2024-07-16 NOTE — PROGRESS NOTES
"Daily Note     Today's date: 2024  Patient name: Mikayla Breaux  : 1940  MRN: [id number]  Referring provider: Nico Buitrago,*  Dx:   Encounter Diagnosis     ICD-10-CM    1. Primary osteoarthritis of right knee  M17.11       2. Aftercare following right knee joint replacement surgery  Z47.1     Z96.651       3. Chronic pain of right knee  M25.561     G89.29                      Subjective: Pt followed up with Dr. Buitrago and had her staples removed. Pt reports having difficulty sleeping at night secondary to discomfort.       Objective: See treatment diary below      Assessment: Tolerated treatment well. Pt is 3 weeks post op. Knee ROM continues to improve, although she lacks extension. Most discomfort present with terminal extension. Improve motor control of quad and reduced swelling present. Initiated ambulation with SPC and PT CG. Good stability and safety present. Patient exhibited good technique with therapeutic exercises and would benefit from continued PT.      Plan: Continue per plan of care.      Precautions: HTN, PAD, A-Fib, CAD, MENON. right total knee arthroplasty on 24      Manuals  7//       PROM of the R assess 8' to tolerance  8' to tolerance  10' focusing on extension 10' focusing on extension 10' focusing on extension                                              Neuro Re-Ed             Quad sets  30x 5\" 20x 5\" 20x5\" 20x5\"        LAQ    10x 3# 10x 3# 2x10 4#       SLR with assist   2x5 1x5, 1x7  1x10, 1x8 1x8, 1x10       Standing hip abd     2x10 2x10       Standing hip ext     2x10 2x10       Standing SLR     2x10  2x10        TKE  10x rtb 2x10 rtb                        Ther Ex             Nustep  8' for ROM only  8' for ROM only  8' for ROM only  8' for ROM only  8'L4       Heel slides Seated  PB and strap x 10 for 5\" PB and strap x 10 for 5\" PB and strap x 10 for 5\" PB and strap x 10 for 5\" PB and strap x 10 for 5\"       Gastroc stretch             " "               Hamstring stretch     10x10\" 10x10\"        Seated knee extension/flexion  20x 20x          LLL extension   1'  2x1'  2x1' 2x1\"       Hamstring curl   2x10  20 ytb                      Ther Activity             Stairs      Up with 6\" and down with 4\" (3 rounds)  reciprocal.        SPC ambulation      500 ft       Standing HR  20x 20x 20x 20x 20x        Mini squat    HHA 10x  Sit to stand x 10         Step up      6\" 10x        Rocking to RLE WB    3'         Gait Training                                       Modalities                                       1:1 with PT from 2-245pm             "

## 2024-07-18 ENCOUNTER — OFFICE VISIT (OUTPATIENT)
Dept: PHYSICAL THERAPY | Facility: CLINIC | Age: 84
End: 2024-07-18
Payer: COMMERCIAL

## 2024-07-18 DIAGNOSIS — Z47.1 AFTERCARE FOLLOWING RIGHT KNEE JOINT REPLACEMENT SURGERY: ICD-10-CM

## 2024-07-18 DIAGNOSIS — M17.11 PRIMARY OSTEOARTHRITIS OF RIGHT KNEE: Primary | ICD-10-CM

## 2024-07-18 DIAGNOSIS — G89.29 CHRONIC PAIN OF RIGHT KNEE: ICD-10-CM

## 2024-07-18 DIAGNOSIS — M25.561 CHRONIC PAIN OF RIGHT KNEE: ICD-10-CM

## 2024-07-18 DIAGNOSIS — Z96.651 AFTERCARE FOLLOWING RIGHT KNEE JOINT REPLACEMENT SURGERY: ICD-10-CM

## 2024-07-18 PROCEDURE — 97112 NEUROMUSCULAR REEDUCATION: CPT | Performed by: PHYSICAL THERAPIST

## 2024-07-18 PROCEDURE — 97530 THERAPEUTIC ACTIVITIES: CPT | Performed by: PHYSICAL THERAPIST

## 2024-07-18 PROCEDURE — 97110 THERAPEUTIC EXERCISES: CPT | Performed by: PHYSICAL THERAPIST

## 2024-07-18 NOTE — PROGRESS NOTES
"Daily Note     Today's date: 2024  Patient name: Mikayla Breaux  : 1940  MRN: [id number]  Referring provider: Nico Buitrago,*  Dx:   Encounter Diagnosis     ICD-10-CM    1. Primary osteoarthritis of right knee  M17.11       2. Aftercare following right knee joint replacement surgery  Z47.1     Z96.651       3. Chronic pain of right knee  M25.561     G89.29                      Subjective: Pt reports feeling sore the night after last treatment. She did wean from pain medication at this time. Feels as though she is progressing well.       Objective: See treatment diary below  Right knee ROM:   Knee flexion: 118 degrees  Knee extension: -5 degrees   Swelling reducing at entire LE    Assessment: Pt continues to have restrictions with terminal knee extension. However, mobility does improved with strength and manual intervention. Pt continues to ambulate safely in the clinic with SPC. Discussed and cued compensatory patterns with sit to stand exercise. Tolerated treatment well. Patient would benefit from continued PT.       Plan: Continue per plan of care.      Precautions: HTN, PAD, A-Fib, CAD, MENON. right total knee arthroplasty on 24      Manuals  7// 7      PROM of the R assess 8' to tolerance  8' to tolerance  10' focusing on extension 10' focusing on extension 10' focusing on extension 10' focusing on extension                                             Neuro Re-Ed             Quad sets  30x 5\" 20x 5\" 20x5\" 20x5\"        LAQ    10x 3# 10x 3# 2x10 4#       SLR with assist   2x5 1x5, 1x7  1x10, 1x8 1x8, 1x10       Standing hip abd     2x10 2x10 2x10      Standing hip ext     2x10 2x10 2x10      Standing SLR     2x10  2x10  2x10      TKE  10x rtb 2x10 rtb                        Ther Ex             Nustep  8' for ROM only  8' for ROM only  8' for ROM only  8' for ROM only  8'L4 10' L4      Heel slides Seated  PB and strap x 10 for 5\" PB and strap x 10 for 5\" PB and " "strap x 10 for 5\" PB and strap x 10 for 5\" PB and strap x 10 for 5\" PB and strap x 10 for 5\"      Gastroc stretch                            Hamstring stretch     10x10\" 10x10\"        Seated knee extension/flexion  20x 20x          LLL extension   1'  2x1'  2x1' 2x1\"       Hamstring curl   2x10  20 ytb                      Ther Activity             Stairs      Up with 6\" and down with 4\" (3 rounds)  reciprocal.        SPC ambulation      500 ft 500 feet      Standing HR  20x 20x 20x 20x 20x        Mini squat    HHA 10x  Sit to stand x 10   Sit to stand with foam 10x (cueing provided to reduce UE supprot)      Step up      6\" 10x  C/ ru nner climb 2x10 6\"      Rocking to RLE WB    3'         Gait Training                                       Modalities                                       1:1 with PT from 245-330pm               "

## 2024-07-23 ENCOUNTER — OFFICE VISIT (OUTPATIENT)
Dept: PHYSICAL THERAPY | Facility: CLINIC | Age: 84
End: 2024-07-23
Payer: COMMERCIAL

## 2024-07-23 DIAGNOSIS — Z96.651 AFTERCARE FOLLOWING RIGHT KNEE JOINT REPLACEMENT SURGERY: ICD-10-CM

## 2024-07-23 DIAGNOSIS — M25.561 CHRONIC PAIN OF RIGHT KNEE: ICD-10-CM

## 2024-07-23 DIAGNOSIS — Z47.1 AFTERCARE FOLLOWING RIGHT KNEE JOINT REPLACEMENT SURGERY: ICD-10-CM

## 2024-07-23 DIAGNOSIS — M17.11 PRIMARY OSTEOARTHRITIS OF RIGHT KNEE: Primary | ICD-10-CM

## 2024-07-23 DIAGNOSIS — G89.29 CHRONIC PAIN OF RIGHT KNEE: ICD-10-CM

## 2024-07-23 PROCEDURE — 97112 NEUROMUSCULAR REEDUCATION: CPT | Performed by: PHYSICAL THERAPIST

## 2024-07-23 PROCEDURE — 97530 THERAPEUTIC ACTIVITIES: CPT | Performed by: PHYSICAL THERAPIST

## 2024-07-23 PROCEDURE — 97110 THERAPEUTIC EXERCISES: CPT | Performed by: PHYSICAL THERAPIST

## 2024-07-23 NOTE — PROGRESS NOTES
"Daily Note     Today's date: 2024  Patient name: Mikayla Breaux  : 1940  MRN: [id number]  Referring provider: Nico Buitrago,*  Dx:   Encounter Diagnosis     ICD-10-CM    1. Primary osteoarthritis of right knee  M17.11       2. Aftercare following right knee joint replacement surgery  Z47.1     Z96.651       3. Chronic pain of right knee  M25.561     G89.29                      Subjective: Pt arrives today with a SPC. She also reports swelling reduction in R leg.       Objective: See treatment diary below      Assessment: Tolerated treatment well. Implemented leg press into POC. Incision closed and steri-stripes nearly all removed. Patient exhibited good technique with therapeutic exercises and would benefit from continued PT. Pt is safely ambulating with a SPC. She doesn't feel quite ready to drive and anticipates being able in a couple of weeks.       Plan: Continue per plan of care.      Precautions: HTN, PAD, A-Fib, CAD, MENON. right total knee arthroplasty on 24      Manuals      PROM of the R assess 8' to tolerance  8' to tolerance  10' focusing on extension 10' focusing on extension 10' focusing on extension 10' focusing on extension 10' focusing on extension                                            Neuro Re-Ed             Quad sets  30x 5\" 20x 5\" 20x5\" 20x5\"        LAQ    10x 3# 10x 3# 2x10 4#  3x10 4#     SLR with assist   2x5 1x5, 1x7  1x10, 1x8 1x8, 1x10       Standing hip abd     2x10 2x10 2x10 2x10     Standing hip ext     2x10 2x10 2x10 2x10     Standing SLR     2x10  2x10  2x10 2x10     TKE  10x rtb 2x10 rtb                        Ther Ex             Nustep  8' for ROM only  8' for ROM only  8' for ROM only  8' for ROM only  8'L4 10' L4 8' L4     Heel slides Seated  PB and strap x 10 for 5\" PB and strap x 10 for 5\" PB and strap x 10 for 5\" PB and strap x 10 for 5\" PB and strap x 10 for 5\" PB and strap x 10 for 5\"      Gastroc stretch      " "                      Hamstring stretch     10x10\" 10x10\"        Seated knee extension/flexion  20x 20x          LLL extension   1'  2x1'  2x1' 2x1\"  2x1\"     Hamstring curl   2x10  20 ytb         Leg press        3x10 50#     Ther Activity             Stairs      Up with 6\" and down with 4\" (3 rounds)  reciprocal.        SPC ambulation      500 ft 500 feet      Standing HR  20x 20x 20x 20x 20x   30x     Mini squat    HHA 10x  Sit to stand x 10   Sit to stand with foam 10x (cueing provided to reduce UE supprot) 2x10  without UE support + red MB     Step up      6\" 10x  C/ ru nner climb 2x10 6\" C/ ru nner climb 2x10 6\"     Rocking to RLE WB    3'         Gait Training                                       Modalities                                       1:1 with PT from 2-245pm                 "

## 2024-07-24 ENCOUNTER — TELEPHONE (OUTPATIENT)
Age: 84
End: 2024-07-24

## 2024-07-25 ENCOUNTER — OFFICE VISIT (OUTPATIENT)
Dept: PHYSICAL THERAPY | Facility: CLINIC | Age: 84
End: 2024-07-25
Payer: COMMERCIAL

## 2024-07-25 DIAGNOSIS — M17.11 PRIMARY OSTEOARTHRITIS OF RIGHT KNEE: Primary | ICD-10-CM

## 2024-07-25 DIAGNOSIS — Z96.651 AFTERCARE FOLLOWING RIGHT KNEE JOINT REPLACEMENT SURGERY: ICD-10-CM

## 2024-07-25 DIAGNOSIS — Z47.1 AFTERCARE FOLLOWING RIGHT KNEE JOINT REPLACEMENT SURGERY: ICD-10-CM

## 2024-07-25 PROCEDURE — 97110 THERAPEUTIC EXERCISES: CPT | Performed by: PHYSICAL THERAPIST

## 2024-07-25 PROCEDURE — 97140 MANUAL THERAPY 1/> REGIONS: CPT | Performed by: PHYSICAL THERAPIST

## 2024-07-25 PROCEDURE — 97530 THERAPEUTIC ACTIVITIES: CPT | Performed by: PHYSICAL THERAPIST

## 2024-07-25 NOTE — PROGRESS NOTES
"Daily Note     Today's date: 2024  Patient name: Mikayla Breaux  : 1940  MRN: [id number]  Referring provider: Nico Buitrago,*  Dx:   Encounter Diagnosis     ICD-10-CM    1. Primary osteoarthritis of right knee  M17.11       2. Aftercare following right knee joint replacement surgery  Z47.1     Z96.651           Start Time: 1400  Stop Time: 1445  Total time in clinic (min): 45 minutes    Subjective: Pt reports feeling really good after therapy last session. Enjoyed the progression without appreciable pain. Still waking up at night time with pain but overall doing well.       Objective: See treatment diary below      Assessment: Tolerated treatment well. Good compliance with clinical exercise program. Pt still struggling with sleep secondary to knee discomfort. This should improve with time and as knee ROM increases. Implemented lateral step downs into POC. Swelling continues to reduce and incision fully closed. Patient demonstrated fatigue post treatment and would benefit from continued PT.       Plan: Continue per plan of care.      Precautions: HTN, PAD, A-Fib, CAD, MENON. right total knee arthroplasty on 24      Manuals  7/    PROM of the R assess 8' to tolerance  8' to tolerance  10' focusing on extension 10' focusing on extension 10' focusing on extension 10' focusing on extension 10' focusing on extension 8' focusing on extension                                           Neuro Re-Ed             Quad sets  30x 5\" 20x 5\" 20x5\" 20x5\"        LAQ    10x 3# 10x 3# 2x10 4#  3x10 4# 3x10 5#    SLR with assist   2x5 1x5, 1x7  1x10, 1x8 1x8, 1x10       Standing hip abd     2x10 2x10 2x10 2x10 2x10    Standing hip ext     2x10 2x10 2x10 2x10 2x10    Standing SLR     2x10  2x10  2x10 2x10 2x10    TKE  10x rtb 2x10 rtb                        Ther Ex             Nustep  8' for ROM only  8' for ROM only  8' for ROM only  8' for ROM only  8'L4 10' L4 8' L4 8' L5  " "  Heel slides Seated  PB and strap x 10 for 5\" PB and strap x 10 for 5\" PB and strap x 10 for 5\" PB and strap x 10 for 5\" PB and strap x 10 for 5\" PB and strap x 10 for 5\"      Gastroc stretch               Hamstring curls          30x rtb     Hamstring stretch     10x10\" 10x10\"        Seated knee extension/flexion  20x 20x          LLL extension   1'  2x1'  2x1' 2x1\"  2x1\" 2x1\"     Hamstring curl   2x10  20 ytb         Leg press        3x10 50# 3x10 50#    Ther Activity             Stairs      Up with 6\" and down with 4\" (3 rounds)  reciprocal.        SPC ambulation      500 ft 500 feet      Standing HR  20x 20x 20x 20x 20x   30x     Lateral step downs         6\" 2x10 FT support     Mini squat    HHA 10x  Sit to stand x 10   Sit to stand with foam 10x (cueing provided to reduce UE supprot) 2x10  without UE support + red MB 2x10  without UE support + red MB    Step up      6\" 10x  C/ ru nner climb 2x10 6\" C/ ru nner climb 2x10 6\"     Rocking to RLE WB    3'         Gait Training                                       Modalities                                       1:1 with PT from 2-245pm                   "

## 2024-07-27 DIAGNOSIS — I48.0 PAROXYSMAL ATRIAL FIBRILLATION (HCC): ICD-10-CM

## 2024-07-29 RX ORDER — METOPROLOL SUCCINATE 25 MG/1
TABLET, EXTENDED RELEASE ORAL
Qty: 135 TABLET | Refills: 1 | Status: SHIPPED | OUTPATIENT
Start: 2024-07-29

## 2024-07-30 ENCOUNTER — OFFICE VISIT (OUTPATIENT)
Dept: PHYSICAL THERAPY | Facility: CLINIC | Age: 84
End: 2024-07-30
Payer: COMMERCIAL

## 2024-07-30 DIAGNOSIS — M25.561 CHRONIC PAIN OF RIGHT KNEE: ICD-10-CM

## 2024-07-30 DIAGNOSIS — Z96.651 AFTERCARE FOLLOWING RIGHT KNEE JOINT REPLACEMENT SURGERY: ICD-10-CM

## 2024-07-30 DIAGNOSIS — G89.29 CHRONIC PAIN OF RIGHT KNEE: ICD-10-CM

## 2024-07-30 DIAGNOSIS — Z47.1 AFTERCARE FOLLOWING RIGHT KNEE JOINT REPLACEMENT SURGERY: ICD-10-CM

## 2024-07-30 DIAGNOSIS — M17.11 PRIMARY OSTEOARTHRITIS OF RIGHT KNEE: Primary | ICD-10-CM

## 2024-07-30 PROCEDURE — 97140 MANUAL THERAPY 1/> REGIONS: CPT

## 2024-07-30 PROCEDURE — 97530 THERAPEUTIC ACTIVITIES: CPT

## 2024-07-30 PROCEDURE — 97110 THERAPEUTIC EXERCISES: CPT

## 2024-07-30 NOTE — PROGRESS NOTES
"Daily Note     Today's date: 2024  Patient name: Mikayla Breaux  : 1940  MRN: [id number]  Referring provider: Nico Buitrago,*  Dx:   Encounter Diagnosis     ICD-10-CM    1. Primary osteoarthritis of right knee  M17.11       2. Aftercare following right knee joint replacement surgery  Z47.1     Z96.651       3. Chronic pain of right knee  M25.561     G89.29                      Subjective: Her sleep quality is fair, nighttime pain after 2-3 hours. She is mainly walking without AD, but will use either a SPC or RW depending on the terrain.       Objective: See treatment diary below      Assessment: Tolerated treatment well. Patient continues to benefit from manuals to promote her knee ROM, ext>flex. Generalized muscle fatigue noted with PRE's, more so with LAQ and HSC. She demonstrated good neuromuscular control with lateral step downs. She demonstrated difficulty and compensatory patterns with STS. Continued PT would be beneficial to improve function.          Plan: Continue per plan of care.       Precautions: HTN, PAD, A-Fib, CAD, MENON. right total knee arthroplasty on 24      Manuals    PROM of the R assess 8' to tolerance  8' to tolerance  10' focusing on extension 10' focusing on extension 10' focusing on extension 10' focusing on extension 10' focusing on extension 8' focusing on extension 8' focusing on extension                                          Neuro Re-Ed             Quad sets  30x 5\" 20x 5\" 20x5\" 20x5\"        LAQ    10x 3# 10x 3# 2x10 4#  3x10 4# 3x10 5# 3x10 5#   SLR with assist   2x5 1x5, 1x7  1x10, 1x8 1x8, 1x10       Standing hip abd     2x10 2x10 2x10 2x10 2x10 2x10   Standing hip ext     2x10 2x10 2x10 2x10 2x10 2x10   Standing SLR     2x10  2x10  2x10 2x10 2x10 2x10   TKE  10x rtb 2x10 rtb                        Ther Ex             Nustep  8' for ROM only  8' for ROM only  8' for ROM only  8' for ROM only  8'L4 10' L4 8' " "L4 8' L5 8' L5   Heel slides Seated  PB and strap x 10 for 5\" PB and strap x 10 for 5\" PB and strap x 10 for 5\" PB and strap x 10 for 5\" PB and strap x 10 for 5\" PB and strap x 10 for 5\"      Gastroc stretch               Hamstring curls          30x rtb  30x RTB   Hamstring stretch     10x10\" 10x10\"        Seated knee extension/flexion  20x 20x          LLL extension   1'  2x1'  2x1' 2x1\"  2x1\" 2x1\"  2x1'   Hamstring curl   2x10  20 ytb         Leg press        3x10 50# 3x10 50# 3x10 50#   Ther Activity             Stairs      Up with 6\" and down with 4\" (3 rounds)  reciprocal.        SPC ambulation      500 ft 500 feet      Standing HR  20x 20x 20x 20x 20x   30x     Lateral step downs         6\" 2x10 FT support  6\" 2x10 FT support    Mini squat    HHA 10x  Sit to stand x 10   Sit to stand with foam 10x (cueing provided to reduce UE supprot) 2x10  without UE support + red MB 2x10  without UE support + red MB 2x10  without UE support + red MB   Step up      6\" 10x  C/ ru nner climb 2x10 6\" C/ ru nner climb 2x10 6\"     Rocking to RLE WB    3'         Gait Training                                       Modalities                                                            "

## 2024-08-01 ENCOUNTER — EVALUATION (OUTPATIENT)
Dept: PHYSICAL THERAPY | Facility: CLINIC | Age: 84
End: 2024-08-01
Payer: COMMERCIAL

## 2024-08-01 DIAGNOSIS — Z96.651 AFTERCARE FOLLOWING RIGHT KNEE JOINT REPLACEMENT SURGERY: ICD-10-CM

## 2024-08-01 DIAGNOSIS — Z47.1 AFTERCARE FOLLOWING RIGHT KNEE JOINT REPLACEMENT SURGERY: ICD-10-CM

## 2024-08-01 DIAGNOSIS — G89.29 CHRONIC PAIN OF RIGHT KNEE: ICD-10-CM

## 2024-08-01 DIAGNOSIS — M17.11 PRIMARY OSTEOARTHRITIS OF RIGHT KNEE: Primary | ICD-10-CM

## 2024-08-01 DIAGNOSIS — M25.561 CHRONIC PAIN OF RIGHT KNEE: ICD-10-CM

## 2024-08-01 PROCEDURE — 97110 THERAPEUTIC EXERCISES: CPT | Performed by: PHYSICAL THERAPIST

## 2024-08-01 PROCEDURE — 97530 THERAPEUTIC ACTIVITIES: CPT | Performed by: PHYSICAL THERAPIST

## 2024-08-01 PROCEDURE — 97112 NEUROMUSCULAR REEDUCATION: CPT | Performed by: PHYSICAL THERAPIST

## 2024-08-01 NOTE — PROGRESS NOTES
[id number]    Today's date: 2024  Patient name: Mikayla Breaux  : 1940  MRN: [id number]  Referring provider: Nico Buitrago,*  Dx:   Encounter Diagnosis     ICD-10-CM    1. Primary osteoarthritis of right knee  M17.11       2. Chronic pain of right knee  M25.561     G89.29       3. Aftercare following right knee joint replacement surgery  Z47.1     Z96.651                      Assessment  Impairments: abnormal or restricted ROM, abnormal movement, activity intolerance, impaired physical strength, lacks appropriate home exercise program and pain with function  Functional limitations: ambulation, stair negotiation, squatting, transfers, weight bearing ADLs.    Assessment details: Nona is s/p 5 weeks right total knee arthroplasty on 24 with Dr. Buitrago. Patient is doing well post operatively. She is currently ambulating without an assisted device. Incisions are closed. Mild swelling is present and much improved. ROM of the knee has improved greatly over the last month. Knee flexion measured at 115 degrees and extension at -5 degrees.  Her strength is gradually improving. She is able to negotiate stairs with some compensation.  Nona would benefit from skilled physical therapy at this time to improve function, reduce pain, increase ROM, increase strength, and return to premorbid function.     Understanding of Dx/Px/POC: good       Prognosis: good    Goals  Short Term Goals: to be achieved by 4 weeks  1) Patient to be independent with basic HEP.-MET  2) Decrease pain to 2/10 at its worst.-Partially met  3) Increase right ROM by 5-10 degrees -Partially met  4) Increase LE strength by 1/2 MMT grade in all deficient planes.-Partially met    Long Term Goals: to be achieved by discharge  1) FOTO equal to or greater than expected.-MET  2) Ambulation to improve to maximal level of function-Partially met  3) Stair negotiation will improve to reciprocal.-Partially met  4) Sit to stand transfers  will improve to maximal level of function -Partially met    Plan  Patient would benefit from: skilled PT  Planned modality interventions: cryotherapy and TENS    Planned therapy interventions: ADL training, manual therapy, motor coordination training, neuromuscular re-education, therapeutic activities, therapeutic exercise, gait training and functional ROM exercises    Frequency: 2x per week for 4-6 weeks.  Treatment plan discussed with: patient      Subjective Evaluation    History of Present Illness  Date of surgery: 2024  Mechanism of injury: surgery  Mechanism of injury: History of Current Injury: Pt is 2 days post op R-TKA with Dr. Buitrago. Pt is ambulating with a RW. She is taking oxycodone, tylenol, and methocarbamol for pain relief. She is back on Xeralto. Denies any fever, chills, night sweats. Pt is accompanied by son. No SOB and appetite is improving.     Special Questions: Numbness in ankle which is improving.   Patient goals:  Resume active lifestyle  Hobbies/Interest: Gardening and exercising.   Occupation: Retired       Quality of life: good    Patient Goals  Patient goals for therapy: decreased pain, improved balance, increased motion, increased strength and independence with ADLs/IADLs    Pain  Current pain ratin  At worst pain ratin        Objective     Active Range of Motion     Right Knee   Flexion: 115 degrees   Extensor lag: -5 degrees     Passive Range of Motion     Right Knee   Flexion: 118 degrees   Extension: -3 degrees     Strength/Myotome Testing     Right Hip   Planes of Motion   Flexion: 4  Abduction: 4-  Adduction: 4    Right Knee   Quadriceps contraction: good  Flexion: 4  Extension: 4    Right Ankle/Foot   Dorsiflexion: 5  Plantar flexion: 5  Inversion: 4+  Eversion: 4+    Additional Strength Details  Performed seated   Good activation of the quad on the R.  Pt able to to perform SLR without lag     Tests     Additional Tests Details  Bandage around incision: this is to  "be maintained until 1st clinical post op appointment with Dr. Buitrago.  Sit to stand transfers: Independent  Sit to supine: Independent  Distal sensation: intact  Distal pulses: good    5 x sit to stand: 13.60 seconds (2x for hand rails and 3x off of left knee).     TUG: 10.02 seconds without UE support or AD    Ambulation     Observational Gait   Gait: Normal gait with symmetrical step length, appropriate jabari.     Stairs (6\"): Vaulting with ascending and pelvic drop with descending.          Precautions: HTN, PAD, A-Fib, CAD, MENON. right total knee arthroplasty on 6/26/24      Manuals 8/1 7/11 7/16 7/16 7/23 7/25 7/30   PROM of the R 6' focusing on extension    10' focusing on extension 10' focusing on extension 10' focusing on extension 10' focusing on extension 8' focusing on extension 8' focusing on extension                                          Neuro Re-Ed             Quad sets     20x5\"        LAQ 3x10 5#    10x 3# 2x10 4#  3x10 4# 3x10 5# 3x10 5#   SLR with assist     1x10, 1x8 1x8, 1x10       Standing hip abd 2x10    2x10 2x10 2x10 2x10 2x10 2x10   Standing hip ext 2x10    2x10 2x10 2x10 2x10 2x10 2x10   Standing SLR 2x10    2x10  2x10  2x10 2x10 2x10 2x10   TKE                          Ther Ex             Nustep 8' L5    8' for ROM only  8'L4 10' L4 8' L4 8' L5 8' L5   Heel slides     PB and strap x 10 for 5\" PB and strap x 10 for 5\" PB and strap x 10 for 5\"      Gastroc stretch               Hamstring curls          30x rtb  30x RTB   Hamstring stretch      10x10\"        Seated knee extension/flexion             LLL extension     2x1' 2x1\"  2x1\" 2x1\"  2x1'   Hamstring curl             Leg press        3x10 50# 3x10 50# 3x10 50#   Ther Activity             Stairs      Up with 6\" and down with 4\" (3 rounds)  reciprocal.        SPC ambulation      500 ft 500 feet      Standing HR     20x 20x   30x     Lateral step downs         6\" 2x10 FT support  6\" 2x10 FT support    Mini squat 3x10  without UE " "support + red MB    Sit to stand x 10   Sit to stand with foam 10x (cueing provided to reduce UE supprot) 2x10  without UE support + red MB 2x10  without UE support + red MB 2x10  without UE support + red MB   Step up      6\" 10x  C/ ru nner climb 2x10 6\" C/ ru nner climb 2x10 6\"     Rocking to RLE WB             Gait Training                                       Modalities                                         1:1 with PT from 245-330pm  Pt was re-evaluated x 25 minutes                     "

## 2024-08-06 ENCOUNTER — OFFICE VISIT (OUTPATIENT)
Dept: PHYSICAL THERAPY | Facility: CLINIC | Age: 84
End: 2024-08-06
Payer: COMMERCIAL

## 2024-08-06 DIAGNOSIS — Z96.651 AFTERCARE FOLLOWING RIGHT KNEE JOINT REPLACEMENT SURGERY: ICD-10-CM

## 2024-08-06 DIAGNOSIS — M25.561 CHRONIC PAIN OF RIGHT KNEE: ICD-10-CM

## 2024-08-06 DIAGNOSIS — G89.29 CHRONIC PAIN OF RIGHT KNEE: ICD-10-CM

## 2024-08-06 DIAGNOSIS — M17.11 PRIMARY OSTEOARTHRITIS OF RIGHT KNEE: Primary | ICD-10-CM

## 2024-08-06 DIAGNOSIS — Z47.1 AFTERCARE FOLLOWING RIGHT KNEE JOINT REPLACEMENT SURGERY: ICD-10-CM

## 2024-08-06 PROCEDURE — 97110 THERAPEUTIC EXERCISES: CPT

## 2024-08-06 PROCEDURE — 97530 THERAPEUTIC ACTIVITIES: CPT

## 2024-08-06 PROCEDURE — 97140 MANUAL THERAPY 1/> REGIONS: CPT

## 2024-08-06 NOTE — PROGRESS NOTES
Daily Note     Today's date: 2024  Patient name: Mikayla Breaux  : 1940  MRN: [id number]  Referring provider: Nico Buitrago,*  Dx:   Encounter Diagnosis     ICD-10-CM    1. Primary osteoarthritis of right knee  M17.11       2. Chronic pain of right knee  M25.561     G89.29       3. Aftercare following right knee joint replacement surgery  Z47.1     Z96.651           Start Time: 1531  Stop Time: 1614  Total time in clinic (min): 43 minutes    Subjective: Patient reports that her biggest challenge is finding a comfortable position in bed when trying to go to sleep at night and notes tossing and turning due to increases in R knee discomfort/tightness.       Objective: See treatment diary below      Assessment: Tolerated treatment well. Good tolerance and appropriate level of challenge demonstrated with all exercises performed today and did not experience any adverse response throughout session. Patient remains moderately challenged with STS w/ MB press due to moderate increases in fatigue from changing from a sitting to a standing position, but was able to tolerate completing desired reps with seated rest breaks in between sets of exercise. Tightness and motion restriction noted during PROM into knee ext today, with mild pain at end range and minimal muscle guarding noted throughout motion. Muscle fatigue present at the conclusion of session. Patient would benefit from continued PT to reduce symptoms, restore R knee mobility, stability, and strength, and to maximize overall functional ability.      Plan: Continue per plan of care.      Precautions: HTN, PAD, A-Fib, CAD, MENON. right total knee arthroplasty on 24      Manuals    PROM of the R 6' focusing on extension 8' focusing on extension   10' focusing on extension 10' focusing on extension 10' focusing on extension 10' focusing on extension 8' focusing on extension 8' focusing on extension               "                            Neuro Re-Ed             Quad sets     20x5\"        LAQ 3x10 5# 3x10 5#   10x 3# 2x10 4#  3x10 4# 3x10 5# 3x10 5#   SLR with assist     1x10, 1x8 1x8, 1x10       Standing hip abd 2x10 2x10   2x10 2x10 2x10 2x10 2x10 2x10   Standing hip ext 2x10 2x10   2x10 2x10 2x10 2x10 2x10 2x10   Standing SLR 2x10 2x10   2x10  2x10  2x10 2x10 2x10 2x10   TKE                          Ther Ex             Nustep 8' L5 8' L5   8' for ROM only  8'L4 10' L4 8' L4 8' L5 8' L5   Heel slides  PB and strap x 10 for 5\"   PB and strap x 10 for 5\" PB and strap x 10 for 5\" PB and strap x 10 for 5\"      Gastroc stretch               Hamstring curls          30x rtb  30x RTB   Hamstring stretch      10x10\"        Seated knee extension/flexion             LLL extension     2x1' 2x1\"  2x1\" 2x1\"  2x1'   Hamstring curl             Leg press  3x10 50#      3x10 50# 3x10 50# 3x10 50#   Ther Activity             Stairs      Up with 6\" and down with 4\" (3 rounds)  reciprocal.        SPC ambulation      500 ft 500 feet      Standing HR     20x 20x   30x     Lateral step downs         6\" 2x10 FT support  6\" 2x10 FT support    Mini squat 3x10  without UE support + red MB 3x10  without UE support + red MB   Sit to stand x 10   Sit to stand with foam 10x (cueing provided to reduce UE supprot) 2x10  without UE support + red MB 2x10  without UE support + red MB 2x10  without UE support + red MB   Step up      6\" 10x  C/ ru nner climb 2x10 6\" C/ ru nner climb 2x10 6\"     Rocking to RLE WB             Gait Training                                       Modalities                                                                "

## 2024-08-08 ENCOUNTER — OFFICE VISIT (OUTPATIENT)
Dept: PHYSICAL THERAPY | Facility: CLINIC | Age: 84
End: 2024-08-08
Payer: COMMERCIAL

## 2024-08-08 DIAGNOSIS — M25.561 CHRONIC PAIN OF RIGHT KNEE: ICD-10-CM

## 2024-08-08 DIAGNOSIS — Z47.1 AFTERCARE FOLLOWING RIGHT KNEE JOINT REPLACEMENT SURGERY: ICD-10-CM

## 2024-08-08 DIAGNOSIS — G89.29 CHRONIC PAIN OF RIGHT KNEE: ICD-10-CM

## 2024-08-08 DIAGNOSIS — M17.11 PRIMARY OSTEOARTHRITIS OF RIGHT KNEE: Primary | ICD-10-CM

## 2024-08-08 DIAGNOSIS — Z96.651 AFTERCARE FOLLOWING RIGHT KNEE JOINT REPLACEMENT SURGERY: ICD-10-CM

## 2024-08-08 PROCEDURE — 97140 MANUAL THERAPY 1/> REGIONS: CPT | Performed by: PHYSICAL MEDICINE & REHABILITATION

## 2024-08-08 PROCEDURE — 97112 NEUROMUSCULAR REEDUCATION: CPT | Performed by: PHYSICAL MEDICINE & REHABILITATION

## 2024-08-08 PROCEDURE — 97110 THERAPEUTIC EXERCISES: CPT | Performed by: PHYSICAL MEDICINE & REHABILITATION

## 2024-08-08 NOTE — PROGRESS NOTES
"Daily Note     Today's date: 2024  Patient name: Mikayla Breaux  : 1940  MRN: [id number]  Referring provider: Nico Buitrago,*  Dx:   Encounter Diagnosis     ICD-10-CM    1. Primary osteoarthritis of right knee  M17.11       2. Chronic pain of right knee  M25.561     G89.29       3. Aftercare following right knee joint replacement surgery  Z47.1     Z96.651                      Subjective: Patient offers no current complaints to begin session, continued sleep disturbance secondary to discomfort/stiffness.      Objective: See treatment diary below    Assessment: Tolerated treatment well. Required airex during STS today with use of different chair. Able to increase leg press resistance without issue. Patient would benefit from continued PT to address remaining deficits and ensure safe return to PLOF.       Plan: Continue per plan of care.      Precautions: HTN, PAD, A-Fib, CAD, MENON. right total knee arthroplasty on 24    Manuals    PROM of the R 6' focusing on extension 8' focusing on extension LH  10' focusing on extension 10' focusing on extension 10' focusing on extension 10' focusing on extension 8' focusing on extension 8' focusing on extension                                          Neuro Re-Ed             Quad sets     20x5\"        LAQ 3x10 5# 3x10 5# 5# 3x10  10x 3# 2x10 4#  3x10 4# 3x10 5# 3x10 5#   SLR with assist     1x10, 1x8 1x8, 1x10       Standing hip abd 2x10 2x10 2x10 1.5#  2x10 2x10 2x10 2x10 2x10 2x10   Standing hip ext 2x10 2x10 2x10 1.5#  2x10 2x10 2x10 2x10 2x10 2x10   Standing SLR 2x10 2x10 2x10 1.5#  2x10  2x10  2x10 2x10 2x10 2x10   TKE                          Ther Ex             Nustep 8' L5 8' L5 8' L5  8' for ROM only  8'L4 10' L4 8' L4 8' L5 8' L5   Heel slides  PB and strap x 10 for 5\" PB and strap x 10 for 5\"  PB and strap x 10 for 5\" PB and strap x 10 for 5\" PB and strap x 10 for 5\"      Gastroc stretch          " "     Hamstring curls          30x rtb  30x RTB   Hamstring stretch      10x10\"        Seated knee extension/flexion             LLL extension     2x1' 2x1\"  2x1\" 2x1\"  2x1'   Hamstring curl             Leg press  3x10 50# 3x10 60#     3x10 50# 3x10 50# 3x10 50#   Ther Activity             Stairs      Up with 6\" and down with 4\" (3 rounds)  reciprocal.        SPC ambulation      500 ft 500 feet      Standing HR     20x 20x   30x     Lateral step downs         6\" 2x10 FT support  6\" 2x10 FT support    STS 3x10  without UE support + red MB 3x10  without UE support + red MB 3x10 RMB w/ airex today after 7x without  Sit to stand x 10   Sit to stand with foam 10x (cueing provided to reduce UE supprot) 2x10  without UE support + red MB 2x10  without UE support + red MB 2x10  without UE support + red MB   Step up      6\" 10x  C/ ru nner climb 2x10 6\" C/ ru nner climb 2x10 6\"     Rocking to RLE WB             Gait Training                                       Modalities                                                                "

## 2024-08-09 ENCOUNTER — OFFICE VISIT (OUTPATIENT)
Dept: OBGYN CLINIC | Facility: MEDICAL CENTER | Age: 84
End: 2024-08-09

## 2024-08-09 VITALS
SYSTOLIC BLOOD PRESSURE: 129 MMHG | DIASTOLIC BLOOD PRESSURE: 78 MMHG | HEART RATE: 84 BPM | HEIGHT: 65 IN | BODY MASS INDEX: 32.39 KG/M2 | WEIGHT: 194.4 LBS

## 2024-08-09 DIAGNOSIS — M17.11 PRIMARY OSTEOARTHRITIS OF RIGHT KNEE: ICD-10-CM

## 2024-08-09 DIAGNOSIS — G89.29 CHRONIC PAIN OF RIGHT KNEE: ICD-10-CM

## 2024-08-09 DIAGNOSIS — Z47.1 AFTERCARE FOLLOWING RIGHT KNEE JOINT REPLACEMENT SURGERY: Primary | ICD-10-CM

## 2024-08-09 DIAGNOSIS — Z96.651 AFTERCARE FOLLOWING RIGHT KNEE JOINT REPLACEMENT SURGERY: Primary | ICD-10-CM

## 2024-08-09 DIAGNOSIS — M25.561 CHRONIC PAIN OF RIGHT KNEE: ICD-10-CM

## 2024-08-09 PROCEDURE — 99024 POSTOP FOLLOW-UP VISIT: CPT | Performed by: ORTHOPAEDIC SURGERY

## 2024-08-09 NOTE — PROGRESS NOTES
Assessment/Plan:  1. Aftercare following right knee joint replacement surgery        2. Primary osteoarthritis of right knee        3. Chronic pain of right knee          Scribe Attestation      I,:  Kelby Geethabishnu am acting as a scribe while in the presence of the attending physician.:       I,:  Nico Buitrago MD personally performed the services described in this documentation    as scribed in my presence.:           Nona is doing well now 6 weeks status post right total knee arthroplasty.  Her incision is well-healed and she demonstrates excellent range of motion and is grossly stable on exam.  I did advise that she can incorporate Motrin as an anti-inflammatory prior to sleep.  She will continue physical therapy as per protocol.  She may begin preparations for anticipated implantation of a tooth in November.  I did advise that no dental procedures should be within 3 months of her surgical date.  She will follow-up in 6 weeks time for repeat clinical evaluation and repeat x-ray of the right knee.      Subjective: Postop evaluation robotically assisted right total knee arthroplasty     Patient ID: Mikayla Breaux is a pleasant 83 y.o. female presenting for postoperative evaluation of her right knee.  She is 6 weeks status post right robotically assisted total knee arthroplasty.  She states that she is doing well and has been able to wean off the oxycodone and takes Tylenol intermittently.  She states that she is progressing with the assistance of physical therapy but does note some soreness and stiffness with knee flexion.  She is able to ambulate without the assistance of a cane.  She is currently on Xarelto.  Overall, she is happy with the progress up to this point.  Review of Systems   Constitutional:  Negative for chills, fever and unexpected weight change.   HENT:  Negative for hearing loss, nosebleeds and sore throat.    Eyes:  Negative for pain, redness and visual disturbance.   Respiratory:   Negative for cough, shortness of breath and wheezing.    Cardiovascular:  Negative for chest pain, palpitations and leg swelling.   Gastrointestinal:  Negative for abdominal pain, nausea and vomiting.   Endocrine: Negative for polydipsia and polyuria.   Genitourinary:  Negative for dysuria and hematuria.   Musculoskeletal:  Positive for arthralgias and myalgias.        See HPI   Skin:  Negative for rash and wound.   Neurological:  Negative for dizziness, numbness and headaches.   Psychiatric/Behavioral:  Negative for decreased concentration and suicidal ideas. The patient is not nervous/anxious.          Past Medical History:   Diagnosis Date    Allergic 1980    [address] tin    Arthritis 2008    Benign essential hypertension     Hyperlipidemia, unspecified     Hypertension 2016    Irregular heart beat     Osteoarthritis of left knee     Osteoporosis     PAD (peripheral artery disease) ([facility]) 09/29/2021    Perichondritis of ear, right        Past Surgical History:   Procedure Laterality Date    CARDIAC CATHETERIZATION Left 7/6/2022    Procedure: Cardiac Left Heart Cath;  Surgeon: Grover Sommer MD;  Location: [facility];  Service: Cardiology    COLON SURGERY  2003    Rectoseal     LAPAROSCOPIC OVARIAN CYSTECTOMY Right 1981    LAPAROSCOPIC OVARIAN CYSTECTOMY Left 2003    rectoseal and cystoseal repari    NE ARTHRP KNE CONDYLE&PLATU MEDIAL&LAT COMPARTMENTS Right 6/26/2024    Procedure: RIGHT TOTAL KNEE ARTHROPLASTY W ROBOT;  Surgeon: Nico Buitrago MD;  Location: [facility] MAIN [facility];  Service: Orthopedics    TONSILLECTOMY AND ADENOIDECTOMY  1952    TUBAL LIGATION  1972       Family History   Problem Relation Age of Onset    Hyperlipidemia Mother     Heart Valve Disease Mother     Hypertension Mother     Arthritis Father     Diabetes Father     Alcohol abuse Father     Prostate cancer Father 80    Diabetes Brother     Atrial fibrillation Brother     Alcohol abuse Brother     Arthritis Brother     Pancreatic  cancer Maternal Aunt 65    No Known Problems Paternal Uncle     No Known Problems Paternal Uncle     No Known Problems Paternal Uncle     No Known Problems Paternal Uncle     Hypertension Daughter     Bradycardia Son     No Known Problems Son        Social History     Occupational History    Not on file   Tobacco Use    Smoking status: Never    Smokeless tobacco: Never   Vaping Use    Vaping status: Never Used   Substance and Sexual Activity    Alcohol use: Yes     Alcohol/week: 5.0 standard drinks of alcohol     Types: 5 Glasses of wine per week    Drug use: Never    Sexual activity: Not Currently     Partners: Male         Current Outpatient Medications:     acetaminophen (TYLENOL) 650 mg CR tablet, Take 1 tablet (650 mg total) by mouth every 8 (eight) hours as needed for mild pain, Disp: 30 tablet, Rfl: 2    b complex vitamins capsule, Take 1 capsule by mouth daily, Disp: , Rfl:     CALCIUM PO, Take by mouth, Disp: , Rfl:     cholecalciferol (VITAMIN D3) 1,000 units tablet, Take 1,000 Units by mouth daily, Disp: , Rfl:     docusate sodium (COLACE) 100 mg capsule, Take 100 mg by mouth daily, Disp: , Rfl:     methocarbamol (ROBAXIN) 500 mg tablet, Take 1 tablet (500 mg total) by mouth 3 (three) times a day as needed for muscle spasms, Disp: 60 tablet, Rfl: 0    metoprolol succinate (TOPROL-XL) 25 mg 24 hr tablet, TAKE 1 TABLET BY MOUTH EVERY MORNING AND TAKE 1 AND 1/2 TABLETS BY MOUTH EVERY EVENING, Disp: 135 tablet, Rfl: 1    Multiple Vitamins-Minerals (MULTIVITAMIN ADULTS PO), Take by mouth, Disp: , Rfl:     Xarelto 20 MG tablet, TAKE 1 TABLET BY MOUTH EVERY DAY WITH BREAKFAST, Disp: 30 tablet, Rfl: 5    Calcium Ascorbate (VITAMIN C) 500 mg tablet, Take 1 tablet (500 mg total) by mouth 2 (two) times a day, Disp: 60 tablet, Rfl: 0    clobetasol (TEMOVATE) 0.05 % ointment, Apply topically as needed  (Patient not taking: Reported on 3/5/2024), Disp: , Rfl:     oxyCODONE (Roxicodone) 5 immediate release tablet, Take  1 tablet (5 mg total) by mouth every 6 (six) hours as needed for moderate pain for up to 20 doses Max Daily Amount: 20 mg, Disp: 20 tablet, Rfl: 0    Allergies   Allergen Reactions    Macrodantin [Nitrofurantoin] Nausea Only, Dizziness and Headache       Objective:  Vitals:    08/09/24 1009   BP: 129/78   Pulse: 84       Body mass index is 32.35 kg/m².    Right Knee Exam     Tenderness   Right knee tenderness location: posteromedial knee.    Range of Motion   Extension:  0   Flexion:  110     Tests   Varus: negative Valgus: negative    Other   Erythema: absent  Scars: present  Sensation: normal  Pulse: present  Effusion: effusion (scant) present          Observations     Right Knee   Positive for effusion (scant).       Physical Exam  Vitals and nursing note reviewed.   Constitutional:       Appearance: She is well-developed.   HENT:      Head: Normocephalic and atraumatic.      Right Ear: External ear normal.      Left Ear: External ear normal.      Nose: Nose normal.   Eyes:      General:         Right eye: No discharge.         Left eye: No discharge.      Conjunctiva/sclera: Conjunctivae normal.   Cardiovascular:      Rate and Rhythm: Normal rate.   Pulmonary:      Effort: Pulmonary effort is normal. No respiratory distress.   Musculoskeletal:      Cervical back: Normal range of motion and neck supple.      Right knee: Effusion (scant) present.      Comments: As noted in ortho exam   Skin:     General: Skin is warm and dry.   Neurological:      Mental Status: She is alert and oriented to person, place, and time.   Psychiatric:         Behavior: Behavior normal.         Thought Content: Thought content normal.         Judgment: Judgment normal.         I have personally reviewed pertinent films in PACS.    No new imaging reviewed today    This document was created using speech voice recognition software.   Grammatical errors, random word insertions, pronoun errors, and incomplete sentences are an occasional  consequence of this system due to software limitations, ambient noise, and hardware issues.   Any formal questions or concerns about content, text, or information contained within the body of this dictation should be directly addressed to the provider for clarification.

## 2024-08-13 ENCOUNTER — OFFICE VISIT (OUTPATIENT)
Dept: PHYSICAL THERAPY | Facility: CLINIC | Age: 84
End: 2024-08-13
Payer: COMMERCIAL

## 2024-08-13 DIAGNOSIS — M25.561 CHRONIC PAIN OF RIGHT KNEE: ICD-10-CM

## 2024-08-13 DIAGNOSIS — Z96.651 AFTERCARE FOLLOWING RIGHT KNEE JOINT REPLACEMENT SURGERY: ICD-10-CM

## 2024-08-13 DIAGNOSIS — M17.11 PRIMARY OSTEOARTHRITIS OF RIGHT KNEE: Primary | ICD-10-CM

## 2024-08-13 DIAGNOSIS — Z47.1 AFTERCARE FOLLOWING RIGHT KNEE JOINT REPLACEMENT SURGERY: ICD-10-CM

## 2024-08-13 DIAGNOSIS — G89.29 CHRONIC PAIN OF RIGHT KNEE: ICD-10-CM

## 2024-08-13 PROCEDURE — 97140 MANUAL THERAPY 1/> REGIONS: CPT | Performed by: PHYSICAL THERAPIST

## 2024-08-13 PROCEDURE — 97112 NEUROMUSCULAR REEDUCATION: CPT | Performed by: PHYSICAL THERAPIST

## 2024-08-13 PROCEDURE — 97110 THERAPEUTIC EXERCISES: CPT | Performed by: PHYSICAL THERAPIST

## 2024-08-13 NOTE — PROGRESS NOTES
Daily Note     Today's date: 2024  Patient name: Mikayla Breaux  : 1940  MRN: [id number]  Referring provider: Nico Buitrago,*  Dx:   Encounter Diagnosis     ICD-10-CM    1. Primary osteoarthritis of right knee  M17.11       2. Chronic pain of right knee  M25.561     G89.29       3. Aftercare following right knee joint replacement surgery  Z47.1     Z96.651                      Subjective: Pt still having some difficulty sleeping. She notes it varies from night to night. She was advise to take motrin at night time. She had increased swelling in knee and leg after taking a long walk on Saturday night (~1 mile).       Objective: See treatment diary below  Knee flexion: 122 degrees  Knee extension: -2/-5 deg (Passive/active)     Assessment: Tolerated treatment well. Pt continues to have fascial restrictions at anterior knee. Performed light MFR to anterior knee to improve soft tissue extensibility. Pt challenged with STS exercise with 4#DB. Patient exhibited good technique with therapeutic exercises and would benefit from continued PT.       Plan: Continue per plan of care.      Precautions: HTN, PAD, A-Fib, CAD, MENON. right total knee arthroplasty on 24    Manuals    PROM of the R 6' focusing on extension 8' focusing on extension LH 8' focusing on extension c/ MFR to anterior knee    10' focusing on extension 8' focusing on extension 8' focusing on extension                                          Neuro Re-Ed             Quad sets             LAQ 3x10 5# 3x10 5# 5# 3x10 5# 3x10    3x10 4# 3x10 5# 3x10 5#   SLR with assist             Standing hip abd 2x10 2x10 2x10 1.5# 2x10 1.5#    2x10 2x10 2x10   Standing hip ext 2x10 2x10 2x10 1.5# 2x10 1.5#    2x10 2x10 2x10   Standing SLR 2x10 2x10 2x10 1.5# 2x10 1.5#    2x10 2x10 2x10   TKE                          Ther Ex             Nustep 8' L5 8' L5 8' L5 8' L5    8' L4 8' L5 8' L5   Heel slides  PB and  "strap x 10 for 5\" PB and strap x 10 for 5\" PB and strap x 10 for 5\"         Gastroc stretch             Hamstring curls          30x rtb  30x RTB   Hamstring stretch              Seated knee extension/flexion             LLL extension        2x1\" 2x1\"  2x1'   Hamstring curl             Leg press  3x10 50# 3x10 60# 3x10 60#    3x10 50# 3x10 50# 3x10 50#   Ther Activity             Stairs             SPC ambulation             Standing HR        30x     Lateral step downs         6\" 2x10 FT support  6\" 2x10 FT support    STS 3x10  without UE support + red MB 3x10  without UE support + red MB 3x10 RMB w/ airex today after 7x without 3x10 4# db     2x10  without UE support + red MB 2x10  without UE support + red MB 2x10  without UE support + red MB   Step up         C/ ru nner climb 2x10 6\"     Rocking to RLE WB             Gait Training                                       Modalities                                         1:1 with PT from 315-4pm                         "

## 2024-08-15 ENCOUNTER — OFFICE VISIT (OUTPATIENT)
Dept: PHYSICAL THERAPY | Facility: CLINIC | Age: 84
End: 2024-08-15
Payer: COMMERCIAL

## 2024-08-15 DIAGNOSIS — M17.11 PRIMARY OSTEOARTHRITIS OF RIGHT KNEE: Primary | ICD-10-CM

## 2024-08-15 DIAGNOSIS — Z47.1 AFTERCARE FOLLOWING RIGHT KNEE JOINT REPLACEMENT SURGERY: ICD-10-CM

## 2024-08-15 DIAGNOSIS — G89.29 CHRONIC PAIN OF RIGHT KNEE: ICD-10-CM

## 2024-08-15 DIAGNOSIS — M25.561 CHRONIC PAIN OF RIGHT KNEE: ICD-10-CM

## 2024-08-15 DIAGNOSIS — Z96.651 AFTERCARE FOLLOWING RIGHT KNEE JOINT REPLACEMENT SURGERY: ICD-10-CM

## 2024-08-15 PROCEDURE — 97140 MANUAL THERAPY 1/> REGIONS: CPT | Performed by: PHYSICAL THERAPIST

## 2024-08-15 PROCEDURE — 97110 THERAPEUTIC EXERCISES: CPT | Performed by: PHYSICAL THERAPIST

## 2024-08-15 PROCEDURE — 97112 NEUROMUSCULAR REEDUCATION: CPT | Performed by: PHYSICAL THERAPIST

## 2024-08-15 NOTE — PROGRESS NOTES
"Daily Note     Today's date: 8/15/2024  Patient name: Mikayla Breaux  : 1940  MRN: [id number]  Referring provider: Nico Buitrago,*  Dx:   Encounter Diagnosis     ICD-10-CM    1. Primary osteoarthritis of right knee  M17.11       2. Chronic pain of right knee  M25.561     G89.29       3. Aftercare following right knee joint replacement surgery  Z47.1     Z96.651           Start Time: 1600  Stop Time: 1645  Total time in clinic (min): 45 minutes    Subjective: Pt reports getting a full night sleep last evening and did not take motrin. She used arnica cream which seemed to help.       Objective: See treatment diary below      Assessment: Tolerated treatment well. Soft tissue mobility improving around patella and anterior knee Implemented standing hamstring curls. Resumed step up with runners climb for quad strength and endurance. Pt required UE assistance during 8\" step up. Patient demonstrated fatigue post treatment and would benefit from continued PT.       Plan: Continue per plan of care.      Precautions: HTN, PAD, A-Fib, CAD, MENON. right total knee arthroplasty on 24    Manuals 8/1 8/6 8/8 8/13 8/15   7/23 7/25 7/30   PROM of the R 6' focusing on extension 8' focusing on extension LH 8' focusing on extension c/ MFR to anterior knee 8' focusing on extension c/ MFR to anterior knee   10' focusing on extension 8' focusing on extension 8' focusing on extension                                          Neuro Re-Ed             Quad sets             LAQ 3x10 5# 3x10 5# 5# 3x10 5# 3x10 5# 3x10   3x10 4# 3x10 5# 3x10 5#   SLR with assist             Standing hip abd 2x10 2x10 2x10 1.5# 2x10 1.5# 2x10 1.5#   2x10 2x10 2x10   Standing hip ext 2x10 2x10 2x10 1.5# 2x10 1.5# 2x10 1.5#   2x10 2x10 2x10   Standing SLR 2x10 2x10 2x10 1.5# 2x10 1.5# 2x10 1.5#   2x10 2x10 2x10   TKE                          Ther Ex             Nustep 8' L5 8' L5 8' L5 8' L5 8' L5   8' L4 8' L5 8' L5   Heel slides  PB and " "strap x 10 for 5\" PB and strap x 10 for 5\" PB and strap x 10 for 5\" PB and strap x 10 for 5\"        Gastroc stretch             Hamstring curls      Standing 3x10 2#    30x rtb  30x RTB   Hamstring stretch              Seated knee extension/flexion             LLL extension        2x1\" 2x1\"  2x1'   Hamstring curl             Leg press  3x10 50# 3x10 60# 3x10 60#    3x10 50# 3x10 50# 3x10 50#   Ther Activity             Stairs             SPC ambulation             Standing HR        30x     Lateral step downs         6\" 2x10 FT support  6\" 2x10 FT support    STS 3x10  without UE support + red MB 3x10  without UE support + red MB 3x10 RMB w/ airex today after 7x without 3x10 4# db  3x10 4# db   2x10  without UE support + red MB 2x10  without UE support + red MB 2x10  without UE support + red MB   Step up      C/ ru nner climb 2x10 8\"   C/ ru nner climb 2x10 6\"     Rocking to RLE WB             Gait Training                                       Modalities                                         1:1 with PT from 355-445pm                           "

## 2024-08-20 ENCOUNTER — OFFICE VISIT (OUTPATIENT)
Dept: PHYSICAL THERAPY | Facility: CLINIC | Age: 84
End: 2024-08-20
Payer: COMMERCIAL

## 2024-08-20 DIAGNOSIS — M17.11 PRIMARY OSTEOARTHRITIS OF RIGHT KNEE: Primary | ICD-10-CM

## 2024-08-20 DIAGNOSIS — Z47.1 AFTERCARE FOLLOWING RIGHT KNEE JOINT REPLACEMENT SURGERY: ICD-10-CM

## 2024-08-20 DIAGNOSIS — Z96.651 AFTERCARE FOLLOWING RIGHT KNEE JOINT REPLACEMENT SURGERY: ICD-10-CM

## 2024-08-20 DIAGNOSIS — M25.561 CHRONIC PAIN OF RIGHT KNEE: ICD-10-CM

## 2024-08-20 DIAGNOSIS — G89.29 CHRONIC PAIN OF RIGHT KNEE: ICD-10-CM

## 2024-08-20 PROCEDURE — 97112 NEUROMUSCULAR REEDUCATION: CPT | Performed by: PHYSICAL THERAPIST

## 2024-08-20 PROCEDURE — 97140 MANUAL THERAPY 1/> REGIONS: CPT | Performed by: PHYSICAL THERAPIST

## 2024-08-20 PROCEDURE — 97110 THERAPEUTIC EXERCISES: CPT | Performed by: PHYSICAL THERAPIST

## 2024-08-20 NOTE — PROGRESS NOTES
Daily Note     Today's date: 2024  Patient name: Mikayla Breaux  : 1940  MRN: [id number]  Referring provider: Nico Buitrago,*  Dx:   Encounter Diagnosis     ICD-10-CM    1. Primary osteoarthritis of right knee  M17.11       2. Chronic pain of right knee  M25.561     G89.29       3. Aftercare following right knee joint replacement surgery  Z47.1     Z96.651                      Subjective: Pt reports having discomfort Friday and Saturday in her quads after last session. Symptoms started to ease by .       Objective: See treatment diary below      Assessment: Pt challenged with sit to stand squat. She does have knee pain with this activity. We terminated the exercise at 24 repetitions secondary to knee discomfort. Otherwise, tolerated treatment well. Patient exhibited good technique with therapeutic exercises and would benefit from continued PT.       Plan: Continue per plan of care.      Precautions: HTN, PAD, A-Fib, CAD, MENON. right total knee arthroplasty on 24    Manuals 8/1 8/6 8/8 8/13 8/15 8/20  7/23 7/25 7/30   PROM of the R 6' focusing on extension 8' focusing on extension LH 8' focusing on extension c/ MFR to anterior knee 8' focusing on extension c/ MFR to anterior knee 8' focusing on extension c/ MFR to anterior knee  10' focusing on extension 8' focusing on extension 8' focusing on extension                                          Neuro Re-Ed             Quad sets             LAQ 3x10 5# 3x10 5# 5# 3x10 5# 3x10 5# 3x10 5# 3x10  3x10 4# 3x10 5# 3x10 5#   SLR with assist             Standing hip abd 2x10 2x10 2x10 1.5# 2x10 1.5# 2x10 1.5# 2x10 1.5#  2x10 2x10 2x10   Standing hip ext 2x10 2x10 2x10 1.5# 2x10 1.5# 2x10 1.5# 2x10 1.5#  2x10 2x10 2x10   Standing SLR 2x10 2x10 2x10 1.5# 2x10 1.5# 2x10 1.5# 2x10 1.5#  2x10 2x10 2x10   TKE                          Ther Ex             Nustep 8' L5 8' L5 8' L5 8' L5 8' L5 8' L5  8' L4 8' L5 8' L5   Heel slides  PB and strap x  "10 for 5\" PB and strap x 10 for 5\" PB and strap x 10 for 5\" PB and strap x 10 for 5\" PB and strap x 10 for 5\"       Gastroc stretch             Hamstring curls      Standing 3x10 2# Standing 2x10 1.5#   30x rtb  30x RTB   Hamstring stretch              Seated knee extension/flexion             LLL extension        2x1\" 2x1\"  2x1'   Hamstring curl             Leg press  3x10 50# 3x10 60# 3x10 60#    3x10 50# 3x10 50# 3x10 50#   Ther Activity             Stairs             SPC ambulation             Standing HR        30x     Lateral step downs         6\" 2x10 FT support  6\" 2x10 FT support    STS 3x10  without UE support + red MB 3x10  without UE support + red MB 3x10 RMB w/ airex today after 7x without 3x10 4# db  3x10 4# db 24x 4# db   2x10  without UE support + red MB 2x10  without UE support + red MB 2x10  without UE support + red MB   Step up      C/ ru nner climb 2x10 8\" Held for today- resume next week  C/ ru nner climb 2x10 6\"     Rocking to RLE WB             Gait Training                                       Modalities                                         1:1 with PT from 315-4pm                             "

## 2024-08-22 ENCOUNTER — OFFICE VISIT (OUTPATIENT)
Dept: PHYSICAL THERAPY | Facility: CLINIC | Age: 84
End: 2024-08-22
Payer: COMMERCIAL

## 2024-08-22 DIAGNOSIS — Z96.651 AFTERCARE FOLLOWING RIGHT KNEE JOINT REPLACEMENT SURGERY: ICD-10-CM

## 2024-08-22 DIAGNOSIS — M17.11 PRIMARY OSTEOARTHRITIS OF RIGHT KNEE: Primary | ICD-10-CM

## 2024-08-22 DIAGNOSIS — M25.561 CHRONIC PAIN OF RIGHT KNEE: ICD-10-CM

## 2024-08-22 DIAGNOSIS — Z47.1 AFTERCARE FOLLOWING RIGHT KNEE JOINT REPLACEMENT SURGERY: ICD-10-CM

## 2024-08-22 DIAGNOSIS — G89.29 CHRONIC PAIN OF RIGHT KNEE: ICD-10-CM

## 2024-08-22 PROCEDURE — 97112 NEUROMUSCULAR REEDUCATION: CPT | Performed by: PHYSICAL THERAPIST

## 2024-08-22 PROCEDURE — 97110 THERAPEUTIC EXERCISES: CPT | Performed by: PHYSICAL THERAPIST

## 2024-08-22 PROCEDURE — 97140 MANUAL THERAPY 1/> REGIONS: CPT | Performed by: PHYSICAL THERAPIST

## 2024-08-22 NOTE — PROGRESS NOTES
"Daily Note     Today's date: 2024  Patient name: Mikayla Breaux  : 1940  MRN: [id number]  Referring provider: Nico Buitrago,*  Dx:   Encounter Diagnosis     ICD-10-CM    1. Primary osteoarthritis of right knee  M17.11       2. Chronic pain of right knee  M25.561     G89.29       3. Aftercare following right knee joint replacement surgery  Z47.1     Z96.651                      Subjective: Pt feeling better than earlier this week. She notes less swelling present at involved knee.       Objective: See treatment diary below  Knee flexion: 125 degrees    Assessment: Knee flexion continues to improve to 125 degrees. Continued with 30 reps of sit to stand squats; however, increased table height slightly which significantly reduced anterior knee pain. Implemented SLS to challenge LE balance and endurance. Tolerated treatment well. Patient would benefit from continued PT.      Plan: Continue per plan of care.      Precautions: HTN, PAD, A-Fib, CAD, MENON. right total knee arthroplasty on 24    Manuals 8/1 8/6 8/8 8/13 8/15 8/20 8/22      PROM of the R 6' focusing on extension 8' focusing on extension LH 8' focusing on extension c/ MFR to anterior knee 8' focusing on extension c/ MFR to anterior knee 8' focusing on extension c/ MFR to anterior knee 8' focusing on extension c/ MFR to anterior knee                                             Neuro Re-Ed             Quad sets             LAQ 3x10 5# 3x10 5# 5# 3x10 5# 3x10 5# 3x10 5# 3x10 5# 3x10      SLR with assist             Standing hip abd 2x10 2x10 2x10 1.5# 2x10 1.5# 2x10 1.5# 2x10 1.5# 2x10 1.5#      Standing hip ext 2x10 2x10 2x10 1.5# 2x10 1.5# 2x10 1.5# 2x10 1.5# 2x10 1.5#      Standing SLR 2x10 2x10 2x10 1.5# 2x10 1.5# 2x10 1.5# 2x10 1.5# 2x10 1.5#      TKE             SLS       10x10\"      Ther Ex             Nustep 8' L5 8' L5 8' L5 8' L5 8' L5 8' L5 8' L5      Heel slides  PB and strap x 10 for 5\" PB and strap x 10 for 5\" PB and " "strap x 10 for 5\" PB and strap x 10 for 5\" PB and strap x 10 for 5\" PB and strap x 10 for 5\"      Gastroc stretch             Hamstring curls      Standing 3x10 2# Standing 2x10 1.5# Standing 2x10 1.5#      Hamstring stretch              Seated knee extension/flexion             LLL extension             Hamstring curl             Leg press  3x10 50# 3x10 60# 3x10 60#         Ther Activity             Stairs             SPC ambulation             Standing HR             Lateral step downs             STS 3x10  without UE support + red MB 3x10  without UE support + red MB 3x10 RMB w/ airex today after 7x without 3x10 4# db  3x10 4# db 24x 4# db  3x10 4# db- monitor height  table       Step up      C/ ru nner climb 2x10 8\" Held for today- resume next week       Rocking to RLE WB             Gait Training                                       Modalities                                         1:1 with PT from 430-508pm                               "

## 2024-08-27 ENCOUNTER — OFFICE VISIT (OUTPATIENT)
Dept: PHYSICAL THERAPY | Facility: CLINIC | Age: 84
End: 2024-08-27
Payer: COMMERCIAL

## 2024-08-27 DIAGNOSIS — G89.29 CHRONIC PAIN OF RIGHT KNEE: ICD-10-CM

## 2024-08-27 DIAGNOSIS — Z47.1 AFTERCARE FOLLOWING RIGHT KNEE JOINT REPLACEMENT SURGERY: ICD-10-CM

## 2024-08-27 DIAGNOSIS — M17.11 PRIMARY OSTEOARTHRITIS OF RIGHT KNEE: Primary | ICD-10-CM

## 2024-08-27 DIAGNOSIS — M25.561 CHRONIC PAIN OF RIGHT KNEE: ICD-10-CM

## 2024-08-27 DIAGNOSIS — Z96.651 AFTERCARE FOLLOWING RIGHT KNEE JOINT REPLACEMENT SURGERY: ICD-10-CM

## 2024-08-27 PROCEDURE — 97140 MANUAL THERAPY 1/> REGIONS: CPT | Performed by: PHYSICAL THERAPIST

## 2024-08-27 PROCEDURE — 97112 NEUROMUSCULAR REEDUCATION: CPT | Performed by: PHYSICAL THERAPIST

## 2024-08-27 PROCEDURE — 97110 THERAPEUTIC EXERCISES: CPT | Performed by: PHYSICAL THERAPIST

## 2024-08-27 NOTE — PROGRESS NOTES
"Daily Note     Today's date: 2024  Patient name: Mikayla Breaux  : 1940  MRN: [id number]  Referring provider: Nico Buitrago,*  Dx:   Encounter Diagnosis     ICD-10-CM    1. Primary osteoarthritis of right knee  M17.11       2. Chronic pain of right knee  M25.561     G89.29       3. Aftercare following right knee joint replacement surgery  Z47.1     Z96.651                      Subjective: Pt overall pleased with rehab outcomes. Mild soreness and swelling noted today.       Objective: See treatment diary below      Assessment: Tolerated treatment well. Increased nustep to level 6. Pt is 2 month post op. Formation of patellar dimples present. Mild guarding during end range flexion. Pt appropriately fatigued with current strength program.  Patient exhibited good technique with therapeutic exercises and would benefit from continued PT.      Plan: Continue per plan of care.      Precautions: HTN, PAD, A-Fib, CAD, MENON. right total knee arthroplasty on 24    Manuals 8/1 8/6 8/8 8/13 8/15 8/20 8/22 8/27     PROM of the R 6' focusing on extension 8' focusing on extension LH 8' focusing on extension c/ MFR to anterior knee 8' focusing on extension c/ MFR to anterior knee 8' focusing on extension c/ MFR to anterior knee 8' focusing on extension c/ MFR to anterior knee 8' focusing on extension c/ MFR to anterior knee                                            Neuro Re-Ed             Quad sets             LAQ 3x10 5# 3x10 5# 5# 3x10 5# 3x10 5# 3x10 5# 3x10 5# 3x10 5# 3x10     SLR with assist             Standing hip abd 2x10 2x10 2x10 1.5# 2x10 1.5# 2x10 1.5# 2x10 1.5# 2x10 1.5# 2x10 2#     Standing hip ext 2x10 2x10 2x10 1.5# 2x10 1.5# 2x10 1.5# 2x10 1.5# 2x10 1.5# 2x10 2#     Standing SLR 2x10 2x10 2x10 1.5# 2x10 1.5# 2x10 1.5# 2x10 1.5# 2x10 1.5# 2x10 2#     TKE             SLS       10x10\" 10x10\" airex     Ther Ex             Nustep 8' L5 8' L5 8' L5 8' L5 8' L5 8' L5 8' L5 8' L6      Heel " "slides  PB and strap x 10 for 5\" PB and strap x 10 for 5\" PB and strap x 10 for 5\" PB and strap x 10 for 5\" PB and strap x 10 for 5\" PB and strap x 10 for 5\" PB and strap x 10 for 5\"     Gastroc stretch             Hamstring curls      Standing 3x10 2# Standing 2x10 1.5# Standing 2x10 1.5# 2x10 2#     Hamstring stretch              Seated knee extension/flexion             LLL extension             Hamstring curl             Leg press  3x10 50# 3x10 60# 3x10 60#         Ther Activity             Stairs             SPC ambulation             Standing HR             Lateral step downs             STS 3x10  without UE support + red MB 3x10  without UE support + red MB 3x10 RMB w/ airex today after 7x without 3x10 4# db  3x10 4# db 24x 4# db  3x10 4# db- monitor height  table  3x10 4# db- monitor height  table      Step up      C/ ru nner climb 2x10 8\" Held for today- resume next week       Rocking to RLE WB             [place] backward walking with resistance        20# 10x      Gait Training                                       Modalities                                         1:1 with PT from 2-245 PM                                 "

## 2024-08-29 ENCOUNTER — OFFICE VISIT (OUTPATIENT)
Dept: PHYSICAL THERAPY | Facility: CLINIC | Age: 84
End: 2024-08-29
Payer: COMMERCIAL

## 2024-08-29 DIAGNOSIS — M25.561 CHRONIC PAIN OF RIGHT KNEE: ICD-10-CM

## 2024-08-29 DIAGNOSIS — G89.29 CHRONIC PAIN OF RIGHT KNEE: ICD-10-CM

## 2024-08-29 DIAGNOSIS — Z96.651 AFTERCARE FOLLOWING RIGHT KNEE JOINT REPLACEMENT SURGERY: ICD-10-CM

## 2024-08-29 DIAGNOSIS — M17.11 PRIMARY OSTEOARTHRITIS OF RIGHT KNEE: Primary | ICD-10-CM

## 2024-08-29 DIAGNOSIS — Z47.1 AFTERCARE FOLLOWING RIGHT KNEE JOINT REPLACEMENT SURGERY: ICD-10-CM

## 2024-08-29 PROCEDURE — 97110 THERAPEUTIC EXERCISES: CPT | Performed by: PHYSICAL THERAPIST

## 2024-08-29 PROCEDURE — 97140 MANUAL THERAPY 1/> REGIONS: CPT | Performed by: PHYSICAL THERAPIST

## 2024-08-29 PROCEDURE — 97112 NEUROMUSCULAR REEDUCATION: CPT | Performed by: PHYSICAL THERAPIST

## 2024-08-29 NOTE — PROGRESS NOTES
"Daily Note     Today's date: 2024  Patient name: Mikayla Breaux  : 1940  MRN: [id number]  Referring provider: Nico Buitrago,*  Dx:   Encounter Diagnosis     ICD-10-CM    1. Primary osteoarthritis of right knee  M17.11       2. Chronic pain of right knee  M25.561     G89.29       3. Aftercare following right knee joint replacement surgery  Z47.1     Z96.651           Start Time: 1530  Stop Time: 1615  Total time in clinic (min): 45 minutes    Subjective: Pt reports a fair amount of soreness the day after PT, affecting night time sleeping. However, symptoms did not last more than 24 hours.       Objective: See treatment diary below      Assessment: Tolerated treatment well. Modified treatment today; omitted squats & Sh[place] walks secondary do to DOMS. Resumed 6\" step up with runner's climb. Patient demonstrated fatigue post treatment and would benefit from continued PT.       Plan: Continue per plan of care.      Precautions: HTN, PAD, A-Fib, CAD, MENON. right total knee arthroplasty on 24    Manuals 8/1 8/6 8/8 8/13 8/15 8/20 8/22 8/27 8/29    PROM of the R 6' focusing on extension 8' focusing on extension LH 8' focusing on extension c/ MFR to anterior knee 8' focusing on extension c/ MFR to anterior knee 8' focusing on extension c/ MFR to anterior knee 8' focusing on extension c/ MFR to anterior knee 8' focusing on extension c/ MFR to anterior knee 8' focusing on extension c/ MFR to anterior knee                                           Neuro Re-Ed             Quad sets             LAQ 3x10 5# 3x10 5# 5# 3x10 5# 3x10 5# 3x10 5# 3x10 5# 3x10 5# 3x10 5# 3x10    SLR with assist             Standing hip abd 2x10 2x10 2x10 1.5# 2x10 1.5# 2x10 1.5# 2x10 1.5# 2x10 1.5# 2x10 2# 2x10 2#    Standing hip ext 2x10 2x10 2x10 1.5# 2x10 1.5# 2x10 1.5# 2x10 1.5# 2x10 1.5# 2x10 2# 2x10 2#    Standing SLR 2x10 2x10 2x10 1.5# 2x10 1.5# 2x10 1.5# 2x10 1.5# 2x10 1.5# 2x10 2# 2x10 2#    TKE             SLS    " "   10x10\" 10x10\" airex 10x10\" airex    Ther Ex   8/8          Nustep 8' L5 8' L5 8' L5 8' L5 8' L5 8' L5 8' L5 8' L6  8' L6    Heel slides  PB and strap x 10 for 5\" PB and strap x 10 for 5\" PB and strap x 10 for 5\" PB and strap x 10 for 5\" PB and strap x 10 for 5\" PB and strap x 10 for 5\" PB and strap x 10 for 5\" PB and strap x 10 for 5\"    Gastroc stretch             Hamstring curls      Standing 3x10 2# Standing 2x10 1.5# Standing 2x10 1.5# 2x10 2# 2x10 2#    Hamstring stretch              Seated knee extension/flexion             LLL extension             Hamstring curl             Leg press  3x10 50# 3x10 60# 3x10 60#         Ther Activity             Stairs             SPC ambulation             Standing HR             Lateral step downs             STS 3x10  without UE support + red MB 3x10  without UE support + red MB 3x10 RMB w/ airex today after 7x without 3x10 4# db  3x10 4# db 24x 4# db  3x10 4# db- monitor height  table  3x10 4# db- monitor height  table  nv    Step up      C/ ru nner climb 2x10 8\" Held for today- resume next week   2x10 6\" with runners climb    Rocking to RLE WB             [employer] backward walking with resistance        20# 10x      Gait Training                                       Modalities                                         1:1 with PT from 330-415PM                                   "

## 2024-09-06 ENCOUNTER — HOSPITAL ENCOUNTER (EMERGENCY)
Facility: HOSPITAL | Age: 84
Discharge: HOME | End: 2024-09-06
Attending: SURGERY
Payer: COMMERCIAL

## 2024-09-06 ENCOUNTER — APPOINTMENT (EMERGENCY)
Dept: RADIOLOGY | Facility: HOSPITAL | Age: 84
End: 2024-09-06
Payer: COMMERCIAL

## 2024-09-06 ENCOUNTER — APPOINTMENT (EMERGENCY)
Dept: CT IMAGING | Facility: HOSPITAL | Age: 84
End: 2024-09-06
Payer: COMMERCIAL

## 2024-09-06 VITALS
RESPIRATION RATE: 18 BRPM | SYSTOLIC BLOOD PRESSURE: 148 MMHG | HEART RATE: 71 BPM | DIASTOLIC BLOOD PRESSURE: 73 MMHG | OXYGEN SATURATION: 98 % | TEMPERATURE: 97.6 F

## 2024-09-06 DIAGNOSIS — S01.81XA LACERATION OF FOREHEAD, INITIAL ENCOUNTER: Primary | ICD-10-CM

## 2024-09-06 LAB
BASE EXCESS BLDA CALC-SCNC: -1 MMOL/L (ref -2–3)
CA-I BLD-SCNC: 1.21 MMOL/L (ref 1.12–1.32)
GLUCOSE SERPL-MCNC: 88 MG/DL (ref 65–140)
HCO3 BLDA-SCNC: 24.2 MMOL/L (ref 24–30)
HCT VFR BLD CALC: 44 % (ref 34.8–46.1)
HGB BLDA-MCNC: 15 G/DL (ref 11.5–15.4)
PCO2 BLD: 25 MMOL/L (ref 21–32)
PCO2 BLD: 41 MM HG (ref 42–50)
PH BLD: 7.38 [PH] (ref 7.3–7.4)
PO2 BLD: 22 MM HG (ref 35–45)
POTASSIUM BLD-SCNC: 3.7 MMOL/L (ref 3.5–5.3)
SAO2 % BLD FROM PO2: 36 % (ref 60–85)
SODIUM BLD-SCNC: 141 MMOL/L (ref 136–145)
SPECIMEN SOURCE: ABNORMAL

## 2024-09-06 PROCEDURE — 82330 ASSAY OF CALCIUM: CPT

## 2024-09-06 PROCEDURE — 84132 ASSAY OF SERUM POTASSIUM: CPT

## 2024-09-06 PROCEDURE — 99284 EMERGENCY DEPT VISIT MOD MDM: CPT

## 2024-09-06 PROCEDURE — EDAIR PR ED AIR: Performed by: EMERGENCY MEDICINE

## 2024-09-06 PROCEDURE — 99204 OFFICE O/P NEW MOD 45 MIN: CPT | Performed by: SURGERY

## 2024-09-06 PROCEDURE — 70450 CT HEAD/BRAIN W/O DYE: CPT

## 2024-09-06 PROCEDURE — 12011 RPR F/E/E/N/L/M 2.5 CM/<: CPT | Performed by: PHYSICIAN ASSISTANT

## 2024-09-06 PROCEDURE — 82803 BLOOD GASES ANY COMBINATION: CPT

## 2024-09-06 PROCEDURE — 82947 ASSAY GLUCOSE BLOOD QUANT: CPT

## 2024-09-06 PROCEDURE — 90471 IMMUNIZATION ADMIN: CPT

## 2024-09-06 PROCEDURE — 84295 ASSAY OF SERUM SODIUM: CPT

## 2024-09-06 PROCEDURE — 85014 HEMATOCRIT: CPT

## 2024-09-06 PROCEDURE — NC001 PR NO CHARGE: Performed by: PHYSICIAN ASSISTANT

## 2024-09-06 PROCEDURE — 90715 TDAP VACCINE 7 YRS/> IM: CPT | Performed by: SURGERY

## 2024-09-06 PROCEDURE — 71045 X-RAY EXAM CHEST 1 VIEW: CPT

## 2024-09-06 RX ADMIN — TETANUS TOXOID, REDUCED DIPHTHERIA TOXOID AND ACELLULAR PERTUSSIS VACCINE, ADSORBED 0.5 ML: 5; 2.5; 8; 8; 2.5 SUSPENSION INTRAMUSCULAR at 15:38
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
## 2024-09-06 NOTE — INCIDENTAL FINDINGS
The following findings require follow up:  Radiographic finding   Finding:   Mild cardiomegaly.    Follow up required: Yes   Follow up should be done within 2-4 week(s)    Please notify the following clinician to assist with the follow up:   Primary Care Provider    Incidental finding results were discussed with the Patient by Sotero Reid PA-C on 09/06/24.   They expressed understanding and all questions answered.

## 2024-09-06 NOTE — PROCEDURES
Universal Protocol:  Consent: Verbal consent obtained.  Patient understanding: patient states understanding of the procedure being performed  Laceration repair    Date/Time: 9/6/2024 4:17 PM    Performed by: Sotero Reid PA-C  Authorized by: Sotero Reid PA-C  Body area: head/neck  Location details: forehead  Laceration length: 1 cm  Tendon involvement: none  Nerve involvement: none  Vascular damage: no      Procedure Details:  Irrigation solution: saline  Irrigation method: syringe  Amount of cleaning: standard  Debridement: none  Degree of undermining: none  Skin closure: glue  Approximation: close  Approximation difficulty: simple  Patient tolerance: patient tolerated the procedure well with no immediate complications
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
## 2024-09-06 NOTE — CASE MANAGEMENT
Case Management ED Progress Note    Patient name Mikayla Breaux  Location TR-/TR- MRN [id number]  : 1940 Date 2024        OBJECTIVE:    Chief Complaint:   Patient Class: Emergency  Preferred Pharmacy:   [id number] - [place] - [id number] [facility] [place] [address]  Phone: [phone] Fax: [phone]    [facility]  [place], [place] - [address]  [address]  [place] [address]  Phone: [phone] Fax: [phone]    Primary Care Provider: Cheyenne Gomez MD    Primary Insurance: [employer] REP  Secondary Insurance:     ED Progress Note:  CM responded to trauma alert. Patient brought self to hospital. In trauma bay for eval. Patient responsive to medical team.     CM met with patient at bedside, to see if she would like family contacted. Patient declining, stating she would like to hold off and will let medical team know if she changes her mind. Trauma AP aware of above.     No current identified CM needs. CM will follow and update screening, assessment, and discharge planning as appropriate.
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
## 2024-09-09 ENCOUNTER — EVALUATION (OUTPATIENT)
Dept: PHYSICAL THERAPY | Facility: CLINIC | Age: 84
End: 2024-09-09
Payer: COMMERCIAL

## 2024-09-09 DIAGNOSIS — Z96.651 AFTERCARE FOLLOWING RIGHT KNEE JOINT REPLACEMENT SURGERY: ICD-10-CM

## 2024-09-09 DIAGNOSIS — M17.11 PRIMARY OSTEOARTHRITIS OF RIGHT KNEE: Primary | ICD-10-CM

## 2024-09-09 DIAGNOSIS — Z47.1 AFTERCARE FOLLOWING RIGHT KNEE JOINT REPLACEMENT SURGERY: ICD-10-CM

## 2024-09-09 DIAGNOSIS — G89.29 CHRONIC PAIN OF RIGHT KNEE: ICD-10-CM

## 2024-09-09 DIAGNOSIS — M25.561 CHRONIC PAIN OF RIGHT KNEE: ICD-10-CM

## 2024-09-09 PROCEDURE — 97110 THERAPEUTIC EXERCISES: CPT | Performed by: PHYSICAL THERAPIST

## 2024-09-09 PROCEDURE — 97140 MANUAL THERAPY 1/> REGIONS: CPT | Performed by: PHYSICAL THERAPIST

## 2024-09-09 PROCEDURE — 97112 NEUROMUSCULAR REEDUCATION: CPT | Performed by: PHYSICAL THERAPIST

## 2024-09-11 ENCOUNTER — OFFICE VISIT (OUTPATIENT)
Dept: CARDIOLOGY CLINIC | Facility: CLINIC | Age: 84
End: 2024-09-11
Payer: COMMERCIAL

## 2024-09-11 ENCOUNTER — OFFICE VISIT (OUTPATIENT)
Dept: PHYSICAL THERAPY | Facility: CLINIC | Age: 84
End: 2024-09-11
Payer: COMMERCIAL

## 2024-09-11 VITALS
WEIGHT: 193 LBS | OXYGEN SATURATION: 97 % | HEART RATE: 65 BPM | SYSTOLIC BLOOD PRESSURE: 112 MMHG | HEIGHT: 65 IN | DIASTOLIC BLOOD PRESSURE: 72 MMHG | BODY MASS INDEX: 32.15 KG/M2 | TEMPERATURE: 97.9 F

## 2024-09-11 DIAGNOSIS — G89.29 CHRONIC PAIN OF RIGHT KNEE: ICD-10-CM

## 2024-09-11 DIAGNOSIS — Z96.651 AFTERCARE FOLLOWING RIGHT KNEE JOINT REPLACEMENT SURGERY: ICD-10-CM

## 2024-09-11 DIAGNOSIS — M25.561 CHRONIC PAIN OF RIGHT KNEE: ICD-10-CM

## 2024-09-11 DIAGNOSIS — M17.11 PRIMARY OSTEOARTHRITIS OF RIGHT KNEE: Primary | ICD-10-CM

## 2024-09-11 DIAGNOSIS — I48.0 PAROXYSMAL ATRIAL FIBRILLATION (HCC): ICD-10-CM

## 2024-09-11 DIAGNOSIS — Z47.1 AFTERCARE FOLLOWING RIGHT KNEE JOINT REPLACEMENT SURGERY: ICD-10-CM

## 2024-09-11 DIAGNOSIS — I10 ESSENTIAL HYPERTENSION: ICD-10-CM

## 2024-09-11 DIAGNOSIS — E78.00 ELEVATED LDL CHOLESTEROL LEVEL: ICD-10-CM

## 2024-09-11 DIAGNOSIS — I25.10 CORONARY ARTERY DISEASE INVOLVING NATIVE CORONARY ARTERY OF NATIVE HEART WITHOUT ANGINA PECTORIS: ICD-10-CM

## 2024-09-11 PROCEDURE — 99214 OFFICE O/P EST MOD 30 MIN: CPT

## 2024-09-11 PROCEDURE — 97112 NEUROMUSCULAR REEDUCATION: CPT | Performed by: PHYSICAL THERAPIST

## 2024-09-11 PROCEDURE — 97110 THERAPEUTIC EXERCISES: CPT | Performed by: PHYSICAL THERAPIST

## 2024-09-11 PROCEDURE — 97140 MANUAL THERAPY 1/> REGIONS: CPT | Performed by: PHYSICAL THERAPIST

## 2024-09-11 PROCEDURE — 93000 ELECTROCARDIOGRAM COMPLETE: CPT

## 2024-09-11 NOTE — PROGRESS NOTES
Mikayla Weisseth  1940  [id number]  [facility] [address] [facility] [address] [phone]    1. Paroxysmal atrial fibrillation (HCC)  POCT ECG    Echo complete w/ contrast if indicated    AMB extended holter monitor      2. Essential hypertension        3. Coronary artery disease involving native coronary artery of native heart without angina pectoris        4. Elevated LDL cholesterol level            Summary/Discussion:  Paroxysmal atrial fibrillation:  - was seen by EP in 9/2023  - EKG today demonstrating sinus rhythm   - echo (5/2022): normal LV, LVEF 60%, normal systolic function, no RWMA, RV mildly dilated, LA/RA mildly dilated, mild/mod MR, mod TR, mild NJ  - she reports several notifications from her smart watch stating that she was in atrial fibrillation with heart rates ranging from high 30's-170's, mostly at night  personally reviewed episodes on her smart watch today during office visit however, no EKG strip to confirm  recommend obtaining a 2 week zio to assess atrial fibrillation burden/average heart rate  repeat echo   did not uptitrate her beta blocker today due to borderline low blood pressure  recommend she follow up with EP in the near future   did discuss sleep medicine evaluation to rule out underlying sleep apnea however, she deferred at this time  - [id number] = 4  - anticoagulation on xarelto 20 mg daily   - continue metoprolol succinate 25 mg in AM and 37.5 mg in PM  - asymptomatic    Coronary artery disease:  - nonobstructive CAD based on C in 7/2022  prov RCA lession 10% stenosed   - without symptoms of angina   - not on aspirin while on chronic anticoagulation with Xarelto  - historically not on statin therapy   - continue beta-blocker    Hypertension:  - blood pressure, 112/72  - continue present medication regimen  - lifestyle modification   - close blood pressure monitoring     Dyslipidemia:  - Lipid Profile:    Latest  Reference Range & Units 02/15/24 08:11   Cholesterol See Comment mg/dL 199   Triglycerides See Comment mg/dL 77   HDL >=50 mg/dL 74   Non-HDL Cholesterol mg/dl 125   LDL Calculated 0 - 100 mg/dL 110 (H)   - historically not on statin therapy-- she would likely benefit from with her hx of nonobstructive CAD  - encouraged low cholesterol, mediterranean diet, and annual lipid follow up    Interval History: Mikayla Breaux is a 83 y.o. year old female with history mentioned in problem list who presents to the office today for routine follow up.    Since her last office visit she has no significant cardiac complaints. She denies any chest pain/pressure/discomfort or shortness of breath. She denies lower extremity edema, orthopnea, and PND. She denies lightheadedness, dizziness, and syncope. She denies palpitations.  She reports recent increase in regular exercise with walking since her right knee replacement without any reported cardiac limitations.     She does report several notifications from her smart watch stating that she was in atrial fibrillation with heart rates ranging from high 30's-170's, mostly at night in which she is asymptomatic.       She will RTO in 3 months with Dr. Alvarado or sooner if necessary. She will call with any concerns.       Medical Problems       Problem List       Essential hypertension    Chronic left shoulder pain    MENON (dyspnea on exertion)    Lichen sclerosus    Lipoma of right lower extremity    Pure hypercholesterolemia    Abnormal stress test    Paroxysmal atrial fibrillation (HCC)    Coronary artery disease involving native coronary artery of native heart without angina pectoris    Class 1 obesity due to excess calories without serious comorbidity with body mass index (BMI) of 30.0 to 30.9 in adult    Acute nonintractable headache    Baker cyst, left    Chronic pain of left knee    Bilateral primary osteoarthritis of knee    Osteopenia of multiple sites    Primary osteoarthritis of  right knee    Greater trochanteric bursitis of right hip    Swelling of lower extremity    Encounter for geriatric assessment    S/P total right hip arthroplasty        Past Medical History:   Diagnosis Date    Allergic 1980    [address] tin    Arthritis 2008    Benign essential hypertension     Hyperlipidemia, unspecified     Hypertension 2016    Irregular heart beat     Osteoarthritis of left knee     Osteoporosis     PAD (peripheral artery disease) ([facility]) 09/29/2021    Perichondritis of ear, right      Social History     Socioeconomic History    Marital status:      Spouse name: Not on file    Number of children: Not on file    Years of education: Not on file    Highest education level: Not on file   Occupational History    Not on file   Tobacco Use    Smoking status: Never    Smokeless tobacco: Never   Vaping Use    Vaping status: Never Used   Substance and Sexual Activity    Alcohol use: Yes     Alcohol/week: 5.0 standard drinks of alcohol     Types: 5 Glasses of wine per week    Drug use: Never    Sexual activity: Not Currently     Partners: Male   Other Topics Concern    Not on file   Social History Narrative    Not on file     Social Determinants of Health     Financial Resource Strain: Not on file   Food Insecurity: No Food Insecurity (6/27/2024)    Hunger Vital Sign     Worried About Running Out of Food in the Last Year: Never true     Ran Out of Food in the Last Year: Never true   Transportation Needs: No Transportation Needs (6/27/2024)    PRAPARE - Transportation     Lack of Transportation (Medical): No     Lack of Transportation (Non-Medical): No   Physical Activity: Not on file   Stress: Not on file   Social Connections: Not on file   Intimate Partner Violence: Not on file   Housing Stability: Low Risk  (6/27/2024)    Housing Stability Vital Sign     Unable to Pay for Housing in the Last Year: No     Number of Times Moved in the Last Year: 1     Homeless in the Last Year: No      Family History    Problem Relation Age of Onset    Hyperlipidemia Mother     Heart Valve Disease Mother     Hypertension Mother     Arthritis Father     Diabetes Father     Alcohol abuse Father     Prostate cancer Father 80    Diabetes Brother     Atrial fibrillation Brother     Alcohol abuse Brother     Arthritis Brother     Pancreatic cancer Maternal Aunt 65    No Known Problems Paternal Uncle     No Known Problems Paternal Uncle     No Known Problems Paternal Uncle     No Known Problems Paternal Uncle     Hypertension Daughter     Bradycardia Son     No Known Problems Son      Past Surgical History:   Procedure Laterality Date    CARDIAC CATHETERIZATION Left 7/6/2022    Procedure: Cardiac Left Heart Cath;  Surgeon: Grover Sommer MD;  Location: [facility]ARD[facility];  Service: Cardiology    COLON SURGERY  2003    Rectoseal     LAPAROSCOPIC OVARIAN CYSTECTOMY Right 1981    LAPAROSCOPIC OVARIAN CYSTECTOMY Left 2003    rectoseal and cystoseal repari    OR ARTHRP KNE CONDYLE&PLATU MEDIAL&LAT COMPARTMENTS Right 6/26/2024    Procedure: RIGHT TOTAL KNEE ARTHROPLASTY W ROBOT;  Surgeon: Nico Buitrago MD;  Location: [facility] MAIN OR;  Service: Orthopedics    TONSILLECTOMY AND ADENOIDECTOMY  1952    TUBAL LIGATION  1972       Current Outpatient Medications:     acetaminophen (TYLENOL) 650 mg CR tablet, Take 1 tablet (650 mg total) by mouth every 8 (eight) hours as needed for mild pain, Disp: 30 tablet, Rfl: 2    b complex vitamins capsule, Take 1 capsule by mouth daily, Disp: , Rfl:     CALCIUM PO, Take by mouth, Disp: , Rfl:     cholecalciferol (VITAMIN D3) 1,000 units tablet, Take 1,000 Units by mouth daily, Disp: , Rfl:     docusate sodium (COLACE) 100 mg capsule, Take 100 mg by mouth daily, Disp: , Rfl:     metoprolol succinate (TOPROL-XL) 25 mg 24 hr tablet, TAKE 1 TABLET BY MOUTH EVERY MORNING AND TAKE 1 AND 1/2 TABLETS BY MOUTH EVERY EVENING, Disp: 135 tablet, Rfl: 1    Multiple Vitamins-Minerals (MULTIVITAMIN ADULTS PO), Take by  "mouth, Disp: , Rfl:     Xarelto 20 MG tablet, TAKE 1 TABLET BY MOUTH EVERY DAY WITH BREAKFAST, Disp: 30 tablet, Rfl: 5    clobetasol (TEMOVATE) 0.05 % ointment, Apply topically as needed  (Patient not taking: Reported on 3/5/2024), Disp: , Rfl:   Allergies   Allergen Reactions    Macrodantin [Nitrofurantoin] Nausea Only, Dizziness and Headache       Labs:     Chemistry        Component Value Date/Time    K 4.0 06/27/2024 0453     06/27/2024 0453    CO2 25 09/06/2024 1452    BUN 15 06/27/2024 0453    CREATININE 0.63 06/27/2024 0453        Component Value Date/Time    CALCIUM 8.3 (L) 06/27/2024 0453    ALKPHOS 70 06/05/2024 0820    AST 24 06/05/2024 0820    ALT 22 06/05/2024 0820            No results found for: \"CHOL\"  Lab Results   Component Value Date    HDL 74 02/15/2024    HDL 74 04/12/2023     Lab Results   Component Value Date    LDLCALC 110 (H) 02/15/2024    LDLCALC 110 (H) 04/12/2023     Lab Results   Component Value Date    TRIG 77 02/15/2024    TRIG 75 04/12/2023     No results found for: \"CHOLHDL\"    Imaging: XR chest 1 view    Result Date: 9/9/2024  Narrative: XR TRAUMA MULTIPLE INDICATION: trauma. COMPARISON: None FINDINGS: CHEST: Supine frontal view of the chest is obtained. Clear lungs. No evidence of a pneumothorax or pleural effusion. Upright images are more sensitive to detect pneumothoraces if relevant. Mild cardiomegaly. Mild calcification of the aortic arch. No displaced fracture.     Impression: No acute cardiopulmonary disease within limitations of supine imaging. Workstation performed: [id number]     XR trauma multiple    Result Date: 9/6/2024  Narrative: XR TRAUMA MULTIPLE INDICATION: trauma. COMPARISON: None FINDINGS: CHEST: Supine frontal view of the chest is obtained. Clear lungs. No evidence of a pneumothorax or pleural effusion. Upright images are more sensitive to detect pneumothoraces if relevant. Mild cardiomegaly. Mild calcification of the aortic arch. No displaced fracture. " "    Impression: No acute cardiopulmonary disease within limitations of supine imaging. Workstation performed: [id number]     TRAUMA - CT head wo contrast    Result Date: 9/6/2024  Narrative: CT BRAIN - WITHOUT CONTRAST INDICATION:   TRAUMA. COMPARISON:  None. TECHNIQUE:  CT examination of the brain was performed.  Multiplanar 2D reformatted images were created from the source data. Radiation dose length product (DLP) for this visit:  1022 mGy-cm .  This examination, like all CT scans performed in the [facility], was performed utilizing techniques to minimize radiation dose exposure, including the use of iterative reconstruction and automated exposure control. IMAGE QUALITY:  Diagnostic. FINDINGS: PARENCHYMA:  No intracranial mass, mass effect or midline shift. No CT signs of acute infarction.  No acute parenchymal hemorrhage. VENTRICLES AND EXTRA-AXIAL SPACES:  Normal for the patient's age. VISUALIZED ORBITS: Normal visualized orbits. PARANASAL SINUSES: Minor mucosal thickening of the inferior maxillary sinuses. CALVARIUM AND EXTRACRANIAL SOFT TISSUES:  Normal.     Impression: No acute intracranial abnormality. Workstation performed: [id number]       ECG:  sinus rhythm     Review of Systems   All other systems reviewed and are negative.      Vitals:    09/11/24 1054   BP: 112/72   Pulse: 65   Temp: 97.9 °F (36.6 °C)   SpO2: 97%     Vitals:    09/11/24 1054   Weight: 87.5 kg (193 lb)     Height: 5' 5\" (165.1 cm)   Body mass index is 32.12 kg/m².    Physical Exam  Vitals and nursing note reviewed.   Constitutional:       General: She is not in acute distress.  HENT:      Head: Normocephalic.      Nose: Nose normal.      Mouth/Throat:      Mouth: Mucous membranes are moist.      Pharynx: Oropharynx is clear.   Cardiovascular:      Rate and Rhythm: Normal rate and regular rhythm.      Heart sounds: No murmur heard.  Pulmonary:      Effort: Pulmonary effort is normal.      Breath sounds: Normal breath " sounds.   Musculoskeletal:      Cervical back: Normal range of motion.      Right lower leg: No edema.      Left lower leg: No edema.   Skin:     General: Skin is warm and dry.   Neurological:      Mental Status: She is alert and oriented to person, place, and time.   Psychiatric:         Mood and Affect: Mood normal.         Behavior: Behavior normal.

## 2024-09-11 NOTE — PROGRESS NOTES
"Daily Note     Today's date: 2024  Patient name: Mkiayla Breaux  : 1940  MRN: [id number]  Referring provider: Nico Buitrago,*  Dx:   Encounter Diagnosis     ICD-10-CM    1. Primary osteoarthritis of right knee  M17.11       2. Chronic pain of right knee  M25.561     G89.29       3. Aftercare following right knee joint replacement surgery  Z47.1     Z96.651                      Subjective: Pt reports feeling more sore after last session than usual. She attributes this mainly to the stretching but also the new exercises implemented.       Objective: See treatment diary below      Assessment: Resumed Nustep vs bike to start treatment. Continued with stretching at tolerable vigor.  Tolerated treatment well. Pt challenged with descending stairs secondary to eccentric quad control. Patient exhibited good technique with therapeutic exercises and would benefit from continued PT.       Plan: Continue per plan of care.      Precautions: HTN, PAD, A-Fib, CAD, MENON. right total knee arthroplasty on 24    Manuals 9/11    8/15 8/20 8/22 8/27 8/29 9/9   PROM of the R Focus on extension 8'    8' focusing on extension c/ MFR to anterior knee 8' focusing on extension c/ MFR to anterior knee 8' focusing on extension c/ MFR to anterior knee 8' focusing on extension c/ MFR to anterior knee 8' focusing on extension c/ MFR to anterior knee Focus on extension                                          Neuro Re-Ed             Quad sets             LAQ 5# 3x10    5# 3x10 5# 3x10 5# 3x10 5# 3x10 5# 3x10    SLR with assist             Standing hip abd 2x10 2#    2x10 1.5# 2x10 1.5# 2x10 1.5# 2x10 2# 2x10 2#    Standing hip ext 2x10 2#    2x10 1.5# 2x10 1.5# 2x10 1.5# 2x10 2# 2x10 2#    Standing SLR 2x10 2#    2x10 1.5# 2x10 1.5# 2x10 1.5# 2x10 2# 2x10 2#    TKE             SLS       10x10\" 10x10\" airex 10x10\" airex    Ther Ex             Nustep 8' L6    8' L5 8' L5 8' L5 8' L6  8' L6    Heel slides     PB and strap x " "10 for 5\" PB and strap x 10 for 5\" PB and strap x 10 for 5\" PB and strap x 10 for 5\" PB and strap x 10 for 5\"    Gastroc stretch             Hamstring curls      Standing 3x10 2# Standing 2x10 1.5# Standing 2x10 1.5# 2x10 2# 2x10 2#    Hamstring stretch              Seated knee extension/flexion             LLL extension             Hamstring curl          8'   Bike             Leg press SL 3x10 30#         SL nv    Ther Activity             Stairs             Step down  6\" 2x10          6\" 2x10    SPC ambulation             Standing HR             Lateral step downs             STS     3x10 4# db 24x 4# db  3x10 4# db- monitor height  table  3x10 4# db- monitor height  table  nv    Step up      C/ ru nner climb 2x10 8\" Held for today- resume next week   2x10 6\" with runners climb    Rocking to RLE WB             [place] backward walking with resistance        20# 10x      Gait Training                                       Modalities                                         1:1 with PT from 9-426h  Pt was re-evaluated today x 30 minutes                                       "

## 2024-09-17 ENCOUNTER — OFFICE VISIT (OUTPATIENT)
Dept: PHYSICAL THERAPY | Facility: CLINIC | Age: 84
End: 2024-09-17
Payer: COMMERCIAL

## 2024-09-17 DIAGNOSIS — M25.561 CHRONIC PAIN OF RIGHT KNEE: ICD-10-CM

## 2024-09-17 DIAGNOSIS — Z47.1 AFTERCARE FOLLOWING RIGHT KNEE JOINT REPLACEMENT SURGERY: ICD-10-CM

## 2024-09-17 DIAGNOSIS — M17.11 PRIMARY OSTEOARTHRITIS OF RIGHT KNEE: Primary | ICD-10-CM

## 2024-09-17 DIAGNOSIS — Z96.651 AFTERCARE FOLLOWING RIGHT KNEE JOINT REPLACEMENT SURGERY: ICD-10-CM

## 2024-09-17 DIAGNOSIS — G89.29 CHRONIC PAIN OF RIGHT KNEE: ICD-10-CM

## 2024-09-17 PROCEDURE — 97110 THERAPEUTIC EXERCISES: CPT | Performed by: PHYSICAL THERAPIST

## 2024-09-17 PROCEDURE — 97112 NEUROMUSCULAR REEDUCATION: CPT | Performed by: PHYSICAL THERAPIST

## 2024-09-17 PROCEDURE — 97140 MANUAL THERAPY 1/> REGIONS: CPT | Performed by: PHYSICAL THERAPIST

## 2024-09-17 NOTE — PROGRESS NOTES
"Daily Note     Today's date: 2024  Patient name: Mikayla Breaux  : 1940  MRN: [id number]  Referring provider: Nico Buitrago,*  Dx:   Encounter Diagnosis     ICD-10-CM    1. Primary osteoarthritis of right knee  M17.11       2. Chronic pain of right knee  M25.561     G89.29       3. Aftercare following right knee joint replacement surgery  Z47.1     Z96.651                      Subjective: Pt reports feeling her knee progressing well.       Objective: See treatment diary below      Assessment: Knee flexion has improved to 130 degrees. Tolerated treatment well. Patient exhibited good technique with therapeutic exercises. Pt plans to attend 2 more PT session then she will be discharge from PT. Increased SL press to 35#.       Plan: Continue per plan of care.      Precautions: HTN, PAD, A-Fib, CAD, MENON. right total knee arthroplasty on 24    Manuals 9/11 9/17   8/15 8/20 8/22 8/27 8/29 9/9   PROM of the R Focus on extension 8' Focus on extension 8'   8' focusing on extension c/ MFR to anterior knee 8' focusing on extension c/ MFR to anterior knee 8' focusing on extension c/ MFR to anterior knee 8' focusing on extension c/ MFR to anterior knee 8' focusing on extension c/ MFR to anterior knee Focus on extension                                          Neuro Re-Ed             Quad sets             LAQ 5# 3x10 5# 3x10   5# 3x10 5# 3x10 5# 3x10 5# 3x10 5# 3x10    SLR with assist             Standing hip abd 2x10 2# 2x10 2#   2x10 1.5# 2x10 1.5# 2x10 1.5# 2x10 2# 2x10 2#    Standing hip ext 2x10 2# 2x10 2#   2x10 1.5# 2x10 1.5# 2x10 1.5# 2x10 2# 2x10 2#    Standing SLR 2x10 2# 2x10 2#   2x10 1.5# 2x10 1.5# 2x10 1.5# 2x10 2# 2x10 2#    TKE             SLS       10x10\" 10x10\" airex 10x10\" airex    Ther Ex             Nustep 8' L6 8' L6   8' L5 8' L5 8' L5 8' L6  8' L6    Heel slides  PB and strap x 10 for 5\"   PB and strap x 10 for 5\" PB and strap x 10 for 5\" PB and strap x 10 for 5\" PB and strap x " "10 for 5\" PB and strap x 10 for 5\"    Gastroc stretch             Hamstring curls      Standing 3x10 2# Standing 2x10 1.5# Standing 2x10 1.5# 2x10 2# 2x10 2#    Hamstring stretch              Seated knee extension/flexion             LLL extension             Hamstring curl          8'   Bike             Leg press SL 3x10 30# SL 3x10 35#        SL nv    Ther Activity             Stairs             Step down  6\" 2x10  6\" 2x10        6\" 2x10    SPC ambulation             Standing HR             Lateral step downs             STS     3x10 4# db 24x 4# db  3x10 4# db- monitor height  table  3x10 4# db- monitor height  table  nv    Step up      C/ ru nner climb 2x10 8\" Held for today- resume next week   2x10 6\" with runners climb    Rocking to RLE WB             [place] backward walking with resistance        20# 10x      Gait Training                                       Modalities                                         1:1 with PT from 7-471l                                           "

## 2024-09-19 ENCOUNTER — OFFICE VISIT (OUTPATIENT)
Dept: PHYSICAL THERAPY | Facility: CLINIC | Age: 84
End: 2024-09-19
Payer: COMMERCIAL

## 2024-09-19 DIAGNOSIS — M17.11 PRIMARY OSTEOARTHRITIS OF RIGHT KNEE: Primary | ICD-10-CM

## 2024-09-19 DIAGNOSIS — G89.29 CHRONIC PAIN OF RIGHT KNEE: ICD-10-CM

## 2024-09-19 DIAGNOSIS — Z47.1 AFTERCARE FOLLOWING RIGHT KNEE JOINT REPLACEMENT SURGERY: ICD-10-CM

## 2024-09-19 DIAGNOSIS — Z96.651 AFTERCARE FOLLOWING RIGHT KNEE JOINT REPLACEMENT SURGERY: ICD-10-CM

## 2024-09-19 DIAGNOSIS — M25.561 CHRONIC PAIN OF RIGHT KNEE: ICD-10-CM

## 2024-09-19 PROCEDURE — 97530 THERAPEUTIC ACTIVITIES: CPT | Performed by: PHYSICAL THERAPIST

## 2024-09-19 PROCEDURE — 97110 THERAPEUTIC EXERCISES: CPT | Performed by: PHYSICAL THERAPIST

## 2024-09-19 PROCEDURE — 97112 NEUROMUSCULAR REEDUCATION: CPT | Performed by: PHYSICAL THERAPIST

## 2024-09-19 NOTE — PROGRESS NOTES
"Daily Note     Today's date: 2024  Patient name: Mikayla Breaux  : 1940  MRN: [id number]  Referring provider: Nico Buitrago,*  Dx:   Encounter Diagnosis     ICD-10-CM    1. Primary osteoarthritis of right knee  M17.11       2. Chronic pain of right knee  M25.561     G89.29       3. Aftercare following right knee joint replacement surgery  Z47.1     Z96.651           Start Time: 09  Stop Time: 945  Total time in clinic (min): 45 minutes    Subjective: Pt overall doing very well. No new complaints. She is very pleased with outcome thus far.       Objective: See treatment diary below      Assessment: Tolerated treatment well. Knee extension nearly at 0 degrees. Pt is 12 weeks post op. She will attend 1 more treatment and then be discharged  to [facility]. Patient exhibited good technique with therapeutic exercises and would benefit from continued PT.      Plan: Continue per plan of care.      Precautions: HTN, PAD, A-Fib, CAD, MENON. right total knee arthroplasty on 24    Manuals 9/11 9/17 9/19   8/15 8/20 8/22 8/27 8/29 9/9   PROM of the R Focus on extension 8' Focus on extension 8' Focus on extension 8'  8' focusing on extension c/ MFR to anterior knee 8' focusing on extension c/ MFR to anterior knee 8' focusing on extension c/ MFR to anterior knee 8' focusing on extension c/ MFR to anterior knee 8' focusing on extension c/ MFR to anterior knee Focus on extension                                          Neuro Re-Ed             Quad sets             LAQ 5# 3x10 5# 3x10 5# 3x10  5# 3x10 5# 3x10 5# 3x10 5# 3x10 5# 3x10 5# 3x10   SLR with assist             Standing hip abd 2x10 2# 2x10 2# 2x10 2#  2x10 1.5# 2x10 1.5# 2x10 1.5# 2x10 2# 2x10 2#    Standing hip ext 2x10 2# 2x10 2# 2x10 2#  2x10 1.5# 2x10 1.5# 2x10 1.5# 2x10 2# 2x10 2#    Standing SLR 2x10 2# 2x10 2# 2x10 2#  2x10 1.5# 2x10 1.5# 2x10 1.5# 2x10 2# 2x10 2#    TKE             SLS       10x10\" 10x10\" airex 10x10\" airex    Ther Ex           " "  Nustep 8' L6 8' L6 8' L6  8' L5 8' L5 8' L5 8' L6  8' L6 8' L6   Heel slides  PB and strap x 10 for 5\"   PB and strap x 10 for 5\" PB and strap x 10 for 5\" PB and strap x 10 for 5\" PB and strap x 10 for 5\" PB and strap x 10 for 5\"    Gastroc stretch             Hamstring curls      Standing 3x10 2# Standing 2x10 1.5# Standing 2x10 1.5# 2x10 2# 2x10 2#    Hamstring stretch              Seated knee extension/flexion             LLL extension             Hamstring curl          8'   Bike             Leg press SL 3x10 30# SL 3x10 35#        SL nv    Ther Activity             Stairs             Step down  6\" 2x10  6\" 2x10 6\" 2x10       6\" 2x10    SPC ambulation             Standing HR             Lateral step downs             STS     3x10 4# db 24x 4# db  3x10 4# db- monitor height  table  3x10 4# db- monitor height  table  nv    Step up    2x10 6\" with runners climb  C/ ru nner climb 2x10 8\" Held for today- resume next week   2x10 6\" with runners climb    Rocking to RLE WB             Modified lunge with bolster on knee   1x10 c/ cueing            [place] backward walking with resistance        20# 10x      Gait Training                                       Modalities                                         1:1 with PT from 0-047a                                             "

## 2024-09-20 ENCOUNTER — APPOINTMENT (OUTPATIENT)
Dept: RADIOLOGY | Facility: MEDICAL CENTER | Age: 84
End: 2024-09-20
Payer: COMMERCIAL

## 2024-09-20 ENCOUNTER — OFFICE VISIT (OUTPATIENT)
Dept: OBGYN CLINIC | Facility: MEDICAL CENTER | Age: 84
End: 2024-09-20

## 2024-09-20 VITALS
DIASTOLIC BLOOD PRESSURE: 74 MMHG | HEIGHT: 65 IN | BODY MASS INDEX: 32.15 KG/M2 | WEIGHT: 193 LBS | SYSTOLIC BLOOD PRESSURE: 115 MMHG | HEART RATE: 69 BPM

## 2024-09-20 DIAGNOSIS — Z47.1 AFTERCARE FOLLOWING RIGHT KNEE JOINT REPLACEMENT SURGERY: Primary | ICD-10-CM

## 2024-09-20 DIAGNOSIS — Z47.1 AFTERCARE FOLLOWING RIGHT KNEE JOINT REPLACEMENT SURGERY: ICD-10-CM

## 2024-09-20 DIAGNOSIS — Z96.651 AFTERCARE FOLLOWING RIGHT KNEE JOINT REPLACEMENT SURGERY: Primary | ICD-10-CM

## 2024-09-20 DIAGNOSIS — Z96.651 AFTERCARE FOLLOWING RIGHT KNEE JOINT REPLACEMENT SURGERY: ICD-10-CM

## 2024-09-20 PROCEDURE — 73560 X-RAY EXAM OF KNEE 1 OR 2: CPT

## 2024-09-20 PROCEDURE — 99024 POSTOP FOLLOW-UP VISIT: CPT | Performed by: ORTHOPAEDIC SURGERY

## 2024-09-20 RX ORDER — CEPHALEXIN 500 MG/1
CAPSULE ORAL
Qty: 40 CAPSULE | Refills: 0 | Status: SHIPPED | OUTPATIENT
Start: 2024-09-20 | End: 2024-09-23

## 2024-09-20 NOTE — PROGRESS NOTES
Assessment:  1. Aftercare following right knee joint replacement surgery  XR knee 1 or 2 vw right          Plan:  3 months s/p right TKA with robot, 6/26/2024.  She is doing well.    She is encouraged to remain active including HEP.    The patient is counseled on prophylactic antibiotic use prior to dental visits.    Antibiotics prescribed   She should follow up in 3 months      To do next visit:  Return in about 3 months (around 12/20/2024) for re-check with x-rays.    The above stated was discussed in layman's terms and the patient expressed understanding.  All questions were answered to the patient's satisfaction.       Scribe Attestation      I,:  Law Chandra MA am acting as a scribe while in the presence of the attending physician.:       I,:  Nico Buitrago MD personally performed the services described in this documentation    as scribed in my presence.:               Subjective:   Mikayla Breaux is a 83 y.o. female who presents 3 months s/p right TKA with robot, 6/26/2024.  She is doing well.  Today she complains of rare soreness especially with stairs.  She continues physical therapy with benefit.  She will use otc medications as needed with benefit.  He denies fever, chills or shortness of breath.        Review of systems negative unless otherwise specified in HPI    Past Medical History:   Diagnosis Date    Allergic 1980    [address] tin    Arthritis 2008    Benign essential hypertension     Hyperlipidemia, unspecified     Hypertension 2016    Irregular heart beat     Osteoarthritis of left knee     Osteoporosis     PAD (peripheral artery disease) ([facility]) 09/29/2021    Perichondritis of ear, right        Past Surgical History:   Procedure Laterality Date    CARDIAC CATHETERIZATION Left 7/6/2022    Procedure: Cardiac Left Heart Cath;  Surgeon: Grover Sommer MD;  Location: [facility] CARDIAC [facility]ATH [facility];  Service: Cardiology    COLON SURGERY  2003    Rectoseal     LAPAROSCOPIC OVARIAN CYSTECTOMY Right 1981     LAPAROSCOPIC OVARIAN CYSTECTOMY Left 2003    rectoseal and cystoseal repari    TX ARTHRP KNE CONDYLE&PLATU MEDIAL&LAT COMPARTMENTS Right 6/26/2024    Procedure: RIGHT TOTAL KNEE ARTHROPLASTY W ROBOT;  Surgeon: Nico Buitrago MD;  Location: [facility] MAIN OR;  Service: Orthopedics    TONSILLECTOMY AND ADENOIDECTOMY  1952    TUBAL LIGATION  1972       Family History   Problem Relation Age of Onset    Hyperlipidemia Mother     Heart Valve Disease Mother     Hypertension Mother     Arthritis Father     Diabetes Father     Alcohol abuse Father     Prostate cancer Father 80    Diabetes Brother     Atrial fibrillation Brother     Alcohol abuse Brother     Arthritis Brother     Pancreatic cancer Maternal Aunt 65    No Known Problems Paternal Uncle     No Known Problems Paternal Uncle     No Known Problems Paternal Uncle     No Known Problems Paternal Uncle     Hypertension Daughter     Bradycardia Son     No Known Problems Son        Social History     Occupational History    Not on file   Tobacco Use    Smoking status: Never    Smokeless tobacco: Never   Vaping Use    Vaping status: Never Used   Substance and Sexual Activity    Alcohol use: Yes     Alcohol/week: 5.0 standard drinks of alcohol     Types: 5 Glasses of wine per week    Drug use: Never    Sexual activity: Not Currently     Partners: Male         Current Outpatient Medications:     acetaminophen (TYLENOL) 650 mg CR tablet, Take 1 tablet (650 mg total) by mouth every 8 (eight) hours as needed for mild pain, Disp: 30 tablet, Rfl: 2    b complex vitamins capsule, Take 1 capsule by mouth daily, Disp: , Rfl:     CALCIUM PO, Take by mouth, Disp: , Rfl:     cholecalciferol (VITAMIN D3) 1,000 units tablet, Take 1,000 Units by mouth daily, Disp: , Rfl:     docusate sodium (COLACE) 100 mg capsule, Take 100 mg by mouth daily, Disp: , Rfl:     metoprolol succinate (TOPROL-XL) 25 mg 24 hr tablet, TAKE 1 TABLET BY MOUTH EVERY MORNING AND TAKE 1 AND 1/2 TABLETS BY MOUTH  "EVERY EVENING, Disp: 135 tablet, Rfl: 1    Multiple Vitamins-Minerals (MULTIVITAMIN ADULTS PO), Take by mouth, Disp: , Rfl:     Xarelto 20 MG tablet, TAKE 1 TABLET BY MOUTH EVERY DAY WITH BREAKFAST, Disp: 30 tablet, Rfl: 5    clobetasol (TEMOVATE) 0.05 % ointment, Apply topically as needed  (Patient not taking: Reported on 3/5/2024), Disp: , Rfl:     Allergies   Allergen Reactions    Macrodantin [Nitrofurantoin] Nausea Only, Dizziness and Headache            Vitals:    09/20/24 1310   BP: 115/74   Pulse: 69       Objective:  Physical exam  General: Awake, Alert, Oriented  Eyes: Pupils equal, round and reactive to light  Heart: regular rate and rhythm  Lungs: No audible wheezing  Abdomen: soft                    Ortho Exam  Right knee:  Well healed anterior incision  No erythema and no ecchymosis  Stable to varus and valgus stress  Extensor mechanism intact  Extension 0  Flexion 130  Calf compartments soft and supple  Sensation intact  Toes are warm sensate and mobile      Diagnostics, reviewed and taken today if performed as documented:    The attending physician has personally reviewed the pertinent films in PACS and interpretation is as follows:  Right knee x-ray:  Well positioned prosthesis with no acute changes.  Distal femoral fixation screw remain intact.      Procedures, if performed today:    Procedures    None performed      Portions of the record may have been created with voice recognition software.  Occasional wrong word or \"sound a like\" substitutions may have occurred due to the inherent limitations of voice recognition software.  Read the chart carefully and recognize, using context, where substitutions have occurred.    "

## 2024-09-23 ENCOUNTER — TELEPHONE (OUTPATIENT)
Age: 84
End: 2024-09-23

## 2024-09-23 ENCOUNTER — TELEPHONE (OUTPATIENT)
Dept: CARDIOLOGY CLINIC | Facility: CLINIC | Age: 84
End: 2024-09-23

## 2024-09-23 NOTE — TELEPHONE ENCOUNTER
Called pt to let her know that her Zio patch has been ordered and she should be expecting it in the mail some time this week. Pt understood. No questions at this time.

## 2024-09-23 NOTE — TELEPHONE ENCOUNTER
Received call from pt.  She was in office 9/11 and a Zio patch was ordered.  Pt has not received anything yet and is asking for update on this.  Attempted to contact clinical staff, unable to speak with someone directly. Advised pt I would send a message and have someone reach back out with an update on the monitor.

## 2024-09-24 ENCOUNTER — APPOINTMENT (OUTPATIENT)
Dept: PHYSICAL THERAPY | Facility: CLINIC | Age: 84
End: 2024-09-24
Payer: COMMERCIAL

## 2024-09-26 ENCOUNTER — OFFICE VISIT (OUTPATIENT)
Dept: PHYSICAL THERAPY | Facility: CLINIC | Age: 84
End: 2024-09-26
Payer: COMMERCIAL

## 2024-09-26 ENCOUNTER — OFFICE VISIT (OUTPATIENT)
Dept: PODIATRY | Facility: CLINIC | Age: 84
End: 2024-09-26
Payer: COMMERCIAL

## 2024-09-26 DIAGNOSIS — Z96.651 AFTERCARE FOLLOWING RIGHT KNEE JOINT REPLACEMENT SURGERY: ICD-10-CM

## 2024-09-26 DIAGNOSIS — Z47.1 AFTERCARE FOLLOWING RIGHT KNEE JOINT REPLACEMENT SURGERY: ICD-10-CM

## 2024-09-26 DIAGNOSIS — L60.8 PINCER NAIL DEFORMITY: Primary | ICD-10-CM

## 2024-09-26 DIAGNOSIS — G89.29 CHRONIC PAIN OF RIGHT KNEE: ICD-10-CM

## 2024-09-26 DIAGNOSIS — M17.11 PRIMARY OSTEOARTHRITIS OF RIGHT KNEE: Primary | ICD-10-CM

## 2024-09-26 DIAGNOSIS — M20.41 HAMMERTOE, BILATERAL: ICD-10-CM

## 2024-09-26 DIAGNOSIS — M20.42 HAMMERTOE, BILATERAL: ICD-10-CM

## 2024-09-26 DIAGNOSIS — M25.561 CHRONIC PAIN OF RIGHT KNEE: ICD-10-CM

## 2024-09-26 PROCEDURE — 99202 OFFICE O/P NEW SF 15 MIN: CPT | Performed by: PODIATRIST

## 2024-09-26 PROCEDURE — 97112 NEUROMUSCULAR REEDUCATION: CPT | Performed by: PHYSICAL THERAPIST

## 2024-09-26 PROCEDURE — 97110 THERAPEUTIC EXERCISES: CPT | Performed by: PHYSICAL THERAPIST

## 2024-09-26 NOTE — PROGRESS NOTES
PT-Discharge    Today's date: 2024  Patient name: Mikayla Breaux  : 1940  MRN: [id number]  Referring provider: Nico Buitrago,*  Dx:   Encounter Diagnosis     ICD-10-CM    1. Primary osteoarthritis of right knee  M17.11       2. Chronic pain of right knee  M25.561     G89.29       3. Aftercare following right knee joint replacement surgery  Z47.1     Z96.651                      Subjective: Pt very pleased with outcome. She denies any issues and agrees to make this session the last.       Objective: See treatment diary below      Active Range of Motion     Right Knee   Flexion: 126 degrees   Extensor lag: -2 degrees     Passive Range of Motion     Right Knee   Flexion: 126 degrees   Extension: 0 degrees     Strength/Myotome Testing     Right Hip   Planes of Motion   Flexion: 4+  Abduction: 4+  Adduction: 5    Right Knee   Quadriceps contraction: good  Flexion: 5  Extension: 5    5 x sit to stand: 13.60 seconds (2x for hand rails and 3x off of left knee).   : 12.6 seconds without UE support   : 11.87 seconds without UE     TUG: 10.02 seconds without UE support or AD  : 7.35 seconds without UE support or AD  : 6.54 seconds without UE support or AD    Stairs:   Reciprocally     Assessment: Nona reports excellent improvement over the past 3 months. Overall, patient has met all goals and is ready for discharge. She is negotiating stairs reciprocally. No longer needs an AD for ambulation. Minimal gait compensation. Excellent improvement in functional test.Thank you for this referral.      Goals  Short Term Goals: to be achieved by 4 weeks  1) Patient to be independent with basic HEP.-MET  2) Decrease pain to 2/10 at its worst.-MET  3) Increase right ROM by 5-10 degrees -MET  4) Increase LE strength by 1/2 MMT grade in all deficient planes.-PaMET     Long Term Goals: to be achieved by discharge  1) FOTO equal to or greater than expected.-MET  2) Ambulation to improve to maximal level of  "function-MET  3) Stair negotiation will improve to reciprocal.-MET  4) Sit to stand transfers will improve to maximal level of function-MET     Plan:  Discharge to [facility]      Precautions: HTN, PAD, A-Fib, CAD, MENON. right total knee arthroplasty on 6/26/24    Manuals 9/11 9/17 9/19 9/26 8/27 8/29 9/9   PROM of the R Focus on extension 8' Focus on extension 8' Focus on extension 8'     8' focusing on extension c/ MFR to anterior knee 8' focusing on extension c/ MFR to anterior knee Focus on extension                                          Neuro Re-Ed             Quad sets             LAQ 5# 3x10 5# 3x10 5# 3x10     5# 3x10 5# 3x10 5# 3x10   SLR with assist             Standing hip abd 2x10 2# 2x10 2# 2x10 2#     2x10 2# 2x10 2#    Standing hip ext 2x10 2# 2x10 2# 2x10 2#     2x10 2# 2x10 2#    Standing SLR 2x10 2# 2x10 2# 2x10 2#     2x10 2# 2x10 2#    TKE             SLS        10x10\" airex 10x10\" airex    Ther Ex             Nustep 8' L6 8' L6 8' L6 8' bike    8' L6  8' L6 8' L6   Heel slides  PB and strap x 10 for 5\"  PB and strap x 10 for 5\"    PB and strap x 10 for 5\" PB and strap x 10 for 5\"    Gastroc stretch             Hamstring curls         2x10 2# 2x10 2#    Hamstring stretch              Seated knee extension/flexion             LLL extension             Hamstring curl          8'   Bike             Leg press SL 3x10 30# SL 3x10 35#        SL nv    Ther Activity             Stairs             Step down  6\" 2x10  6\" 2x10 6\" 2x10       6\" 2x10    SPC ambulation             Standing HR             Lateral step downs             STS        3x10 4# db- monitor height  table  nv    Step up    2x10 6\" with runners climb      2x10 6\" with runners climb    Rocking to RLE WB             Modified lunge with bolster on knee   1x10 c/ cueing            [place] backward walking with resistance        20# 10x      Gait Training                                       Modalities                                     "     1:1 with PT from 331-4pm  Assessment x 20'

## 2024-09-26 NOTE — PROGRESS NOTES
Ambulatory Visit  Name: Mikayla Breaux      : 1940      MRN: [id number]  Encounter Provider: Umberto Birch DPM  Encounter Date: 2024   Encounter department: [facility] PODIATRY [facility]    Assessment & Plan  Pincer nail deformity         Hammertoe, bilateral       The nail can be trimmed and filed to be less pain. No sign of infection. We briefly discussed permanent nail removal but we both felt that is unnecessary    The hammertoes are asymptomatic, monitor for now    History of Present Illness     Mikayla Breaux is a 83 y.o. female who presents with left 2nd toenail pain. It pinches her toe. She's had it removed twice but it grew back the same.       Review of Systems  As stated in HPI, otherwise normal    Medical History Reviewed by provider this encounter:  Tobacco  Allergies  Meds  Problems  Med Hx  Surg Hx  Fam Hx            Objective     There were no vitals taken for this visit.    Physical Exam  Vitals reviewed.   Cardiovascular:      Pulses: Normal pulses.           Dorsalis pedis pulses are 2+ on the right side and 2+ on the left side.        Posterior tibial pulses are 2+ on the right side and 2+ on the left side.   Musculoskeletal:         General: Deformity (hammertoe 2nd B/L) present.      Right foot: Deformity present.      Left foot: Deformity present.   Feet:      Right foot:      Toenail Condition: Right toenails are abnormally thick.      Left foot:      Toenail Condition: Left toenails are abnormally thick.   Skin:     Capillary Refill: Capillary refill takes less than 2 seconds.      Findings: No erythema.   Neurological:      Mental Status: She is alert.      Sensory: No sensory deficit.

## 2024-09-30 ENCOUNTER — HOSPITAL ENCOUNTER (OUTPATIENT)
Dept: NON INVASIVE DIAGNOSTICS | Facility: HOSPITAL | Age: 84
Discharge: HOME | End: 2024-09-30
Payer: COMMERCIAL

## 2024-09-30 VITALS
HEIGHT: 65 IN | BODY MASS INDEX: 32.15 KG/M2 | HEART RATE: 80 BPM | DIASTOLIC BLOOD PRESSURE: 74 MMHG | WEIGHT: 193 LBS | SYSTOLIC BLOOD PRESSURE: 115 MMHG

## 2024-09-30 DIAGNOSIS — I48.0 PAROXYSMAL ATRIAL FIBRILLATION (HCC): ICD-10-CM

## 2024-09-30 PROCEDURE — 93306 TTE W/DOPPLER COMPLETE: CPT | Performed by: INTERNAL MEDICINE

## 2024-09-30 PROCEDURE — 93306 TTE W/DOPPLER COMPLETE: CPT

## 2024-10-01 LAB
AORTIC ROOT: 2.8 CM
ASCENDING AORTA: 3 CM
BSA FOR ECHO PROCEDURE: 1.95 M2
E WAVE DECELERATION TIME: 218 MS
E/A RATIO: 0.63
FRACTIONAL SHORTENING: 33 (ref 28–44)
INTERVENTRICULAR SEPTUM IN DIASTOLE (PARASTERNAL SHORT AXIS VIEW): 1 CM
INTERVENTRICULAR SEPTUM: 1.3 CM (ref 0.6–1.1)
LAAS-AP2: 21.2 CM2
LAAS-AP4: 23.7 CM2
LEFT ATRIUM SIZE: 3.9 CM
LEFT ATRIUM VOLUME (MOD BIPLANE): 71 ML
LEFT ATRIUM VOLUME INDEX (MOD BIPLANE): 36.4 ML/M2
LEFT INTERNAL DIMENSION IN SYSTOLE: 2.6 CM (ref 2.1–4)
LEFT VENTRICULAR INTERNAL DIMENSION IN DIASTOLE: 3.9 CM (ref 3.5–6)
LEFT VENTRICULAR POSTERIOR WALL IN END DIASTOLE: 0.8 CM
LEFT VENTRICULAR STROKE VOLUME: 41 ML
LV EF: 62 %
LVSV (TEICH): 41 ML
MV E'TISSUE VEL-SEP: 7 CM/S
MV PEAK A VEL: 0.82 M/S
MV PEAK E VEL: 52 CM/S
MV STENOSIS PRESSURE HALF TIME: 63 MS
MV VALVE AREA P 1/2 METHOD: 3.49
RA PRESSURE ESTIMATED: 3 MMHG
RIGHT ATRIUM AREA SYSTOLE A4C: 20 CM2
RIGHT VENTRICLE ID DIMENSION: 4.1 CM
RV PSP: 32 MMHG
SL CV LEFT ATRIUM LENGTH A2C: 5.5 CM
SL CV LV EF: 60
SL CV PED ECHO LEFT VENTRICLE DIASTOLIC VOLUME (MOD BIPLANE) 2D: 65 ML
SL CV PED ECHO LEFT VENTRICLE SYSTOLIC VOLUME (MOD BIPLANE) 2D: 24 ML
TR MAX PG: 29 MMHG
TR PEAK VELOCITY: 2.7 M/S
TRICUSPID ANNULAR PLANE SYSTOLIC EXCURSION: 2.3 CM
TRICUSPID VALVE PEAK REGURGITATION VELOCITY: 2.67 M/S

## 2024-10-07 ENCOUNTER — OFFICE VISIT (OUTPATIENT)
Dept: FAMILY MEDICINE CLINIC | Facility: CLINIC | Age: 84
End: 2024-10-07
Payer: COMMERCIAL

## 2024-10-07 VITALS
BODY MASS INDEX: 32.49 KG/M2 | DIASTOLIC BLOOD PRESSURE: 78 MMHG | TEMPERATURE: 97.7 F | RESPIRATION RATE: 17 BRPM | HEART RATE: 71 BPM | HEIGHT: 65 IN | WEIGHT: 195 LBS | SYSTOLIC BLOOD PRESSURE: 130 MMHG | OXYGEN SATURATION: 97 %

## 2024-10-07 DIAGNOSIS — M17.0 BILATERAL PRIMARY OSTEOARTHRITIS OF KNEE: ICD-10-CM

## 2024-10-07 DIAGNOSIS — E66.09 CLASS 1 OBESITY DUE TO EXCESS CALORIES WITHOUT SERIOUS COMORBIDITY WITH BODY MASS INDEX (BMI) OF 30.0 TO 30.9 IN ADULT: ICD-10-CM

## 2024-10-07 DIAGNOSIS — Z00.00 MEDICARE ANNUAL WELLNESS VISIT, SUBSEQUENT: ICD-10-CM

## 2024-10-07 DIAGNOSIS — E66.811 CLASS 1 OBESITY DUE TO EXCESS CALORIES WITHOUT SERIOUS COMORBIDITY WITH BODY MASS INDEX (BMI) OF 30.0 TO 30.9 IN ADULT: ICD-10-CM

## 2024-10-07 DIAGNOSIS — I10 ESSENTIAL HYPERTENSION: Primary | ICD-10-CM

## 2024-10-07 DIAGNOSIS — E78.00 PURE HYPERCHOLESTEROLEMIA: ICD-10-CM

## 2024-10-07 DIAGNOSIS — I48.0 PAROXYSMAL ATRIAL FIBRILLATION (HCC): ICD-10-CM

## 2024-10-07 PROCEDURE — G0439 PPPS, SUBSEQ VISIT: HCPCS | Performed by: FAMILY MEDICINE

## 2024-10-07 PROCEDURE — G0442 ANNUAL ALCOHOL SCREEN 15 MIN: HCPCS | Performed by: FAMILY MEDICINE

## 2024-10-07 PROCEDURE — G0444 DEPRESSION SCREEN ANNUAL: HCPCS | Performed by: FAMILY MEDICINE

## 2024-10-07 PROCEDURE — 99214 OFFICE O/P EST MOD 30 MIN: CPT | Performed by: FAMILY MEDICINE
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
## 2024-10-07 NOTE — ASSESSMENT & PLAN NOTE
BP Readings from Last 3 Encounters:   10/07/24 130/78   09/30/24 115/74   09/20/24 115/74   Blood pressure remains well-controlled.  Continue metoprolol.  Continue following with cardiology.  Monitor sodium intake closely.  Continue monitoring blood pressure at home    Orders:    Basic metabolic panel; Future    CBC and Platelet; Future

## 2024-10-07 NOTE — PATIENT INSTRUCTIONS
Medicare Preventive Visit Patient Instructions  Thank you for completing your Welcome to Medicare Visit or Medicare Annual Wellness Visit today. Your next wellness visit will be due in one year (10/8/2025).  The screening/preventive services that you may require over the next 5-10 years are detailed below. Some tests may not apply to you based off risk factors and/or age. Screening tests ordered at today's visit but not completed yet may show as past due. Also, please note that scanned in results may not display below.  Preventive Screenings:  Service Recommendations Previous Testing/Comments   Colorectal Cancer Screening  * Colonoscopy    * Fecal Occult Blood Test (FOBT)/Fecal Immunochemical Test (FIT)  * Fecal DNA/Cologuard Test  * Flexible Sigmoidoscopy Age: 45-75 years old   Colonoscopy: every 10 years (may be performed more frequently if at higher risk)  OR  FOBT/FIT: every 1 year  OR  Cologuard: every 3 years  OR  Sigmoidoscopy: every 5 years  Screening may be recommended earlier than age 45 if at higher risk for colorectal cancer. Also, an individualized decision between you and your healthcare provider will decide whether screening between the ages of 76-85 would be appropriate. Colonoscopy: 10/13/2009  FOBT/FIT: Not on file  Cologuard: Not on file  Sigmoidoscopy: Not on file          Breast Cancer Screening Age: 40+ years old  Frequency: every 1-2 years  Not required if history of left and right mastectomy Mammogram: 09/01/2021        Cervical Cancer Screening Between the ages of 21-29, pap smear recommended once every 3 years.   Between the ages of 30-65, can perform pap smear with HPV co-testing every 5 years.   Recommendations may differ for women with a history of total hysterectomy, cervical cancer, or abnormal pap smears in past. Pap Smear: Not on file    Screening Not Indicated   Hepatitis C Screening Once for adults born between 1945 and 1965  More frequently in patients at high risk for Hepatitis C  Hep C Antibody: Not on file        Diabetes Screening 1-2 times per year if you're at risk for diabetes or have pre-diabetes Fasting glucose: 128 mg/dL (6/27/2024)  A1C: 5.6 % (6/5/2024)  Screening Current   Cholesterol Screening Once every 5 years if you don't have a lipid disorder. May order more often based on risk factors. Lipid panel: 02/15/2024    Screening Not Indicated  History Lipid Disorder     Other Preventive Screenings Covered by Medicare:  Abdominal Aortic Aneurysm (AAA) Screening: covered once if your at risk. You're considered to be at risk if you have a family history of AAA.  Lung Cancer Screening: covers low dose CT scan once per year if you meet all of the following conditions: (1) Age 55-77; (2) No signs or symptoms of lung cancer; (3) Current smoker or have quit smoking within the last 15 years; (4) You have a tobacco smoking history of at least 20 pack years (packs per day multiplied by number of years you smoked); (5) You get a written order from a healthcare provider.  Glaucoma Screening: covered annually if you're considered high risk: (1) You have diabetes OR (2) Family history of glaucoma OR (3)  aged 50 and older OR (4)  American aged 65 and older  Osteoporosis Screening: covered every 2 years if you meet one of the following conditions: (1) You're estrogen deficient and at risk for osteoporosis based off medical history and other findings; (2) Have a vertebral abnormality; (3) On glucocorticoid therapy for more than 3 months; (4) Have primary hyperparathyroidism; (5) On osteoporosis medications and need to assess response to drug therapy.   Last bone density test (DXA Scan): 11/30/2023.  HIV Screening: covered annually if you're between the age of 15-65. Also covered annually if you are younger than 15 and older than 65 with risk factors for HIV infection. For pregnant patients, it is covered up to 3 times per pregnancy.    Immunizations:  Immunization  Recommendations   Influenza Vaccine Annual influenza vaccination during flu season is recommended for all persons aged >= 6 months who do not have contraindications   Pneumococcal Vaccine   * Pneumococcal conjugate vaccine = PCV13 (Prevnar 13), PCV15 (Vaxneuvance), PCV20 (Prevnar 20)  * Pneumococcal polysaccharide vaccine = PPSV23 (Pneumovax) Adults 19-63 yo with certain risk factors or if 65+ yo  If never received any pneumonia vaccine: recommend Prevnar 20 (PCV20)  Give PCV20 if previously received 1 dose of PCV13 or PPSV23   Hepatitis B Vaccine 3 dose series if at intermediate or high risk (ex: diabetes, end stage renal disease, liver disease)   Respiratory syncytial virus (RSV) Vaccine - COVERED BY MEDICARE PART D  * RSVPreF3 (Arexvy) CDC recommends that adults 60 years of age and older may receive a single dose of RSV vaccine using shared clinical decision-making (SCDM)   Tetanus (Td) Vaccine - COST NOT COVERED BY MEDICARE PART B Following completion of primary series, a booster dose should be given every 10 years to maintain immunity against tetanus. Td may also be given as tetanus wound prophylaxis.   Tdap Vaccine - COST NOT COVERED BY MEDICARE PART B Recommended at least once for all adults. For pregnant patients, recommended with each pregnancy.   Shingles Vaccine (Shingrix) - COST NOT COVERED BY MEDICARE PART B  2 shot series recommended in those 19 years and older who have or will have weakened immune systems or those 50 years and older     Health Maintenance Due:  There are no preventive care reminders to display for this patient.  Immunizations Due:      Topic Date Due   • Pneumococcal Vaccine: 65+ Years (2 of 2 - PCV) 11/05/2015   • Influenza Vaccine (1) 09/01/2024     Advance Directives   What are advance directives?  Advance directives are legal documents that state your wishes and plans for medical care. These plans are made ahead of time in case you lose your ability to make decisions for yourself.  Advance directives can apply to any medical decision, such as the treatments you want, and if you want to donate organs.   What are the types of advance directives?  There are many types of advance directives, and each state has rules about how to use them. You may choose a combination of any of the following:  Living will:  This is a written record of the treatment you want. You can also choose which treatments you do not want, which to limit, and which to stop at a certain time. This includes surgery, medicine, IV fluid, and tube feedings.   Durable power of  for healthcare (DPAHC):  This is a written record that states who you want to make healthcare choices for you when you are unable to make them for yourself. This person, called a proxy, is usually a family member or a friend. You may choose more than 1 proxy.  Do not resuscitate (DNR) order:  A DNR order is used in case your heart stops beating or you stop breathing. It is a request not to have certain forms of treatment, such as CPR. A DNR order may be included in other types of advance directives.  Medical directive:  This covers the care that you want if you are in a coma, near death, or unable to make decisions for yourself. You can list the treatments you want for each condition. Treatment may include pain medicine, surgery, blood transfusions, dialysis, IV or tube feedings, and a ventilator (breathing machine).  Values history:  This document has questions about your views, beliefs, and how you feel and think about life. This information can help others choose the care that you would choose.  Why are advance directives important?  An advance directive helps you control your care. Although spoken wishes may be used, it is better to have your wishes written down. Spoken wishes can be misunderstood, or not followed. Treatments may be given even if you do not want them. An advance directive may make it easier for your family to make difficult  choices about your care.   Urinary Incontinence   Urinary incontinence (UI)  is when you lose control of your bladder. UI develops because your bladder cannot store or empty urine properly. The 3 most common types of UI are stress incontinence, urge incontinence, or both.  Medicines:   May be given to help strengthen your bladder control. Report any side effects of medication to your healthcare provider.  Do pelvic muscle exercises often:  Your pelvic muscles help you stop urinating. Squeeze these muscles tight for 5 seconds, then relax for 5 seconds. Gradually work up to squeezing for 10 seconds. Do 3 sets of 15 repetitions a day, or as directed. This will help strengthen your pelvic muscles and improve bladder control.  Train your bladder:  Go to the bathroom at set times, such as every 2 hours, even if you do not feel the urge to go. You can also try to hold your urine when you feel the urge to go. For example, hold your urine for 5 minutes when you feel the urge to go. As that becomes easier, hold your urine for 10 minutes.   Self-care:   Keep a UI record.  Write down how often you leak urine and how much you leak. Make a note of what you were doing when you leaked urine.  Drink liquids as directed. You may need to limit the amount of liquid you drink to help control your urine leakage. Do not drink any liquid right before you go to bed. Limit or do not have drinks that contain caffeine or alcohol.   Prevent constipation.  Eat a variety of high-fiber foods. Good examples are high-fiber cereals, beans, vegetables, and whole-grain breads. Walking is the best way to trigger your intestines to have a bowel movement.  Exercise regularly and maintain a healthy weight.  Weight loss and exercise will decrease pressure on your bladder and help you control your leakage.   Use a catheter as directed  to help empty your bladder. A catheter is a tiny, plastic tube that is put into your bladder to drain your urine.   Go to  behavior therapy as directed.  Behavior therapy may be used to help you learn to control your urge to urinate.    Weight Management   Why it is important to manage your weight:  Being overweight increases your risk of health conditions such as heart disease, high blood pressure, type 2 diabetes, and certain types of cancer. It can also increase your risk for osteoarthritis, sleep apnea, and other respiratory problems. Aim for a slow, steady weight loss. Even a small amount of weight loss can lower your risk of health problems.  How to lose weight safely:  A safe and healthy way to lose weight is to eat fewer calories and get regular exercise. You can lose up about 1 pound a week by decreasing the number of calories you eat by 500 calories each day.   Healthy meal plan for weight management:  A healthy meal plan includes a variety of foods, contains fewer calories, and helps you stay healthy. A healthy meal plan includes the following:  Eat whole-grain foods more often.  A healthy meal plan should contain fiber. Fiber is the part of grains, fruits, and vegetables that is not broken down by your body. Whole-grain foods are healthy and provide extra fiber in your diet. Some examples of whole-grain foods are whole-wheat breads and pastas, oatmeal, brown rice, and bulgur.  Eat a variety of vegetables every day.  Include dark, leafy greens such as spinach, kale, mario greens, and mustard greens. Eat yellow and orange vegetables such as carrots, sweet potatoes, and winter squash.   Eat a variety of fruits every day.  Choose fresh or canned fruit (canned in its own juice or light syrup) instead of juice. Fruit juice has very little or no fiber.  Eat low-fat dairy foods.  Drink fat-free (skim) milk or 1% milk. Eat fat-free yogurt and low-fat cottage cheese. Try low-fat cheeses such as mozzarella and other reduced-fat cheeses.  Choose meat and other protein foods that are low in fat.  Choose beans or other legumes such as  "split peas or lentils. Choose fish, skinless poultry (chicken or turkey), or lean cuts of red meat (beef or pork). Before you cook meat or poultry, cut off any visible fat.   Use less fat and oil.  Try baking foods instead of frying them. Add less fat, such as margarine, sour cream, regular salad dressing and mayonnaise to foods. Eat fewer high-fat foods. Some examples of high-fat foods include french fries, doughnuts, ice cream, and cakes.  Eat fewer sweets.  Limit foods and drinks that are high in sugar. This includes candy, cookies, regular soda, and sweetened drinks.  Exercise:  Exercise at least 30 minutes per day on most days of the week. Some examples of exercise include walking, biking, dancing, and swimming. You can also fit in more physical activity by taking the stairs instead of the elevator or parking farther away from stores. Ask your healthcare provider about the best exercise plan for you.   Alcohol Use and Your Health    Drinking too much can harm your health.  Excessive alcohol use leads to about 88,000 death in the [place] each year, and shortens the life of those who diet by almost 30 years.  Further, excessive drinking cost the economy $249 billion in 2010.  Most excessive drinkers are not alcohol dependent.    Excessive alcohol use has immediate effects that increase the risk of many harmful health conditions.  These are most often the result of binge drinking.  Over time, excessive alcohol use can lead to the development of chronic diseases and other series health problems.    What is considered a \"drink\"?        Excessive alcohol use includes:  Binge Drinking: For women, 4 or more drinks consumed on one occasion. For men, 5 or more drinks consumed on one occasion.  Heavy Drinking: For women, 8 or more drinks per week. For men, 15 or more drinks per week  Any alcohol used by pregnant women  Any alcohol used by those under the age of 21 years    If you choose to drink, do so in " moderation:  Do not drink at all if you are under the age of 21, or if you are or may be pregnant, or have health problems that could be made worse by drinking.  For women, up to 1 drink per day  For men, up to 2 drinks a day    No one should begin drinking or drink more frequently based on potential health benefits    Short-Term Health Risks:  Injuries: motor vehicle crashes, falls, drownings, burns  Violence: homicide, suicide, sexual assault, intimate partner violence  Alcohol poisoning  Reproductive health: risky sexual behaviors, unintended prengnacy, sexually transmitted diseases, miscarriage, stillbirth, fetal alcohol syndrome    Long-Term Health Risks:  Chronic diseases: high blood pressure, heart disease, stroke, liver disease, digestive problems  Cancers: breast, mouth and throat, liver, colon  Learning and memory problems: dementia, poor school performance  Mental health: depression, anxiety, insomnia  Social problems: lost productivity, family problems, unemployment  Alcohol dependence    For support and more information:  Substance Abuse and Mental Health Services Administration  [address]  [place] [address]  Web Address: http://www.[facility].gov    Alcoholics Anonymous        Web Address: http://www.aa.org    https://www.cdc.gov/alcohol/fact-sheets/alcohol-use.htm     © Copyright [employer] 2018 Information is for End User's use only and may not be sold, redistributed or otherwise used for commercial purposes. All illustrations and images included in CareNotes® are the copyrighted property of [employer] or [employer]

## 2024-10-07 NOTE — ASSESSMENT & PLAN NOTE
S/P TKA   Completed PT   Continue exercises at home   Going down stairs causing some discomfort but improving.

## 2024-10-21 DIAGNOSIS — I48.0 PAROXYSMAL ATRIAL FIBRILLATION (HCC): ICD-10-CM

## 2024-10-22 DIAGNOSIS — I48.0 PAROXYSMAL ATRIAL FIBRILLATION (HCC): ICD-10-CM

## 2024-10-22 RX ORDER — RIVAROXABAN 20 MG/1
TABLET, FILM COATED ORAL
Qty: 30 TABLET | Refills: 5 | Status: SHIPPED | OUTPATIENT
Start: 2024-10-22

## 2024-11-14 ENCOUNTER — OFFICE VISIT (OUTPATIENT)
Dept: CARDIOLOGY CLINIC | Facility: CLINIC | Age: 84
End: 2024-11-14
Payer: COMMERCIAL

## 2024-11-14 VITALS
OXYGEN SATURATION: 98 % | HEART RATE: 72 BPM | WEIGHT: 197 LBS | BODY MASS INDEX: 32.78 KG/M2 | DIASTOLIC BLOOD PRESSURE: 86 MMHG | SYSTOLIC BLOOD PRESSURE: 158 MMHG

## 2024-11-14 DIAGNOSIS — I25.10 CORONARY ARTERY DISEASE INVOLVING NATIVE CORONARY ARTERY OF NATIVE HEART WITHOUT ANGINA PECTORIS: ICD-10-CM

## 2024-11-14 DIAGNOSIS — I48.0 PAROXYSMAL ATRIAL FIBRILLATION (HCC): ICD-10-CM

## 2024-11-14 DIAGNOSIS — E78.00 PURE HYPERCHOLESTEROLEMIA: ICD-10-CM

## 2024-11-14 DIAGNOSIS — I48.0 PAROXYSMAL ATRIAL FIBRILLATION (HCC): Primary | ICD-10-CM

## 2024-11-14 DIAGNOSIS — R06.09 DOE (DYSPNEA ON EXERTION): ICD-10-CM

## 2024-11-14 DIAGNOSIS — I10 ESSENTIAL HYPERTENSION: ICD-10-CM

## 2024-11-14 PROCEDURE — 99214 OFFICE O/P EST MOD 30 MIN: CPT | Performed by: INTERNAL MEDICINE

## 2024-11-14 RX ORDER — METOPROLOL SUCCINATE 25 MG/1
TABLET, EXTENDED RELEASE ORAL
Qty: 135 TABLET | Refills: 1 | OUTPATIENT
Start: 2024-11-14

## 2024-11-14 RX ORDER — METOPROLOL SUCCINATE 25 MG/1
37.5 TABLET, EXTENDED RELEASE ORAL 2 TIMES DAILY
Qty: 135 TABLET | Refills: 3 | Status: SHIPPED | OUTPATIENT
Start: 2024-11-14

## 2024-11-14 NOTE — ASSESSMENT & PLAN NOTE
Zio monitor correlating with symptoms revealed ectopy, both SVE and VE  Will increase Toprol to 1.5 tabs twice daily rather than 1 in AM and 1.5 in PM  Considering mild to moderate MR and moderate TR as well, we will check a stress echo to assess for ischemia as well as for significant valve disease with stress

## 2024-11-14 NOTE — ASSESSMENT & PLAN NOTE
in Nov 2020, which is at goal - repeat levels in Oct 2021 revealed LDL of 113.    LDL 95 in June 2022.    Repeat  in April 2023.    LDL remains well-controlled at 110 in February 2024.  Repeat levels ordered in her chart

## 2024-11-14 NOTE — ASSESSMENT & PLAN NOTE
remains well controlled today, continued on metoprolol.    Had a nuclear stress test in Oct 2020 that was normal for EF and perfusion.    Echocardiogram at that time (and on repeat in May 2022) revealed normal LV & RV function, normal valves other than mild to moderate MR, moderate TR.    She has had MENON for many years - also noted by prior cardiologist documentation in 2017.    No changes made today, continue current regiment.

## 2024-11-14 NOTE — PROGRESS NOTES
Cardiology Follow Up    Mikayla Breaux  1940  [id number]  [facility] [place]  [address]  [address]  [place] [address] [phone]      Assessment & Plan  Paroxysmal atrial fibrillation (HCC)  noted on stress testing and LHC revealed 10% pRCA lesion.    Continued on Xarelto for AC and Toprol XL for rate control, dose of which has been increased for improved rate control to 25mg BID.    Now appears to be having more frequent episodes of afib that are symptomatic and was seen by EP for further evaluation regarding rhythm management.    There was recommendation regarding flecainide as pill in pocket for the next few months until a possible hospital admission for dofetilide or sotalol therapy for rhythm control.    Considering that her symptoms were less, she held off on further rhythm control strategy for the time being.    Echocardiogram repeated in September 2024 revealing normal LV size and function with biatrial enlargement, moderate mitral regurgitation, moderate tricuspid regurgitation  Zio monitor was also completed in October 2024 revealing no further atrial fibrillation burden, symptoms correlating with supraventricular and ventricular ectopy with an overall normal ectopy burden  Essential hypertension  remains well controlled today, continued on metoprolol.    Had a nuclear stress test in Oct 2020 that was normal for EF and perfusion.    Echocardiogram at that time (and on repeat in May 2022) revealed normal LV & RV function, normal valves other than mild to moderate MR, moderate TR.    She has had MENON for many years - also noted by prior cardiologist documentation in 2017.    No changes made today, continue current regiment.  Coronary artery disease involving native coronary artery of native heart without angina pectoris  LHC revealed 10% pRCA lesion.    Continued on beta-blocker  Pure hypercholesterolemia   in Nov 2020, which is at goal - repeat  levels in Oct 2021 revealed LDL of 113.    LDL 95 in June 2022.    Repeat  in April 2023.    LDL remains well-controlled at 110 in February 2024.  Repeat levels ordered in her chart  MENON (dyspnea on exertion)  Zio monitor correlating with symptoms revealed ectopy, both SVE and VE  Will increase Toprol to 1.5 tabs twice daily rather than 1 in AM and 1.5 in PM  Considering mild to moderate MR and moderate TR as well, we will check a stress echo to assess for ischemia as well as for significant valve disease with stress    Chief Complaint   Patient presents with    Follow-up     3 month  check up Echo, zio monitor     Shortness of Breath     On exertion        Interval History: Patient continues to have some shortness of breath on exertion, otherwise no new complaints.  MENON seems like it has been progressing more recently, even climbing a few stairs causes shortness of breath.  No reported chest pain, palpitations, lightheadedness, syncope, LE edema, orthopnea, PND, or significant weight changes.  Patient remains active without any increased fatigue out of the ordinary.        Blood pressure 158/86, pulse 72, weight 89.4 kg (197 lb), SpO2 98%, not currently breastfeeding.      Review of Systems:  Review of Systems   Constitutional:  Negative for activity change, appetite change, chills, diaphoresis, fatigue and unexpected weight change.   HENT:  Negative for hearing loss, nosebleeds and sore throat.    Eyes:  Negative for photophobia and visual disturbance.   Respiratory:  Positive for shortness of breath. Negative for cough, chest tightness and wheezing.    Cardiovascular:  Negative for chest pain, palpitations and leg swelling.   Gastrointestinal:  Negative for abdominal pain, diarrhea, nausea and vomiting.   Endocrine: Negative for polyuria.   Genitourinary:  Negative for dysuria, frequency and hematuria.   Musculoskeletal:  Negative for arthralgias, back pain, gait problem and neck pain.   Skin:  Negative  for pallor and rash.   Neurological:  Negative for dizziness, syncope and headaches.   Hematological:  Does not bruise/bleed easily.   Psychiatric/Behavioral:  Negative for behavioral problems and confusion.        Physical Exam:  Physical Exam  Vitals reviewed.   Constitutional:       Appearance: Normal appearance. She is well-developed. She is not ill-appearing or diaphoretic.   HENT:      Head: Normocephalic and atraumatic.      Nose: Nose normal.   Eyes:      General: No scleral icterus.     Extraocular Movements: Extraocular movements intact.      Pupils: Pupils are equal, round, and reactive to light.   Neck:      Vascular: No JVD.   Cardiovascular:      Rate and Rhythm: Normal rate and regular rhythm.      Heart sounds: Murmur heard.      Systolic murmur is present with a grade of 2/6.      No friction rub. No gallop.   Pulmonary:      Effort: Pulmonary effort is normal. No respiratory distress.      Breath sounds: Normal breath sounds. No wheezing or rales.   Abdominal:      General: Bowel sounds are normal. There is no distension.      Palpations: Abdomen is soft.      Tenderness: There is no abdominal tenderness.   Musculoskeletal:         General: No deformity. Normal range of motion.      Cervical back: Normal range of motion and neck supple.      Right lower leg: No edema.      Left lower leg: No edema.   Skin:     General: Skin is warm and dry.      Findings: No rash.   Neurological:      Mental Status: She is alert and oriented to person, place, and time.      Cranial Nerves: No cranial nerve deficit.   Psychiatric:         Mood and Affect: Mood normal.         Behavior: Behavior normal.         Patient Active Problem List   Diagnosis    Essential hypertension    Chronic left shoulder pain    MENON (dyspnea on exertion)    Lichen sclerosus    Lipoma of right lower extremity    Pure hypercholesterolemia    Abnormal stress test    Paroxysmal atrial fibrillation (HCC)    Coronary artery disease involving  native coronary artery of native heart without angina pectoris    Class 1 obesity due to excess calories without serious comorbidity with body mass index (BMI) of 30.0 to 30.9 in adult    Acute nonintractable headache    Baker cyst, left    Chronic pain of left knee    Bilateral primary osteoarthritis of knee    Osteopenia of multiple sites    Primary osteoarthritis of right knee    Greater trochanteric bursitis of right hip    Swelling of lower extremity    Encounter for geriatric assessment    S/P total right hip arthroplasty     Past Medical History:   Diagnosis Date    Allergic 1980    [address] tin    Arthritis 2008    Benign essential hypertension     Hyperlipidemia, unspecified     Hypertension 2016    Irregular heart beat     Osteoarthritis of left knee     Osteoporosis     PAD (peripheral artery disease) ([facility]) 09/29/2021    Perichondritis of ear, right      Social History     Socioeconomic History    Marital status:      Spouse name: Not on file    Number of children: Not on file    Years of education: Not on file    Highest education level: Not on file   Occupational History    Not on file   Tobacco Use    Smoking status: Never    Smokeless tobacco: Never   Vaping Use    Vaping status: Never Used   Substance and Sexual Activity    Alcohol use: Yes     Alcohol/week: 5.0 standard drinks of alcohol     Types: 5 Glasses of wine per week    Drug use: Never    Sexual activity: Not Currently     Partners: Male   Other Topics Concern    Not on file   Social History Narrative    Not on file     Social Drivers of Health     Financial Resource Strain: Not on file   Food Insecurity: No Food Insecurity (10/6/2024)    Nursing - Inadequate Food Risk Classification     Worried About Running Out of Food in the Last Year: Never true     Ran Out of Food in the Last Year: Never true     Ran Out of Food in the Last Year: Not on file   Transportation Needs: No Transportation Needs (10/6/2024)    PRAPARE - Transportation      Lack of Transportation (Medical): No     Lack of Transportation (Non-Medical): No   Physical Activity: Not on file   Stress: Not on file   Social Connections: Not on file   Intimate Partner Violence: Not on file   Housing Stability: Low Risk  (10/6/2024)    Housing Stability Vital Sign     Unable to Pay for Housing in the Last Year: No     Number of Times Moved in the Last Year: 0     Homeless in the Last Year: No      Family History   Problem Relation Age of Onset    Hyperlipidemia Mother     Heart Valve Disease Mother     Hypertension Mother     Arthritis Father     Diabetes Father     Alcohol abuse Father     Prostate cancer Father 80    Diabetes Brother     Atrial fibrillation Brother     Alcohol abuse Brother     Arthritis Brother     Pancreatic cancer Maternal Aunt 65    No Known Problems Paternal Uncle     No Known Problems Paternal Uncle     No Known Problems Paternal Uncle     No Known Problems Paternal Uncle     Hypertension Daughter     Bradycardia Son     No Known Problems Son      Past Surgical History:   Procedure Laterality Date    CARDIAC CATHETERIZATION Left 7/6/2022    Procedure: Cardiac Left Heart Cath;  Surgeon: Grover Sommer MD;  [facility];  Service: Cardiology    COLON SURGERY  2003    Rectoseal     LAPAROSCOPIC OVARIAN CYSTECTOMY Right 1981    LAPAROSCOPIC OVARIAN CYSTECTOMY Left 2003    rectoseal and cystoseal repari    NV ARTHRP KNE CONDYLE&PLATU MEDIAL&LAT COMPARTMENTS Right 6/26/2024    Procedure: RIGHT TOTAL KNEE ARTHROPLASTY W ROBOT;  Surgeon: Nico Buitrago MD;  Location: [facility];  Service: Orthopedics    TONSILLECTOMY AND ADENOIDECTOMY  1952    TUBAL LIGATION  1972       Current Outpatient Medications:     b complex vitamins capsule, Take 1 capsule by mouth daily, Disp: , Rfl:     cholecalciferol (VITAMIN D3) 1,000 units tablet, Take 1,000 Units by mouth daily, Disp: , Rfl:     clobetasol (TEMOVATE) 0.05 % ointment, Apply topically as needed, Disp: ,  Rfl:     docusate sodium (COLACE) 100 mg capsule, Take 100 mg by mouth daily, Disp: , Rfl:     metoprolol succinate (TOPROL-XL) 25 mg 24 hr tablet, Take 1.5 tablets (37.5 mg total) by mouth 2 (two) times a day, Disp: 135 tablet, Rfl: 3    Multiple Vitamins-Minerals (MULTIVITAMIN ADULTS PO), Take by mouth, Disp: , Rfl:     Xarelto 20 MG tablet, TAKE 1 TABLET BY MOUTH EVERY DAY WITH BREAKFAST, Disp: 30 tablet, Rfl: 5    CALCIUM PO, Take by mouth (Patient not taking: Reported on 11/14/2024), Disp: , Rfl:   Allergies   Allergen Reactions    Macrodantin [Nitrofurantoin] Nausea Only, Dizziness and Headache       Labs:  Hospital Outpatient Visit on 09/30/2024   Component Date Value    BSA 09/30/2024 1.95     LVIDd 09/30/2024 3.90     LVIDS 09/30/2024 2.60     IVSd 09/30/2024 1.00     LVPWd 09/30/2024 0.80     FS 09/30/2024 33     MV E' Tissue Velocity Se* 09/30/2024 7     LA Volume Index (BP) 09/30/2024 36.4     E/A ratio 09/30/2024 0.63     E wave deceleration time 09/30/2024 218     MV Peak E Jeff 09/30/2024 52     MV Peak A Jeff 09/30/2024 0.82     RVID d 09/30/2024 4.1     Tricuspid annular plane * 09/30/2024 2.30     LA size 09/30/2024 3.9     LA length (A2C) 09/30/2024 5.50     LA volume (BP) 09/30/2024 71     RAA A4C 09/30/2024 20     MV stenosis pressure 1/2* 09/30/2024 63     MV valve area p 1/2 meth* 09/30/2024 3.49     TR Peak Jeff 09/30/2024 2.7     Triscuspid Valve Regurgi* 09/30/2024 29.0     Ao root 09/30/2024 2.80     Asc Ao 09/30/2024 3     Tricuspid valve peak reg* 09/30/2024 2.67     Left ventricular stroke * 09/30/2024 41.00     IVS 09/30/2024 1.3     LEFT VENTRICLE SYSTOLIC * 09/30/2024 24     LV DIASTOLIC VOLUME (MOD* 09/30/2024 65     Left Atrium Area-systoli* 09/30/2024 23.7     Left Atrium Area-systoli* 09/30/2024 21.2     LVSV, 2D 09/30/2024 41     EF 09/30/2024 62     LV EF 09/30/2024 60     Est. RA pres 09/30/2024 3.0     Right Ventricular Peak S* 09/30/2024 32.00    Admission on 09/06/2024,  Discharged on 09/06/2024   Component Date Value    ph, Jorge L ISTAT 09/06/2024 7.378     pCO2, Jorge L i-STAT 09/06/2024 41.0 (L)     pO2, Jorge L i-STAT 09/06/2024 22.0 (L)     BE, i-STAT 09/06/2024 -1     HCO3, Jorge L i-STAT 09/06/2024 24.2     CO2, i-STAT 09/06/2024 25     O2 Sat, i-STAT 09/06/2024 36 (L)     SODIUM, I-STAT 09/06/2024 141     Potassium, i-STAT 09/06/2024 3.7     Calcium, Ionized i-STAT 09/06/2024 1.21     Hct, i-STAT 09/06/2024 44     Hgb, i-STAT 09/06/2024 15.0     Glucose, i-STAT 09/06/2024 88     Specimen Type 09/06/2024 VENOUS    Admission on 06/26/2024, Discharged on 06/27/2024   Component Date Value    ABO Grouping 06/26/2024 B     Rh Factor 06/26/2024 Positive     POC Glucose 06/26/2024 89     Sodium 06/27/2024 139     Potassium 06/27/2024 4.0     Chloride 06/27/2024 108     CO2 06/27/2024 26     ANION GAP 06/27/2024 5     BUN 06/27/2024 15     Creatinine 06/27/2024 0.63     Glucose 06/27/2024 128     Glucose, Fasting 06/27/2024 128 (H)     Calcium 06/27/2024 8.3 (L)     eGFR 06/27/2024 84     WBC 06/27/2024 7.19     RBC 06/27/2024 4.11     Hemoglobin 06/27/2024 12.6     Hematocrit 06/27/2024 39.2     MCV 06/27/2024 95     MCH 06/27/2024 30.7     MCHC 06/27/2024 32.1     RDW 06/27/2024 13.1     Platelets 06/27/2024 163     MPV 06/27/2024 10.2    Appointment on 06/05/2024   Component Date Value    Sodium 06/05/2024 141     Potassium 06/05/2024 4.5     Chloride 06/05/2024 106     CO2 06/05/2024 29     ANION GAP 06/05/2024 6     BUN 06/05/2024 24     Creatinine 06/05/2024 0.72     Glucose, Fasting 06/05/2024 84     Calcium 06/05/2024 9.1     AST 06/05/2024 24     ALT 06/05/2024 22     Alkaline Phosphatase 06/05/2024 70     Total Protein 06/05/2024 6.4     Albumin 06/05/2024 3.7     Total Bilirubin 06/05/2024 0.59     eGFR 06/05/2024 78     WBC 06/05/2024 3.60 (L)     RBC 06/05/2024 4.89     Hemoglobin 06/05/2024 14.9     Hematocrit 06/05/2024 46.8 (H)     MCV 06/05/2024 96     MCH 06/05/2024 30.5     MCHC  06/05/2024 31.8     RDW 06/05/2024 13.1     MPV 06/05/2024 10.2     Platelets 06/05/2024 188     nRBC 06/05/2024 0     Segmented % 06/05/2024 47     Immature Grans % 06/05/2024 0     Lymphocytes % 06/05/2024 40     Monocytes % 06/05/2024 10     Eosinophils Relative 06/05/2024 3     Basophils Relative 06/05/2024 0     Absolute Neutrophils 06/05/2024 1.67 (L)     Absolute Immature Grans 06/05/2024 0.01     Absolute Lymphocytes 06/05/2024 1.43     Absolute Monocytes 06/05/2024 0.37     Eosinophils Absolute 06/05/2024 0.11     Basophils Absolute 06/05/2024 0.01     Protime 06/05/2024 14.6 (H)     INR 06/05/2024 1.15     PTT 06/05/2024 36     ABO Grouping 06/05/2024 B     Rh Factor 06/05/2024 Positive     Antibody Screen 06/05/2024 Negative     Specimen Expiration Date 06/05/2024 [id number]     Hemoglobin A1C 06/05/2024 5.6     EAG 06/05/2024 114    Telephone on 05/20/2024   Component Date Value    Supplier Name 05/20/2024 [employer]     Supplier Phone Number 05/20/2024 [phone]     Order Status 05/20/2024 Delivery Successful     Delivery Request Date 05/20/2024 05/20/2024     Date Delivered  05/20/2024 05/23/2024     Item Description 05/20/2024 Wheeled Walker, Adult     Item Description 05/20/2024 Raised toilet seat with arms.      Lab Results   Component Value Date    TRIG 77 02/15/2024    HDL 74 02/15/2024     Imaging: No results found.

## 2024-11-14 NOTE — ASSESSMENT & PLAN NOTE
noted on stress testing and LHC revealed 10% pRCA lesion.    Continued on Xarelto for AC and Toprol XL for rate control, dose of which has been increased for improved rate control to 25mg BID.    Now appears to be having more frequent episodes of afib that are symptomatic and was seen by EP for further evaluation regarding rhythm management.    There was recommendation regarding flecainide as pill in pocket for the next few months until a possible hospital admission for dofetilide or sotalol therapy for rhythm control.    Considering that her symptoms were less, she held off on further rhythm control strategy for the time being.    Echocardiogram repeated in September 2024 revealing normal LV size and function with biatrial enlargement, moderate mitral regurgitation, moderate tricuspid regurgitation  Zio monitor was also completed in October 2024 revealing no further atrial fibrillation burden, symptoms correlating with supraventricular and ventricular ectopy with an overall normal ectopy burden

## 2024-12-06 ENCOUNTER — HOSPITAL ENCOUNTER (OUTPATIENT)
Dept: NON INVASIVE DIAGNOSTICS | Facility: HOSPITAL | Age: 84
Discharge: HOME | End: 2024-12-06
Attending: INTERNAL MEDICINE
Payer: COMMERCIAL

## 2024-12-06 VITALS
OXYGEN SATURATION: 98 % | HEART RATE: 71 BPM | DIASTOLIC BLOOD PRESSURE: 90 MMHG | SYSTOLIC BLOOD PRESSURE: 140 MMHG | RESPIRATION RATE: 16 BRPM

## 2024-12-06 DIAGNOSIS — E78.00 PURE HYPERCHOLESTEROLEMIA: ICD-10-CM

## 2024-12-06 DIAGNOSIS — I10 ESSENTIAL HYPERTENSION: ICD-10-CM

## 2024-12-06 DIAGNOSIS — I25.10 CORONARY ARTERY DISEASE INVOLVING NATIVE CORONARY ARTERY OF NATIVE HEART WITHOUT ANGINA PECTORIS: ICD-10-CM

## 2024-12-06 DIAGNOSIS — R06.09 DOE (DYSPNEA ON EXERTION): ICD-10-CM

## 2024-12-06 LAB
CHEST PAIN STATEMENT: NORMAL
MAX DIASTOLIC BP: 92 MMHG
MAX HR PERCENT: 87 %
MAX HR: 122 BPM
MAX PREDICTED HEART RATE: 139 BPM
PROTOCOL NAME: NORMAL
RATE PRESSURE PRODUCT: NORMAL
REASON FOR TERMINATION: NORMAL
SL CV LV EF: 60
SL CV STRESS RECOVERY BP: NORMAL MMHG
SL CV STRESS RECOVERY HR: 73 BPM
SL CV STRESS RECOVERY O2 SAT: 98 %
STRESS ANGINA INDEX: 0
STRESS BASELINE BP: NORMAL MMHG
STRESS BASELINE HR: 71 BPM
STRESS O2 SAT REST: 98 %
STRESS PEAK HR: 122 BPM
STRESS POST ESTIMATED WORKLOAD: 4.8 METS
STRESS POST EXERCISE DUR MIN: 3 MIN
STRESS POST EXERCISE DUR MIN: 3 MIN
STRESS POST EXERCISE DUR SEC: 10 SEC
STRESS POST EXERCISE DUR SEC: 10 SEC
STRESS POST O2 SAT PEAK: 98 %
STRESS POST PEAK BP: 140 MMHG
STRESS POST PEAK HR: 122 BPM
STRESS POST PEAK SYSTOLIC BP: 152 MMHG
TARGET HR FORMULA: NORMAL
TEST INDICATION: NORMAL
TR MAX PG: 31 MMHG
TR PEAK VELOCITY: 2.8 M/S
TRICUSPID VALVE PEAK REGURGITATION VELOCITY: 2.77 M/S

## 2024-12-06 PROCEDURE — 93350 STRESS TTE ONLY: CPT | Performed by: INTERNAL MEDICINE

## 2024-12-06 PROCEDURE — 93350 STRESS TTE ONLY: CPT

## 2024-12-10 LAB
CHEST PAIN STATEMENT: NORMAL
MAX DIASTOLIC BP: 92 MMHG
MAX PREDICTED HEART RATE: 139 BPM
PROTOCOL NAME: NORMAL
REASON FOR TERMINATION: NORMAL
STRESS POST EXERCISE DUR MIN: 3 MIN
STRESS POST EXERCISE DUR SEC: 10 SEC
STRESS POST PEAK HR: 122 BPM
STRESS POST PEAK SYSTOLIC BP: 152 MMHG
TARGET HR FORMULA: NORMAL
TEST INDICATION: NORMAL

## 2024-12-13 ENCOUNTER — OFFICE VISIT (OUTPATIENT)
Dept: OBGYN CLINIC | Facility: MEDICAL CENTER | Age: 84
End: 2024-12-13
Payer: COMMERCIAL

## 2024-12-13 ENCOUNTER — APPOINTMENT (OUTPATIENT)
Dept: RADIOLOGY | Facility: MEDICAL CENTER | Age: 84
End: 2024-12-13
Payer: COMMERCIAL

## 2024-12-13 ENCOUNTER — APPOINTMENT (OUTPATIENT)
Dept: RADIOLOGY | Facility: AMBULARY SURGERY CENTER | Age: 84
End: 2024-12-13
Attending: ORTHOPAEDIC SURGERY
Payer: COMMERCIAL

## 2024-12-13 VITALS
WEIGHT: 197 LBS | HEART RATE: 80 BPM | HEIGHT: 65 IN | DIASTOLIC BLOOD PRESSURE: 86 MMHG | BODY MASS INDEX: 32.82 KG/M2 | SYSTOLIC BLOOD PRESSURE: 133 MMHG

## 2024-12-13 DIAGNOSIS — Z96.651 AFTERCARE FOLLOWING RIGHT KNEE JOINT REPLACEMENT SURGERY: Primary | ICD-10-CM

## 2024-12-13 DIAGNOSIS — Z47.1 AFTERCARE FOLLOWING RIGHT KNEE JOINT REPLACEMENT SURGERY: ICD-10-CM

## 2024-12-13 DIAGNOSIS — M62.81 WEAKNESS OF RIGHT QUADRICEPS MUSCLE: ICD-10-CM

## 2024-12-13 DIAGNOSIS — Z96.651 AFTERCARE FOLLOWING RIGHT KNEE JOINT REPLACEMENT SURGERY: ICD-10-CM

## 2024-12-13 DIAGNOSIS — Z47.1 AFTERCARE FOLLOWING RIGHT KNEE JOINT REPLACEMENT SURGERY: Primary | ICD-10-CM

## 2024-12-13 PROCEDURE — 99213 OFFICE O/P EST LOW 20 MIN: CPT | Performed by: ORTHOPAEDIC SURGERY

## 2024-12-13 PROCEDURE — 73560 X-RAY EXAM OF KNEE 1 OR 2: CPT

## 2024-12-13 NOTE — PATIENT INSTRUCTIONS
1. Aftercare following right knee joint replacement surgery  XR knee 1 or 2 vw right    XR knee 1 or 2 vw right      2. Weakness of right quadriceps muscle            Return in about 6 months (around 6/13/2025) for re-check with x-rays upon arrival right knee.

## 2024-12-13 NOTE — PROGRESS NOTES
Assessment:   Diagnosis ICD-10-CM Associated Orders   1. Aftercare following right knee joint replacement surgery  Z47.1 XR knee 1 or 2 vw right    Z96.651 XR knee 1 or 2 vw right      2. Weakness of right quadriceps muscle  M62.81           Plan:  X-rays taken and reviewed, physical exam performed  Doing well 6 months s/p right TKA.  Stressed the importance of quadricep strengthening and demonstrated in the office today.  Total knee precautions with antibiotics as discussed until the 2-year postop jesús.  Weightbearing and activity as tolerated.    To do next visit:  Return in about 6 months (around 6/13/2025) for re-check with x-rays upon arrival right knee.    The above stated was discussed in layman's terms and the patient expressed understanding.  All questions were answered to the patient's satisfaction.       Scribe Attestation      I,:  Blake Camarena am acting as a scribe while in the presence of the attending physician.:       I,:  Nico Buitrago MD personally performed the services described in this documentation    as scribed in my presence.:               Subjective:   Mikayla Breaux is a 84 y.o. female who presents today for repeat evaluation 6-month status post right total knee arthroplasty.  Overall she is doing very well.  She has stated neck pain and improvement of her preoperative complaints.  She recently was able to walk around [place] without any right knee issues.  She notes occasional swelling and stiffness.  At times she has difficulty powering herself up stairs.  She does frequently attend the fitness facility where she resides for exercise.  She denies any calf or thigh pain.  Denies any fevers or chills.  She recently had a dental cleaning and did take her antibiotics prior to and is scheduled for a dental implant, January.  Overall doing very well and is pleased.      Review of systems negative unless otherwise specified in HPI  Review of Systems    Past Medical History:    Diagnosis Date    Allergic 1980    [address] tin    Arthritis 2008    Benign essential hypertension     Hyperlipidemia, unspecified     Hypertension 2016    Irregular heart beat     Osteoarthritis of left knee     Osteoporosis     PAD (peripheral artery disease) ([facility]) 09/29/2021    Perichondritis of ear, right        Past Surgical History:   Procedure Laterality Date    CARDIAC CATHETERIZATION Left 7/6/2022    Procedure: Cardiac Left Heart Cath;  Surgeon: Grover Sommer MD;  [facility];  Service: Cardiology    COLON SURGERY  2003    Rectoseal     LAPAROSCOPIC OVARIAN CYSTECTOMY Right 1981    LAPAROSCOPIC OVARIAN CYSTECTOMY Left 2003    rectoseal and cystoseal repari    CO ARTHRP KNE CONDYLE&PLATU MEDIAL&LAT COMPARTMENTS Right 6/26/2024    Procedure: RIGHT TOTAL KNEE ARTHROPLASTY W ROBOT;  Surgeon: Nico Buitrago MD;  Location[facility];  Service: Orthopedics    TONSILLECTOMY AND ADENOIDECTOMY  1952    TUBAL LIGATION  1972       Family History   Problem Relation Age of Onset    Hyperlipidemia Mother     Heart Valve Disease Mother     Hypertension Mother     Arthritis Father     Diabetes Father     Alcohol abuse Father     Prostate cancer Father 80    Diabetes Brother     Atrial fibrillation Brother     Alcohol abuse Brother     Arthritis Brother     Pancreatic cancer Maternal Aunt 65    No Known Problems Paternal Uncle     No Known Problems Paternal Uncle     No Known Problems Paternal Uncle     No Known Problems Paternal Uncle     Hypertension Daughter     Bradycardia Son     No Known Problems Son        Social History     Occupational History    Not on file   Tobacco Use    Smoking status: Never    Smokeless tobacco: Never   Vaping Use    Vaping status: Never Used   Substance and Sexual Activity    Alcohol use: Yes     Alcohol/week: 5.0 standard drinks of alcohol     Types: 5 Glasses of wine per week    Drug use: Never    Sexual activity: Not Currently     Partners: Male          Current Outpatient Medications:     b complex vitamins capsule, Take 1 capsule by mouth daily, Disp: , Rfl:     cholecalciferol (VITAMIN D3) 1,000 units tablet, Take 1,000 Units by mouth daily, Disp: , Rfl:     clobetasol (TEMOVATE) 0.05 % ointment, Apply topically as needed, Disp: , Rfl:     docusate sodium (COLACE) 100 mg capsule, Take 100 mg by mouth daily, Disp: , Rfl:     metoprolol succinate (TOPROL-XL) 25 mg 24 hr tablet, Take 1.5 tablets (37.5 mg total) by mouth 2 (two) times a day, Disp: 135 tablet, Rfl: 3    Multiple Vitamins-Minerals (MULTIVITAMIN ADULTS PO), Take by mouth, Disp: , Rfl:     Xarelto 20 MG tablet, TAKE 1 TABLET BY MOUTH EVERY DAY WITH BREAKFAST, Disp: 30 tablet, Rfl: 5    CALCIUM PO, Take by mouth (Patient not taking: Reported on 11/14/2024), Disp: , Rfl:     Allergies   Allergen Reactions    Macrodantin [Nitrofurantoin] Nausea Only, Dizziness and Headache            Vitals:    12/13/24 1349   BP: 133/86   Pulse: 80       Body mass index is 32.78 kg/m².  Wt Readings from Last 3 Encounters:   12/13/24 89.4 kg (197 lb)   11/14/24 89.4 kg (197 lb)   10/07/24 88.5 kg (195 lb)       Objective:                    Right Knee Exam     Tenderness   The patient is experiencing no tenderness.     Range of Motion   Extension:  0   Right knee flexion: 115, patella tracks well.     Other   Erythema: absent  Sensation: normal  Swelling: mild  Effusion: no effusion present    Comments:    Intact extensor mechanism with a well-healed anterior incision.  Improved alignment, rather neutral.  No appreciable warmth.  No drainage.  No signs of infection.  Calf or thigh are soft and nontender no signs DVT            Diagnostics, reviewed and taken today if performed as documented:    The attending physician has personally reviewed the pertinent films in PACS and interpretation is as follows:    Right knee x-rays taken and reviewed in the office today and show: Right total knee prosthesis in excellent  "position alignment, well bonded, no signs of loosening or stress shielding.  Cannulated screw fixation bicondylar without signs of hardware failure loosening      Procedures, if performed today:    Procedures    None performed      Portions of the record may have been created with voice recognition software.  Occasional wrong word or \"sound a like\" substitutions may have occurred due to the inherent limitations of voice recognition software.  Read the chart carefully and recognize, using context, where substitutions have occurred.  "

## 2025-01-31 ENCOUNTER — OFFICE VISIT (OUTPATIENT)
Dept: CARDIOLOGY CLINIC | Facility: CLINIC | Age: 85
End: 2025-01-31
Payer: COMMERCIAL

## 2025-01-31 VITALS
SYSTOLIC BLOOD PRESSURE: 148 MMHG | DIASTOLIC BLOOD PRESSURE: 80 MMHG | HEIGHT: 65 IN | BODY MASS INDEX: 33.15 KG/M2 | WEIGHT: 199 LBS | HEART RATE: 61 BPM

## 2025-01-31 DIAGNOSIS — E78.00 PURE HYPERCHOLESTEROLEMIA: ICD-10-CM

## 2025-01-31 DIAGNOSIS — I48.0 PAROXYSMAL ATRIAL FIBRILLATION (HCC): Primary | ICD-10-CM

## 2025-01-31 DIAGNOSIS — I10 ESSENTIAL HYPERTENSION: ICD-10-CM

## 2025-01-31 DIAGNOSIS — R06.09 DOE (DYSPNEA ON EXERTION): ICD-10-CM

## 2025-01-31 DIAGNOSIS — I25.10 CORONARY ARTERY DISEASE INVOLVING NATIVE CORONARY ARTERY OF NATIVE HEART WITHOUT ANGINA PECTORIS: ICD-10-CM

## 2025-01-31 PROCEDURE — 99214 OFFICE O/P EST MOD 30 MIN: CPT | Performed by: INTERNAL MEDICINE

## 2025-01-31 RX ORDER — FUROSEMIDE 20 MG/1
10 TABLET ORAL DAILY
Qty: 90 TABLET | Refills: 3 | Status: SHIPPED | OUTPATIENT
Start: 2025-01-31

## 2025-01-31 NOTE — ASSESSMENT & PLAN NOTE
noted on stress testing and LHC revealed 10% pRCA lesion.    Continued on Xarelto for AC and Toprol XL for rate control, dose of which has been increased for improved rate control to 25mg BID.    Now appears to be having more frequent episodes of afib that are symptomatic and was seen by EP for further evaluation regarding rhythm management.    There was recommendation regarding flecainide as pill in pocket for the next few months until a possible hospital admission for dofetilide or sotalol therapy for rhythm control.    Considering that her symptoms were less, she held off on further rhythm control strategy for the time being.    Echocardiogram repeated in September 2024 revealing normal LV size and function with biatrial enlargement, moderate mitral regurgitation, moderate tricuspid regurgitation  Zio monitor was also completed in October 2024 revealing no further atrial fibrillation burden, symptoms correlating with supraventricular and ventricular ectopy with an overall normal ectopy burden  Continue current regimen

## 2025-01-31 NOTE — ASSESSMENT & PLAN NOTE
in Nov 2020, which is at goal - repeat levels in Oct 2021 revealed LDL of 113.    LDL 95 in June 2022.    Repeat  in April 2023.    LDL remains well-controlled at 110 in February 2024.  Repeat levels ordered in her chart, encouraged to complete

## 2025-01-31 NOTE — ASSESSMENT & PLAN NOTE
remains relatively well controlled today, continued on metoprolol.    Had a nuclear stress test in Oct 2020 that was normal for EF and perfusion.    Echocardiogram at that time (and on repeat in May 2022) revealed normal LV & RV function, normal valves other than mild to moderate MR, moderate TR.    She has had MENON for many years - also noted by prior cardiologist documentation in 2017.    No changes made today, continue current regiment.

## 2025-01-31 NOTE — PROGRESS NOTES
Cardiology Follow Up    Mikayla Breaux  1940  [id number]  [facility] [place]  [address] [facility] [address]  [address]  [place] [address] [phone]      Assessment & Plan  Paroxysmal atrial fibrillation (HCC)  noted on stress testing and LHC revealed 10% pRCA lesion.    Continued on Xarelto for AC and Toprol XL for rate control, dose of which has been increased for improved rate control to 25mg BID.    Now appears to be having more frequent episodes of afib that are symptomatic and was seen by EP for further evaluation regarding rhythm management.    There was recommendation regarding flecainide as pill in pocket for the next few months until a possible hospital admission for dofetilide or sotalol therapy for rhythm control.    Considering that her symptoms were less, she held off on further rhythm control strategy for the time being.    Echocardiogram repeated in September 2024 revealing normal LV size and function with biatrial enlargement, moderate mitral regurgitation, moderate tricuspid regurgitation  Zio monitor was also completed in October 2024 revealing no further atrial fibrillation burden, symptoms correlating with supraventricular and ventricular ectopy with an overall normal ectopy burden  Continue current regimen  Essential hypertension  remains relatively well controlled today, continued on metoprolol.    Had a nuclear stress test in Oct 2020 that was normal for EF and perfusion.    Echocardiogram at that time (and on repeat in May 2022) revealed normal LV & RV function, normal valves other than mild to moderate MR, moderate TR.    She has had MENON for many years - also noted by prior cardiologist documentation in 2017.    No changes made today, continue current regiment.  Coronary artery disease involving native coronary artery of native heart without angina pectoris  LHC revealed 10% pRCA lesion.    Continued on beta-blocker  No active symptoms  Pure  "hypercholesterolemia   in Nov 2020, which is at goal - repeat levels in Oct 2021 revealed LDL of 113.    LDL 95 in June 2022.    Repeat  in April 2023.    LDL remains well-controlled at 110 in February 2024.  Repeat levels ordered in her chart, encouraged to complete  MENON (dyspnea on exertion)  Zio monitor correlating with symptoms revealed ectopy, both SVE and VE  Increased Toprol to 1.5 tabs twice daily rather than 1 in AM and 1.5 in PM  Considering mild to moderate MR and moderate TR as well, we checked a stress echo to assess for ischemia as well as for significant valve disease with stress in December 2024 - this revealed no active ischemic changes, but with exercise-induced pulmonary HTN from 31 to 47 mmHg (MR increased from mild to mild to moderate) - offered use of low dose diuretic to help with symptoms, will start on lasix 10 mg daily    Chief Complaint   Patient presents with    Follow-up     3 months,    Edema     B/l ankles       Interval History: Patient has continued dyspnea on exertion, with inclines and stairs.  She does have some LE edema in ankles, even in mornings.  No reported chest pain, palpitations, lightheadedness, syncope, orthopnea, PND, or significant weight changes.  Patient remains active without any increased fatigue out of the ordinary.         Blood pressure 148/80, pulse 61, height 5' 5\" (1.651 m), weight 90.3 kg (199 lb), not currently breastfeeding.      Review of Systems:  Review of Systems   Constitutional:  Negative for activity change, appetite change, chills, diaphoresis, fatigue and unexpected weight change.   HENT:  Negative for hearing loss, nosebleeds and sore throat.    Eyes:  Negative for photophobia and visual disturbance.   Respiratory:  Positive for shortness of breath. Negative for cough, chest tightness and wheezing.    Cardiovascular:  Positive for leg swelling. Negative for chest pain and palpitations.   Gastrointestinal:  Negative for abdominal " pain, diarrhea, nausea and vomiting.   Endocrine: Negative for polyuria.   Genitourinary:  Negative for dysuria, frequency and hematuria.   Musculoskeletal:  Negative for arthralgias, back pain, gait problem and neck pain.   Skin:  Negative for pallor and rash.   Neurological:  Negative for dizziness, syncope and headaches.   Hematological:  Does not bruise/bleed easily.   Psychiatric/Behavioral:  Negative for behavioral problems and confusion.        Physical Exam:  Physical Exam  Vitals reviewed.   Constitutional:       Appearance: Normal appearance. She is well-developed. She is not ill-appearing or diaphoretic.   HENT:      Head: Normocephalic and atraumatic.      Nose: Nose normal.   Eyes:      General: No scleral icterus.     Extraocular Movements: Extraocular movements intact.      Pupils: Pupils are equal, round, and reactive to light.   Neck:      Vascular: No JVD.   Cardiovascular:      Rate and Rhythm: Normal rate and regular rhythm.      Heart sounds: Murmur heard.      Systolic murmur is present with a grade of 2/6.      No friction rub. No gallop.   Pulmonary:      Effort: Pulmonary effort is normal. No respiratory distress.      Breath sounds: Normal breath sounds. No wheezing or rales.   Abdominal:      General: Bowel sounds are normal. There is no distension.      Palpations: Abdomen is soft.      Tenderness: There is no abdominal tenderness.   Musculoskeletal:         General: No deformity. Normal range of motion.      Cervical back: Normal range of motion and neck supple.      Right lower leg: No edema.      Left lower leg: No edema.   Skin:     General: Skin is warm and dry.      Findings: No rash.   Neurological:      Mental Status: She is alert and oriented to person, place, and time.      Cranial Nerves: No cranial nerve deficit.   Psychiatric:         Mood and Affect: Mood normal.         Behavior: Behavior normal.         Patient Active Problem List   Diagnosis    Essential hypertension     Chronic left shoulder pain    MENON (dyspnea on exertion)    Lichen sclerosus    Lipoma of right lower extremity    Pure hypercholesterolemia    Abnormal stress test    Paroxysmal atrial fibrillation (HCC)    Coronary artery disease involving native coronary artery of native heart without angina pectoris    Class 1 obesity due to excess calories without serious comorbidity with body mass index (BMI) of 30.0 to 30.9 in adult    Acute nonintractable headache    Baker cyst, left    Chronic pain of left knee    Bilateral primary osteoarthritis of knee    Osteopenia of multiple sites    Primary osteoarthritis of right knee    Greater trochanteric bursitis of right hip    Swelling of lower extremity    Encounter for geriatric assessment    S/P total right hip arthroplasty     Past Medical History:   Diagnosis Date    Allergic 1980    [address] tin    Arthritis 2008    Benign essential hypertension     Hyperlipidemia, unspecified     Hypertension 2016    Irregular heart beat     Osteoarthritis of left knee     Osteoporosis     PAD (peripheral artery disease) (HCC) 09/29/2021    Perichondritis of ear, right      Social History     Socioeconomic History    Marital status:      Spouse name: Not on file    Number of children: Not on file    Years of education: Not on file    Highest education level: Not on file   Occupational History    Not on file   Tobacco Use    Smoking status: Never    Smokeless tobacco: Never   Vaping Use    Vaping status: Never Used   Substance and Sexual Activity    Alcohol use: Yes     Alcohol/week: 5.0 standard drinks of alcohol     Types: 5 Glasses of wine per week    Drug use: Never    Sexual activity: Not Currently     Partners: Male   Other Topics Concern    Not on file   Social History Narrative    Not on file     Social Drivers of Health     Financial Resource Strain: Not on file   Food Insecurity: No Food Insecurity (10/6/2024)    Nursing - Inadequate Food Risk Classification     Worried  About Running Out of Food in the Last Year: Never true     Ran Out of Food in the Last Year: Never true     Ran Out of Food in the Last Year: Not on file   Transportation Needs: No Transportation Needs (10/6/2024)    PRAPARE - Transportation     Lack of Transportation (Medical): No     Lack of Transportation (Non-Medical): No   Physical Activity: Not on file   Stress: Not on file   Social Connections: Not on file   Intimate Partner Violence: Not on file   Housing Stability: Low Risk  (10/6/2024)    Housing Stability Vital Sign     Unable to Pay for Housing in the Last Year: No     Number of Times Moved in the Last Year: 0     Homeless in the Last Year: No      Family History   Problem Relation Age of Onset    Hyperlipidemia Mother     Heart Valve Disease Mother     Hypertension Mother     Arthritis Father     Diabetes Father     Alcohol abuse Father     Prostate cancer Father 80    Diabetes Brother     Atrial fibrillation Brother     Alcohol abuse Brother     Arthritis Brother     Pancreatic cancer Maternal Aunt 65    No Known Problems Paternal Uncle     No Known Problems Paternal Uncle     No Known Problems Paternal Uncle     No Known Problems Paternal Uncle     Hypertension Daughter     Bradycardia Son     No Known Problems Son      Past Surgical History:   Procedure Laterality Date    CARDIAC CATHETERIZATION Left 7/6/2022    Procedure: Cardiac Left Heart Cath;  Surgeon: Grover Sommer MD;  Location: [facility];  Service: Cardiology    COLON SURGERY  2003    Rectoseal     LAPAROSCOPIC OVARIAN CYSTECTOMY Right 1981    LAPAROSCOPIC OVARIAN CYSTECTOMY Left 2003    rectoseal and cystoseal repari    WA ARTHRP KNE CONDYLE&PLATU MEDIAL&LAT COMPARTMENTS Right 6/26/2024    Procedure: RIGHT TOTAL KNEE ARTHROPLASTY W ROBOT;  Surgeon: Nico Buitrago MD;  Location: [facility];  Service: Orthopedics    TONSILLECTOMY AND ADENOIDECTOMY  1952    TUBAL LIGATION  1972       Current Outpatient Medications:     b  complex vitamins capsule, Take 1 capsule by mouth daily, Disp: , Rfl:     CALCIUM PO, Take by mouth, Disp: , Rfl:     cholecalciferol (VITAMIN D3) 1,000 units tablet, Take 1,000 Units by mouth daily, Disp: , Rfl:     clobetasol (TEMOVATE) 0.05 % ointment, Apply topically as needed, Disp: , Rfl:     docusate sodium (COLACE) 100 mg capsule, Take 100 mg by mouth daily (Patient taking differently: Take 100 mg by mouth 2 (two) times a day as needed), Disp: , Rfl:     magnesium gluconate (MAGONATE) 500 mg tablet, Take 200 mg by mouth 2 (two) times a day, Disp: , Rfl:     metoprolol succinate (TOPROL-XL) 25 mg 24 hr tablet, Take 1.5 tablets (37.5 mg total) by mouth 2 (two) times a day, Disp: 135 tablet, Rfl: 3    Multiple Vitamins-Minerals (MULTIVITAMIN ADULTS PO), Take by mouth, Disp: , Rfl:     Xarelto 20 MG tablet, TAKE 1 TABLET BY MOUTH EVERY DAY WITH BREAKFAST, Disp: 30 tablet, Rfl: 5    furosemide (LASIX) 20 mg tablet, Take 0.5 tablets (10 mg total) by mouth daily, Disp: 90 tablet, Rfl: 3  Allergies   Allergen Reactions    Macrodantin [Nitrofurantoin] Nausea Only, Dizziness and Headache       Labs:  Hospital Outpatient Visit on 12/06/2024   Component Date Value    Triscuspid Valve Regurgi* 12/06/2024 31.0     TR Peak Jeff 12/06/2024 2.8     Tricuspid valve peak reg* 12/06/2024 2.77     Baseline HR 12/06/2024 71     Baseline BP 12/06/2024 140/90     O2 sat rest 12/06/2024 98     Stress peak HR 12/06/2024 122     Post peak BP 12/06/2024 140     O2 sat peak 12/06/2024 98     Recovery HR 12/06/2024 73     Recovery BP 12/06/2024 140/90     O2 sat recovery 12/06/2024 98     Max HR 12/06/2024 122     Max HR Percent 12/06/2024 87     Exercise duration (min) 12/06/2024 3     Exercise duration (sec) 12/06/2024 10     Estimated workload 12/06/2024 4.8     Rate Pressure Product 12/06/2024 17,080.0     Angina Index 12/06/2024 0     LV EF 12/06/2024 60     Protocol Name 12/06/2024 ANGEL     Exercise duration (min) 12/06/2024 3      Exercise duration (sec) 12/06/2024 10     Post Peak Systolic BP 12/06/2024 152     Max Diastolic Bp 12/06/2024 92     Peak HR 12/06/2024 122     Max Predicted Heart Rate 12/06/2024 139     Reason for Termination 12/06/2024 Dyspnea     Test Indication 12/06/2024 Dyspnea with Exercise     Target Hr Formular 12/06/2024 (220 - Age)*100%     Chest Pain Statement 12/06/2024 none    Hospital Outpatient Visit on 09/30/2024   Component Date Value    BSA 09/30/2024 1.95     LVIDd 09/30/2024 3.90     LVIDS 09/30/2024 2.60     IVSd 09/30/2024 1.00     LVPWd 09/30/2024 0.80     FS 09/30/2024 33     MV E' Tissue Velocity Se* 09/30/2024 7     LA Volume Index (BP) 09/30/2024 36.4     E/A ratio 09/30/2024 0.63     E wave deceleration time 09/30/2024 218     MV Peak E Jeff 09/30/2024 52     MV Peak A Jeff 09/30/2024 0.82     RVID d 09/30/2024 4.1     Tricuspid annular plane * 09/30/2024 2.30     LA size 09/30/2024 3.9     LA length (A2C) 09/30/2024 5.50     LA volume (BP) 09/30/2024 71     RAA A4C 09/30/2024 20     MV stenosis pressure 1/2* 09/30/2024 63     MV valve area p 1/2 meth* 09/30/2024 3.49     TR Peak Jeff 09/30/2024 2.7     Triscuspid Valve Regurgi* 09/30/2024 29.0     Ao root 09/30/2024 2.80     Asc Ao 09/30/2024 3     Tricuspid valve peak reg* 09/30/2024 2.67     Left ventricular stroke * 09/30/2024 41.00     IVS 09/30/2024 1.3     LEFT VENTRICLE SYSTOLIC * 09/30/2024 24     LV DIASTOLIC VOLUME (MOD* 09/30/2024 65     Left Atrium Area-systoli* 09/30/2024 23.7     Left Atrium Area-systoli* 09/30/2024 21.2     LVSV, 2D 09/30/2024 41     EF 09/30/2024 62     LV EF 09/30/2024 60     Est. RA pres 09/30/2024 3.0     Right Ventricular Peak S* 09/30/2024 32.00    Admission on 09/06/2024, Discharged on 09/06/2024   Component Date Value    ph, Jorge L ISTAT 09/06/2024 7.378     pCO2, Joreg L i-STAT 09/06/2024 41.0 (L)     pO2, Jorge L i-STAT 09/06/2024 22.0 (L)     BE, i-STAT 09/06/2024 -1     HCO3, Jorge L i-STAT 09/06/2024 24.2     CO2, i-STAT  09/06/2024 25     O2 Sat, i-STAT 09/06/2024 36 (L)     SODIUM, I-STAT 09/06/2024 141     Potassium, i-STAT 09/06/2024 3.7     Calcium, Ionized i-STAT 09/06/2024 1.21     Hct, i-STAT 09/06/2024 44     Hgb, i-STAT 09/06/2024 15.0     Glucose, i-STAT 09/06/2024 88     Specimen Type 09/06/2024 VENOUS      Lab Results   Component Value Date    TRIG 77 02/15/2024    HDL 74 02/15/2024     Imaging: No results found.

## 2025-01-31 NOTE — ASSESSMENT & PLAN NOTE
Zio monitor correlating with symptoms revealed ectopy, both SVE and VE  Increased Toprol to 1.5 tabs twice daily rather than 1 in AM and 1.5 in PM  Considering mild to moderate MR and moderate TR as well, we checked a stress echo to assess for ischemia as well as for significant valve disease with stress in December 2024 - this revealed no active ischemic changes, but with exercise-induced pulmonary HTN from 31 to 47 mmHg (MR increased from mild to mild to moderate) - offered use of low dose diuretic to help with symptoms, will start on lasix 10 mg daily

## 2025-03-07 ENCOUNTER — OFFICE VISIT (OUTPATIENT)
Dept: OBGYN CLINIC | Facility: MEDICAL CENTER | Age: 85
End: 2025-03-07
Payer: COMMERCIAL

## 2025-03-07 VITALS — BODY MASS INDEX: 33.15 KG/M2 | WEIGHT: 199 LBS | HEIGHT: 65 IN

## 2025-03-07 DIAGNOSIS — Z96.651 AFTERCARE FOLLOWING RIGHT KNEE JOINT REPLACEMENT SURGERY: ICD-10-CM

## 2025-03-07 DIAGNOSIS — S80.01XA CONTUSION OF RIGHT KNEE, INITIAL ENCOUNTER: ICD-10-CM

## 2025-03-07 DIAGNOSIS — Z47.1 AFTERCARE FOLLOWING RIGHT KNEE JOINT REPLACEMENT SURGERY: ICD-10-CM

## 2025-03-07 DIAGNOSIS — M76.32 ILIOTIBIAL BAND TENDONITIS OF LEFT SIDE: ICD-10-CM

## 2025-03-07 DIAGNOSIS — M70.61 TROCHANTERIC BURSITIS OF RIGHT HIP: Primary | ICD-10-CM

## 2025-03-07 PROCEDURE — 20610 DRAIN/INJ JOINT/BURSA W/O US: CPT | Performed by: ORTHOPAEDIC SURGERY

## 2025-03-07 PROCEDURE — 99213 OFFICE O/P EST LOW 20 MIN: CPT | Performed by: ORTHOPAEDIC SURGERY

## 2025-03-07 RX ORDER — BUPIVACAINE HYDROCHLORIDE 2.5 MG/ML
2 INJECTION, SOLUTION INFILTRATION; PERINEURAL
Status: COMPLETED | OUTPATIENT
Start: 2025-03-07 | End: 2025-03-07

## 2025-03-07 RX ORDER — LIDOCAINE HYDROCHLORIDE 10 MG/ML
2 INJECTION, SOLUTION INFILTRATION; PERINEURAL
Status: COMPLETED | OUTPATIENT
Start: 2025-03-07 | End: 2025-03-07

## 2025-03-07 RX ORDER — BETAMETHASONE SODIUM PHOSPHATE AND BETAMETHASONE ACETATE 3; 3 MG/ML; MG/ML
12 INJECTION, SUSPENSION INTRA-ARTICULAR; INTRALESIONAL; INTRAMUSCULAR; SOFT TISSUE
Status: COMPLETED | OUTPATIENT
Start: 2025-03-07 | End: 2025-03-07

## 2025-03-07 RX ADMIN — LIDOCAINE HYDROCHLORIDE 2 ML: 10 INJECTION, SOLUTION INFILTRATION; PERINEURAL at 13:15

## 2025-03-07 RX ADMIN — BUPIVACAINE HYDROCHLORIDE 2 ML: 2.5 INJECTION, SOLUTION INFILTRATION; PERINEURAL at 13:15

## 2025-03-07 RX ADMIN — BETAMETHASONE SODIUM PHOSPHATE AND BETAMETHASONE ACETATE 12 MG: 3; 3 INJECTION, SUSPENSION INTRA-ARTICULAR; INTRALESIONAL; INTRAMUSCULAR; SOFT TISSUE at 13:15

## 2025-03-07 NOTE — ASSESSMENT & PLAN NOTE
Cortisone injection performed today  Orders:    XR hip/pelv 2-3 vws right if performed; Future    Large joint arthrocentesis: R greater trochanteric bursa

## 2025-03-07 NOTE — PROGRESS NOTES
Name: Mikayla Breaux      : 1940       MRN: [id number]   Encounter Provider: Nico Buitrago MD   Encounter Date: 25  Encounter department: [facility] [place]     ASSESSMENT & PLAN:  Assessment & Plan  Trochanteric bursitis of right hip  Cortisone injection performed today  Orders:    XR hip/pelv 2-3 vws right if performed; Future    Large joint arthrocentesis: R greater trochanteric bursa    Aftercare following right knee joint replacement surgery  24  Orders:    XR knee 1 or 2 vw right; Future    Contusion of right knee, initial encounter  About 2 weeks ago       Iliotibial band tendonitis of left side  Stretching as demonstrated and provided in AVS         Diagnosis, treatment options and associated risks were discussed with the patient including no treatment, nonsurgical treatment and potential for surgical intervention.  The patient was given the opportunity to ask questions regarding each.   It was explained to the patient that based upon the clinical and radiographic findings, at this point in time, this is not a surgical problem.  Treatment for the above diagnoses and associated symptoms of this nature is generally conservative including a physician directed physical therapy program, improved fitness/weight loss, anti-inflammatories, and potentially various injections.  In the absence of any groin pain with passive hip flexion and forced internal rotation no need for updated x-rays at this time.  She was offered, accepted, performed injection of cortisone to right hip trochanteric bursa area today for symptomatic relief.  She tolerated the procedure well.  Ice and postinjection protocol advised.  IT band stretching as demonstrated and provided in her AVS  Weightbearing activity as tolerated.      To do next visit:  Return for as previously scheduled  with right knee XRs upon arrival , or sooner if  "needed.  ____________________________________________________  CHIEF COMPLAINT:  Chief Complaint   Patient presents with    Right Knee - Pain         SUBJECTIVE:  Mikayla Breaux is a 84 y.o. female who presents today earlier than previously scheduled due to an incident while in [facility] when she felt dizzy and \"fell\" into a hyperflexion knee position. This happened last week, 2/28/25. A week prior to her trip she bumped right knee on a drawer at home resulting in soreness anteriorly, it is better.  She is able to ambulate and bear weight on her right lower extremity.  She uses a cane due to a recent onset of lateral right hip pain.  She had similar symptoms several years ago and responded favorably to injection of cortisone to her trochanteric bursa area.  She cannot lay comfortably on her right side at night.  On a very rare occasion she has groin pain but it is very intermittent and only present for the past week or 2.  She denies any radiating symptoms distal to her knee.        PAST MEDICAL HISTORY:  Past Medical History:   Diagnosis Date    Allergic 1980    [address] tin    Arthritis 2008    Benign essential hypertension     Hyperlipidemia, unspecified     Hypertension 2016    Irregular heart beat     Osteoarthritis of left knee     Osteoporosis     PAD (peripheral artery disease) ([facility]) 09/29/2021    Perichondritis of ear, right        PAST SURGICAL HISTORY:  Past Surgical History:   Procedure Laterality Date    CARDIAC CATHETERIZATION Left 7/6/2022    Procedure: Cardiac Left Heart Cath;  Surgeon: Grover Sommer MD;  [facility];  Service: Cardiology    COLON SURGERY  2003    Rectoseal     LAPAROSCOPIC OVARIAN CYSTECTOMY Right 1981    LAPAROSCOPIC OVARIAN CYSTECTOMY Left 2003    rectoseal and cystoseal repari    [place] ARTHRP KNE CONDYLE&PLATU MEDIAL&LAT COMPARTMENTS Right 6/26/2024    Procedure: RIGHT TOTAL KNEE ARTHROPLASTY W ROBOT;  Surgeon: Nico Buitrago MD;  Location: [facility] MAIN [facility];  " Service: Orthopedics    TONSILLECTOMY AND ADENOIDECTOMY  1952    TUBAL LIGATION  1972       FAMILY HISTORY:  Family History   Problem Relation Age of Onset    Hyperlipidemia Mother     Heart Valve Disease Mother     Hypertension Mother     Arthritis Father     Diabetes Father     Alcohol abuse Father     Prostate cancer Father 80    Diabetes Brother     Atrial fibrillation Brother     Alcohol abuse Brother     Arthritis Brother     Pancreatic cancer Maternal Aunt 65    No Known Problems Paternal Uncle     No Known Problems Paternal Uncle     No Known Problems Paternal Uncle     No Known Problems Paternal Uncle     Hypertension Daughter     Bradycardia Son     No Known Problems Son        SOCIAL HISTORY:  Social History     Tobacco Use    Smoking status: Never    Smokeless tobacco: Never   Vaping Use    Vaping status: Never Used   Substance Use Topics    Alcohol use: Yes     Alcohol/week: 5.0 standard drinks of alcohol     Types: 5 Glasses of wine per week    Drug use: Never       MEDICATIONS:    Current Outpatient Medications:     b complex vitamins capsule, Take 1 capsule by mouth daily, Disp: , Rfl:     CALCIUM PO, Take by mouth, Disp: , Rfl:     cholecalciferol (VITAMIN D3) 1,000 units tablet, Take 1,000 Units by mouth daily, Disp: , Rfl:     clobetasol (TEMOVATE) 0.05 % ointment, Apply topically as needed, Disp: , Rfl:     docusate sodium (COLACE) 100 mg capsule, Take 100 mg by mouth daily, Disp: , Rfl:     furosemide (LASIX) 20 mg tablet, Take 0.5 tablets (10 mg total) by mouth daily, Disp: 90 tablet, Rfl: 3    magnesium gluconate (MAGONATE) 500 mg tablet, Take 200 mg by mouth 2 (two) times a day, Disp: , Rfl:     metoprolol succinate (TOPROL-XL) 25 mg 24 hr tablet, Take 1.5 tablets (37.5 mg total) by mouth 2 (two) times a day, Disp: 135 tablet, Rfl: 3    Multiple Vitamins-Minerals (MULTIVITAMIN ADULTS PO), Take by mouth, Disp: , Rfl:     Xarelto 20 MG tablet, TAKE 1 TABLET BY MOUTH EVERY DAY WITH BREAKFAST,  "Disp: 30 tablet, Rfl: 5    ALLERGIES:  Allergies   Allergen Reactions    Macrodantin [Nitrofurantoin] Nausea Only, Dizziness and Headache       LABS:  HgA1c:   Lab Results   Component Value Date    HGBA1C 5.6 06/05/2024     BMP:   Lab Results   Component Value Date    GLUCOSE 88 09/06/2024    CALCIUM 8.3 (L) 06/27/2024    K 4.0 06/27/2024    CO2 25 09/06/2024     06/27/2024    BUN 15 06/27/2024    CREATININE 0.63 06/27/2024     CBC: No components found for: \"CBC\"    _____________________________________________________  PHYSICAL EXAMINATION:  Vital signs: Ht 5' 5\" (1.651 m)   Wt 90.3 kg (199 lb)   BMI 33.12 kg/m²   General: No acute distress, awake and alert  Psychiatric: Mood and affect appear appropriate  HEENT: Trachea Midline, No torticollis, no apparent facial trauma  Cardiovascular: No audible murmurs; Extremities appear perfused  Pulmonary: No audible wheezing or stridor  Skin: Intact, no open lesions; see further details (if any) below    MUSCULOSKELETAL EXAMINATION:    Right Knee Exam     Muscle Strength   The patient has normal right knee strength.    Tenderness   Right knee tenderness location: Tenderness superior aspect of her patella.    Range of Motion   Extension:  0   Flexion:  120 (Patella tracks well)     Other   Erythema: absent  Sensation: normal  Swelling: mild  Effusion: no effusion present    Comments:    Intact extensor mechanism without palpable defect.  Well-healed anterior incision without any warmth.  No gross signs of infection.  Stable to varus and valgus stressing in both extension and mid flexion.              _____________________________________________________  STUDIES REVIEWED:    None performed        PROCEDURES PERFORMED:    Large joint arthrocentesis: R greater trochanteric bursa  Universal Protocol:  Consent: Verbal consent obtained.  Risks and benefits: risks, benefits and alternatives were discussed  Consent given by: patient  Time out: Immediately prior to procedure " "a \"time out\" was called to verify the correct patient, procedure, equipment, support staff and site/side marked as required.  Timeout called at: 3/7/2025 1:29 PM.  Patient understanding: patient states understanding of the procedure being performed  Site marked: the operative site was marked  Patient identity confirmed: verbally with patient  Supporting Documentation  Indications: pain and diagnostic evaluation   Procedure Details  Location: hip - R greater trochanteric bursa  Preparation: Patient was prepped and draped in the usual sterile fashion  Needle size: 22 G  Ultrasound guidance: no  Approach: lateral  Medications administered: 2 mL bupivacaine 0.25 %; 2 mL lidocaine 1 %; 12 mg betamethasone acetate-betamethasone sodium phosphate 6 (3-3) mg/mL    Patient tolerance: patient tolerated the procedure well with no immediate complications  Dressing:  Sterile dressing applied             Scribe Attestation      I,:  Blake Camarena am acting as a scribe while in the presence of the attending physician.:       I,:  Nico Buitrago MD personally performed the services described in this documentation    as scribed in my presence.:           "

## 2025-03-07 NOTE — PATIENT INSTRUCTIONS
1. Trochanteric bursitis of right hip  XR hip/pelv 2-3 vws right if performed    Large joint arthrocentesis: R greater trochanteric bursa      2. Aftercare following right knee joint replacement surgery  XR knee 1 or 2 vw right      3. Contusion of right knee, initial encounter            Return for as previously scheduled 6/13 with right knee XRs upon arrival , or sooner if needed.      Pull knee towards chest  Perform several times with rest in between

## 2025-03-12 ENCOUNTER — OFFICE VISIT (OUTPATIENT)
Dept: OBGYN CLINIC | Facility: HOSPITAL | Age: 85
End: 2025-03-12
Payer: COMMERCIAL

## 2025-03-12 ENCOUNTER — HOSPITAL ENCOUNTER (OUTPATIENT)
Dept: RADIOLOGY | Facility: HOSPITAL | Age: 85
Discharge: HOME | End: 2025-03-12
Attending: ORTHOPAEDIC SURGERY
Payer: COMMERCIAL

## 2025-03-12 ENCOUNTER — TELEPHONE (OUTPATIENT)
Age: 85
End: 2025-03-12

## 2025-03-12 VITALS — HEIGHT: 65 IN | BODY MASS INDEX: 33.12 KG/M2

## 2025-03-12 DIAGNOSIS — M25.551 PAIN IN RIGHT HIP: ICD-10-CM

## 2025-03-12 DIAGNOSIS — M70.61 TROCHANTERIC BURSITIS OF RIGHT HIP: ICD-10-CM

## 2025-03-12 DIAGNOSIS — M70.61 TROCHANTERIC BURSITIS OF RIGHT HIP: Primary | ICD-10-CM

## 2025-03-12 PROCEDURE — 99213 OFFICE O/P EST LOW 20 MIN: CPT | Performed by: ORTHOPAEDIC SURGERY

## 2025-03-12 PROCEDURE — 73502 X-RAY EXAM HIP UNI 2-3 VIEWS: CPT

## 2025-03-12 RX ORDER — METHYLPREDNISOLONE 4 MG/1
TABLET ORAL
Qty: 21 TABLET | Refills: 0 | Status: SHIPPED | OUTPATIENT
Start: 2025-03-12

## 2025-03-12 NOTE — TELEPHONE ENCOUNTER
Called and spoke w/pt and she states she felt pretty good for about 2 days after receiving CSI on 3/7/25 in R hip. Then on Monday, went to  and pain has progressively gotten worse in front of hip and groin area (4/10). Taking Tylenol as pt is on Xeralto. She is icing. Offloading w/cane. No redness to area although does feel that there maybe a pocket of swelling in the front. Advised to rest, ice, OTC Tylenol, offload w/cane and give CSI some more time to work as it has only been about 5 days. She was wondering if a Xray needs to be ordered as it had been discussed at the office visit.  Please review and advise. Thank you.

## 2025-03-12 NOTE — ASSESSMENT & PLAN NOTE
Patient s/p right troch injection 3/7/2025 with 2 days of moderate relief  Current symptoms of right lateral and anterolateral hip pain  Radiograph displays minimal arthritic changes  On exam patient has good ROM with no pain of right hip  Start physical therapy  Medrol dose pack prescribed  Follow up in 6 weeks  Patient to cancel if she is well at that time

## 2025-03-12 NOTE — TELEPHONE ENCOUNTER
Caller: Nona- Patient     Doctor: Dr. Buitrago     Reason for call: Patient had a CSI on her R Hip on March 07,2025. Patient states she is experiencing increasing pain on her R hip/groin area. Patient states the injection made it feel better for about two days and then got worse again. Patient states she cannot sleep on that sign and cannot walk without using her cane. Patient is asking for a return phone call to discuss with clinical team.     Call back#: [phone]

## 2025-03-12 NOTE — TELEPHONE ENCOUNTER
Called and spoke w/pt and relayed RASHAWN Fontenot's msg. Scheduled pt for today at 2pm arrival 1:45 at [facility] office-pt aware.

## 2025-03-12 NOTE — PROGRESS NOTES
Encounter Provider: Nico Buitrago MD   Encounter Date: 03/12/25  Encounter department: [facility] [place]         ASSESSMENT & PLAN:  Assessment & Plan  Trochanteric bursitis of right hip  Patient s/p right troch injection 3/7/2025 with 2 days of moderate relief  Current symptoms of right lateral and anterolateral hip pain  Radiograph displays minimal arthritic changes  On exam patient has good ROM with no pain of right hip  Start physical therapy  Medrol dose pack prescribed  Follow up in 6 weeks  Patient to cancel if she is well at that time  Pain in right hip  See other diagnoses               To do next visit:  Return in about 6 weeks (around 4/23/2025).    Scribe Attestation      I,:  Law Chandra MA am acting as a scribe while in the presence of the attending physician.:       I,:  Nico Buitrago MD personally performed the services described in this documentation    as scribed in my presence.:             ____________________________________________________  CHIEF COMPLAINT:  Chief Complaint   Patient presents with    Right Hip - Pain         SUBJECTIVE:  Mikayla Breaux is a 84 y.o. female who presents for follow up of right hip.  She is s/p right trochanteric bursa steroid injection with moderate relief for 2 days, 3/7/2025.  Today she complains of right anterolateral hip pain.  Lifting leg and flexing hip can aggravate while rest alleviates.             PAST MEDICAL HISTORY:  Past Medical History:   Diagnosis Date    Allergic 1980    [address] tin    Arthritis 2008    Benign essential hypertension     Hyperlipidemia, unspecified     Hypertension 2016    Irregular heart beat     Osteoarthritis of left knee     Osteoporosis     PAD (peripheral artery disease) ([facility]) 09/29/2021    Perichondritis of ear, right        PAST SURGICAL HISTORY:  Past Surgical History:   Procedure Laterality Date    CARDIAC CATHETERIZATION Left 7/6/2022    Procedure: Cardiac Left Heart  Cath;  Surgeon: Grover Sommer MD;  [facility]: [facility];  Service: Cardiology    COLON SURGERY  2003    Rectoseal     LAPAROSCOPIC OVARIAN CYSTECTOMY Right 1981    LAPAROSCOPIC OVARIAN CYSTECTOMY Left 2003    rectoseal and cystoseal repari    [place] ARTHRP KNE CONDYLE&PLATU MEDIAL&LAT COMPARTMENTS Right 6/26/2024    Procedure: RIGHT TOTAL KNEE ARTHROPLASTY W ROBOT;  Surgeon: Nico Buitrago MD;  Location: [facility];  Service: Orthopedics    TONSILLECTOMY AND ADENOIDECTOMY  1952    TUBAL LIGATION  1972       FAMILY HISTORY:  Family History   Problem Relation Age of Onset    Hyperlipidemia Mother     Heart Valve Disease Mother     Hypertension Mother     Arthritis Father     Diabetes Father     Alcohol abuse Father     Prostate cancer Father 80    Diabetes Brother     Atrial fibrillation Brother     Alcohol abuse Brother     Arthritis Brother     Pancreatic cancer Maternal Aunt 65    No Known Problems Paternal Uncle     No Known Problems Paternal Uncle     No Known Problems Paternal Uncle     No Known Problems Paternal Uncle     Hypertension Daughter     Bradycardia Son     No Known Problems Son        SOCIAL HISTORY:  Social History     Tobacco Use    Smoking status: Never    Smokeless tobacco: Never   Vaping Use    Vaping status: Never Used   Substance Use Topics    Alcohol use: Yes     Alcohol/week: 5.0 standard drinks of alcohol     Types: 5 Glasses of wine per week    Drug use: Never       MEDICATIONS:    Current Outpatient Medications:     b complex vitamins capsule, Take 1 capsule by mouth daily, Disp: , Rfl:     CALCIUM PO, Take by mouth, Disp: , Rfl:     cholecalciferol (VITAMIN D3) 1,000 units tablet, Take 1,000 Units by mouth daily, Disp: , Rfl:     clobetasol (TEMOVATE) 0.05 % ointment, Apply topically as needed, Disp: , Rfl:     docusate sodium (COLACE) 100 mg capsule, Take 100 mg by mouth daily, Disp: , Rfl:     furosemide (LASIX) 20 mg tablet, Take 0.5 tablets (10 mg total) by mouth  "daily, Disp: 90 tablet, Rfl: 3    magnesium gluconate (MAGONATE) 500 mg tablet, Take 200 mg by mouth 2 (two) times a day, Disp: , Rfl:     metoprolol succinate (TOPROL-XL) 25 mg 24 hr tablet, Take 1.5 tablets (37.5 mg total) by mouth 2 (two) times a day, Disp: 135 tablet, Rfl: 3    Multiple Vitamins-Minerals (MULTIVITAMIN ADULTS PO), Take by mouth, Disp: , Rfl:     Xarelto 20 MG tablet, TAKE 1 TABLET BY MOUTH EVERY DAY WITH BREAKFAST, Disp: 30 tablet, Rfl: 5    ALLERGIES:  Allergies   Allergen Reactions    Macrodantin [Nitrofurantoin] Nausea Only, Dizziness and Headache       LABS:  HgA1c:   Lab Results   Component Value Date    HGBA1C 5.6 06/05/2024     BMP:   Lab Results   Component Value Date    GLUCOSE 88 09/06/2024    CALCIUM 8.3 (L) 06/27/2024    K 4.0 06/27/2024    CO2 25 09/06/2024     06/27/2024    BUN 15 06/27/2024    CREATININE 0.63 06/27/2024     CBC: No components found for: \"CBC\"    _____________________________________________________  PHYSICAL EXAMINATION:  Vital signs: Ht 5' 5\" (1.651 m)   BMI 33.12 kg/m²   General: No acute distress, awake and alert  Psychiatric: Mood and affect appear appropriate  HEENT: Trachea Midline, No torticollis, no apparent facial trauma  Cardiovascular: No audible murmurs; Extremities appear perfused  Pulmonary: No audible wheezing or stridor  Skin: No open lesions; see further details (if any) below    Ortho Exam:  Right hip:  TTP over greater trochanter  Good arc of motion  Patient sits comfortably in chair with hip flexed at 90 degrees  Patient stands from seated position without assistance  Calf compartments soft and supple  Sensation intact  Toes are warm sensate and mobile          _____________________________________________________  STUDIES REVIEWED:    The attending physician has personally reviewed the pertinent films in PACS and interpretation is as follows:  Right hip x-ray:  Minimal arthritic changes.  Lumbar spne displays degenerative changes.  " "    PROCEDURES PERFORMED:  Procedures      None Preformed       Portions of the record may have been created with voice recognition software.  Occasional wrong word or \"sound a like\" substitutions may have occurred due to the inherent limitations of voice recognition software.  Read the chart carefully and recognize, using context, where substitutions have occurred.        "

## 2025-03-20 ENCOUNTER — EVALUATION (OUTPATIENT)
Dept: PHYSICAL THERAPY | Facility: CLINIC | Age: 85
End: 2025-03-20
Payer: COMMERCIAL

## 2025-03-20 DIAGNOSIS — M25.551 PAIN IN RIGHT HIP: ICD-10-CM

## 2025-03-20 DIAGNOSIS — M70.61 TROCHANTERIC BURSITIS OF RIGHT HIP: ICD-10-CM

## 2025-03-20 PROCEDURE — 97161 PT EVAL LOW COMPLEX 20 MIN: CPT

## 2025-03-20 PROCEDURE — 97110 THERAPEUTIC EXERCISES: CPT

## 2025-03-20 NOTE — PROGRESS NOTES
PT EVALUATION    Today's date: 25  Patient name: Mikayla Breaux  : 1940  MRN: [id number]  Referring provider: Nico Buitrago,*  Dx:   1. Trochanteric bursitis of right hip    2. Pain in right hip        ASSESSMENT:  Mikayla Breaux is a 84 y.o. female who presents with signs and symptoms consistent with referring diagnosis. Patient presents with pain, decreased strength, and decreased activity tolerance . Upon examination, patient was TTP at the R greater trochanter and had some mild pain with resisted testing of the hip abductors/external rotators. Due to these impairments, patient has difficulty performing a/iadls and recreational activities including stairs, prolonged walking, and sleeping. Therefore, patient would benefit from skilled physical therapy to address the impairments, improve their level of function, and to improve their overall quality of life. Patient was provided education regarding diagnosis, POC, and was provided an HEP at the end of session. No further referral appears necessary at this time based upon examination findings.      Impairments:    decreased strength   pain with function   activity intolerance   weight bearing intolerance     Prognosis:  Good  Positive and negative prognostic indicator(s):  pain < 3 months    Goals:    STG (to be met in 4 weeks)  Patient will be independent with basic HEP for self-management.  Patient will improve hip strength by 1/2 MMT grade in all deficient planes.  Patient will report decreased pain by 25% at its worst.    LTG (to be met by discharge):  Patient will be independent with comprehensive HEP for maintenance after discharge.   Patient will improve FOTO score to equal to or above predicted value.   Patient will be able to sleep on R side without minimal to no pain (</= 2/10).  Patient will be able to participate in exercise class without restriction.  Patient will be able to navigate stairs with reciprocal gait without  pain/restriction.   Patient will be able to walk at least 1 mile without restriction.        Planned interventions:  home exercise program, patient education, manual therapy, massage, graded activity, flexibility, functional range of motion exercises, strengthening, abdominal trunk stabilization, balance and weight bearing training, and modalities prn    Duration in visits:  12  Frequency: 2 visits per week  Duration in weeks:  6  POC expiration: 5/1/2025    History of Current Injury: Patient states she had this pain years ago and responded well to injection. This recent bout started in February, right before her trip to [facility]. She couldn't get a doctor's appt so she took her cane with her. She ended up doing a lot of walking on the trip which she feels like might've aggravated the hip further. She also had a fainting spell on the trip that she attributes to different time zones and medication. She ended up slipping (Daughter caught her) but her hips were in a really flexed position. When she got back, she saw Dr. Buitrago who gave her an injection. Patient reports having relief for only 2 days. During her most recent visit, she was provided a prednisone taper which has helped. Symptoms have settled down but she still has pain at the lateral and anterolateral hip. Denies low back pain. Denies numbness/tingling. Aggravating factors include sleeping on R side, going up stairs, and prolonged walking/standing.     Patient also reports difficulty participating in exercise class, particularly when she does seated marches + hip abd.     Pain location: R side, R anterior hip  Pain descriptors: Sharp when bearing weight  Pain at Currently:  2/10  Pain at Best:  1/10  Pain at Worst:  6-7/10      Aggravating factors: sleeping on R side, stairs, prolonged walking/standing  Easing factors: cortisone injection 3/7/25, ice, rest, tylenol    Imaging:   Copied from X-Ray report on 3/12/25:  Narrative & Impression         RIGHT HIP      INDICATION:   Trochanteric bursitis, right hip.      COMPARISON:  None.     VIEWS:  XR HIP/PELV 2-3 VWS RIGHT W PELVIS IF PERFORMED      FINDINGS:     There is no acute fracture or dislocation.     No significant hip degenerative changes.     No lytic or blastic osseous lesion.     Soft tissues are unremarkable.     Degenerative changes visualized lower lumbar spine.  Degenerative changes of the pubic symphysis     IMPRESSION:        No acute osseous abnormality of the right hip.  Preserved hip joint space.  No evidence of abnormal calcific density adjacent to greater trochanter to indicate calcific trochanteric bursitis radiographically.     Special Questions: N/A      Hobbies/Interests: walking, workout (elliptical, bike), garden  Occupation: retired  Patient goals: Patient reports goals for physical therapy would be increased strength and return to recreation        Objective     Palpation     Additional Palpation Details  (+) R greater trochanter, ITB (mid lateral thigh), hip flexor tendons    Active Range of Motion   Left Hip   Normal active range of motion    Right Hip   Normal active range of motion    Passive Range of Motion   Left Hip   Normal passive range of motion    Right Hip   Normal passive range of motion    Strength/Myotome Testing     Right Hip   Planes of Motion   Flexion: 4  Abduction: 4  External rotation: 4    Left Knee   Flexion: 5  Extension: 5    Right Knee   Flexion: 5  Extension: 5    Additional Strength Details  Some pain with resisted clamshell on R    Tests     Left Hip   Negative CONRAD and FADIR.     Right Hip   Positive CONRAD.   Negative FADIR.             Precautions: HTN, CAD, s/p R TKA 6/24/24      Diagnosis:    Precautions:    POC expiration date: 5/1/2025   [employer] Access Code: [email or web address]    *asterisks by exercise = given for HEP   Visit Count 1       Manuals 3/20       Hip PROM        Hip STM prn                                There Ex        Active warmup        DKTC HEP        ITB stretch HEP       Hip extension        Hip abduction                                        Neuro Re-Ed        Hip add iso        Hip abd iso        Supine bridge        SLR HEP                               Re-evaluation              Ther Act                                                         Modalities

## 2025-03-26 ENCOUNTER — OFFICE VISIT (OUTPATIENT)
Dept: PHYSICAL THERAPY | Facility: CLINIC | Age: 85
End: 2025-03-26
Payer: COMMERCIAL

## 2025-03-26 DIAGNOSIS — M70.61 TROCHANTERIC BURSITIS OF RIGHT HIP: Primary | ICD-10-CM

## 2025-03-26 DIAGNOSIS — M25.551 PAIN IN RIGHT HIP: ICD-10-CM

## 2025-03-26 PROCEDURE — 97112 NEUROMUSCULAR REEDUCATION: CPT

## 2025-03-26 PROCEDURE — 97110 THERAPEUTIC EXERCISES: CPT

## 2025-03-26 PROCEDURE — 97140 MANUAL THERAPY 1/> REGIONS: CPT

## 2025-03-26 NOTE — PROGRESS NOTES
"Daily Note     Today's date: 3/26/2025  Patient name: Mikayla Breaux  : 1940  MRN: [id number]  Referring provider: Nico Buitrago,*  Dx:   Encounter Diagnosis     ICD-10-CM    1. Trochanteric bursitis of right hip  M70.61       2. Pain in right hip  M25.551                      Subjective: Patient states she had some soreness last night into this morning so she iced. Reports exercises are going well.       Objective: See treatment diary below      Assessment: Tolerated treatment well. Initiated POC today with a focus on  hip mobility and hip strengthening, particularly isometrics. Appropriate amount of fatigue post tx. Educated patient on DOMS to ensure appropriate pacing and progression of exercise program. Patient would benefit from continued PT      Plan: Continue per plan of care.      Precautions: HTN, CAD, s/p R TKA 24      Diagnosis:    Precautions:    POC expiration date: 2025   [employer] Access Code: [email or web address]    *asterisks by exercise = given for HEP   Visit Count 1 2 3     Manuals 3/20 3/26      Hip PROM  AA      Hip STM prn                                There Ex        Active warmup  Nustep 6' L2      DKTC HEP 20x3\"      ITB stretch HEP 10x5\" (supine)      Hip extension  x15      Hip abduction        Seated marching  20xea      Piriformis stretch  Seated 10x5\" Supine                     Neuro Re-Ed        Hip add iso  20x3\"      Hip abd iso  20x3\"      Supine bridge  2x10      SLR HEP 2x10      Hip ER iso  15x5\"      SLS                Re-evaluation              Ther Act                                                         Modalities                                                          "

## 2025-03-28 ENCOUNTER — OFFICE VISIT (OUTPATIENT)
Dept: PHYSICAL THERAPY | Facility: CLINIC | Age: 85
End: 2025-03-28
Payer: COMMERCIAL

## 2025-03-28 DIAGNOSIS — M70.61 TROCHANTERIC BURSITIS OF RIGHT HIP: Primary | ICD-10-CM

## 2025-03-28 DIAGNOSIS — M25.551 PAIN IN RIGHT HIP: ICD-10-CM

## 2025-03-28 PROCEDURE — 97112 NEUROMUSCULAR REEDUCATION: CPT

## 2025-03-28 PROCEDURE — 97140 MANUAL THERAPY 1/> REGIONS: CPT

## 2025-03-28 PROCEDURE — 97110 THERAPEUTIC EXERCISES: CPT

## 2025-03-28 NOTE — PROGRESS NOTES
"Daily Note     Today's date: 3/28/2025  Patient name: Mikayla Breaux  : 1940  MRN: [id number]  Referring provider: Nico Buitrago,*  Dx:   Encounter Diagnosis     ICD-10-CM    1. Trochanteric bursitis of right hip  M70.61       2. Pain in right hip  M25.551                      Subjective: Patient states she felt fine after last session - denies any significant pain.       Objective: See treatment diary below      Assessment: Tolerated treatment well. Continued with POC focused on hip strengthening, particularly the lateral hip. Able to progress patient's program with increased resistance/weight as well as with the addition of lateral steps. No adverse response during or post tx. Patient exhibited good technique with therapeutic exercises and would benefit from continued PT      Plan: Continue per plan of care.      Precautions: HTN, CAD, s/p R TKA 24      Diagnosis:    Precautions:    POC expiration date: 2025   [employer] Access Code: [email or web address]    *asterisks by exercise = given for HEP   Visit Count 1 2 3     Manuals 3/20 3/26 3/28     Hip PROM  AA      Hip STM prn                                There Ex        Active warmup  Nustep 6' L2 Nustep 6' L2     DKTC HEP 20x3\" NV     ITB stretch HEP 10x5\" (supine) 10x5\"  (supine)     Hip extension  x15 2# 2x10     Hip abduction   2# 2x10     Seated marching  20xea 2# 2x10     Piriformis stretch  Seated 10x5\"      Hip hike                Neuro Re-Ed        Hip add iso  20x3\" 30x3\"     Hip abd iso  20x3\" 20x3\"     Supine bridge  2x10 2x10 w/belt     SLR HEP 2x10 2x10     Hip ER iso  15x5\" 15x5\"     SLS        Squats        Clamshells   NV     Re-evaluation              Ther Act             Step ups      Lateral  6\" 2x10 RLE                                     Modalities                                                            "

## 2025-04-01 ENCOUNTER — OFFICE VISIT (OUTPATIENT)
Dept: PHYSICAL THERAPY | Facility: CLINIC | Age: 85
End: 2025-04-01
Payer: COMMERCIAL

## 2025-04-01 DIAGNOSIS — M70.61 TROCHANTERIC BURSITIS OF RIGHT HIP: Primary | ICD-10-CM

## 2025-04-01 DIAGNOSIS — M25.551 PAIN IN RIGHT HIP: ICD-10-CM

## 2025-04-01 PROCEDURE — 97530 THERAPEUTIC ACTIVITIES: CPT

## 2025-04-01 PROCEDURE — 97110 THERAPEUTIC EXERCISES: CPT

## 2025-04-01 PROCEDURE — 97112 NEUROMUSCULAR REEDUCATION: CPT

## 2025-04-01 NOTE — PROGRESS NOTES
"Daily Note     Today's date: 2025  Patient name: Mikayla Breaux  : 1940  MRN: [id number]  Referring provider: Nico Buitrago,*  Dx:   Encounter Diagnosis     ICD-10-CM    1. Trochanteric bursitis of right hip  M70.61       2. Pain in right hip  M25.551                      Subjective: Patient denies any significant changes.      Objective: See treatment diary below      Assessment: Tolerated treatment well. Modified patient's program to include hip hikes with patient demonstrating some fatigue and mild soreness with it. Also added in S/L clamshell iso with also some fatigue reported toward the end of second set. Otherwise good tolerance to all exercies. Patient would benefit from continued PT      Plan: Continue per plan of care.      Precautions: HTN, CAD, s/p R TKA 24      Diagnosis:    Precautions:    POC expiration date: 2025   [employer] Access Code: [email or web address]    *asterisks by exercise = given for HEP   Visit Count 1 2 3 4    Manuals 3/20 3/26 3/28 4/1    Hip PROM  AA      Hip STM prn   AA                             There Ex        Active warmup  Nustep 6' L2 Nustep 6' L2 Nustep 6' L3    DKTC HEP 20x3\" NV 1.5' x 3 sec hold    ITB stretch HEP 10x5\" (supine) 10x5\"  (supine) 10x5\" (supine)    Hip extension  x15 2# 2x10 2# 2x10ea    Hip abduction   2# 2x10 2# 2x10ea    Seated marching  20xea 2# 2x10 2# 2x10ea    Piriformis stretch  Seated 10x5\"      Hip hike    x10    Lateral walks    4 laps    Neuro Re-Ed        Hip add iso  20x3\" 30x3\" 20x3\"    Hip abd iso  20x3\" 20x3\"     Supine bridge  2x10 2x10 w/belt 2x10 w/belt    SLR HEP 2x10 2x10 2x10 SLR+ abd   Hip ER iso  15x5\" 15x5\" 15x5\"    SLS        Squats        Clamshells   NV S/L iso 20x3\"    Steamboats                Re-evaluation              Ther Act             Step ups     Lateral  6\" 2x10 RLE Lateral 6\" 2x10 RLE     Sit to Stands    2X8                          Modalities                                                            "

## 2025-04-03 ENCOUNTER — OFFICE VISIT (OUTPATIENT)
Dept: PHYSICAL THERAPY | Facility: CLINIC | Age: 85
End: 2025-04-03
Payer: COMMERCIAL

## 2025-04-03 DIAGNOSIS — M25.551 PAIN IN RIGHT HIP: ICD-10-CM

## 2025-04-03 DIAGNOSIS — M70.61 TROCHANTERIC BURSITIS OF RIGHT HIP: Primary | ICD-10-CM

## 2025-04-03 PROCEDURE — 97530 THERAPEUTIC ACTIVITIES: CPT

## 2025-04-03 PROCEDURE — 97112 NEUROMUSCULAR REEDUCATION: CPT

## 2025-04-03 PROCEDURE — 97110 THERAPEUTIC EXERCISES: CPT

## 2025-04-03 NOTE — PROGRESS NOTES
"Daily Note     Today's date: 4/3/2025  Patient name: Mikayla Breaux  : 1940  MRN: [id number]  Referring provider: Nico Buitrago,*  Dx:   Encounter Diagnosis     ICD-10-CM    1. Trochanteric bursitis of right hip  M70.61       2. Pain in right hip  M25.551                      Subjective: Patient states she was tired after last session but denies pain.       Objective: See treatment diary below      Assessment: Tolerated treatment well. Progressed ER isometric to resisted AROM with good tolerance. Also added in hurdles stepovers to work on combined movements of hip flexion with abduction for patient's exercise class. No adverse response.  Patient would benefit from continued PT      Plan: Continue per plan of care.      Precautions: HTN, CAD, s/p R TKA 24      Diagnosis:    Precautions:    POC expiration date: 2025   [employer] Access Code: [email or web address]    *asterisks by exercise = given for HEP   Visit Count 1 2 3 4 5   Manuals 3/20 3/26 3/28 4/1 4/3   Hip PROM  AA      Hip STM prn   AA  AA                           There Ex        Active warmup  Nustep 6' L2 Nustep 6' L2 Nustep 6' L3 Nustep 5' L3   DKTC HEP 20x3\" NV 1.5' x 3 sec hold 1.5  x 3 sec hold   ITB stretch HEP 10x5\" (supine) 10x5\"  (supine) 10x5\" (supine) 10X10\" (supine)   Hip extension  x15 2# 2x10 2# 2x10ea 2# 2x10ea   Hip abduction   2# 2x10 2# 2x10ea 2# 2x10ea   Seated marching  20xea 2# 2x10 2# 2x10ea 2# 2x10ea   Piriformis stretch  Seated 10x5\"      Hip IR/ER     RTB 2x10ea   Hip hike    x10    Lateral walks    4 laps    Neuro Re-Ed        Hip add iso  20x3\" 30x3\" 20x3\" 20x3\"   Hip abd iso  20x3\" 20x3\"     Supine bridge  2x10 2x10 w/belt 2x10 w/belt 2x10 w/belt   SLR HEP 2x10 2x10 2x10 SLR+ abd 2x10   Hip ER iso  15x5\" 15x5\" 15x5\"    SLS        Squats        Clamshells   NV S/L iso 20x3\" S/L iso 20x3\"   Steamboats                Re-evaluation              Ther Act             Step ups     Lateral  6\" 2x10 RLE Lateral 6\" 2x10 RLE  "    Sit to Stands    2X8 2x8   Hurdles   `  Orange lateral 3 laps                 Modalities

## 2025-04-07 ENCOUNTER — OFFICE VISIT (OUTPATIENT)
Dept: FAMILY MEDICINE CLINIC | Facility: CLINIC | Age: 85
End: 2025-04-07
Payer: COMMERCIAL

## 2025-04-07 ENCOUNTER — TELEPHONE (OUTPATIENT)
Dept: FAMILY MEDICINE CLINIC | Facility: CLINIC | Age: 85
End: 2025-04-07

## 2025-04-07 VITALS
SYSTOLIC BLOOD PRESSURE: 148 MMHG | RESPIRATION RATE: 16 BRPM | WEIGHT: 204.5 LBS | HEART RATE: 68 BPM | DIASTOLIC BLOOD PRESSURE: 86 MMHG | BODY MASS INDEX: 34.07 KG/M2 | HEIGHT: 65 IN | TEMPERATURE: 97.1 F | OXYGEN SATURATION: 98 %

## 2025-04-07 DIAGNOSIS — I48.0 PAROXYSMAL ATRIAL FIBRILLATION (HCC): ICD-10-CM

## 2025-04-07 DIAGNOSIS — E78.00 PURE HYPERCHOLESTEROLEMIA: ICD-10-CM

## 2025-04-07 DIAGNOSIS — I10 ESSENTIAL HYPERTENSION: Primary | ICD-10-CM

## 2025-04-07 DIAGNOSIS — R06.09 DOE (DYSPNEA ON EXERTION): ICD-10-CM

## 2025-04-07 PROBLEM — Z01.89 ENCOUNTER FOR GERIATRIC ASSESSMENT: Status: RESOLVED | Noted: 2024-05-29 | Resolved: 2025-04-07

## 2025-04-07 PROCEDURE — 99214 OFFICE O/P EST MOD 30 MIN: CPT | Performed by: FAMILY MEDICINE

## 2025-04-07 RX ORDER — KETOCONAZOLE 20 MG/G
CREAM TOPICAL
COMMUNITY
Start: 2025-03-24

## 2025-04-07 RX ORDER — HYDROCORTISONE 25 MG/G
CREAM TOPICAL
COMMUNITY
Start: 2025-03-24

## 2025-04-07 NOTE — ASSESSMENT & PLAN NOTE
BP Readings from Last 3 Encounters:   04/07/25 148/86   01/31/25 148/80   12/13/24 133/86     Blood pressure above goal.  Has an upcoming appointment with cardiology on 4/9. She did not take her Lasix yet this morning.  Currently takes 10 mg daily.  Metoprolol 37.5 twice daily.  Will defer blood pressure adjustment to cardiologist on Wednesday. Emphasized the importance of monitoring sodium intake.       Orders:    Comprehensive metabolic panel; Future    CBC and Platelet; Future

## 2025-04-07 NOTE — ASSESSMENT & PLAN NOTE
Patient does currently wear an Apple Watch.  A-fib episodes have increased per her Apple Watch.  Unsure if she was in A-fib during episode in [place].  Continue Riaz

## 2025-04-07 NOTE — PROGRESS NOTES
Name: Mikayla Breaux      : 1940      MRN: [id number]  Encounter Provider: Cheyenne Gomez MD  Encounter Date: 2025   Encounter department: [facility]  :  Assessment & Plan  Essential hypertension  BP Readings from Last 3 Encounters:   25 148/86   25 148/80   24 133/86     Blood pressure above goal.  Has an upcoming appointment with cardiology on . She did not take her Lasix yet this morning.  Currently takes 10 mg daily.  Metoprolol 37.5 twice daily.  Will defer blood pressure adjustment to cardiologist on Wednesday. Emphasized the importance of monitoring sodium intake.       Orders:    Comprehensive metabolic panel; Future    CBC and Platelet; Future    Paroxysmal atrial fibrillation (HCC)  Patient does currently wear an Apple Watch.  A-fib episodes have increased per her Apple Watch.  Unsure if she was in A-fib during episode in [place].  Continue Xarelto       MENON (dyspnea on exertion)         Pure hypercholesterolemia    Orders:    Lipid panel; Future           History of Present Illness       Was traveling in [place] when she had a pre syncopal episode. She didn't ear or take her medicine due to the time change. She also had just walked off a large flight of stairs in [place].  Following this episode she remained asymptomatic for the remainder of the trip.  She continues to have some MENON.  She is not managing her sodium intake.  She currently lives at [facility] and this makes it difficult for her to manage her overall sodium levels.      Review of Systems   Constitutional:  Negative for activity change, fatigue and fever.   Eyes:  Negative for visual disturbance.   Respiratory:  Positive for shortness of breath.    Cardiovascular:  Negative for chest pain and palpitations.   Gastrointestinal:  Negative for abdominal pain, constipation, diarrhea and nausea.   Endocrine: Negative for cold intolerance and heat intolerance.   Musculoskeletal:   "Negative for back pain.   Skin:  Negative for rash.   Neurological:  Negative for headaches.   Psychiatric/Behavioral:  Negative for confusion.        Objective   /86 (BP Location: Left arm, Patient Position: Sitting, Cuff Size: Large)   Pulse 68   Temp (!) 97.1 °F (36.2 °C) (Temporal)   Resp 16   Ht 5' 5\" (1.651 m)   Wt 92.8 kg (204 lb 8 oz)   SpO2 98%   BMI 34.03 kg/m²      Physical Exam  Vitals and nursing note reviewed.   Constitutional:       Appearance: Normal appearance. She is well-developed.   HENT:      Head: Normocephalic and atraumatic.   Cardiovascular:      Rate and Rhythm: Normal rate and regular rhythm.      Heart sounds: Murmur heard.   Pulmonary:      Effort: Pulmonary effort is normal.      Breath sounds: Normal breath sounds.   Abdominal:      General: Bowel sounds are normal.      Palpations: Abdomen is soft.   Musculoskeletal:      Cervical back: Normal range of motion.      Right lower leg: Edema present.      Left lower leg: Edema present.   Skin:     General: Skin is warm.   Neurological:      General: No focal deficit present.      Mental Status: She is alert.   Psychiatric:         Mood and Affect: Mood normal.         Speech: Speech normal.         "

## 2025-04-08 ENCOUNTER — RESULTS FOLLOW-UP (OUTPATIENT)
Dept: FAMILY MEDICINE CLINIC | Facility: CLINIC | Age: 85
End: 2025-04-08

## 2025-04-08 ENCOUNTER — OFFICE VISIT (OUTPATIENT)
Dept: PHYSICAL THERAPY | Facility: CLINIC | Age: 85
End: 2025-04-08
Payer: COMMERCIAL

## 2025-04-08 ENCOUNTER — APPOINTMENT (OUTPATIENT)
Dept: LAB | Facility: CLINIC | Age: 85
End: 2025-04-08
Payer: COMMERCIAL

## 2025-04-08 DIAGNOSIS — I10 ESSENTIAL HYPERTENSION: ICD-10-CM

## 2025-04-08 DIAGNOSIS — M25.551 PAIN IN RIGHT HIP: ICD-10-CM

## 2025-04-08 DIAGNOSIS — M70.61 TROCHANTERIC BURSITIS OF RIGHT HIP: Primary | ICD-10-CM

## 2025-04-08 DIAGNOSIS — E78.00 PURE HYPERCHOLESTEROLEMIA: ICD-10-CM

## 2025-04-08 LAB
ALBUMIN SERPL BCG-MCNC: 3.5 G/DL (ref 3.5–5)
ALP SERPL-CCNC: 65 U/L (ref 34–104)
ALT SERPL W P-5'-P-CCNC: 12 U/L (ref 7–52)
ANION GAP SERPL CALCULATED.3IONS-SCNC: 6 MMOL/L (ref 4–13)
AST SERPL W P-5'-P-CCNC: 17 U/L (ref 13–39)
BILIRUB SERPL-MCNC: 0.68 MG/DL (ref 0.2–1)
BUN SERPL-MCNC: 21 MG/DL (ref 5–25)
CALCIUM SERPL-MCNC: 9.1 MG/DL (ref 8.4–10.2)
CHLORIDE SERPL-SCNC: 108 MMOL/L (ref 96–108)
CHOLEST SERPL-MCNC: 191 MG/DL (ref ?–200)
CO2 SERPL-SCNC: 28 MMOL/L (ref 21–32)
CREAT SERPL-MCNC: 0.72 MG/DL (ref 0.6–1.3)
ERYTHROCYTE [DISTWIDTH] IN BLOOD BY AUTOMATED COUNT: 13.9 % (ref 11.6–15.1)
GFR SERPL CREATININE-BSD FRML MDRD: 78 ML/MIN/1.73SQ M
GLUCOSE P FAST SERPL-MCNC: 83 MG/DL (ref 65–99)
HCT VFR BLD AUTO: 44.9 % (ref 34.8–46.1)
HDLC SERPL-MCNC: 76 MG/DL
HGB BLD-MCNC: 14.6 G/DL (ref 11.5–15.4)
LDLC SERPL CALC-MCNC: 100 MG/DL (ref 0–100)
MCH RBC QN AUTO: 30.7 PG (ref 26.8–34.3)
MCHC RBC AUTO-ENTMCNC: 32.5 G/DL (ref 31.4–37.4)
MCV RBC AUTO: 94 FL (ref 82–98)
NONHDLC SERPL-MCNC: 115 MG/DL
PLATELET # BLD AUTO: 192 THOUSANDS/UL (ref 149–390)
PMV BLD AUTO: 10.1 FL (ref 8.9–12.7)
POTASSIUM SERPL-SCNC: 4.2 MMOL/L (ref 3.5–5.3)
PROT SERPL-MCNC: 6 G/DL (ref 6.4–8.4)
RBC # BLD AUTO: 4.76 MILLION/UL (ref 3.81–5.12)
SODIUM SERPL-SCNC: 142 MMOL/L (ref 135–147)
TRIGL SERPL-MCNC: 74 MG/DL (ref ?–150)
WBC # BLD AUTO: 3.47 THOUSAND/UL (ref 4.31–10.16)

## 2025-04-08 PROCEDURE — 36415 COLL VENOUS BLD VENIPUNCTURE: CPT

## 2025-04-08 PROCEDURE — 80061 LIPID PANEL: CPT

## 2025-04-08 PROCEDURE — 80053 COMPREHEN METABOLIC PANEL: CPT

## 2025-04-08 PROCEDURE — 97110 THERAPEUTIC EXERCISES: CPT

## 2025-04-08 PROCEDURE — 85027 COMPLETE CBC AUTOMATED: CPT

## 2025-04-08 PROCEDURE — 97112 NEUROMUSCULAR REEDUCATION: CPT

## 2025-04-08 NOTE — PROGRESS NOTES
"Daily Note     Today's date: 2025  Patient name: Mikayla Breaux  : 1940  MRN: [id number]  Referring provider: Nico Buitrago,*  Dx:   Encounter Diagnosis     ICD-10-CM    1. Trochanteric bursitis of right hip  M70.61       2. Pain in right hip  M25.551                      Subjective: Patient denies any significant changes since last session.       Objective: See treatment diary below      Assessment: Tolerated treatment well. Patient late to session resulting in shorter session today. Focused on more standing exercises today to prepare patient for exercise class. Progressing 3 way hip to ed machine to further promote balance and motor control which patient was challenged by. Patient exhibited good technique with therapeutic exercises and would benefit from continued PT      Plan: Continue per plan of care.      Precautions: HTN, CAD, s/p R TKA 24      Diagnosis:    Precautions:    POC expiration date: 2025   [employer] Access Code: [email or web address]    *asterisks by exercise = given for HEP   Visit Count 6 2 3 4 5   Manuals 4/8 3/26 3/28 4/1 4/3   Hip PROM  AA      Hip STM prn   AA  AA                           There Ex        Active warmup R. Bike 5' L1 Nustep 6' L2 Nustep 6' L2 Nustep 6' L3 Nustep 5' L3   DKTC  20x3\" NV 1.5' x 3 sec hold 1.5  x 3 sec hold   ITB stretch  10x5\" (supine) 10x5\"  (supine) 10x5\" (supine) 10X10\" (supine)   Hip extension 2.5 x10 (ed) x15 2# 2x10 2# 2x10ea 2# 2x10ea   Hip abduction 2.5 x10 (ed)  2# 2x10 2# 2x10ea 2# 2x10ea   Seated marching  20xea 2# 2x10 2# 2x10ea 2# 2x10ea   Piriformis stretch  Seated 10x5\"      Hip IR/ER RTB 2x10ea    RTB 2x10ea   Hip hike    x10    Leg press 50# 2x10       Lateral walks    4 laps    Neuro Re-Ed        Hip add iso  20x3\" 30x3\" 20x3\" 20x3\"   Hip abd iso  20x3\" 20x3\"     Supine bridge 2x10 w/belt 2x10 2x10 w/belt 2x10 w/belt 2x10 w/belt   SLR Standing 2.5 (ed) 2x10 2x10 2x10 SLR+ abd 2x10   Hip ER iso  15x5\" 15x5\" " "15x5\"    SLS        Squats        Clamshells   NV S/L iso 20x3\" S/L iso 20x3\"   Steamboats                Re-evaluation              Ther Act             Step ups     Lateral  6\" 2x10 RLE Lateral 6\" 2x10 RLE     Sit to Stands 2x8   2X8 2x8   Hurdles Orange lateral 4 laps  `  Orange lateral 3 laps                 Modalities                                                                  "

## 2025-04-09 ENCOUNTER — OFFICE VISIT (OUTPATIENT)
Dept: CARDIOLOGY CLINIC | Facility: CLINIC | Age: 85
End: 2025-04-09
Payer: COMMERCIAL

## 2025-04-09 VITALS
HEART RATE: 84 BPM | OXYGEN SATURATION: 98 % | DIASTOLIC BLOOD PRESSURE: 68 MMHG | BODY MASS INDEX: 33.45 KG/M2 | SYSTOLIC BLOOD PRESSURE: 104 MMHG | WEIGHT: 201 LBS

## 2025-04-09 DIAGNOSIS — R06.09 DOE (DYSPNEA ON EXERTION): ICD-10-CM

## 2025-04-09 DIAGNOSIS — E78.00 PURE HYPERCHOLESTEROLEMIA: ICD-10-CM

## 2025-04-09 DIAGNOSIS — I10 ESSENTIAL HYPERTENSION: ICD-10-CM

## 2025-04-09 DIAGNOSIS — I48.0 PAROXYSMAL ATRIAL FIBRILLATION (HCC): Primary | ICD-10-CM

## 2025-04-09 DIAGNOSIS — I25.10 CORONARY ARTERY DISEASE INVOLVING NATIVE CORONARY ARTERY OF NATIVE HEART WITHOUT ANGINA PECTORIS: ICD-10-CM

## 2025-04-09 PROCEDURE — 99214 OFFICE O/P EST MOD 30 MIN: CPT | Performed by: INTERNAL MEDICINE

## 2025-04-09 PROCEDURE — 93000 ELECTROCARDIOGRAM COMPLETE: CPT | Performed by: INTERNAL MEDICINE

## 2025-04-09 RX ORDER — FUROSEMIDE 20 MG/1
20 TABLET ORAL DAILY
Qty: 90 TABLET | Refills: 3 | Status: SHIPPED | OUTPATIENT
Start: 2025-04-09

## 2025-04-09 NOTE — ASSESSMENT & PLAN NOTE
Zio monitor correlating with symptoms revealed ectopy, both SVE and VE -but abnormal burden  Increased Toprol to 1.5 tabs twice daily rather than 1 in AM and 1.5 in PM  Considering mild to moderate MR and moderate TR as well, we checked a stress echo to assess for ischemia as well as for significant valve disease with stress in December 2024 - this revealed no active ischemic changes, but with exercise-induced pulmonary HTN from 31 to 47 mmHg (MR increased from mild to mild to moderate) - offered use of low dose diuretic to help with symptoms- started on lasix 10 mg daily  No significant change to symptoms since last visit, will increase to 20 mg to see if it improves symptoms

## 2025-04-09 NOTE — PROGRESS NOTES
Cardiology Follow Up    Mikayla Breaux  1940  [id number]  [facility] [place]  [address] [facility] [facility] [facility] [address]  [address]  [place] [address] [phone]      Assessment & Plan  Paroxysmal atrial fibrillation (HCC)  noted on stress testing and LHC revealed 10% pRCA lesion.    Continued on Xarelto for AC and Toprol XL for rate control, dose of which has been increased for improved rate control to 25mg BID.    Now appears to be having more frequent episodes of afib that are symptomatic and was seen by EP for further evaluation regarding rhythm management.    There was recommendation regarding flecainide as pill in pocket for the next few months until a possible hospital admission for dofetilide or sotalol therapy for rhythm control.    Considering that her symptoms were less, she held off on further rhythm control strategy for the time being.    Echocardiogram repeated in September 2024 revealing normal LV size and function with biatrial enlargement, moderate mitral regurgitation, moderate tricuspid regurgitation  Zio monitor was also completed in October 2024 revealing no further atrial fibrillation burden, symptoms correlating with supraventricular and ventricular ectopy with an overall normal ectopy burden  Continue current regimen, no changes required today  Maintaining sinus rhythm on EKG today  Essential hypertension  remains very well controlled today, continued on metoprolol.    Had a nuclear stress test in Oct 2020 that was normal for EF and perfusion.    Echocardiogram at that time (and on repeat in May 2022) revealed normal LV & RV function, normal valves other than mild to moderate MR, moderate TR.    She has had MENON for many years - also noted by prior cardiologist documentation in 2017.    No changes made today, continue current regiment.  No additional testing required currently  Coronary artery disease involving native coronary artery of native heart without angina  pectoris  C revealed 10% pRCA lesion in July 2022.    Continued on beta-blocker  No change in symptoms to warrant additional testing currently  Pure hypercholesterolemia   in Nov 2020, which is at goal - repeat levels in Oct 2021 revealed LDL of 113.    LDL 95 in June 2022.    Repeat  in April 2023.    LDL remains well-controlled at 110 in February 2024.  Repeat levels revealed an LDL of 100 and April 2025  MENON (dyspnea on exertion)  Zio monitor correlating with symptoms revealed ectopy, both SVE and VE -but abnormal burden  Increased Toprol to 1.5 tabs twice daily rather than 1 in AM and 1.5 in PM  Considering mild to moderate MR and moderate TR as well, we checked a stress echo to assess for ischemia as well as for significant valve disease with stress in December 2024 - this revealed no active ischemic changes, but with exercise-induced pulmonary HTN from 31 to 47 mmHg (MR increased from mild to mild to moderate) - offered use of low dose diuretic to help with symptoms- started on lasix 10 mg daily  No significant change to symptoms since last visit, will increase to 20 mg to see if it improves symptoms    Chief Complaint   Patient presents with    Follow-up     3 month check up      Shortness of Breath     On exertion     Leg Swelling     BL     Ankle Swelling     BL       Interval History: Patient has continued dyspnea on exertion, with inclines and stairs.  She does have some LE edema in ankles, even in mornings.  She did have an episode of syncope when she was in [place], while on lasix, and she had not eaten/drank anything.  She didn't feel well and she slid down a wall to prevent fall.  Once she ate and took her meds, she felt better.  No reported chest pain, palpitations, lightheadedness, orthopnea, PND, or significant weight changes.  Patient remains active without any increased fatigue out of the ordinary.        Blood pressure 104/68, pulse 84, weight 91.2 kg (201 lb), SpO2 98%, not  currently breastfeeding.    EKG:  Sinus rhythm with occasional premature ventricular complexes  Nonspecific T wave abnormality  Abnormal ECG    Review of Systems:  Review of Systems   Constitutional:  Negative for activity change, appetite change, chills, diaphoresis, fatigue and unexpected weight change.   HENT:  Negative for hearing loss, nosebleeds and sore throat.    Eyes:  Negative for photophobia and visual disturbance.   Respiratory:  Positive for shortness of breath. Negative for cough, chest tightness and wheezing.    Cardiovascular:  Positive for leg swelling. Negative for chest pain and palpitations.   Gastrointestinal:  Negative for abdominal pain, diarrhea, nausea and vomiting.   Endocrine: Negative for polyuria.   Genitourinary:  Negative for dysuria, frequency and hematuria.   Musculoskeletal:  Negative for arthralgias, back pain, gait problem and neck pain.   Skin:  Negative for pallor and rash.   Neurological:  Negative for dizziness, syncope and headaches.   Hematological:  Does not bruise/bleed easily.   Psychiatric/Behavioral:  Negative for behavioral problems and confusion.        Physical Exam:  Physical Exam  Vitals reviewed.   Constitutional:       Appearance: Normal appearance. She is well-developed. She is not ill-appearing or diaphoretic.   HENT:      Head: Normocephalic and atraumatic.      Nose: Nose normal.   Eyes:      General: No scleral icterus.     Extraocular Movements: Extraocular movements intact.      Pupils: Pupils are equal, round, and reactive to light.   Neck:      Vascular: No JVD.   Cardiovascular:      Rate and Rhythm: Normal rate and regular rhythm.      Heart sounds: Normal heart sounds. No murmur heard.     No friction rub. No gallop.   Pulmonary:      Effort: Pulmonary effort is normal. No respiratory distress.      Breath sounds: Normal breath sounds. No wheezing or rales.   Abdominal:      General: Bowel sounds are normal. There is no distension.      Palpations:  Abdomen is soft.      Tenderness: There is no abdominal tenderness.   Musculoskeletal:         General: No deformity. Normal range of motion.      Cervical back: Normal range of motion and neck supple.      Right lower leg: No edema.      Left lower leg: No edema.   Skin:     General: Skin is warm and dry.      Findings: No rash.   Neurological:      Mental Status: She is alert and oriented to person, place, and time.      Cranial Nerves: No cranial nerve deficit.   Psychiatric:         Mood and Affect: Mood normal.         Behavior: Behavior normal.         Patient Active Problem List   Diagnosis    Essential hypertension    Chronic left shoulder pain    MENON (dyspnea on exertion)    Lichen sclerosus    Lipoma of right lower extremity    Pure hypercholesterolemia    Abnormal stress test    Paroxysmal atrial fibrillation (HCC)    Coronary artery disease involving native coronary artery of native heart without angina pectoris    Class 1 obesity due to excess calories without serious comorbidity with body mass index (BMI) of 30.0 to 30.9 in adult    Acute nonintractable headache    Baker cyst, left    Chronic pain of left knee    Bilateral primary osteoarthritis of knee    Osteopenia of multiple sites    Primary osteoarthritis of right knee    Trochanteric bursitis of right hip    Swelling of lower extremity    S/P total right hip arthroplasty    Aftercare following right knee joint replacement surgery    Contusion of right knee    Iliotibial band tendonitis of left side     Past Medical History:   Diagnosis Date    Allergic 1980    [address] tin    Arthritis 2008    Atrial fibrillation ([facility]) 5/25/2022    Benign essential hypertension     Coronary artery disease     Heart disease 2022    Afib    Hyperlipidemia, unspecified     Hypertension 2016    Ingrown toenail 2004    Irregular heart beat     Osteoarthritis of left knee     Osteoporosis     PAD (peripheral artery disease) ([facility]) 09/29/2021    Perichondritis of ear,  right     Pulmonary arterial hypertension (HCC) May 2016     Social History     Socioeconomic History    Marital status:      Spouse name: Not on file    Number of children: Not on file    Years of education: Not on file    Highest education level: Not on file   Occupational History    Not on file   Tobacco Use    Smoking status: Never    Smokeless tobacco: Never   Vaping Use    Vaping status: Never Used   Substance and Sexual Activity    Alcohol use: Yes     Alcohol/week: 5.0 standard drinks of alcohol     Types: 5 Glasses of wine per week    Drug use: Never    Sexual activity: Not Currently     Partners: Male   Other Topics Concern    Not on file   Social History Narrative    Not on file     Social Drivers of Health     Financial Resource Strain: Not on file   Food Insecurity: No Food Insecurity (10/6/2024)    Nursing - Inadequate Food Risk Classification     Worried About Running Out of Food in the Last Year: Never true     Ran Out of Food in the Last Year: Never true     Ran Out of Food in the Last Year: Not on file   Transportation Needs: No Transportation Needs (10/6/2024)    PRAPARE - Transportation     Lack of Transportation (Medical): No     Lack of Transportation (Non-Medical): No   Physical Activity: Not on file   Stress: Not on file   Social Connections: Not on file   Intimate Partner Violence: Not on file   Housing Stability: Low Risk  (10/6/2024)    Housing Stability Vital Sign     Unable to Pay for Housing in the Last Year: No     Number of Times Moved in the Last Year: 0     Homeless in the Last Year: No      Family History   Problem Relation Age of Onset    Hyperlipidemia Mother     Heart Valve Disease Mother     Hypertension Mother     Heart disease Mother     Osteoporosis Mother     Arthritis Father     Diabetes Father     Alcohol abuse Father     Prostate cancer Father 80    Diabetes Brother     Atrial fibrillation Brother     Alcohol abuse Brother     Arthritis Brother     Heart disease  Brother     Pancreatic cancer Maternal Aunt 65    No Known Problems Paternal Uncle     No Known Problems Paternal Uncle     No Known Problems Paternal Uncle     No Known Problems Paternal Uncle     Arthritis Maternal Grandmother     Arthritis Maternal Grandfather     Hypertension Daughter     Bradycardia Son     No Known Problems Son     Clotting disorder Maternal Uncle      Past Surgical History:   Procedure Laterality Date    CARDIAC CATHETERIZATION Left 07/06/2022    Procedure: Cardiac Left Heart Cath;  Surgeon: Grover Sommer MD;  Location: [facility];  Service: Cardiology    COLON SURGERY  2003    Rectoseal     FOOT SURGERY  2014    Left pinkie bone spurs    KNEE SURGERY  06/26/24    right    LAPAROSCOPIC OVARIAN CYSTECTOMY Right 1981    LAPAROSCOPIC OVARIAN CYSTECTOMY Left 2003    rectoseal and cystoseal repari    AZ ARTHRP KNE CONDYLE&PLATU MEDIAL&LAT COMPARTMENTS Right 06/26/2024    Procedure: RIGHT TOTAL KNEE ARTHROPLASTY W ROBOT;  Surgeon: Nico Buitrago MD;  Location: [facility];  Service: Orthopedics    TOENAIL EXCISION  2007 & 2019    TONSILLECTOMY AND ADENOIDECTOMY  1952    TUBAL LIGATION  1972       Current Outpatient Medications:     b complex vitamins capsule, Take 1 capsule by mouth daily, Disp: , Rfl:     CALCIUM PO, Take by mouth, Disp: , Rfl:     cholecalciferol (VITAMIN D3) 1,000 units tablet, Take 1,000 Units by mouth daily, Disp: , Rfl:     furosemide (LASIX) 20 mg tablet, Take 1 tablet (20 mg total) by mouth daily, Disp: 90 tablet, Rfl: 3    hydrocortisone 2.5 % cream, APPLY TO AFFECTED AREA(S) ON CHEST (AVOID FACE/SKIN FOLDS) TWO TIMES DAILY FOR 2 WEEKS, USE ONLY AS NEEDED WITH FLARES, Disp: , Rfl:     ketoconazole (NIZORAL) 2 % cream, APPLY TOPICALLY TWO TIMES DAILY FOR ABOUT 2 WEEKS AND THEN AS NEEDED FOR FLARES, Disp: , Rfl:     magnesium gluconate (MAGONATE) 500 mg tablet, Take 200 mg by mouth 2 (two) times a day, Disp: , Rfl:     metoprolol succinate (TOPROL-XL) 25 mg  24 hr tablet, Take 1.5 tablets (37.5 mg total) by mouth 2 (two) times a day, Disp: 135 tablet, Rfl: 3    Multiple Vitamins-Minerals (MULTIVITAMIN ADULTS PO), Take by mouth, Disp: , Rfl:     Xarelto 20 MG tablet, TAKE 1 TABLET BY MOUTH EVERY DAY WITH BREAKFAST, Disp: 30 tablet, Rfl: 5    docusate sodium (COLACE) 100 mg capsule, Take 100 mg by mouth daily (Patient not taking: Reported on 4/9/2025), Disp: , Rfl:   Allergies   Allergen Reactions    Macrodantin [Nitrofurantoin] Nausea Only, Dizziness and Headache       Labs:  Appointment on 04/08/2025   Component Date Value    Cholesterol 04/08/2025 191     Triglycerides 04/08/2025 74     HDL, Direct 04/08/2025 76     LDL Calculated 04/08/2025 100     Non-HDL-Chol (CHOL-HDL) 04/08/2025 115     WBC 04/08/2025 3.47 (L)     RBC 04/08/2025 4.76     Hemoglobin 04/08/2025 14.6     Hematocrit 04/08/2025 44.9     MCV 04/08/2025 94     MCH 04/08/2025 30.7     MCHC 04/08/2025 32.5     RDW 04/08/2025 13.9     Platelets 04/08/2025 192     MPV 04/08/2025 10.1     Sodium 04/08/2025 142     Potassium 04/08/2025 4.2     Chloride 04/08/2025 108     CO2 04/08/2025 28     ANION GAP 04/08/2025 6     BUN 04/08/2025 21     Creatinine 04/08/2025 0.72     Glucose, Fasting 04/08/2025 83     Calcium 04/08/2025 9.1     AST 04/08/2025 17     ALT 04/08/2025 12     Alkaline Phosphatase 04/08/2025 65     Total Protein 04/08/2025 6.0 (L)     Albumin 04/08/2025 3.5     Total Bilirubin 04/08/2025 0.68     eGFR 04/08/2025 78    Hospital Outpatient Visit on 12/06/2024   Component Date Value    Triscuspid Valve Regurgi* 12/06/2024 31.0     TR Peak Jeff 12/06/2024 2.8     Tricuspid valve peak reg* 12/06/2024 2.77     Baseline HR 12/06/2024 71     Baseline BP 12/06/2024 140/90     O2 sat rest 12/06/2024 98     Stress peak HR 12/06/2024 122     Post peak BP 12/06/2024 140     O2 sat peak 12/06/2024 98     Recovery HR 12/06/2024 73     Recovery BP 12/06/2024 140/90     O2 sat recovery 12/06/2024 98     Max HR  12/06/2024 122     Max HR Percent 12/06/2024 87     Exercise duration (min) 12/06/2024 3     Exercise duration (sec) 12/06/2024 10     Estimated workload 12/06/2024 4.8     Rate Pressure Product 12/06/2024 17,080.0     Angina Index 12/06/2024 0     LV EF 12/06/2024 60     Protocol Name 12/06/2024 ANGEL     Exercise duration (min) 12/06/2024 3     Exercise duration (sec) 12/06/2024 10     Post Peak Systolic BP 12/06/2024 152     Max Diastolic Bp 12/06/2024 92     Peak HR 12/06/2024 122     Max Predicted Heart Rate 12/06/2024 139     Reason for Termination 12/06/2024 Dyspnea     Test Indication 12/06/2024 Dyspnea with Exercise     Target Hr Formular 12/06/2024 (220 - Age)*100%     Chest Pain Statement 12/06/2024 none      Lab Results   Component Value Date    TRIG 74 04/08/2025    HDL 76 04/08/2025     Imaging: No results found.

## 2025-04-09 NOTE — ASSESSMENT & PLAN NOTE
in Nov 2020, which is at goal - repeat levels in Oct 2021 revealed LDL of 113.    LDL 95 in June 2022.    Repeat  in April 2023.    LDL remains well-controlled at 110 in February 2024.  Repeat levels revealed an LDL of 100 and April 2025

## 2025-04-09 NOTE — ASSESSMENT & PLAN NOTE
noted on stress testing and LHC revealed 10% pRCA lesion.    Continued on Xarelto for AC and Toprol XL for rate control, dose of which has been increased for improved rate control to 25mg BID.    Now appears to be having more frequent episodes of afib that are symptomatic and was seen by EP for further evaluation regarding rhythm management.    There was recommendation regarding flecainide as pill in pocket for the next few months until a possible hospital admission for dofetilide or sotalol therapy for rhythm control.    Considering that her symptoms were less, she held off on further rhythm control strategy for the time being.    Echocardiogram repeated in September 2024 revealing normal LV size and function with biatrial enlargement, moderate mitral regurgitation, moderate tricuspid regurgitation  Zio monitor was also completed in October 2024 revealing no further atrial fibrillation burden, symptoms correlating with supraventricular and ventricular ectopy with an overall normal ectopy burden  Continue current regimen, no changes required today  Maintaining sinus rhythm on EKG today

## 2025-04-09 NOTE — ASSESSMENT & PLAN NOTE
remains very well controlled today, continued on metoprolol.    Had a nuclear stress test in Oct 2020 that was normal for EF and perfusion.    Echocardiogram at that time (and on repeat in May 2022) revealed normal LV & RV function, normal valves other than mild to moderate MR, moderate TR.    She has had MENON for many years - also noted by prior cardiologist documentation in 2017.    No changes made today, continue current regiment.  No additional testing required currently

## 2025-04-09 NOTE — ASSESSMENT & PLAN NOTE
LHC revealed 10% pRCA lesion in July 2022.    Continued on beta-blocker  No change in symptoms to warrant additional testing currently

## 2025-04-10 ENCOUNTER — OFFICE VISIT (OUTPATIENT)
Dept: PHYSICAL THERAPY | Facility: CLINIC | Age: 85
End: 2025-04-10
Payer: COMMERCIAL

## 2025-04-10 DIAGNOSIS — M25.551 PAIN IN RIGHT HIP: ICD-10-CM

## 2025-04-10 DIAGNOSIS — M70.61 TROCHANTERIC BURSITIS OF RIGHT HIP: Primary | ICD-10-CM

## 2025-04-10 PROCEDURE — 97112 NEUROMUSCULAR REEDUCATION: CPT

## 2025-04-10 PROCEDURE — 97110 THERAPEUTIC EXERCISES: CPT

## 2025-04-10 PROCEDURE — 97530 THERAPEUTIC ACTIVITIES: CPT

## 2025-04-10 NOTE — PROGRESS NOTES
"Daily Note     Today's date: 4/10/2025  Patient name: Mikayla Breaux  : 1940  MRN: [id number]  Referring provider: Nico Buitrago,*  Dx:   Encounter Diagnosis     ICD-10-CM    1. Trochanteric bursitis of right hip  M70.61       2. Pain in right hip  M25.551                      Subjective: Patient states she had some knee pain after last session that she believes was unrelated to the PT session.      Objective: See treatment diary below      Assessment: Tolerated treatment well. Modified patient's program to include more exercises targeted toward lateral hip muscles and quad. Patient had more difficulty with R sided exercises compared to L as expected. Patient would benefit from continued PT      Plan: Continue per plan of care.      Precautions: HTN, CAD, s/p R TKA 24      Diagnosis:    Precautions:    POC expiration date: 2025   [employer] Access Code: [email or web address]    *asterisks by exercise = given for HEP   Visit Count 6 7 3 4 5   Manuals 4/8 4/10 3/28 4/1 4/3   Hip PROM        Hip STM prn   AA  AA                           There Ex        Active warmup R. Bike 5' L1 R. Bike 5' L2 Nustep 6' L2 Nustep 6' L3 Nustep 5' L3   DKTC   NV 1.5' x 3 sec hold 1.5  x 3 sec hold   ITB stretch   10x5\"  (supine) 10x5\" (supine) 10X10\" (supine)   Hip extension 2.5 x10 (shama) 2.5 x10ea ([place]) 2# 2x10 2# 2x10ea 2# 2x10ea   Hip abduction 2.5 x10 (shama) 2.5 x10ea  ([place]) 2# 2x10 2# 2x10ea 2# 2x10ea   Seated marching   2# 2x10 2# 2x10ea 2# 2x10ea   Piriformis stretch        Hip IR/ER RTB 2x10ea RTB 2x10 ER   RTB 2x10ea   Hip hike    x10    Leg extension  33# x10; 22# x10      Hamstring stretch  10x10\" seated)      Leg press 50# 2x10 50# 2x10      Lateral walks  W/paloff press 6.5#  4 laps    Neuro Re-Ed        Hip add iso   30x3\" 20x3\" 20x3\"   Hip abd iso   20x3\"     Supine bridge 2x10 w/belt 2x10 w/belt 2x10 w/belt 2x10 w/belt 2x10 w/belt   SLR Supine SLR+abd x15; 2.5# shama x10ea 2x10 2x10 2x10 SLR+ abd " "2x10   Hip ER iso   15x5\" 15x5\"    SLS        Squats        Clamshells   NV S/L iso 20x3\" S/L iso 20x3\"   Steamboats                Re-evaluation              Ther Act             Step ups  8\" 2x10   Lateral  6\" 2x10 RLE Lateral 6\" 2x10 RLE     Sit to Stands    2X8 2x8   Hurdles Orange lateral 3 laps  `  [place]ange lateral 3 laps   Squats  w/belt 2x10           Modalities                                                                    "

## 2025-04-15 ENCOUNTER — OFFICE VISIT (OUTPATIENT)
Dept: PHYSICAL THERAPY | Facility: CLINIC | Age: 85
End: 2025-04-15
Payer: COMMERCIAL

## 2025-04-15 DIAGNOSIS — M70.61 TROCHANTERIC BURSITIS OF RIGHT HIP: Primary | ICD-10-CM

## 2025-04-15 DIAGNOSIS — M25.551 PAIN IN RIGHT HIP: ICD-10-CM

## 2025-04-15 PROCEDURE — 97110 THERAPEUTIC EXERCISES: CPT

## 2025-04-15 PROCEDURE — 97112 NEUROMUSCULAR REEDUCATION: CPT

## 2025-04-15 PROCEDURE — 97530 THERAPEUTIC ACTIVITIES: CPT

## 2025-04-15 NOTE — PROGRESS NOTES
"Daily Note     Today's date: 4/15/2025  Patient name: Mikayla Breaux  : 1940  MRN: [id number]  Referring provider: Nico Buitrago,*  Dx:   Encounter Diagnosis     ICD-10-CM    1. Trochanteric bursitis of right hip  M70.61       2. Pain in right hip  M25.551                      Subjective: Patient reports going up stairs has been easier and pain in hip has been minimal to none. She feels like she is doing good and ready to be discharged. Patient reports her shoulders have been bothering her so she may be back.       Objective: See treatment diary below      Assessment: Tolerated treatment well. Patient did well with exercises. Able to make some progressions without any adverse response. Patient would benefit from continued PT      Plan: Potential discharge next visit.     Precautions: HTN, CAD, s/p R TKA 24      Diagnosis:    Precautions:    POC expiration date: 2025   [employer] Access Code: [email or web address]    *asterisks by exercise = given for HEP   Visit Count 6 7 8 4 5   Manuals 4/8 4/10 4/15 4/1 4/3   Hip PROM        Hip STM prn     AA                           There Ex        Active warmup R. Bike 5' L1 R. Bike 5' L2 Nustep 6' L2 Nustep 6' L3 Nustep 5' L3   DKTC    1.5' x 3 sec hold 1.5  x 3 sec hold   ITB stretch    10x5\" (supine) 10X10\" (supine)   Hip extension 2.5 x10 (shama) 2.5 x10ea ([place]) 4.5# x10ea 2# 2x10ea 2# 2x10ea   Hip abduction 2.5 x10 (shama) 2.5 x10ea  ([place]) 2.5# x10ea 2# 2x10ea 2# 2x10ea   Seated marching    2# 2x10ea 2# 2x10ea   Piriformis stretch        Hip IR/ER RTB 2x10ea RTB 2x10 ER RTB 2x10ea ER/IR  RTB 2x10ea   Hip hike    x10    Leg extension  33# x10; 22# x10 22# 2x10     Hamstring curl   22# 2x10     Hamstring stretch  10x10\" seated)      Leg press 50# 2x10 50# 2x10 50# 2x10     Lateral walks  W/paloff press 6.5#  4 laps    Neuro Re-Ed        Hip add iso    20x3\" 20x3\"   Hip abd iso        Supine bridge 2x10 w/belt 2x10 w/belt 2x10  2x10 w/belt 2x10 w/belt " "  SLR Supine SLR+abd x15; 2.5# ed x10ea 2x10 Supine SLR+abd x15;  2.5# ed x10ea 2x10 SLR+ abd 2x10   Hip ER iso    15x5\"    SLS        Squats        Clamshells    S/L iso 20x3\" S/L iso 20x3\"   Steamboats                Re-evaluation              Ther Act             Step ups  8\" 2x10   8\" 2x10 Lateral 6\" 2x10 RLE     Sit to Stands    2X8 2x8   Hurdles Orange lateral 3 laps  `Orange lateral 3 laps  Orange lateral 3 laps   Squats w/belt 2x10   GTB 2x10       Modalities                                                                      "

## 2025-04-15 NOTE — PROGRESS NOTES
PT DISCHARGE    Today's date: 25  Patient name: Mikayla Breaux  : 1940  MRN: [id number]  Referring provider: Nico Buitrago,*  Dx:   1. Trochanteric bursitis of right hip    2. Pain in right hip        ASSESSMENT:  Mikayla Breaux is a 84 y.o. female who presents with signs and symptoms consistent with referring diagnosis. Patient presents with mild improvements in strength, pain, and activity tolerance. However, patient remains limited secondary to pain. Patient reports some continued pain with sleeping on either side although duration has improved. Patient subjectively reports improvement in navigating stairs. Patient has met most her short term and long term goals and would like to transition to an HEP at this time. Therapist provided patient with a comprehensive program focused on hip abduction strengthening. Therapist answered all of patient's questions and concerns prior to discharge.        Goals:    STG (to be met in 4 weeks)  Patient will be independent with basic HEP for self-management. - MET  Patient will improve hip strength by 1/2 MMT grade in all deficient planes. - MET  Patient will report decreased pain by 25% at its worst. MET    LTG (to be met by discharge):  Patient will be independent with comprehensive HEP for maintenance after discharge. MET  Patient will improve FOTO score to equal to or above predicted value.  MET  Patient will be able to sleep on R side without minimal to no pain (</= 2/10). NOT MET  Patient will be able to participate in exercise class without restriction. D/C (patient no longer wants to attend due to other reasons)  Patient will be able to navigate stairs with reciprocal gait without pain/restriction.  MET  Patient will be able to walk at least 1 mile without restriction.  PARTIALLY MET (able to walk a mile with some knee and hip pain 3/4 of the way).      Planned interventions:  home exercise program    Duration in visits:  N/A - DC  Frequency: N/A  - DC  Duration in weeks:  N/A - DC  POC expiration: 5/1/2025    History of Current Injury:   Discharge: Patient states she was able to do some gardening which went well however she is feeling a little sore in the muscles, particularly the quad. Took a mile walk yesterday with some pain in the knee and hip 3/4 of the way. She states she will take her cane for long walks for once she gets tired. Patient states she is able to sleep on her sides for about 30 minutes before hip starts to bother her. Stairs are much better.     Patient states she had this pain years ago and responded well to injection. This recent bout started in February, right before her trip to [place] [facility] [place] [facility]". She couldn't get a doctor's appt so she took her cane with her. She ended up doing a lot of walking on the trip which she feels like might've aggravated the hip further. She also had a fainting spell on the trip that she attributes to different time zones and medication. She ended up slipping (Daughter caught her) but her hips were in a really flexed position. When she got back, she saw Dr. Buitrago who gave her an injection. Patient reports having relief for only 2 days. During her most recent visit, she was provided a prednisone taper which has helped. Symptoms have settled down but she still has pain at the lateral and anterolateral hip. Denies low back pain. Denies numbness/tingling. Aggravating factors include sleeping on R side, going up stairs, and prolonged walking/standing.     Patient also reports difficulty participating in exercise class, particularly when she does seated marches + hip abd.     Pain location: Lateral hip  Pain descriptors: Dull ache  Pain at Currently:  1/10  Pain at Best:  0/10  Pain at Worst:  3-4/10      Aggravating factors: sleeping on R side, stairs, prolonged walking/standing  Easing factors: cortisone injection 3/7/25, ice, rest, tylenol    Imaging:   Copied from X-Ray report on 3/12/25:  Narrative & Impression        "  RIGHT HIP     INDICATION:   Trochanteric bursitis, right hip.      COMPARISON:  None.     VIEWS:  XR HIP/PELV 2-3 VWS RIGHT W PELVIS IF PERFORMED      FINDINGS:     There is no acute fracture or dislocation.     No significant hip degenerative changes.     No lytic or blastic osseous lesion.     Soft tissues are unremarkable.     Degenerative changes visualized lower lumbar spine.  Degenerative changes of the pubic symphysis     IMPRESSION:        No acute osseous abnormality of the right hip.  Preserved hip joint space.  No evidence of abnormal calcific density adjacent to greater trochanter to indicate calcific trochanteric bursitis radiographically.     Special Questions: N/A      Hobbies/Interests: walking, workout (elliptical, bike), garden  Occupation: retired  Patient goals: Patient reports goals for physical therapy would be increased strength and return to recreation        Objective     Palpation     Additional Palpation Details  (+)  mild tenderness at R greater troch    Active Range of Motion     Right Hip   Flexion: WFL  Abduction: WFL    Passive Range of Motion     Right Hip   Flexion: WFL  Abduction: WFL    Strength/Myotome Testing     Right Hip   Planes of Motion   Flexion: 4+  Abduction: 4+  External rotation: 4+    Left Knee   Flexion: 5  Extension: 5    Right Knee   Flexion: 5  Extension: 5    Tests     Left Hip   Negative CONRAD and FADIR.     Right Hip   Positive CONRAD.   Negative FADIR.             Precautions: HTN, CAD, s/p R TKA 6/24/24      Diagnosis:    Precautions:    POC expiration date: 5/1/2025   [employer] Access Code: [email or web address]    *asterisks by exercise = given for HEP   Visit Count 6 7 8 4 5   Manuals 4/8 4/10 4/18 4/1 4/3   Hip PROM        Hip STM prn     AA                           There Ex        Active warmup R. Bike 5' L1 R. Bike 5' L2 Nustep 6' L2 Nustep 6' L3 Nustep 5' L3   DKTC    1.5' x 3 sec hold 1.5  x 3 sec hold   ITB stretch   HEP 10x5\" (supine) 10X10\" (supine)   Hip " "extension 2.5 x10 (shama) 2.5 x10ea (Shama) 5# x10ea HEP 2# 2x10ea 2# 2x10ea   Hip abduction 2.5 x10 (shama) 2.5 x10ea  (Shama) 2.5 x10ea HEP 2# 2x10ea 2# 2x10ea   Seated marching    2# 2x10ea 2# 2x10ea   Piriformis stretch        Hip IR/ER RTB 2x10ea RTB 2x10 ER   RTB 2x10ea   Hip hike    x10    Leg extension  33# x10; 22# x10 22# 2x10     Hamstring stretch  10x10\" seated)      Leg press 50# 2x10 50# 2x10 50# 2x10     Fire hydrants   HEP     Lateral walks  W/paloff press 6.5# HEP 4 laps    Neuro Re-Ed        Hip add iso    20x3\" 20x3\"   Hip abd iso        Supine bridge 2x10 w/belt 2x10 w/belt  2x10 w/belt 2x10 w/belt   SLR Supine SLR+abd x15; 2.5# shama x10ea 2x10 2.5# x10ea 2x10 SLR+ abd 2x10   Hip ER iso    15x5\"    SLS        Squats        Clamshells    S/L iso 20x3\" S/L iso 20x3\"   Steamboats                Re-evaluation             Ther Act            Step ups  8\" 2x10   Lateral HEP Lateral 6\" 2x10 RLE     Sit to Stands    2X8 2x8   Hurdles Orange lateral 3 laps  `  Orange lateral 3 laps   Squats  w/belt 2x10    HEP       Modalities                                                                      "

## 2025-04-18 ENCOUNTER — OFFICE VISIT (OUTPATIENT)
Dept: PHYSICAL THERAPY | Facility: CLINIC | Age: 85
End: 2025-04-18
Payer: COMMERCIAL

## 2025-04-18 DIAGNOSIS — M70.61 TROCHANTERIC BURSITIS OF RIGHT HIP: Primary | ICD-10-CM

## 2025-04-18 DIAGNOSIS — M25.551 PAIN IN RIGHT HIP: ICD-10-CM

## 2025-04-18 DIAGNOSIS — I48.0 PAROXYSMAL ATRIAL FIBRILLATION (HCC): ICD-10-CM

## 2025-04-18 PROCEDURE — 97110 THERAPEUTIC EXERCISES: CPT

## 2025-04-18 PROCEDURE — 97112 NEUROMUSCULAR REEDUCATION: CPT

## 2025-04-18 RX ORDER — RIVAROXABAN 20 MG/1
20 TABLET, FILM COATED ORAL
Qty: 30 TABLET | Refills: 5 | Status: SHIPPED | OUTPATIENT
Start: 2025-04-18

## 2025-04-23 ENCOUNTER — OFFICE VISIT (OUTPATIENT)
Dept: OBGYN CLINIC | Facility: HOSPITAL | Age: 85
End: 2025-04-23
Payer: COMMERCIAL

## 2025-04-23 VITALS — HEIGHT: 65 IN | WEIGHT: 204 LBS | BODY MASS INDEX: 33.99 KG/M2

## 2025-04-23 DIAGNOSIS — M25.551 PAIN IN RIGHT HIP: Primary | ICD-10-CM

## 2025-04-23 DIAGNOSIS — M16.11 PRIMARY OSTEOARTHRITIS OF ONE HIP, RIGHT: ICD-10-CM

## 2025-04-23 DIAGNOSIS — M25.551 PAIN IN RIGHT HIP: ICD-10-CM

## 2025-04-23 DIAGNOSIS — M16.11 PRIMARY OSTEOARTHRITIS OF ONE HIP, RIGHT: Primary | ICD-10-CM

## 2025-04-23 PROCEDURE — 99213 OFFICE O/P EST LOW 20 MIN: CPT | Performed by: ORTHOPAEDIC SURGERY

## 2025-04-23 NOTE — PROGRESS NOTES
Encounter Provider: Nico Buitrago MD   Encounter Date: 04/23/25  Encounter department: [facility] [place]         ASSESSMENT & PLAN:  Assessment & Plan  Pain in right hip  See other diagnoses     Primary osteoarthritis of one hip, right  Current symptoms of right lateral and anterior groin pain  On exam patient has increase of right groin pain with flexion  Patient to schedule right IA hip steroid injection under imaging  Follow up in 4-6 weeks            To do next visit:  Return in about 6 weeks (around 6/4/2025).    Scribe Attestation      I,:  Law Chandra MA am acting as a scribe while in the presence of the attending physician.:       I,:  Nico Buitrago MD personally performed the services described in this documentation    as scribed in my presence.:             ____________________________________________________  CHIEF COMPLAINT:  Chief Complaint   Patient presents with    Right Hip - Follow-up         SUBJECTIVE:  Mikayla Breaux is a 84 y.o. female who presents or follow up of right hip.  She did have brief window of relief following medrol dose pack.  Today she complains of right lateral > anterior hip pain with occasional right knee pain.   Weight bearing aggravates while sitting alleviates.   She does use SPC for ambulation.  She does participate in physical therapy            PAST MEDICAL HISTORY:  Past Medical History:   Diagnosis Date    Allergic 1980    [address] tin    Arthritis 2008    Atrial fibrillation ([facility]) 5/25/2022    Benign essential hypertension     Coronary artery disease     Heart disease 2022    Afib    Hyperlipidemia, unspecified     Hypertension 2016    Ingrown toenail 2004    Irregular heart beat     Osteoarthritis of left knee     Osteoporosis     PAD (peripheral artery disease) ([facility]) 09/29/2021    Perichondritis of ear, right     Pulmonary arterial hypertension ([facility]) May 2016       PAST SURGICAL HISTORY:  Past Surgical History:    Procedure Laterality Date    CARDIAC CATHETERIZATION Left 07/06/2022    Procedure: Cardiac Left Heart Cath;  Surgeon: Grover Sommer MD;  [facility];  Service: Cardiology    COLON SURGERY  2003    Rectoseal     FOOT SURGERY  2014    Left pinkie bone spurs    KNEE SURGERY  06/26/24    right    LAPAROSCOPIC OVARIAN CYSTECTOMY Right 1981    LAPAROSCOPIC OVARIAN CYSTECTOMY Left 2003    rectoseal and cystoseal repari    OR ARTHRP KNE CONDYLE&PLATU MEDIAL&LAT COMPARTMENTS Right 06/26/2024    Procedure: RIGHT TOTAL KNEE ARTHROPLASTY W ROBOT;  Surgeon: Nico Buitrago MD;  Location: [facility];  Service: Orthopedics    TOENAIL EXCISION  2007 & 2019    TONSILLECTOMY AND ADENOIDECTOMY  1952    TUBAL LIGATION  1972       FAMILY HISTORY:  Family History   Problem Relation Age of Onset    Hyperlipidemia Mother     Heart Valve Disease Mother     Hypertension Mother     Heart disease Mother     Osteoporosis Mother     Arthritis Father     Diabetes Father     Alcohol abuse Father     Prostate cancer Father 80    Diabetes Brother     Atrial fibrillation Brother     Alcohol abuse Brother     Arthritis Brother     Heart disease Brother     Pancreatic cancer Maternal Aunt 65    No Known Problems Paternal Uncle     No Known Problems Paternal Uncle     No Known Problems Paternal Uncle     No Known Problems Paternal Uncle     Arthritis Maternal Grandmother     Arthritis Maternal Grandfather     Hypertension Daughter     Bradycardia Son     No Known Problems Son     Clotting disorder Maternal Uncle        SOCIAL HISTORY:  Social History     Tobacco Use    Smoking status: Never    Smokeless tobacco: Never   Vaping Use    Vaping status: Never Used   Substance Use Topics    Alcohol use: Yes     Alcohol/week: 5.0 standard drinks of alcohol     Types: 5 Glasses of wine per week    Drug use: Never       MEDICATIONS:    Current Outpatient Medications:     b complex vitamins capsule, Take 1 capsule by mouth daily,  "Disp: , Rfl:     CALCIUM PO, Take by mouth, Disp: , Rfl:     cholecalciferol (VITAMIN D3) 1,000 units tablet, Take 1,000 Units by mouth daily, Disp: , Rfl:     docusate sodium (COLACE) 100 mg capsule, Take 100 mg by mouth daily (Patient not taking: Reported on 4/9/2025), Disp: , Rfl:     furosemide (LASIX) 20 mg tablet, Take 1 tablet (20 mg total) by mouth daily, Disp: 90 tablet, Rfl: 3    hydrocortisone 2.5 % cream, APPLY TO AFFECTED AREA(S) ON CHEST (AVOID FACE/SKIN FOLDS) TWO TIMES DAILY FOR 2 WEEKS, USE ONLY AS NEEDED WITH FLARES, Disp: , Rfl:     ketoconazole (NIZORAL) 2 % cream, APPLY TOPICALLY TWO TIMES DAILY FOR ABOUT 2 WEEKS AND THEN AS NEEDED FOR FLARES, Disp: , Rfl:     magnesium gluconate (MAGONATE) 500 mg tablet, Take 200 mg by mouth 2 (two) times a day, Disp: , Rfl:     metoprolol succinate (TOPROL-XL) 25 mg 24 hr tablet, Take 1.5 tablets (37.5 mg total) by mouth 2 (two) times a day, Disp: 135 tablet, Rfl: 3    Multiple Vitamins-Minerals (MULTIVITAMIN ADULTS PO), Take by mouth, Disp: , Rfl:     Xarelto 20 MG tablet, TAKE 1 TABLET BY MOUTH EVERY DAY WITH BREAKFAST, Disp: 30 tablet, Rfl: 5    ALLERGIES:  Allergies   Allergen Reactions    Macrodantin [Nitrofurantoin] Nausea Only, Dizziness and Headache       LABS:  HgA1c:   Lab Results   Component Value Date    HGBA1C 5.6 06/05/2024     BMP:   Lab Results   Component Value Date    GLUCOSE 88 09/06/2024    CALCIUM 9.1 04/08/2025    K 4.2 04/08/2025    CO2 28 04/08/2025     04/08/2025    BUN 21 04/08/2025    CREATININE 0.72 04/08/2025     CBC: No components found for: \"CBC\"    _____________________________________________________  PHYSICAL EXAMINATION:  Vital signs: Ht 5' 5\" (1.651 m)   Wt 92.5 kg (204 lb)   BMI 33.95 kg/m²   General: No acute distress, awake and alert  Psychiatric: Mood and affect appear appropriate  HEENT: Trachea Midline, No torticollis, no apparent facial trauma  Cardiovascular: No audible murmurs; Extremities appear " "perfused  Pulmonary: No audible wheezing or stridor  Skin: No open lesions; see further details (if any) below    Ortho Exam:  Right hip:  Groin pain with flexion and IR  Patient sits comfortably in chair with hip flexed at 90 degrees   Calf compartments soft and supple  Sensation intact  Toes are warm sensate and mobile          _____________________________________________________  STUDIES REVIEWED:    None performed     PROCEDURES PERFORMED:  Procedures      None Preformed       Portions of the record may have been created with voice recognition software.  Occasional wrong word or \"sound a like\" substitutions may have occurred due to the inherent limitations of voice recognition software.  Read the chart carefully and recognize, using context, where substitutions have occurred.        "

## 2025-04-29 ENCOUNTER — TELEPHONE (OUTPATIENT)
Dept: NON INVASIVE DIAGNOSTICS | Facility: HOSPITAL | Age: 85
End: 2025-04-29

## 2025-04-29 NOTE — TELEPHONE ENCOUNTER
Call placed to patient to discuss upcoming right hip corticosteroid injection at [facility]. Allergies reviewed. Verified patient currently takes xarelto, but not required to stop per protocol. Pre procedure instructions including diet and taking own medications discussed with patient. Patient instructed that she may eat normally and take medications including xarelto as usual before the procedure. Procedure and post procedure expectations and instructions reviewed with the patient. Patient verbalizes understanding and denies any questions at this time. Reminded of the location, date and time of the expected procedure. Name and contact number provided in case patient has any further questions.

## 2025-05-02 ENCOUNTER — HOSPITAL ENCOUNTER (OUTPATIENT)
Dept: RADIOLOGY | Facility: HOSPITAL | Age: 85
Discharge: HOME | End: 2025-05-02
Attending: ORTHOPAEDIC SURGERY
Payer: COMMERCIAL

## 2025-05-02 DIAGNOSIS — M25.551 PAIN IN RIGHT HIP: ICD-10-CM

## 2025-05-02 DIAGNOSIS — M16.11 PRIMARY OSTEOARTHRITIS OF ONE HIP, RIGHT: ICD-10-CM

## 2025-05-02 PROCEDURE — 20610 DRAIN/INJ JOINT/BURSA W/O US: CPT

## 2025-05-02 PROCEDURE — 77002 NEEDLE LOCALIZATION BY XRAY: CPT

## 2025-05-02 RX ORDER — METHYLPREDNISOLONE ACETATE 80 MG/ML
80 INJECTION, SUSPENSION INTRA-ARTICULAR; INTRALESIONAL; INTRAMUSCULAR; SOFT TISSUE
Status: COMPLETED | OUTPATIENT
Start: 2025-05-02 | End: 2025-05-02

## 2025-05-02 RX ORDER — LIDOCAINE HYDROCHLORIDE 10 MG/ML
10 INJECTION, SOLUTION EPIDURAL; INFILTRATION; INTRACAUDAL; PERINEURAL
Status: COMPLETED | OUTPATIENT
Start: 2025-05-02 | End: 2025-05-02

## 2025-05-02 RX ORDER — ROPIVACAINE HYDROCHLORIDE 2 MG/ML
10 INJECTION, SOLUTION EPIDURAL; INFILTRATION; PERINEURAL
Status: DISCONTINUED | OUTPATIENT
Start: 2025-05-02 | End: 2025-05-03 | Stop reason: HOSPADM

## 2025-05-02 RX ADMIN — IOHEXOL 1 ML: 300 INJECTION, SOLUTION INTRAVENOUS at 14:01

## 2025-05-02 RX ADMIN — LIDOCAINE HYDROCHLORIDE 5 ML: 10 INJECTION, SOLUTION EPIDURAL; INFILTRATION; INTRACAUDAL at 14:01

## 2025-05-02 RX ADMIN — ROPIVACAINE HYDROCHLORIDE 2 ML: 2 INJECTION, SOLUTION EPIDURAL; INFILTRATION at 14:02

## 2025-05-02 RX ADMIN — METHYLPREDNISOLONE ACETATE 80 MG: 80 INJECTION, SUSPENSION INTRA-ARTICULAR; INTRALESIONAL; INTRAMUSCULAR; SOFT TISSUE at 14:02

## 2025-05-03 DIAGNOSIS — I48.0 PAROXYSMAL ATRIAL FIBRILLATION (HCC): ICD-10-CM

## 2025-05-05 RX ORDER — METOPROLOL SUCCINATE 25 MG/1
37.5 TABLET, EXTENDED RELEASE ORAL 2 TIMES DAILY
Qty: 135 TABLET | Refills: 3 | Status: SHIPPED | OUTPATIENT
Start: 2025-05-05

## 2025-06-13 ENCOUNTER — OFFICE VISIT (OUTPATIENT)
Dept: OBGYN CLINIC | Facility: MEDICAL CENTER | Age: 85
End: 2025-06-13
Payer: COMMERCIAL

## 2025-06-13 ENCOUNTER — APPOINTMENT (OUTPATIENT)
Dept: RADIOLOGY | Facility: MEDICAL CENTER | Age: 85
End: 2025-06-13
Attending: ORTHOPAEDIC SURGERY
Payer: COMMERCIAL

## 2025-06-13 VITALS — WEIGHT: 203 LBS | BODY MASS INDEX: 33.82 KG/M2 | HEIGHT: 65 IN

## 2025-06-13 DIAGNOSIS — Z47.1 AFTERCARE FOLLOWING RIGHT KNEE JOINT REPLACEMENT SURGERY: Primary | ICD-10-CM

## 2025-06-13 DIAGNOSIS — Z47.1 AFTERCARE FOLLOWING RIGHT KNEE JOINT REPLACEMENT SURGERY: ICD-10-CM

## 2025-06-13 DIAGNOSIS — Z96.651 HISTORY OF TOTAL KNEE REPLACEMENT, RIGHT: ICD-10-CM

## 2025-06-13 DIAGNOSIS — Z96.651 AFTERCARE FOLLOWING RIGHT KNEE JOINT REPLACEMENT SURGERY: Primary | ICD-10-CM

## 2025-06-13 DIAGNOSIS — M16.11 PRIMARY OSTEOARTHRITIS OF RIGHT HIP: ICD-10-CM

## 2025-06-13 DIAGNOSIS — Z96.651 AFTERCARE FOLLOWING RIGHT KNEE JOINT REPLACEMENT SURGERY: ICD-10-CM

## 2025-06-13 DIAGNOSIS — M25.551 RIGHT HIP PAIN: ICD-10-CM

## 2025-06-13 PROCEDURE — 73560 X-RAY EXAM OF KNEE 1 OR 2: CPT

## 2025-06-13 PROCEDURE — 99213 OFFICE O/P EST LOW 20 MIN: CPT | Performed by: ORTHOPAEDIC SURGERY

## 2025-06-13 NOTE — ASSESSMENT & PLAN NOTE
1 year following a right total knee replacement, this patient has a well clinic radiographic exam.  Ad ila. use of the right knee is allowed, dental antibiotic prophylaxis for the next 12 months are recommended, and office follow-up on an as-needed basis is my final recommendation

## 2025-06-13 NOTE — ASSESSMENT & PLAN NOTE
This diagnosis discussed the patient.  Treatment option including risk, benefits, terms discussed in detail.  Patient wishes to take a watch and wait approach, and office follow-up on an as-needed basis is my final recommendation

## 2025-06-13 NOTE — PROGRESS NOTES
Name: Mikayla Breaux      : 1940       MRN: [id number]   Encounter Provider: Nico Buitrago MD   Encounter Date: 25  Encounter department: [facility] [place]     ASSESSMENT & PLAN:  Assessment & Plan  Aftercare following right knee joint replacement surgery    Orders:    XR knee 1 or 2 vw right; Future    History of total knee replacement, right  1 year following a right total knee replacement, this patient has a well clinic radiographic exam.  Ad ila. use of the right knee is allowed, dental antibiotic prophylaxis for the next 12 months are recommended, and office follow-up on an as-needed basis is my final recommendation       Right hip pain         Primary osteoarthritis of right hip  This diagnosis discussed the patient.  Treatment option including risk, benefits, terms discussed in detail.  Patient wishes to take a watch and wait approach, and office follow-up on an as-needed basis is my final recommendation            To do next visit:  No follow-ups on file.    _____________________________________________________  CHIEF COMPLAINT:  Chief Complaint   Patient presents with    Right Knee - Post-op    Right Hip - Follow-up         SUBJECTIVE:  Mikayla Breaux is a 84 y.o. female who presents for 1 year following right total knee replacement.  She describes ongoing relief for the preprocedure weightbearing pain she had been experiencing in her right knee.  She does admit that she has persistence of right hip and groin pain that is worse with rotation, and difficulty putting on shoe wear on the right side recent injection of corticosteroid has resulted some temporization of her right hip and groin pain        PAST MEDICAL HISTORY:  Past Medical History[1]    PAST SURGICAL HISTORY:  Past Surgical History[2]    FAMILY HISTORY:  Family History[3]    SOCIAL HISTORY:  Social History[4]    MEDICATIONS:  Current  "Medications[5]    ALLERGIES:  Allergies[6]    LABS:  HgA1c:   Lab Results   Component Value Date    HGBA1C 5.6 06/05/2024     BMP:   Lab Results   Component Value Date    GLUCOSE 88 09/06/2024    CALCIUM 9.1 04/08/2025    K 4.2 04/08/2025    CO2 28 04/08/2025     04/08/2025    BUN 21 04/08/2025    CREATININE 0.72 04/08/2025     CBC: No components found for: \"CBC\"    _____________________________________________________  PHYSICAL EXAMINATION:  Vital signs: Ht 5' 5\" (1.651 m)   Wt 92.1 kg (203 lb)   BMI 33.78 kg/m²   General: No acute distress, awake and alert  Psychiatric: Mood and affect appear appropriate  HEENT: Trachea Midline, No torticollis, no apparent facial trauma  Cardiovascular: No audible murmurs; Extremities appear perfused  Pulmonary: No audible wheezing or stridor  Skin: No open lesions; see further details (if any) below    MUSCULOSKELETAL EXAMINATION:  Ortho Exam  With slight coxa antalgia.  Right hip has intact soft tissue envelope.  There is restriction of external rotation as this recreates groin pain.  Right thigh is devoid of atrophy right knee is healed anterior scar.  The knee is neutral alignment.  The knee extends fully and flexes well.  There is no mid flexion valgus instability present no tenderness the right calf    ___________________________________________________  STUDIES REVIEWED:  I personally reviewed the images obtained in office today and my independent interpretation is as follows:    I have personally reviewed x-rays of the right knee from today my interpretation follows:  2 views of the right knee show total knee replacement satisfactory position.  A single screw secures the distal femur  Review of previous x-rays of the right hip suggest moderate arthritis in the right hip      PROCEDURES PERFORMED:    Procedures    None preformed       Nico Buitrago MD       [1]   Past Medical History:  Diagnosis Date    Allergic [address]    [address] tin    Arthritis 2008    " Atrial fibrillation ([facility]) 5/25/2022    Benign essential hypertension     Coronary artery disease     Heart disease 2022    Afib    Hyperlipidemia, unspecified     Hypertension 2016    Ingrown toenail 2004    Irregular heart beat     Osteoarthritis of left knee     Osteoporosis     PAD (peripheral artery disease) ([facility]) 09/29/2021    Perichondritis of ear, right     Pulmonary arterial hypertension ([facility]) May 2016   [2]   Past Surgical History:  Procedure Laterality Date    CARDIAC CATHETERIZATION Left 07/06/2022    Procedure: Cardiac Left Heart Cath;  Surgeon: Grover Sommer MD;  [facility];  Service: Cardiology    COLON SURGERY  2003    Rectoseal     FL INJECTION RIGHT HIP (NON ARTHROGRAM)  5/2/2025    FOOT SURGERY  2014    Left pinkie bone spurs    KNEE SURGERY  06/26/24    right    LAPAROSCOPIC OVARIAN CYSTECTOMY Right 1981    LAPAROSCOPIC OVARIAN CYSTECTOMY Left 2003    rectoseal and cystoseal repari    TN ARTHRP KNE CONDYLE&PLATU MEDIAL&LAT COMPARTMENTS Right 06/26/2024    Procedure: RIGHT TOTAL KNEE ARTHROPLASTY W ROBOT;  Surgeon: Nico Buitrago MD;  Location[facility];  Service: Orthopedics    TOENAIL EXCISION  2007 & 2019    TONSILLECTOMY AND ADENOIDECTOMY  1952    TUBAL LIGATION  1972   [3]   Family History  Problem Relation Name Age of Onset    Hyperlipidemia Mother Arlet     Heart Valve Disease Mother Arlet     Hypertension Mother Arlet     Heart disease Mother Arlet     Osteoporosis Mother Arlet     Arthritis Father Dad     Diabetes Father Dad     Alcohol abuse Father Dad     Prostate cancer Father Dad 80    Diabetes Brother Teddy Jaimes     Atrial fibrillation Brother Teddy Jaimes     Alcohol abuse Brother Teddy Jaimes     Arthritis Brother Teddy Jaimes     Heart disease Brother Teddy Jaimes     Pancreatic cancer Maternal Aunt  65    No Known Problems Paternal Uncle      No Known Problems Paternal Uncle      No Known Problems Paternal Uncle      No  Known Problems Paternal Uncle      Arthritis Maternal Grandmother Geovanna Martínez     Arthritis Maternal Grandfather Teddy Martínez     Hypertension Daughter      Bradycardia Son      No Known Problems Son      Clotting disorder Maternal Uncle Isaac Martínez    [4]   Social History  Tobacco Use    Smoking status: Never    Smokeless tobacco: Never   Vaping Use    Vaping status: Never Used   Substance Use Topics    Alcohol use: Yes     Alcohol/week: 5.0 standard drinks of alcohol     Types: 5 Glasses of wine per week    Drug use: Never   [5]   Current Outpatient Medications:     b complex vitamins capsule, Take 1 capsule by mouth daily, Disp: , Rfl:     CALCIUM PO, Take by mouth, Disp: , Rfl:     cholecalciferol (VITAMIN D3) 1,000 units tablet, Take 1,000 Units by mouth daily, Disp: , Rfl:     docusate sodium (COLACE) 100 mg capsule, Take 100 mg by mouth daily (Patient not taking: Reported on 4/9/2025), Disp: , Rfl:     furosemide (LASIX) 20 mg tablet, Take 1 tablet (20 mg total) by mouth daily, Disp: 90 tablet, Rfl: 3    hydrocortisone 2.5 % cream, APPLY TO AFFECTED AREA(S) ON CHEST (AVOID FACE/SKIN FOLDS) TWO TIMES DAILY FOR 2 WEEKS, USE ONLY AS NEEDED WITH FLARES, Disp: , Rfl:     ketoconazole (NIZORAL) 2 % cream, APPLY TOPICALLY TWO TIMES DAILY FOR ABOUT 2 WEEKS AND THEN AS NEEDED FOR FLARES, Disp: , Rfl:     magnesium gluconate (MAGONATE) 500 mg tablet, Take 200 mg by mouth 2 (two) times a day, Disp: , Rfl:     metoprolol succinate (TOPROL-XL) 25 mg 24 hr tablet, TAKE 1.5 TABLETS (37.5MG TOTAL) BY MOUTH TWO TIMES A DAY, Disp: 135 tablet, Rfl: 3    Multiple Vitamins-Minerals (MULTIVITAMIN ADULTS PO), Take by mouth, Disp: , Rfl:     Xarelto 20 MG tablet, TAKE 1 TABLET BY MOUTH EVERY DAY WITH BREAKFAST, Disp: 30 tablet, Rfl: 5  [6]   Allergies  Allergen Reactions    Macrodantin [Nitrofurantoin] Nausea Only, Dizziness and Headache

## 2025-08-14 ENCOUNTER — APPOINTMENT (OUTPATIENT)
Dept: RADIOLOGY | Facility: MEDICAL CENTER | Age: 85
End: 2025-08-14
Attending: PHYSICAL MEDICINE & REHABILITATION
Payer: COMMERCIAL

## 2025-08-14 ENCOUNTER — OFFICE VISIT (OUTPATIENT)
Dept: OBGYN CLINIC | Facility: MEDICAL CENTER | Age: 85
End: 2025-08-14
Payer: COMMERCIAL

## 2025-08-21 ENCOUNTER — CONSULTATION (OUTPATIENT)
Dept: URBAN - METROPOLITAN AREA CLINIC 6 | Facility: CLINIC | Age: 85
End: 2025-08-21

## 2025-08-21 DIAGNOSIS — H25.813: ICD-10-CM

## 2025-08-21 PROCEDURE — 92015 DETERMINE REFRACTIVE STATE: CPT

## 2025-08-21 PROCEDURE — 92014 COMPRE OPH EXAM EST PT 1/>: CPT

## 2025-08-21 ASSESSMENT — KERATOMETRY
OD_AXISANGLE2_DEGREES: 67
OS_AXISANGLE2_DEGREES: 84
OS_AXISANGLE_DEGREES: 174
OD_K1POWER_DIOPTERS: 42.75
OS_K2POWER_DIOPTERS: 42.75
OS_K1POWER_DIOPTERS: 42.25
OD_K2POWER_DIOPTERS: 43.25
OD_AXISANGLE_DEGREES: 157

## 2025-08-21 ASSESSMENT — VISUAL ACUITY
OS_CC: 20/25
OU_CC: J1
OD_CC: 20/25-1

## 2025-08-21 ASSESSMENT — TONOMETRY
OS_IOP_MMHG: 13
OD_IOP_MMHG: 16

## (undated) DEVICE — ACE WRAP 6 IN UNSTERILE

## (undated) DEVICE — DUAL CUT SAGITTAL BLADE

## (undated) DEVICE — ABDOMINAL PAD: Brand: DERMACEA

## (undated) DEVICE — NO-SCRATCH ™ SMALL WHITNEY CURETTE ™ IS A SINGLE-USE, PLASTIC CURETTE FOR QUICKLY APPLYING, MANIPULATING AND REMOVING BONE CEMENT DURING HIP AND KNEE REPLACEMENT SURGERY. THE PLASTIC IS SOFTER THAN STEEL INSTRUMENTS, REDUCING THE RISK OF DAMAGING THE PROSTHESIS WITH METAL INSTRUMENTS.  THE CURETTE’S 6MM TIP REMOVES EXCESS CEMENT FROM REPLACEMENT HIPS AND KNEES. EASY-TO-MANEUVER, THE SMALL BLUE CURETTE LETS YOU REMOVE CEMENT FROM ALL EDGES OF THE PROSTHESIS.NO-SCRATCH WHITNEY SMALL CURETTE FEATURES:SAFER THAN STEEL- MADE OF PLASTIC - STURDY YET SOFTER THAN SURGICAL STEEL.HANDIER- EACH TOOL HAS A MOLDED-IN THUMB INDENTATION INSTANTLY ORIENTING THE TOOL.- EASIER TO MANEUVER IN HARD TO SEE PLACES.- COLOR-CODED FOR EASY IDENTIFICATION.FASTER- COMES INDIVIDUALLY PACKAGED IN STERILE, PEEL OPEN POUCH, READY TO GO.- APPLIES, MANIPULATES, OR REMOVES CEMENT WITH FINGERTIP PRECISION.ECONOMICAL- THE COST OF A SINGLE REVISION DWARFS THE COST OF A SINGLE-USE CURETTE. - DISPOSABLE – THERE’S NO NEED TO WASTE TIME REMOVING HARDENED CEMENT OR RE-STERILIZING TOOLS.- LESS EXPENSIVE TO BUY AND INVENTORY - ORDER ONLY THE TOOL YOU USE.- PACKAGED 25 INDIVIDUALLY WRAPPED TOOLS TO A CARTON FOR CONVENIENT SHELF STORAGE.: Brand: WHITNEY NO-SCRATCH CURETTE (SMALL)

## (undated) DEVICE — PADDING CAST 4 IN  COTTON STRL

## (undated) DEVICE — ACE WRAP 6 IN STERILE

## (undated) DEVICE — HOOD: Brand: T7PLUS

## (undated) DEVICE — DRAPE EQUIPMENT RF WAND

## (undated) DEVICE — PENCIL ELECTROSURG E-Z CLEAN -0035H

## (undated) DEVICE — DGW .035 FC J3MM 260CM TEF: Brand: EMERALD

## (undated) DEVICE — GUIDEWIRE WHOLEY HI TORQUE INTERM MOD J .035 145CM

## (undated) DEVICE — CUFF TOURNIQUET 30 X 4 IN QUICK CONNECT DISP 1BLA

## (undated) DEVICE — VELYS ROBOT DRAPE

## (undated) DEVICE — TR BAND RADIAL ARTERY COMPRESSION DEVICE: Brand: TR BAND

## (undated) DEVICE — VELYS ROBOT-ASSISTED SOLUTION ARRAY DRILL PIN 125 MM X 4 MM: Brand: VELYS

## (undated) DEVICE — STOCKINETTE REGULAR

## (undated) DEVICE — VELYS SATEL DRAPE

## (undated) DEVICE — HOOD WITH PEEL AWAY FACE SHIELD: Brand: T7PLUS

## (undated) DEVICE — SKN PRP WNG SPNGE PVP SCRB STR: Brand: MEDLINE INDUSTRIES, INC.

## (undated) DEVICE — JP 3-SPRING RES W/10FR PVC DRAIN/TR: Brand: CARDINAL HEALTH

## (undated) DEVICE — TRAY FOLEY 16FR URIMETER SURESTEP

## (undated) DEVICE — VELYS ROBOTIC-ASSISTED SOLUTION ARRAY SET - KNEE: Brand: VELYS

## (undated) DEVICE — 3 BONE CEMENT MIXER: Brand: MIXEVAC

## (undated) DEVICE — BETHLEHEM UNIV TOTAL KNEE, KIT: Brand: CARDINAL HEALTH

## (undated) DEVICE — SILVER-COATED ANTIMICROBIAL BARRIER DRESSING: Brand: ACTICOAT   4" X 8"

## (undated) DEVICE — 1.6MM THREADED GUIDE WIRE 150MM

## (undated) DEVICE — 3M™ IOBAN™ 2 ANTIMICROBIAL INCISE DRAPE 6650EZ: Brand: IOBAN™ 2

## (undated) DEVICE — HANDPIECE SET WITH RETRACTABLE COAXIAL FAN SPRAY TIP AND SUCTION TUBE: Brand: INTERPULSE

## (undated) DEVICE — GAUZE SPONGES,16 PLY: Brand: CURITY

## (undated) DEVICE — SUT VICRYL PLUS 2-0 CTB-1 27 IN VCPB259H

## (undated) DEVICE — CAPIT KNEE ATTUNE FB W/DOM AOX

## (undated) DEVICE — DRAPE SHEET X-LG

## (undated) DEVICE — VELYS BLADE SAW OSC 85 X 19 X 2MM

## (undated) DEVICE — DRAPE SHEET THREE QUARTER

## (undated) DEVICE — GLOVE SRG BIOGEL 8

## (undated) DEVICE — GLOVE INDICATOR PI UNDERGLOVE SZ 8.5 BLUE

## (undated) DEVICE — SUT VICRYL PLUS 1 CTB-1 36 IN VCPB947H

## (undated) DEVICE — STERILE PITCHER PACK: Brand: CARDINAL HEALTH

## (undated) DEVICE — RADIFOCUS OPTITORQUE ANGIOGRAPHIC CATHETER: Brand: OPTITORQUE

## (undated) DEVICE — GLIDESHEATH SLENDER STAINLESS STEEL KIT: Brand: GLIDESHEATH SLENDER

## (undated) DEVICE — RECIPROCATING BLADE, DOUBLE SIDED, OFFSET  (70.0 X 0.64 X 12.6MM)